# Patient Record
Sex: MALE | Race: WHITE | NOT HISPANIC OR LATINO | URBAN - METROPOLITAN AREA
[De-identification: names, ages, dates, MRNs, and addresses within clinical notes are randomized per-mention and may not be internally consistent; named-entity substitution may affect disease eponyms.]

---

## 2017-08-03 ENCOUNTER — EMERGENCY (EMERGENCY)
Facility: HOSPITAL | Age: 47
LOS: 1 days | Discharge: HOME | End: 2017-08-03
Attending: EMERGENCY MEDICINE | Admitting: EMERGENCY MEDICINE
Payer: SELF-PAY

## 2017-08-03 VITALS
DIASTOLIC BLOOD PRESSURE: 82 MMHG | RESPIRATION RATE: 16 BRPM | WEIGHT: 175.93 LBS | SYSTOLIC BLOOD PRESSURE: 121 MMHG | OXYGEN SATURATION: 100 % | HEART RATE: 93 BPM | TEMPERATURE: 98 F

## 2017-08-03 VITALS
SYSTOLIC BLOOD PRESSURE: 117 MMHG | TEMPERATURE: 98 F | RESPIRATION RATE: 17 BRPM | HEART RATE: 82 BPM | DIASTOLIC BLOOD PRESSURE: 92 MMHG | OXYGEN SATURATION: 98 %

## 2017-08-03 DIAGNOSIS — Z87.820 PERSONAL HISTORY OF TRAUMATIC BRAIN INJURY: ICD-10-CM
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
## 2017-08-03 DIAGNOSIS — Z88.5 ALLERGY STATUS TO NARCOTIC AGENT: ICD-10-CM

## 2017-08-03 DIAGNOSIS — E11.9 TYPE 2 DIABETES MELLITUS WITHOUT COMPLICATIONS: ICD-10-CM

## 2017-08-03 DIAGNOSIS — M79.602 PAIN IN LEFT ARM: ICD-10-CM
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
## 2017-08-03 DIAGNOSIS — Z79.84 LONG TERM (CURRENT) USE OF ORAL HYPOGLYCEMIC DRUGS: ICD-10-CM

## 2017-08-03 LAB
ALBUMIN SERPL ELPH-MCNC: 3.7 G/DL — SIGNIFICANT CHANGE UP (ref 3.3–5)
ALP SERPL-CCNC: 84 U/L — SIGNIFICANT CHANGE UP (ref 40–120)
ALT FLD-CCNC: 18 U/L — SIGNIFICANT CHANGE UP (ref 12–78)
ANION GAP SERPL CALC-SCNC: 10 MMOL/L — SIGNIFICANT CHANGE UP (ref 5–17)
AST SERPL-CCNC: 13 U/L — LOW (ref 15–37)
BASOPHILS # BLD AUTO: 0.1 K/UL — SIGNIFICANT CHANGE UP (ref 0–0.2)
BASOPHILS NFR BLD AUTO: 1.5 % — SIGNIFICANT CHANGE UP (ref 0–2)
BILIRUB SERPL-MCNC: 0.2 MG/DL — SIGNIFICANT CHANGE UP (ref 0.2–1.2)
BUN SERPL-MCNC: 17 MG/DL — SIGNIFICANT CHANGE UP (ref 7–23)
CALCIUM SERPL-MCNC: 9.1 MG/DL — SIGNIFICANT CHANGE UP (ref 8.5–10.1)
CHLORIDE SERPL-SCNC: 104 MMOL/L — SIGNIFICANT CHANGE UP (ref 96–108)
CK SERPL-CCNC: 50 U/L — SIGNIFICANT CHANGE UP (ref 26–308)
CO2 SERPL-SCNC: 21 MMOL/L — LOW (ref 22–31)
CREAT SERPL-MCNC: 0.73 MG/DL — SIGNIFICANT CHANGE UP (ref 0.5–1.3)
EOSINOPHIL # BLD AUTO: 0.3 K/UL — SIGNIFICANT CHANGE UP (ref 0–0.5)
EOSINOPHIL NFR BLD AUTO: 3.6 % — SIGNIFICANT CHANGE UP (ref 0–6)
GLUCOSE SERPL-MCNC: 299 MG/DL — HIGH (ref 70–99)
HCT VFR BLD CALC: 48.5 % — SIGNIFICANT CHANGE UP (ref 39–50)
HGB BLD-MCNC: 17 G/DL — SIGNIFICANT CHANGE UP (ref 13–17)
LYMPHOCYTES # BLD AUTO: 4.2 K/UL — HIGH (ref 1–3.3)
LYMPHOCYTES # BLD AUTO: 46 % — HIGH (ref 13–44)
MCHC RBC-ENTMCNC: 30.7 PG — SIGNIFICANT CHANGE UP (ref 27–34)
MCHC RBC-ENTMCNC: 35.1 GM/DL — SIGNIFICANT CHANGE UP (ref 32–36)
MCV RBC AUTO: 87.6 FL — SIGNIFICANT CHANGE UP (ref 80–100)
MONOCYTES # BLD AUTO: 0.6 K/UL — SIGNIFICANT CHANGE UP (ref 0–0.9)
MONOCYTES NFR BLD AUTO: 6.1 % — SIGNIFICANT CHANGE UP (ref 1–9)
NEUTROPHILS # BLD AUTO: 3.9 K/UL — SIGNIFICANT CHANGE UP (ref 1.8–7.4)
NEUTROPHILS NFR BLD AUTO: 42.9 % — LOW (ref 43–77)
PLATELET # BLD AUTO: 251 K/UL — SIGNIFICANT CHANGE UP (ref 150–400)
POTASSIUM SERPL-MCNC: 4.3 MMOL/L — SIGNIFICANT CHANGE UP (ref 3.5–5.3)
POTASSIUM SERPL-SCNC: 4.3 MMOL/L — SIGNIFICANT CHANGE UP (ref 3.5–5.3)
PROT SERPL-MCNC: 7.3 G/DL — SIGNIFICANT CHANGE UP (ref 6–8.3)
RBC # BLD: 5.54 M/UL — SIGNIFICANT CHANGE UP (ref 4.2–5.8)
RBC # FLD: 11.9 % — SIGNIFICANT CHANGE UP (ref 10.3–14.5)
SODIUM SERPL-SCNC: 135 MMOL/L — SIGNIFICANT CHANGE UP (ref 135–145)
WBC # BLD: 9.1 K/UL — SIGNIFICANT CHANGE UP (ref 3.8–10.5)
WBC # FLD AUTO: 9.1 K/UL — SIGNIFICANT CHANGE UP (ref 3.8–10.5)

## 2017-08-03 PROCEDURE — 73080 X-RAY EXAM OF ELBOW: CPT

## 2017-08-03 PROCEDURE — 36415 COLL VENOUS BLD VENIPUNCTURE: CPT

## 2017-08-03 PROCEDURE — 99284 EMERGENCY DEPT VISIT MOD MDM: CPT | Mod: 25

## 2017-08-03 PROCEDURE — 99283 EMERGENCY DEPT VISIT LOW MDM: CPT

## 2017-08-03 PROCEDURE — 73110 X-RAY EXAM OF WRIST: CPT | Mod: 26,LT

## 2017-08-03 PROCEDURE — 73090 X-RAY EXAM OF FOREARM: CPT

## 2017-08-03 PROCEDURE — 73130 X-RAY EXAM OF HAND: CPT

## 2017-08-03 PROCEDURE — 73080 X-RAY EXAM OF ELBOW: CPT | Mod: 26,LT

## 2017-08-03 PROCEDURE — 73130 X-RAY EXAM OF HAND: CPT | Mod: 26,LT

## 2017-08-03 PROCEDURE — 82550 ASSAY OF CK (CPK): CPT

## 2017-08-03 PROCEDURE — 73110 X-RAY EXAM OF WRIST: CPT

## 2017-08-03 PROCEDURE — 73090 X-RAY EXAM OF FOREARM: CPT | Mod: 26,LT

## 2017-08-03 PROCEDURE — 29125 APPL SHORT ARM SPLINT STATIC: CPT | Mod: LT

## 2017-08-03 PROCEDURE — 85027 COMPLETE CBC AUTOMATED: CPT

## 2017-08-03 PROCEDURE — 80053 COMPREHEN METABOLIC PANEL: CPT

## 2017-08-03 PROCEDURE — 29125 APPL SHORT ARM SPLINT STATIC: CPT

## 2017-08-03 RX ORDER — CYCLOBENZAPRINE HYDROCHLORIDE 10 MG/1
1 TABLET, FILM COATED ORAL
Qty: 15 | Refills: 0
Start: 2017-08-03 | End: 2017-08-08

## 2017-08-03 RX ORDER — CYCLOBENZAPRINE HYDROCHLORIDE 10 MG/1
5 TABLET, FILM COATED ORAL ONCE
Qty: 0 | Refills: 0 | Status: COMPLETED | OUTPATIENT
Start: 2017-08-03 | End: 2017-08-03

## 2017-08-03 RX ORDER — IBUPROFEN 200 MG
600 TABLET ORAL ONCE
Qty: 0 | Refills: 0 | Status: COMPLETED | OUTPATIENT
Start: 2017-08-03 | End: 2017-08-03

## 2017-08-03 RX ADMIN — Medication 600 MILLIGRAM(S): at 11:50

## 2017-08-03 RX ADMIN — CYCLOBENZAPRINE HYDROCHLORIDE 5 MILLIGRAM(S): 10 TABLET, FILM COATED ORAL at 13:17

## 2017-08-03 RX ADMIN — Medication 600 MILLIGRAM(S): at 13:17

## 2017-08-03 NOTE — ED PROVIDER NOTE - ATTENDING CONTRIBUTION TO CARE
I have personally performed a face to face diagnostic evaluation on this patient.  I have reviewed the PA note and agree with the history, exam, and plan of care, except as noted.  History and Exam by me shows 46 male with left arm injury was lifting his dog at the vet today and was struggling, pulled his left arm, having pain from left elbow down to left thumb, patient alert and oriented, heart and lung sounds clear, slight swelling over lateral elbow, able to move fingers, cap reill < 2 sec, opan and close hand, has tenderness over left forarm, f/u xrays, pain control.

## 2017-08-03 NOTE — ED PROVIDER NOTE - OBJECTIVE STATEMENT
45 y/o male presents to ED c/o left arm pain s/p injury x 2 days ago. States he brought his dog to the vet and he was holding his dog tightly so his dog wouldn't move at the vet. States he then developed the left arm pain later that day. Rates pain 5/10, no radiation of pain, gradual onset, worse with movement. Denies any other complaints. States he otherwise feels good. Denies n/v, f/c, chest pain, sob, numbness, tingling, headache, lightheadedness, dizziness, visual changes.

## 2017-08-03 NOTE — ED PROCEDURE NOTE - CPROC ED POST PROC CARE GUIDE1
Verbal/written post procedure instructions were given to patient/caregiver./Keep the cast/splint/dressing clean and dry./Instructed patient/caregiver to follow-up with primary care physician./Elevate the injured extremity as instructed./Instructed patient/caregiver regarding signs and symptoms of infection.

## 2017-08-03 NOTE — ED PROVIDER NOTE - PROGRESS NOTE DETAILS
ekg refused by pt. risks discussed including death and AMI. pt understands. pain improved after medication, pt in nad, resting comfortably in bed

## 2017-08-03 NOTE — ED PROVIDER NOTE - CHPI ED SYMPTOMS NEG
no deformity/no fever/no abrasion/no tingling/no bruising/no back pain/no weakness/no difficulty bearing weight/no numbness/no stiffness

## 2017-08-03 NOTE — ED PROVIDER NOTE - MEDICAL DECISION MAKING DETAILS
labs, xray, pain management, thumb spica splint labs, xray, pain management, thumb spica splint  Please follow up with your Primary MD in 24 hr. Please follow up with orthopedics Dr Cee within 3 days. Keep splint clean, dry and intact. Rest, ice, elevate extremity. OTC ibuprofen every 6 hours as needed for pain, take with food. Cyclobenzaprine every 8 hours as needed for muscle spasm, do not drive or drink alcohol while taking this medication. Return to ED immediately if condition worsens or any concerns.  Seek immediate medical care for any new/worsening signs or symptoms.

## 2017-08-03 NOTE — ED ADULT NURSE NOTE - OBJECTIVE STATEMENT
Pt. received alert and oriented x4 with chief complaint of left arm pain after holding dog earlier today.

## 2017-08-03 NOTE — ED PROCEDURE NOTE - NS ED PERI VASCULAR NEG
no cyanosis of extremity/capillary refill time < 2 seconds/no paresthesia/fingers/toes warm to touch/no swelling

## 2017-08-03 NOTE — ED PROVIDER NOTE - PHYSICAL EXAMINATION
Head- NC/AT. No capellan signs, no raccoon eyes, no hemotympanum, no contusions, no hematoma, no dental injuries.  Spine- no midline or paraspinal tenderness of cspine, thoracic spine or lumbar spine. No signs of back trauma, no masses, no abrasions, no lacerations, no redness, no bruising.  Neck- supple, no midline tenderness to palpation, + FROM, no abrasions, no ecchymosis.  Neuro- EOM intact, no nystagmus. Pelvis stable. Pt able to straight leg raise BL. NVI, good distal pulses x 4 extremities, capillary refill <2 sec x 4 extremities, sensation intact throughout, 5/5 motor x 4 extremities. Gait normal without limp.  DTRs normal x 4 extremities.  right upper extremity- FROM right shoulder, right elbow, right wrist, all fingers BL. +mild wrist and snuff box tenderness right. +mild tenderness to elbow. No other bony tenderness except where noted. No redness, no warmth, no swelling, no discharge, no streaking, no bruising, no deformity. NVI, good distal pulses BL, capillary refill <2 sec BL, sensation intact throughout, 5/5 motor x 4 extremities.

## 2018-10-19 ENCOUNTER — EMERGENCY (EMERGENCY)
Facility: HOSPITAL | Age: 48
LOS: 1 days | Discharge: HOME | End: 2018-10-19
Attending: INTERNAL MEDICINE
Payer: COMMERCIAL

## 2018-10-19 VITALS
TEMPERATURE: 98 F | RESPIRATION RATE: 16 BRPM | HEART RATE: 74 BPM | OXYGEN SATURATION: 95 % | WEIGHT: 184.97 LBS | HEIGHT: 66 IN | DIASTOLIC BLOOD PRESSURE: 75 MMHG | SYSTOLIC BLOOD PRESSURE: 111 MMHG

## 2018-10-19 PROCEDURE — 99283 EMERGENCY DEPT VISIT LOW MDM: CPT

## 2018-10-19 NOTE — ED ADULT NURSE NOTE - OBJECTIVE STATEMENT
Patient presents alert, oriented x4, calm, cooperative, able to follow commands. Multiple lacerations noted to right hand, no active bleeding noted. Fingers mobile, positive sensation

## 2018-10-20 PROCEDURE — 99283 EMERGENCY DEPT VISIT LOW MDM: CPT

## 2018-10-20 RX ADMIN — Medication 1 TABLET(S): at 01:27

## 2018-10-20 NOTE — ED PROVIDER NOTE - OBJECTIVE STATEMENT
49 y/o wm with cat bite, this was his rescue cat from adoption, uptodate on vaccines. The patient also received TT < 10 years

## 2019-02-14 ENCOUNTER — RX RENEWAL (OUTPATIENT)
Age: 49
End: 2019-02-14

## 2019-02-18 ENCOUNTER — RX RENEWAL (OUTPATIENT)
Age: 49
End: 2019-02-18

## 2019-03-05 ENCOUNTER — RECORD ABSTRACTING (OUTPATIENT)
Age: 49
End: 2019-03-05

## 2019-03-05 DIAGNOSIS — Z82.5 FAMILY HISTORY OF ASTHMA AND OTHER CHRONIC LOWER RESPIRATORY DISEASES: ICD-10-CM

## 2019-03-05 DIAGNOSIS — Z80.9 FAMILY HISTORY OF MALIGNANT NEOPLASM, UNSPECIFIED: ICD-10-CM

## 2019-03-05 DIAGNOSIS — Z87.828 PERSONAL HISTORY OF OTHER (HEALED) PHYSICAL INJURY AND TRAUMA: ICD-10-CM

## 2019-03-05 DIAGNOSIS — G56.03 CARPAL TUNNEL SYNDROM,BILATERAL UPPER LIMBS: ICD-10-CM

## 2019-03-05 DIAGNOSIS — Z87.820 PERSONAL HISTORY OF TRAUMATIC BRAIN INJURY: ICD-10-CM

## 2019-04-10 ENCOUNTER — RX RENEWAL (OUTPATIENT)
Age: 49
End: 2019-04-10

## 2019-07-17 ENCOUNTER — RX RENEWAL (OUTPATIENT)
Age: 49
End: 2019-07-17

## 2019-07-19 ENCOUNTER — RX RENEWAL (OUTPATIENT)
Age: 49
End: 2019-07-19

## 2019-07-22 ENCOUNTER — EMERGENCY (EMERGENCY)
Facility: HOSPITAL | Age: 49
LOS: 1 days | Discharge: HOME | End: 2019-07-22
Attending: EMERGENCY MEDICINE | Admitting: EMERGENCY MEDICINE
Payer: COMMERCIAL

## 2019-07-22 VITALS
DIASTOLIC BLOOD PRESSURE: 887 MMHG | RESPIRATION RATE: 17 BRPM | TEMPERATURE: 98 F | HEART RATE: 77 BPM | SYSTOLIC BLOOD PRESSURE: 128 MMHG | WEIGHT: 182.98 LBS | OXYGEN SATURATION: 98 % | HEIGHT: 67 IN

## 2019-07-22 VITALS
RESPIRATION RATE: 18 BRPM | HEART RATE: 74 BPM | TEMPERATURE: 98 F | DIASTOLIC BLOOD PRESSURE: 73 MMHG | OXYGEN SATURATION: 97 % | SYSTOLIC BLOOD PRESSURE: 111 MMHG

## 2019-07-22 LAB
ALBUMIN SERPL ELPH-MCNC: 3.8 G/DL — SIGNIFICANT CHANGE UP (ref 3.3–5)
ALP SERPL-CCNC: 75 U/L — SIGNIFICANT CHANGE UP (ref 40–120)
ALT FLD-CCNC: 31 U/L — SIGNIFICANT CHANGE UP (ref 12–78)
ANION GAP SERPL CALC-SCNC: 8 MMOL/L — SIGNIFICANT CHANGE UP (ref 5–17)
APTT BLD: 31.2 SEC — SIGNIFICANT CHANGE UP (ref 28.5–37)
AST SERPL-CCNC: 13 U/L — LOW (ref 15–37)
BASOPHILS # BLD AUTO: 0.05 K/UL — SIGNIFICANT CHANGE UP (ref 0–0.2)
BASOPHILS NFR BLD AUTO: 0.5 % — SIGNIFICANT CHANGE UP (ref 0–2)
BILIRUB SERPL-MCNC: 0.4 MG/DL — SIGNIFICANT CHANGE UP (ref 0.2–1.2)
BUN SERPL-MCNC: 16 MG/DL — SIGNIFICANT CHANGE UP (ref 7–23)
CALCIUM SERPL-MCNC: 9 MG/DL — SIGNIFICANT CHANGE UP (ref 8.5–10.1)
CHLORIDE SERPL-SCNC: 105 MMOL/L — SIGNIFICANT CHANGE UP (ref 96–108)
CK SERPL-CCNC: 76 U/L — SIGNIFICANT CHANGE UP (ref 26–308)
CO2 SERPL-SCNC: 22 MMOL/L — SIGNIFICANT CHANGE UP (ref 22–31)
CREAT SERPL-MCNC: 0.84 MG/DL — SIGNIFICANT CHANGE UP (ref 0.5–1.3)
EOSINOPHIL # BLD AUTO: 0.26 K/UL — SIGNIFICANT CHANGE UP (ref 0–0.5)
EOSINOPHIL NFR BLD AUTO: 2.7 % — SIGNIFICANT CHANGE UP (ref 0–6)
ERYTHROCYTE [SEDIMENTATION RATE] IN BLOOD: 5 MM/HR — SIGNIFICANT CHANGE UP (ref 0–15)
GLUCOSE SERPL-MCNC: 278 MG/DL — HIGH (ref 70–99)
HCT VFR BLD CALC: 47.4 % — SIGNIFICANT CHANGE UP (ref 39–50)
HGB BLD-MCNC: 17 G/DL — SIGNIFICANT CHANGE UP (ref 13–17)
IMM GRANULOCYTES NFR BLD AUTO: 0.3 % — SIGNIFICANT CHANGE UP (ref 0–1.5)
INR BLD: 0.89 RATIO — SIGNIFICANT CHANGE UP (ref 0.88–1.16)
LACTATE SERPL-SCNC: 1.5 MMOL/L — SIGNIFICANT CHANGE UP (ref 0.7–2)
LYMPHOCYTES # BLD AUTO: 3.63 K/UL — HIGH (ref 1–3.3)
LYMPHOCYTES # BLD AUTO: 37.2 % — SIGNIFICANT CHANGE UP (ref 13–44)
MCHC RBC-ENTMCNC: 30.4 PG — SIGNIFICANT CHANGE UP (ref 27–34)
MCHC RBC-ENTMCNC: 35.9 GM/DL — SIGNIFICANT CHANGE UP (ref 32–36)
MCV RBC AUTO: 84.8 FL — SIGNIFICANT CHANGE UP (ref 80–100)
MONOCYTES # BLD AUTO: 0.67 K/UL — SIGNIFICANT CHANGE UP (ref 0–0.9)
MONOCYTES NFR BLD AUTO: 6.9 % — SIGNIFICANT CHANGE UP (ref 2–14)
NEUTROPHILS # BLD AUTO: 5.11 K/UL — SIGNIFICANT CHANGE UP (ref 1.8–7.4)
NEUTROPHILS NFR BLD AUTO: 52.4 % — SIGNIFICANT CHANGE UP (ref 43–77)
NRBC # BLD: 0 /100 WBCS — SIGNIFICANT CHANGE UP (ref 0–0)
PLATELET # BLD AUTO: 233 K/UL — SIGNIFICANT CHANGE UP (ref 150–400)
POTASSIUM SERPL-MCNC: 4.3 MMOL/L — SIGNIFICANT CHANGE UP (ref 3.5–5.3)
POTASSIUM SERPL-SCNC: 4.3 MMOL/L — SIGNIFICANT CHANGE UP (ref 3.5–5.3)
PROT SERPL-MCNC: 7.5 G/DL — SIGNIFICANT CHANGE UP (ref 6–8.3)
PROTHROM AB SERPL-ACNC: 10.1 SEC — SIGNIFICANT CHANGE UP (ref 10–12.9)
RBC # BLD: 5.59 M/UL — SIGNIFICANT CHANGE UP (ref 4.2–5.8)
RBC # FLD: 12.8 % — SIGNIFICANT CHANGE UP (ref 10.3–14.5)
SODIUM SERPL-SCNC: 135 MMOL/L — SIGNIFICANT CHANGE UP (ref 135–145)
WBC # BLD: 9.75 K/UL — SIGNIFICANT CHANGE UP (ref 3.8–10.5)
WBC # FLD AUTO: 9.75 K/UL — SIGNIFICANT CHANGE UP (ref 3.8–10.5)

## 2019-07-22 PROCEDURE — 93010 ELECTROCARDIOGRAM REPORT: CPT

## 2019-07-22 PROCEDURE — 82962 GLUCOSE BLOOD TEST: CPT

## 2019-07-22 PROCEDURE — 36415 COLL VENOUS BLD VENIPUNCTURE: CPT

## 2019-07-22 PROCEDURE — 93971 EXTREMITY STUDY: CPT

## 2019-07-22 PROCEDURE — 73630 X-RAY EXAM OF FOOT: CPT

## 2019-07-22 PROCEDURE — 85027 COMPLETE CBC AUTOMATED: CPT

## 2019-07-22 PROCEDURE — 82550 ASSAY OF CK (CPK): CPT

## 2019-07-22 PROCEDURE — 73630 X-RAY EXAM OF FOOT: CPT | Mod: 26,RT

## 2019-07-22 PROCEDURE — 99284 EMERGENCY DEPT VISIT MOD MDM: CPT

## 2019-07-22 PROCEDURE — 83605 ASSAY OF LACTIC ACID: CPT

## 2019-07-22 PROCEDURE — 93926 LOWER EXTREMITY STUDY: CPT

## 2019-07-22 PROCEDURE — 85652 RBC SED RATE AUTOMATED: CPT

## 2019-07-22 PROCEDURE — 93005 ELECTROCARDIOGRAM TRACING: CPT

## 2019-07-22 PROCEDURE — 85610 PROTHROMBIN TIME: CPT

## 2019-07-22 PROCEDURE — 85730 THROMBOPLASTIN TIME PARTIAL: CPT

## 2019-07-22 PROCEDURE — 99284 EMERGENCY DEPT VISIT MOD MDM: CPT | Mod: 25

## 2019-07-22 PROCEDURE — 96376 TX/PRO/DX INJ SAME DRUG ADON: CPT

## 2019-07-22 PROCEDURE — 80053 COMPREHEN METABOLIC PANEL: CPT

## 2019-07-22 PROCEDURE — 93926 LOWER EXTREMITY STUDY: CPT | Mod: 26,RT

## 2019-07-22 PROCEDURE — 93922 UPR/L XTREMITY ART 2 LEVELS: CPT | Mod: 26

## 2019-07-22 PROCEDURE — 96374 THER/PROPH/DIAG INJ IV PUSH: CPT

## 2019-07-22 PROCEDURE — 93971 EXTREMITY STUDY: CPT | Mod: 26,RT

## 2019-07-22 PROCEDURE — 87040 BLOOD CULTURE FOR BACTERIA: CPT

## 2019-07-22 RX ORDER — HYDROMORPHONE HYDROCHLORIDE 2 MG/ML
0.5 INJECTION INTRAMUSCULAR; INTRAVENOUS; SUBCUTANEOUS ONCE
Refills: 0 | Status: DISCONTINUED | OUTPATIENT
Start: 2019-07-22 | End: 2019-07-22

## 2019-07-22 RX ORDER — SODIUM CHLORIDE 9 MG/ML
1000 INJECTION INTRAMUSCULAR; INTRAVENOUS; SUBCUTANEOUS ONCE
Refills: 0 | Status: COMPLETED | OUTPATIENT
Start: 2019-07-22 | End: 2019-07-22

## 2019-07-22 RX ADMIN — HYDROMORPHONE HYDROCHLORIDE 0.5 MILLIGRAM(S): 2 INJECTION INTRAMUSCULAR; INTRAVENOUS; SUBCUTANEOUS at 13:11

## 2019-07-22 RX ADMIN — HYDROMORPHONE HYDROCHLORIDE 0.5 MILLIGRAM(S): 2 INJECTION INTRAMUSCULAR; INTRAVENOUS; SUBCUTANEOUS at 17:48

## 2019-07-22 RX ADMIN — SODIUM CHLORIDE 1000 MILLILITER(S): 9 INJECTION INTRAMUSCULAR; INTRAVENOUS; SUBCUTANEOUS at 11:01

## 2019-07-22 RX ADMIN — HYDROMORPHONE HYDROCHLORIDE 0.5 MILLIGRAM(S): 2 INJECTION INTRAMUSCULAR; INTRAVENOUS; SUBCUTANEOUS at 14:00

## 2019-07-22 RX ADMIN — HYDROMORPHONE HYDROCHLORIDE 0.5 MILLIGRAM(S): 2 INJECTION INTRAMUSCULAR; INTRAVENOUS; SUBCUTANEOUS at 17:50

## 2019-07-22 RX ADMIN — HYDROMORPHONE HYDROCHLORIDE 0.5 MILLIGRAM(S): 2 INJECTION INTRAMUSCULAR; INTRAVENOUS; SUBCUTANEOUS at 12:27

## 2019-07-22 RX ADMIN — HYDROMORPHONE HYDROCHLORIDE 0.5 MILLIGRAM(S): 2 INJECTION INTRAMUSCULAR; INTRAVENOUS; SUBCUTANEOUS at 11:01

## 2019-07-22 NOTE — ED PROVIDER NOTE - PROGRESS NOTE DETAILS
spoke with vascular on call, Dr. Brown,case dicussed, aware of discolored right 5th toe, (+)DP pulse, awaiting US arterial, wants EULALIA to be done as well and call back with results Case reviewed again with Dr. Brown who feels that patient can go home to follow-up with him as outpatient.

## 2019-07-22 NOTE — ED PROVIDER NOTE - OBJECTIVE STATEMENT
Patient is a 49 y/o male with PMHx of NIDDM on Metformin complicated by diabetic neuropathy, TBI with chronic migraines, and nicotine dependence with 32 pack year history who presents with pain and purple discoloration of the R 5th toe, R 4th toe, and side of R foot. Patient describes beginning to notice the pain yesterday afternoon with redness of his R 5th toe. Pain is constant, 8/10, and feels like "someone is gripping my toes with pliers". Pain worsens with ambulation and icing helps the pain briefly. Denies taking any medication for the pain. Noticed the purple discoloration of the R 5th toe, R 4th toe tip, and R side of the foot this morning. Says this pain is different than his normal LE diabetic neuropathy pain. Denies any trauma to the area. Denies any fevers, chills, chest pain, or SOB. Denies any recent travel or increased time of immobility. Denies any history of gout. As per patient, last HbA1c was 7.2-7.4 within the past 2-3 months.     PCP: Dr. Sheldon Irwin

## 2019-07-22 NOTE — ED PROVIDER NOTE - ATTENDING CONTRIBUTION TO CARE
I have personally performed a face to face bedside history and physical examination of this patient. I have discussed the history, examination, review of systems, assessment and plan of management with the resident. I have reviewed the electronic medical record and amended it to reflect my history, review of systems, physical exam, assessment and plan. Patient c/o right foot pain, right 5th toe discoloration, tender to palpation, concern for PAD, (+)smoker, DM, f/u labs, US doppler, xrays, pain control.

## 2019-07-22 NOTE — ED PROVIDER NOTE - NS ED ROS FT
Constitutional: denies fever or chills  HEENT: denies headache, dizziness, or lightheadedness  Respiratory: denies SOB  Cardiovascular: denies CP, palpitations  Skin: + discoloration of R toes, R foot  Musculoskeletal: + R foot pain, + R calf pain  Neurologic: + neuropathic pain RLE LLE  ROS negative except as noted above

## 2019-07-22 NOTE — ED ADULT NURSE NOTE - OBJECTIVE STATEMENT
pt to er has area to bottom of 5 th toe on right foot with darkened skin no drainage c/o pain to area pos pedal pulse resp even unlabored iv started 20 angio left ac labs drawn

## 2019-07-22 NOTE — ED PROVIDER NOTE - PHYSICAL EXAMINATION
Physical Exam:  General: Overweight male, in mild acute distress due to pain  CV: RRR, +S1/S2, no murmurs, rubs or gallops  Respiratory: CTA B/L, No W/R/R  Extremities:   LLE: no cyanosis/ecchymosis, tenderness to light touch normal 2/2 diabetic neuropathy as per patient; pulses 2+ dorsalis, posterior tib, femoral on palpation; DP and PT pulses audible with doppler; 5/5 strength all ROM foot and ankle; sensation intact to light touch throughout  RLE: purple discoloration of 5th toe, tip of 4th toe, lateral side of foot; erythema of all toes; tenderness to light touch of all toes, plantar and dorsal aspects of foot, up to mid-calf region; DP pulse and femoral pulse 2+/4 by palpation; no PT found on palpation; DP and PT pulses audible with doppler; 4/5 strength all ROM foot and ankle 2/2 pain; sensation intact to light touch throughout

## 2019-07-25 ENCOUNTER — APPOINTMENT (OUTPATIENT)
Dept: INTERNAL MEDICINE | Facility: CLINIC | Age: 49
End: 2019-07-25
Payer: COMMERCIAL

## 2019-07-25 VITALS
RESPIRATION RATE: 16 BRPM | HEART RATE: 90 BPM | BODY MASS INDEX: 29.7 KG/M2 | HEIGHT: 67 IN | SYSTOLIC BLOOD PRESSURE: 124 MMHG | TEMPERATURE: 98.3 F | DIASTOLIC BLOOD PRESSURE: 84 MMHG | OXYGEN SATURATION: 95 % | WEIGHT: 189.19 LBS

## 2019-07-25 DIAGNOSIS — Z87.438 PERSONAL HISTORY OF OTHER DISEASES OF MALE GENITAL ORGANS: ICD-10-CM

## 2019-07-25 PROCEDURE — G0442 ANNUAL ALCOHOL SCREEN 15 MIN: CPT

## 2019-07-25 PROCEDURE — 99406 BEHAV CHNG SMOKING 3-10 MIN: CPT

## 2019-07-25 PROCEDURE — 99214 OFFICE O/P EST MOD 30 MIN: CPT | Mod: 25

## 2019-07-25 RX ORDER — ASPIRIN 325 MG/1
325 TABLET, FILM COATED ORAL
Refills: 0 | Status: ACTIVE | COMMUNITY

## 2019-07-25 NOTE — PLAN
[FreeTextEntry1] : Pulmonology \par Tobacco abuse-  advised the importance to stop smoking. Education was provided to the patient for over 5 minutes. Discussed comorbidities of tobacco abuse including stroke and clots.  Patient states he plans to quit in 2 days\par \par Endocrinology\par Diabetes-  advised patient the need to check hemoglobin A1c every 3-4 months.  Reviewed last hemoglobin A1c of October 2018 is elevated at 7.8.  Patient was given a prescription for and hemoglobin A1c. Did refill metformin for only 30 days pending blood test.  Again education was provided.\par \par Vitamin D deficiency -  advised to take vitamin D 3. Check lab\par Vascular  \par Fifth toe vascular clot.  Followup with Dr. Brown. Continue with aspirin 325 mg daily. Advised to elevate leg.

## 2019-07-25 NOTE — COUNSELING
[Healthy eating counseling provided] : healthy eating [Activity counseling provided] : activity [Discussed Risks and Advised to Quit Smoking] : Discussed risks and advised to quit smoking [Discussed Cessation Medication] : cessation medication was discussed [Discussed Cessation Strategies] : cessation strategies were discussed [Needs reinforcement, provided] : Patient needs reinforcement on understanding of disease, goals and obesity follow-up plan; reinforcement was provided [Low Fat Diet] : Low fat diet [Low Salt Diet] : Low salt diet [Decrease Portions] : Decrease food portions [Walking] : Walking [Patient motivation] : Patient motivation [Tobacco Use Cessation Intermediate Greater Than 3 Minutes Up to 10 Minutes] : Tobacco Use Cessation Intermediate Greater Than 3 Minutes Up to 10 Minutes [ - Annual Alcohol Misuse Screening] : Annual Alcohol Misuse Screening [de-identified] : ADA 1800 miley diet

## 2019-07-25 NOTE — ASSESSMENT
[FreeTextEntry1] : A 48-year-old male who presents to the office for a followup status post emergency room visit

## 2019-07-25 NOTE — PHYSICAL EXAM
[No Acute Distress] : no acute distress [Well Nourished] : well nourished [Well Developed] : well developed [Well-Appearing] : well-appearing [Normal Sclera/Conjunctiva] : normal sclera/conjunctiva [PERRL] : pupils equal round and reactive to light [EOMI] : extraocular movements intact [Normal Outer Ear/Nose] : the outer ears and nose were normal in appearance [Normal Oropharynx] : the oropharynx was normal [No JVD] : no jugular venous distention [No Lymphadenopathy] : no lymphadenopathy [Supple] : supple [Thyroid Normal, No Nodules] : the thyroid was normal and there were no nodules present [No Respiratory Distress] : no respiratory distress  [No Accessory Muscle Use] : no accessory muscle use [Clear to Auscultation] : lungs were clear to auscultation bilaterally [Normal Rate] : normal rate  [Regular Rhythm] : with a regular rhythm [Normal S1, S2] : normal S1 and S2 [No Carotid Bruits] : no carotid bruits [No Abdominal Bruit] : a ~M bruit was not heard ~T in the abdomen [No Varicosities] : no varicosities [Pedal Pulses Present] : the pedal pulses are present [No Edema] : there was no peripheral edema [No Palpable Aorta] : no palpable aorta [No Extremity Clubbing/Cyanosis] : no extremity clubbing/cyanosis [Soft] : abdomen soft [Non Tender] : non-tender [Non-distended] : non-distended [No Masses] : no abdominal mass palpated [No HSM] : no HSM [Normal Bowel Sounds] : normal bowel sounds [Normal Posterior Cervical Nodes] : no posterior cervical lymphadenopathy [Normal Anterior Cervical Nodes] : no anterior cervical lymphadenopathy [No CVA Tenderness] : no CVA  tenderness [No Spinal Tenderness] : no spinal tenderness [No Joint Swelling] : no joint swelling [Grossly Normal Strength/Tone] : grossly normal strength/tone [No Rash] : no rash [Coordination Grossly Intact] : coordination grossly intact [No Focal Deficits] : no focal deficits [Normal Gait] : normal gait [Deep Tendon Reflexes (DTR)] : deep tendon reflexes were 2+ and symmetric [Normal Affect] : the affect was normal [Normal Insight/Judgement] : insight and judgment were intact [Tattoo - Single] : no tattoos observed [Multiple Tattoos] : multiple tattoos observed [Speech Grossly Normal] : speech grossly normal [Alert and Oriented x3] : oriented to person, place, and time [Normal Mood] : the mood was normal [] : both feet [de-identified] : Left toe 5th was purple and tender to touch

## 2019-07-25 NOTE — HEALTH RISK ASSESSMENT
[] : Yes [30 or more] : 30 or more [Never (0 pts)] : Never (0 points) [No] : In the past 12 months have you used drugs other than those required for medical reasons? No [No falls in past year] : Patient reported no falls in the past year [0] : 2) Feeling down, depressed, or hopeless: Not at all (0) [Audit-CScore] : 0 [de-identified] : Denies  [de-identified] : Low carbohydrate  [[id number]] : 0

## 2019-07-27 LAB
CULTURE RESULTS: SIGNIFICANT CHANGE UP
CULTURE RESULTS: SIGNIFICANT CHANGE UP
SPECIMEN SOURCE: SIGNIFICANT CHANGE UP
SPECIMEN SOURCE: SIGNIFICANT CHANGE UP

## 2019-08-09 ENCOUNTER — APPOINTMENT (OUTPATIENT)
Dept: VASCULAR SURGERY | Facility: CLINIC | Age: 49
End: 2019-08-09
Payer: COMMERCIAL

## 2019-08-09 VITALS
HEART RATE: 90 BPM | DIASTOLIC BLOOD PRESSURE: 86 MMHG | TEMPERATURE: 98.1 F | WEIGHT: 188 LBS | HEIGHT: 67 IN | BODY MASS INDEX: 29.51 KG/M2 | SYSTOLIC BLOOD PRESSURE: 122 MMHG

## 2019-08-09 DIAGNOSIS — I75.021 ATHEROEMBOLISM OF RIGHT LOWER EXTREMITY: ICD-10-CM

## 2019-08-09 DIAGNOSIS — Z86.79 PERSONAL HISTORY OF OTHER DISEASES OF THE CIRCULATORY SYSTEM: ICD-10-CM

## 2019-08-09 DIAGNOSIS — Z86.39 PERSONAL HISTORY OF OTHER ENDOCRINE, NUTRITIONAL AND METABOLIC DISEASE: ICD-10-CM

## 2019-08-09 PROCEDURE — 99202 OFFICE O/P NEW SF 15 MIN: CPT

## 2019-08-09 NOTE — ASSESSMENT
[Arterial/Venous Disease] : arterial/venous disease [FreeTextEntry1] : Patient has  Atheroemboli to right foot  5th toe   No clinical evidence of  aneurysms  Patient has excellent  pedal pulses   Suggest cessation of  smoking  Start Plavix  Plan to  do  CTA  Abdominal  aorta with  runoff  Pain  control   [Smoking Cessation] : smoking cessation

## 2019-08-09 NOTE — HISTORY OF PRESENT ILLNESS
[FreeTextEntry1] : Patient has these symptoms  for  2 weeks  Started  suddenly  Right  5th toe  is extremely painful  Side of the foot has  purple  discoloration  Had cramps  right  calf   Sits  at a desk   Could  walk  well before this  episode  No claudication  history or  rest pain

## 2019-08-09 NOTE — PHYSICAL EXAM
[2+] : left 2+ [Ankle Swelling (On Exam)] : not present [Varicose Veins Of Lower Extremities] : not present [] : not present [FreeTextEntry1] : Right  5th toe cyanosis  Excellent pulses  Classic  Athero embolic  disease

## 2019-08-26 ENCOUNTER — RX RENEWAL (OUTPATIENT)
Age: 49
End: 2019-08-26

## 2019-12-04 ENCOUNTER — RX RENEWAL (OUTPATIENT)
Age: 49
End: 2019-12-04

## 2020-03-11 ENCOUNTER — LABORATORY RESULT (OUTPATIENT)
Age: 50
End: 2020-03-11

## 2020-03-12 ENCOUNTER — APPOINTMENT (OUTPATIENT)
Dept: INTERNAL MEDICINE | Facility: CLINIC | Age: 50
End: 2020-03-12
Payer: COMMERCIAL

## 2020-03-12 ENCOUNTER — LABORATORY RESULT (OUTPATIENT)
Age: 50
End: 2020-03-12

## 2020-03-12 VITALS
TEMPERATURE: 98.7 F | SYSTOLIC BLOOD PRESSURE: 132 MMHG | OXYGEN SATURATION: 98 % | BODY MASS INDEX: 27.78 KG/M2 | HEART RATE: 80 BPM | HEIGHT: 67 IN | RESPIRATION RATE: 16 BRPM | DIASTOLIC BLOOD PRESSURE: 70 MMHG | WEIGHT: 177 LBS

## 2020-03-12 DIAGNOSIS — Z12.5 ENCOUNTER FOR SCREENING FOR MALIGNANT NEOPLASM OF PROSTATE: ICD-10-CM

## 2020-03-12 LAB
BILIRUB UR QL STRIP: NORMAL
CLARITY UR: CLEAR
COLLECTION METHOD: NORMAL
GLUCOSE UR-MCNC: 500
HCG UR QL: 0.2 EU/DL
HGB UR QL STRIP.AUTO: NORMAL
KETONES UR-MCNC: 15
LEUKOCYTE ESTERASE UR QL STRIP: NORMAL
NITRITE UR QL STRIP: NORMAL
PH UR STRIP: 5.5
PROT UR STRIP-MCNC: 100
SP GR UR STRIP: 1.02

## 2020-03-12 PROCEDURE — 99214 OFFICE O/P EST MOD 30 MIN: CPT | Mod: 25

## 2020-03-12 PROCEDURE — 81003 URINALYSIS AUTO W/O SCOPE: CPT | Mod: QW

## 2020-03-12 PROCEDURE — 36415 COLL VENOUS BLD VENIPUNCTURE: CPT

## 2020-03-12 RX ORDER — CLOPIDOGREL BISULFATE 75 MG/1
75 TABLET, FILM COATED ORAL DAILY
Qty: 30 | Refills: 0 | Status: COMPLETED | COMMUNITY
Start: 2019-08-09 | End: 2019-09-12

## 2020-03-16 PROBLEM — Z12.5 SCREENING PSA (PROSTATE SPECIFIC ANTIGEN): Status: ACTIVE | Noted: 2020-03-12

## 2020-03-16 NOTE — COUNSELING
[Healthy eating counseling provided] : healthy eating [Activity counseling provided] : activity [Discussed Risks and Advised to Quit Smoking] : Discussed risks and advised to quit smoking [Discussed Cessation Medication] : cessation medication was discussed [Discussed Cessation Strategies] : cessation strategies were discussed [Needs reinforcement, provided] : Patient needs reinforcement on understanding of disease, goals and obesity follow-up plan; reinforcement was provided [Low Fat Diet] : Low fat diet [Low Salt Diet] : Low salt diet [Walking] : Walking [Patient motivation] : Patient motivation [Risk of tobacco use and health benefits of smoking cessation discussed] : Risk of tobacco use and health benefits of smoking cessation discussed [AUDIT-C Screening administered and reviewed] : AUDIT-C Screening administered and reviewed [Benefits of weight loss discussed] : Benefits of weight loss discussed [Encouraged to increase physical activity] : Encouraged to increase physical activity [Decrease Portions] : decrease portions [____ min/wk Activity] : [unfilled] min/wk activity [Good understanding] : Patient has a good understanding of disease, goals and obesity follow-up plan [FreeTextEntry2] : ADA diet  [de-identified] : ADA 1800 miley diet

## 2020-03-16 NOTE — HEALTH RISK ASSESSMENT
[] : Yes [30 or more] : 30 or more [Never (0 pts)] : Never (0 points) [No] : In the past 12 months have you used drugs other than those required for medical reasons? No [No falls in past year] : Patient reported no falls in the past year [0] : 2) Feeling down, depressed, or hopeless: Not at all (0) [Audit-CScore] : 0 [de-identified] : Denies  [de-identified] : Low carbohydrate  [[id number]] : 0

## 2020-03-16 NOTE — PHYSICAL EXAM
[No Acute Distress] : no acute distress [Well Nourished] : well nourished [Well Developed] : well developed [Well-Appearing] : well-appearing [Normal Sclera/Conjunctiva] : normal sclera/conjunctiva [PERRL] : pupils equal round and reactive to light [EOMI] : extraocular movements intact [Normal Outer Ear/Nose] : the outer ears and nose were normal in appearance [Normal Oropharynx] : the oropharynx was normal [No JVD] : no jugular venous distention [No Lymphadenopathy] : no lymphadenopathy [Supple] : supple [Thyroid Normal, No Nodules] : the thyroid was normal and there were no nodules present [No Respiratory Distress] : no respiratory distress  [No Accessory Muscle Use] : no accessory muscle use [Clear to Auscultation] : lungs were clear to auscultation bilaterally [Normal Rate] : normal rate  [Regular Rhythm] : with a regular rhythm [Normal S1, S2] : normal S1 and S2 [No Carotid Bruits] : no carotid bruits [No Abdominal Bruit] : a ~M bruit was not heard ~T in the abdomen [No Varicosities] : no varicosities [Pedal Pulses Present] : the pedal pulses are present [No Edema] : there was no peripheral edema [No Palpable Aorta] : no palpable aorta [No Extremity Clubbing/Cyanosis] : no extremity clubbing/cyanosis [Soft] : abdomen soft [Non Tender] : non-tender [Non-distended] : non-distended [No Masses] : no abdominal mass palpated [No HSM] : no HSM [Normal Bowel Sounds] : normal bowel sounds [Normal Posterior Cervical Nodes] : no posterior cervical lymphadenopathy [Normal Anterior Cervical Nodes] : no anterior cervical lymphadenopathy [No CVA Tenderness] : no CVA  tenderness [No Spinal Tenderness] : no spinal tenderness [No Joint Swelling] : no joint swelling [Grossly Normal Strength/Tone] : grossly normal strength/tone [No Rash] : no rash [Multiple Tattoos] : multiple tattoos observed [Coordination Grossly Intact] : coordination grossly intact [No Focal Deficits] : no focal deficits [Normal Gait] : normal gait [Deep Tendon Reflexes (DTR)] : deep tendon reflexes were 2+ and symmetric [Speech Grossly Normal] : speech grossly normal [Normal Affect] : the affect was normal [Alert and Oriented x3] : oriented to person, place, and time [Normal Mood] : the mood was normal [Normal Insight/Judgement] : insight and judgment were intact [] : both feet [Right Foot Was Examined] : Right foot ~C was examined [Left Foot Was Examined] : left foot ~C was examined [Normal TMs] : both tympanic membranes were normal [Tattoo - Single] : no tattoos observed [de-identified] : w murmur  [de-identified] : Left toe 5th was purple and tender to touch  [TWNoteComboBox3] : +1 [TWNoteComboBox4] : +2

## 2020-03-16 NOTE — ASSESSMENT
[FreeTextEntry1] : A 49-year-old male who presents to the office for a check up, renewal of medication and fasting labs

## 2020-03-16 NOTE — CURRENT MEDS
[Takes medication as prescribed] : does not take [FreeTextEntry1] : stopped metformin over the last month\par stopped Plavix and jardiace a while ago on his own

## 2020-03-16 NOTE — HISTORY OF PRESENT ILLNESS
[FreeTextEntry1] : Check up  [de-identified] : Mr. CARRINGTON is a 49 year  male, who present to the office for check up and fasting labs.\par States he stopped taking Plavix and jardiance for a period of time.  Also states for the last week  he not taking the metformin secondary to running out of medication.  \par States his foot pain is much better.  Still at time has a  slight numbness

## 2020-03-16 NOTE — PLAN
[FreeTextEntry1] : Pulmonology \par Tobacco abuse-  advised the importance to stop smoking.  Discussed comorbidities of tobacco abuse.\par  \par Endocrinology\par Diabetes-  advised patient the need to check hemoglobin A1c every 3-4 months.  Check labs.  Did refill metformin for only 30 days pending blood test.  Again education was provided.\par Advised to see ophthalmologist for a diabetic eye exam  \par \par Vitamin D deficiency -  advised to take vitamin D 3. Check lab\par \par Vascular  \par Fifth toe vascular clot.  Followup with Dr. Brown. Continue with aspirin 325 mg daily. Pt Stopped his plavix on his own.\par \par  - screening for PSA.  Education advised \par \par GI - check FIT / DNA advised the importance of screening colonoscopies \par \par We discussed the importance of healthy lifestyles which include exercise, weight control and good diet.\par Patient's questions were answered in full detail. Advise patient if any other concerns arise to please call office to have a discussion.

## 2020-03-17 ENCOUNTER — APPOINTMENT (OUTPATIENT)
Dept: INTERNAL MEDICINE | Facility: CLINIC | Age: 50
End: 2020-03-17
Payer: COMMERCIAL

## 2020-03-17 VITALS
TEMPERATURE: 97.8 F | HEART RATE: 81 BPM | SYSTOLIC BLOOD PRESSURE: 110 MMHG | BODY MASS INDEX: 27.94 KG/M2 | HEIGHT: 67 IN | WEIGHT: 178 LBS | OXYGEN SATURATION: 97 % | RESPIRATION RATE: 16 BRPM | DIASTOLIC BLOOD PRESSURE: 72 MMHG

## 2020-03-17 DIAGNOSIS — E29.1 TESTICULAR HYPOFUNCTION: ICD-10-CM

## 2020-03-17 DIAGNOSIS — E11.65 TYPE 2 DIABETES MELLITUS WITH HYPERGLYCEMIA: ICD-10-CM

## 2020-03-17 DIAGNOSIS — E55.9 VITAMIN D DEFICIENCY, UNSPECIFIED: ICD-10-CM

## 2020-03-17 DIAGNOSIS — D72.829 ELEVATED WHITE BLOOD CELL COUNT, UNSPECIFIED: ICD-10-CM

## 2020-03-17 LAB
25(OH)D3 SERPL-MCNC: 10.4 NG/ML
ALBUMIN SERPL ELPH-MCNC: 4.6 G/DL
ALP BLD-CCNC: 81 U/L
ALT SERPL-CCNC: 13 U/L
ANION GAP SERPL CALC-SCNC: 16 MMOL/L
AST SERPL-CCNC: 12 U/L
BASOPHILS # BLD AUTO: 0.05 K/UL
BASOPHILS NFR BLD AUTO: 0.4 %
BILIRUB SERPL-MCNC: 0.2 MG/DL
BUN SERPL-MCNC: 15 MG/DL
CALCIUM SERPL-MCNC: 9.9 MG/DL
CHLORIDE SERPL-SCNC: 95 MMOL/L
CHOLEST SERPL-MCNC: 204 MG/DL
CHOLEST/HDLC SERPL: 5.1 RATIO
CO2 SERPL-SCNC: 22 MMOL/L
CREAT SERPL-MCNC: 0.79 MG/DL
EOSINOPHIL # BLD AUTO: 0.31 K/UL
EOSINOPHIL NFR BLD AUTO: 2.6 %
ESTIMATED AVERAGE GLUCOSE: 349 MG/DL
GLUCOSE SERPL-MCNC: 330 MG/DL
HBA1C MFR BLD HPLC: 13.8 %
HCT VFR BLD CALC: 50 %
HDLC SERPL-MCNC: 40 MG/DL
HGB BLD-MCNC: 16.6 G/DL
IMM GRANULOCYTES NFR BLD AUTO: 0.2 %
LDLC SERPL CALC-MCNC: 95 MG/DL
LYMPHOCYTES # BLD AUTO: 4.17 K/UL
LYMPHOCYTES NFR BLD AUTO: 34.7 %
MAN DIFF?: NORMAL
MCHC RBC-ENTMCNC: 29.6 PG
MCHC RBC-ENTMCNC: 33.2 GM/DL
MCV RBC AUTO: 89.1 FL
MONOCYTES # BLD AUTO: 0.86 K/UL
MONOCYTES NFR BLD AUTO: 7.2 %
NEUTROPHILS # BLD AUTO: 6.61 K/UL
NEUTROPHILS NFR BLD AUTO: 54.9 %
PLATELET # BLD AUTO: 261 K/UL
POTASSIUM SERPL-SCNC: 4.4 MMOL/L
PROLACTIN SERPL-MCNC: 8.1 NG/ML
PROT SERPL-MCNC: 7.1 G/DL
PSA SERPL-MCNC: 0.4 NG/ML
RBC # BLD: 5.61 M/UL
RBC # FLD: 13 %
SODIUM SERPL-SCNC: 134 MMOL/L
TESTOST BND SERPL-MCNC: 5.7 PG/ML
TESTOST SERPL-MCNC: 248.4 NG/DL
TRIGL SERPL-MCNC: 349 MG/DL
TSH SERPL-ACNC: 1.92 UIU/ML
WBC # FLD AUTO: 12.02 K/UL

## 2020-03-17 PROCEDURE — 99213 OFFICE O/P EST LOW 20 MIN: CPT | Mod: 25

## 2020-03-17 PROCEDURE — 36415 COLL VENOUS BLD VENIPUNCTURE: CPT

## 2020-03-17 RX ORDER — BLOOD SUGAR DIAGNOSTIC
STRIP MISCELLANEOUS TWICE DAILY
Qty: 200 | Refills: 0 | Status: ACTIVE | COMMUNITY
Start: 2020-03-17 | End: 1900-01-01

## 2020-03-17 RX ORDER — LANCETS
EACH MISCELLANEOUS
Qty: 100 | Refills: 4 | Status: ACTIVE | COMMUNITY
Start: 2020-03-17 | End: 1900-01-01

## 2020-03-17 RX ORDER — BLOOD-GLUCOSE METER
EACH MISCELLANEOUS
Qty: 1 | Refills: 0 | Status: ACTIVE | COMMUNITY
Start: 2020-03-17 | End: 1900-01-01

## 2020-03-17 RX ORDER — MULTIVITAMIN
TABLET ORAL
Refills: 0 | Status: ACTIVE | COMMUNITY

## 2020-03-17 RX ORDER — VITAMIN K2 90 MCG
125 MCG CAPSULE ORAL
Qty: 90 | Refills: 0 | Status: ACTIVE | COMMUNITY
Start: 2020-03-16

## 2020-03-17 NOTE — PHYSICAL EXAM
[No Acute Distress] : no acute distress [Well Nourished] : well nourished [Well Developed] : well developed [Well-Appearing] : well-appearing [Normal Sclera/Conjunctiva] : normal sclera/conjunctiva [PERRL] : pupils equal round and reactive to light [EOMI] : extraocular movements intact [Normal Outer Ear/Nose] : the outer ears and nose were normal in appearance [Normal Oropharynx] : the oropharynx was normal [Normal TMs] : both tympanic membranes were normal [No JVD] : no jugular venous distention [No Lymphadenopathy] : no lymphadenopathy [Supple] : supple [Thyroid Normal, No Nodules] : the thyroid was normal and there were no nodules present [No Respiratory Distress] : no respiratory distress  [No Accessory Muscle Use] : no accessory muscle use [Clear to Auscultation] : lungs were clear to auscultation bilaterally [Normal Rate] : normal rate  [Regular Rhythm] : with a regular rhythm [Normal S1, S2] : normal S1 and S2 [No Carotid Bruits] : no carotid bruits [No Abdominal Bruit] : a ~M bruit was not heard ~T in the abdomen [No Varicosities] : no varicosities [Pedal Pulses Present] : the pedal pulses are present [No Edema] : there was no peripheral edema [No Palpable Aorta] : no palpable aorta [No Extremity Clubbing/Cyanosis] : no extremity clubbing/cyanosis [Soft] : abdomen soft [Non Tender] : non-tender [Non-distended] : non-distended [No Masses] : no abdominal mass palpated [No HSM] : no HSM [Normal Bowel Sounds] : normal bowel sounds [Normal Posterior Cervical Nodes] : no posterior cervical lymphadenopathy [Normal Anterior Cervical Nodes] : no anterior cervical lymphadenopathy [No CVA Tenderness] : no CVA  tenderness [No Spinal Tenderness] : no spinal tenderness [No Joint Swelling] : no joint swelling [Grossly Normal Strength/Tone] : grossly normal strength/tone [No Rash] : no rash [Multiple Tattoos] : multiple tattoos observed [Coordination Grossly Intact] : coordination grossly intact [No Focal Deficits] : no focal deficits [Normal Gait] : normal gait [Deep Tendon Reflexes (DTR)] : deep tendon reflexes were 2+ and symmetric [Speech Grossly Normal] : speech grossly normal [Normal Affect] : the affect was normal [Alert and Oriented x3] : oriented to person, place, and time [Normal Mood] : the mood was normal [Normal Insight/Judgement] : insight and judgment were intact [Right Foot Was Examined] : Right foot ~C was examined [Left Foot Was Examined] : left foot ~C was examined [] : both feet [Tattoo - Single] : no tattoos observed [de-identified] : w murmur  [de-identified] : Left toe 5th was purple and tender to touch  [TWNoteComboBox3] : +1 [TWNoteComboBox4] : +2

## 2020-03-17 NOTE — HISTORY OF PRESENT ILLNESS
[FreeTextEntry1] : Follow up on abnormal labs  [de-identified] : Mr. CARRINGTON is a 49 year  male, who present to the office for follow up on abnormal labs.  Patient states he restarted metformin bid.  Denies taking Jardiance as directed to but has medication at home.  Denies chest pain, SOB, VU, nausea and vomiting.  Does states he knows how to check his blood glucose at home.  \par Was being treated for a dental abscess prior to coming to the office last week

## 2020-03-17 NOTE — ASSESSMENT
[FreeTextEntry1] : A  49-year-old male who presents to the office for a check up, and a  follow up on abnormal labs

## 2020-03-17 NOTE — PLAN
[FreeTextEntry1] : Pulmonology \par Tobacco abuse-  advised the importance to stop smoking.  Discussed comorbidities of tobacco abuse.\par  \par Endocrinology\par Diabetes-  advised patient the need to check hemoglobin A1c every 3 months.  Check labs.  Did refill metformin for only 30 days pending blood test.  Again education was provided.  Advised t check blood glucose am and pm before eating and call office weekly to go over home readings.  \par Restart Jardiance \par Advised to see ophthalmologist for a diabetic eye exam  \par Rx for a test meter was given \par Advised ASA 81 mg daily\par \par hyper triglycerides - Advised low sugar and starch diet start pravastatin  - Check labs 8 weeks\par Advised risk of pancreatitis \par Start Pravachol 20 mg daily  repeat labs 8 week \par Advised diet \par \par Vitamin D deficiency -  advised to take vitamin D 3 5000 iu daily \par \par Heme leukocytosis -- hx dental abscess check cbc.\par \par Hyponatremia check BMP  \par \par Vascular  \par Fifth toe vascular clot.  Followup with Dr. Brown. Continue with aspirin 325 mg daily. Pt Stopped his Plavix on his own.\par \par  - Discussed testosterone  level and pros and cons of HRT.  Advised to monitor start weight bearing exercise and decrease weight  \par \par GI - check FIT / DNA advised the importance of screening colonoscopies \par \par We discussed the importance of healthy lifestyles which include exercise, weight control and good diet.\par Patient's questions were answered in full detail. Advise patient if any other concerns arise to please call office to have a discussion.

## 2020-03-17 NOTE — HEALTH RISK ASSESSMENT
[] : Yes [1 or 2 (0 pts)] : 1 or 2 (0 points) [Never (0 pts)] : Never (0 points) [No] : In the past 12 months have you used drugs other than those required for medical reasons? No [No falls in past year] : Patient reported no falls in the past year [0] : 2) Feeling down, depressed, or hopeless: Not at all (0) [Audit-CScore] : 0 [[id number]] : 0

## 2020-03-17 NOTE — COUNSELING
[Risk of tobacco use and health benefits of smoking cessation discussed] : Risk of tobacco use and health benefits of smoking cessation discussed [AUDIT-C Screening administered and reviewed] : AUDIT-C Screening administered and reviewed [Benefits of weight loss discussed] : Benefits of weight loss discussed [Encouraged to increase physical activity] : Encouraged to increase physical activity [____ min/wk Activity] : [unfilled] min/wk activity [Healthy eating counseling provided] : healthy eating [Activity counseling provided] : activity [Discussed Risks and Advised to Quit Smoking] : Discussed risks and advised to quit smoking [Discussed Cessation Medication] : cessation medication was discussed [Discussed Cessation Strategies] : cessation strategies were discussed [Needs reinforcement, provided] : Patient needs reinforcement on understanding of disease, goals and obesity follow-up plan; reinforcement was provided [Low Fat Diet] : Low fat diet [Low Salt Diet] : Low salt diet [Decrease Portions] : Decrease food portions [Walking] : Walking [Patient motivation] : Patient motivation [Good understanding] : Patient has a good understanding of lifestyle changes and steps needed to achieve self management goal [FreeTextEntry2] : ADA diet  [de-identified] : ADA 1800 miley diet

## 2020-03-18 LAB
ANION GAP SERPL CALC-SCNC: 15 MMOL/L
BASOPHILS # BLD AUTO: 0.07 K/UL
BASOPHILS NFR BLD AUTO: 0.8 %
BUN SERPL-MCNC: 19 MG/DL
CALCIUM SERPL-MCNC: 9.4 MG/DL
CHLORIDE SERPL-SCNC: 98 MMOL/L
CO2 SERPL-SCNC: 22 MMOL/L
CREAT SERPL-MCNC: 0.72 MG/DL
EOSINOPHIL # BLD AUTO: 0.21 K/UL
EOSINOPHIL NFR BLD AUTO: 2.4 %
GLUCOSE SERPL-MCNC: 480 MG/DL
HCT VFR BLD CALC: 46.3 %
HGB BLD-MCNC: 16 G/DL
IMM GRANULOCYTES NFR BLD AUTO: 0.2 %
LYMPHOCYTES # BLD AUTO: 3.9 K/UL
LYMPHOCYTES NFR BLD AUTO: 45.1 %
MAN DIFF?: NORMAL
MCHC RBC-ENTMCNC: 30.2 PG
MCHC RBC-ENTMCNC: 34.6 GM/DL
MCV RBC AUTO: 87.4 FL
MONOCYTES # BLD AUTO: 0.58 K/UL
MONOCYTES NFR BLD AUTO: 6.7 %
NEUTROPHILS # BLD AUTO: 3.87 K/UL
NEUTROPHILS NFR BLD AUTO: 44.8 %
PLATELET # BLD AUTO: 244 K/UL
POTASSIUM SERPL-SCNC: 4.6 MMOL/L
RBC # BLD: 5.3 M/UL
RBC # FLD: 12.8 %
SODIUM SERPL-SCNC: 135 MMOL/L
WBC # FLD AUTO: 8.65 K/UL

## 2020-03-18 RX ORDER — PRAVASTATIN SODIUM 20 MG/1
20 TABLET ORAL
Qty: 90 | Refills: 1 | Status: COMPLETED | COMMUNITY
Start: 2020-03-17 | End: 2020-03-18

## 2020-04-09 ENCOUNTER — RX RENEWAL (OUTPATIENT)
Age: 50
End: 2020-04-09

## 2020-06-02 ENCOUNTER — RX RENEWAL (OUTPATIENT)
Age: 50
End: 2020-06-02

## 2020-06-09 ENCOUNTER — RX RENEWAL (OUTPATIENT)
Age: 50
End: 2020-06-09

## 2020-06-09 RX ORDER — CHOLECALCIFEROL (VITAMIN D3) 125 MCG
125 MCG CAPSULE ORAL
Qty: 90 | Refills: 2 | Status: ACTIVE | COMMUNITY
Start: 2020-06-09 | End: 1900-01-01

## 2020-07-03 ENCOUNTER — RX RENEWAL (OUTPATIENT)
Age: 50
End: 2020-07-03

## 2020-08-31 ENCOUNTER — RX RENEWAL (OUTPATIENT)
Age: 50
End: 2020-08-31

## 2020-10-12 ENCOUNTER — RX RENEWAL (OUTPATIENT)
Age: 50
End: 2020-10-12

## 2020-10-16 ENCOUNTER — APPOINTMENT (OUTPATIENT)
Dept: INTERNAL MEDICINE | Facility: CLINIC | Age: 50
End: 2020-10-16

## 2020-12-30 ENCOUNTER — RX RENEWAL (OUTPATIENT)
Age: 50
End: 2020-12-30

## 2021-01-04 ENCOUNTER — RX RENEWAL (OUTPATIENT)
Age: 51
End: 2021-01-04

## 2021-01-31 ENCOUNTER — RX RENEWAL (OUTPATIENT)
Age: 51
End: 2021-01-31

## 2021-03-04 ENCOUNTER — APPOINTMENT (OUTPATIENT)
Dept: INTERNAL MEDICINE | Facility: CLINIC | Age: 51
End: 2021-03-04
Payer: COMMERCIAL

## 2021-03-04 VITALS
DIASTOLIC BLOOD PRESSURE: 70 MMHG | RESPIRATION RATE: 17 BRPM | BODY MASS INDEX: 27.31 KG/M2 | WEIGHT: 174 LBS | HEART RATE: 88 BPM | SYSTOLIC BLOOD PRESSURE: 110 MMHG | OXYGEN SATURATION: 97 % | TEMPERATURE: 97.7 F | HEIGHT: 67 IN

## 2021-03-04 DIAGNOSIS — E11.49 TYPE 2 DIABETES MELLITUS WITH OTHER DIABETIC NEUROLOGICAL COMPLICATION: ICD-10-CM

## 2021-03-04 DIAGNOSIS — Z12.11 ENCOUNTER FOR SCREENING FOR MALIGNANT NEOPLASM OF COLON: ICD-10-CM

## 2021-03-04 DIAGNOSIS — E78.5 HYPERLIPIDEMIA, UNSPECIFIED: ICD-10-CM

## 2021-03-04 DIAGNOSIS — F17.200 NICOTINE DEPENDENCE, UNSPECIFIED, UNCOMPLICATED: ICD-10-CM

## 2021-03-04 DIAGNOSIS — G62.9 POLYNEUROPATHY, UNSPECIFIED: ICD-10-CM

## 2021-03-04 LAB
BILIRUB UR QL STRIP: NORMAL
CLARITY UR: CLEAR
COLLECTION METHOD: NORMAL
GLUCOSE UR-MCNC: 500
HCG UR QL: 0.2 EU/DL
HGB UR QL STRIP.AUTO: NORMAL
KETONES UR-MCNC: ABNORMAL
LEUKOCYTE ESTERASE UR QL STRIP: NORMAL
NITRITE UR QL STRIP: NORMAL
PH UR STRIP: 5.5
PROT UR STRIP-MCNC: 100
SP GR UR STRIP: 1.02

## 2021-03-04 PROCEDURE — 99072 ADDL SUPL MATRL&STAF TM PHE: CPT

## 2021-03-04 PROCEDURE — 81003 URINALYSIS AUTO W/O SCOPE: CPT | Mod: QW

## 2021-03-04 PROCEDURE — 36415 COLL VENOUS BLD VENIPUNCTURE: CPT

## 2021-03-04 PROCEDURE — 99214 OFFICE O/P EST MOD 30 MIN: CPT | Mod: 25

## 2021-03-04 RX ORDER — EMPAGLIFLOZIN 25 MG/1
25 TABLET, FILM COATED ORAL DAILY
Qty: 30 | Refills: 0 | Status: DISCONTINUED | COMMUNITY
End: 2021-03-04

## 2021-03-05 PROBLEM — F17.200 CURRENT EVERY DAY SMOKER: Status: ACTIVE | Noted: 2019-03-05

## 2021-03-05 PROBLEM — E78.5 HYPERLIPIDEMIA: Status: ACTIVE | Noted: 2020-03-17

## 2021-03-05 RX ORDER — BLOOD-GLUCOSE TRANSMITTER
EACH MISCELLANEOUS
Qty: 1 | Refills: 3 | Status: ACTIVE | COMMUNITY
Start: 2021-03-05 | End: 1900-01-01

## 2021-03-05 RX ORDER — BLOOD-GLUCOSE SENSOR
EACH MISCELLANEOUS
Qty: 1 | Refills: 11 | Status: ACTIVE | COMMUNITY
Start: 2021-03-05 | End: 1900-01-01

## 2021-03-05 RX ORDER — FLASH GLUCOSE SCANNING READER
EACH MISCELLANEOUS
Qty: 1 | Refills: 0 | Status: ACTIVE | COMMUNITY
Start: 2021-03-05 | End: 1900-01-01

## 2021-03-05 RX ORDER — BLOOD-GLUCOSE,RECEIVER,CONT
EACH MISCELLANEOUS
Qty: 1 | Refills: 0 | Status: ACTIVE | COMMUNITY
Start: 2021-03-05 | End: 1900-01-01

## 2021-03-05 NOTE — PLAN
[FreeTextEntry1] : Pulmonology \par Tobacco abuse-  advised the importance to stop smoking.  Discussed comorbidities of tobacco abuse.\par  \par Endocrinology\par Diabetes-  advised patient the need to check hemoglobin A1c every 3 months.  Check labs.  Did refill metformin for only 30 days pending blood test.  Again education was provided.  Advised t check blood glucose am and pm before eating and call office weekly to go over home readings.  \par Advised to see ophthalmologist for a diabetic eye exam  \par Rx for a test meter was given  wants free styl warren \par Advised ASA 81 mg daily\par \par hyper triglycerides - Advised low sugar and starch diet start pravastatin  - Check labs \par Advised diet. Continue  meds \par \par Vitamin D deficiency -  advised to take vitamin D 3 5000 iu daily \par \par HX of Hyponatremia check BMP  \par \par GI - check FIT / DNA advised the importance of screening colonoscopies \par will have RN to call pt in two weeks if not done \par \par We discussed the importance of healthy lifestyles which include exercise, weight control and good diet.\par Patient's questions were answered in full detail. Advise patient if any other concerns arise to please call office to have a discussion.

## 2021-03-05 NOTE — CURRENT MEDS
[Takes medication as prescribed] : does not take [None] : Patient does not have any barriers to medication adherence [Lack of understanding] : lack of understanding [FreeTextEntry1] : stopped metformin over the last month\par stopped Plavix and jardiace a while ago on his own

## 2021-03-05 NOTE — PHYSICAL EXAM
[Well Nourished] : well nourished [Well Developed] : well developed [Well-Appearing] : well-appearing [Normal Sclera/Conjunctiva] : normal sclera/conjunctiva [PERRL] : pupils equal round and reactive to light [EOMI] : extraocular movements intact [Normal Outer Ear/Nose] : the outer ears and nose were normal in appearance [Normal Oropharynx] : the oropharynx was normal [Normal TMs] : both tympanic membranes were normal [No JVD] : no jugular venous distention [No Lymphadenopathy] : no lymphadenopathy [Supple] : supple [Thyroid Normal, No Nodules] : the thyroid was normal and there were no nodules present [No Respiratory Distress] : no respiratory distress  [No Accessory Muscle Use] : no accessory muscle use [Clear to Auscultation] : lungs were clear to auscultation bilaterally [Normal Rate] : normal rate  [Regular Rhythm] : with a regular rhythm [Normal S1, S2] : normal S1 and S2 [No Carotid Bruits] : no carotid bruits [No Abdominal Bruit] : a ~M bruit was not heard ~T in the abdomen [No Varicosities] : no varicosities [Pedal Pulses Present] : the pedal pulses are present [No Edema] : there was no peripheral edema [No Palpable Aorta] : no palpable aorta [No Extremity Clubbing/Cyanosis] : no extremity clubbing/cyanosis [Soft] : abdomen soft [Non Tender] : non-tender [Non-distended] : non-distended [No Masses] : no abdominal mass palpated [No HSM] : no HSM [Normal Bowel Sounds] : normal bowel sounds [Normal Posterior Cervical Nodes] : no posterior cervical lymphadenopathy [Normal Anterior Cervical Nodes] : no anterior cervical lymphadenopathy [No CVA Tenderness] : no CVA  tenderness [No Spinal Tenderness] : no spinal tenderness [No Joint Swelling] : no joint swelling [Grossly Normal Strength/Tone] : grossly normal strength/tone [No Rash] : no rash [Multiple Tattoos] : multiple tattoos observed [Coordination Grossly Intact] : coordination grossly intact [No Focal Deficits] : no focal deficits [Normal Gait] : normal gait [Deep Tendon Reflexes (DTR)] : deep tendon reflexes were 2+ and symmetric [Speech Grossly Normal] : speech grossly normal [Normal Affect] : the affect was normal [Alert and Oriented x3] : oriented to person, place, and time [Normal Mood] : the mood was normal [Normal Insight/Judgement] : insight and judgment were intact [Right Foot Was Examined] : Right foot ~C was examined [Left Foot Was Examined] : left foot ~C was examined [] : both feet [Tattoo - Single] : no tattoos observed [de-identified] : w murmur  [de-identified] : Left toe 5th was purple and tender to touch  [TWNoteComboBox3] : +1 [TWNoteComboBox4] : +2

## 2021-03-05 NOTE — ASSESSMENT
[FreeTextEntry1] : A  50-year-old male who presents to the office for a check up, and a  follow up on abnormal labs

## 2021-03-05 NOTE — COUNSELING
[Risk of tobacco use and health benefits of smoking cessation discussed] : Risk of tobacco use and health benefits of smoking cessation discussed [AUDIT-C Screening administered and reviewed] : AUDIT-C Screening administered and reviewed [Benefits of weight loss discussed] : Benefits of weight loss discussed [Encouraged to increase physical activity] : Encouraged to increase physical activity [____ min/wk Activity] : [unfilled] min/wk activity [Good understanding] : Patient has a good understanding of lifestyle changes and steps needed to achieve self management goal [Healthy eating counseling provided] : healthy eating [Activity counseling provided] : activity [Discussed Risks and Advised to Quit Smoking] : Discussed risks and advised to quit smoking [Discussed Cessation Medication] : cessation medication was discussed [Discussed Cessation Strategies] : cessation strategies were discussed [Needs reinforcement, provided] : Patient needs reinforcement on understanding of disease, goals and obesity follow-up plan; reinforcement was provided [Low Fat Diet] : Low fat diet [Low Salt Diet] : Low salt diet [Decrease Portions] : Decrease food portions [Walking] : Walking [Patient motivation] : Patient motivation [FreeTextEntry2] : ADA diet  [de-identified] : ADA 1800 miley diet

## 2021-03-05 NOTE — HEALTH RISK ASSESSMENT
[] : Yes [1 or 2 (0 pts)] : 1 or 2 (0 points) [Never (0 pts)] : Never (0 points) [No] : In the past 12 months have you used drugs other than those required for medical reasons? No [No falls in past year] : Patient reported no falls in the past year [0] : 2) Feeling down, depressed, or hopeless: Not at all (0) [Audit-CScore] : 0 [JRY4Szstx] : 0

## 2021-03-05 NOTE — HISTORY OF PRESENT ILLNESS
[FreeTextEntry1] : Medication renewal and fasting labs right leg cramps  [de-identified] : Mr. CARRINGTON is a 49 year  male, who present to the office for fasting labs, renewal of medication.  Denies going to see ophthalmologist as directed. \par States still having numbness to lower ext. Also been having leg cramps in the left calf area \par still smoking \par Stopped jardiance on his own

## 2021-03-11 DIAGNOSIS — E78.1 PURE HYPERGLYCERIDEMIA: ICD-10-CM

## 2021-03-11 LAB
25(OH)D3 SERPL-MCNC: 28.4 NG/ML
ALBUMIN SERPL ELPH-MCNC: 4.3 G/DL
ALP BLD-CCNC: 83 U/L
ALT SERPL-CCNC: 16 U/L
ANION GAP SERPL CALC-SCNC: 13 MMOL/L
AST SERPL-CCNC: 8 U/L
BASOPHILS # BLD AUTO: 0.05 K/UL
BASOPHILS NFR BLD AUTO: 0.5 %
BILIRUB SERPL-MCNC: <0.2 MG/DL
BUN SERPL-MCNC: 19 MG/DL
CALCIUM SERPL-MCNC: 9.3 MG/DL
CHLORIDE SERPL-SCNC: 102 MMOL/L
CHOLEST SERPL-MCNC: 186 MG/DL
CO2 SERPL-SCNC: 22 MMOL/L
CREAT SERPL-MCNC: 0.97 MG/DL
CREAT SPEC-SCNC: 78 MG/DL
EOSINOPHIL # BLD AUTO: 0.45 K/UL
EOSINOPHIL NFR BLD AUTO: 4.6 %
ESTIMATED AVERAGE GLUCOSE: 341 MG/DL
GLUCOSE SERPL-MCNC: 347 MG/DL
HBA1C MFR BLD HPLC: 13.5 %
HCT VFR BLD CALC: 46.9 %
HDLC SERPL-MCNC: 36 MG/DL
HGB BLD-MCNC: 16.1 G/DL
IMM GRANULOCYTES NFR BLD AUTO: 0.2 %
LDLC SERPL CALC-MCNC: NORMAL MG/DL
LYMPHOCYTES # BLD AUTO: 4.12 K/UL
LYMPHOCYTES NFR BLD AUTO: 42.4 %
MAN DIFF?: NORMAL
MCHC RBC-ENTMCNC: 30.5 PG
MCHC RBC-ENTMCNC: 34.3 GM/DL
MCV RBC AUTO: 88.8 FL
MICROALBUMIN 24H UR DL<=1MG/L-MCNC: 34.7 MG/DL
MICROALBUMIN/CREAT 24H UR-RTO: 445 MG/G
MONOCYTES # BLD AUTO: 0.7 K/UL
MONOCYTES NFR BLD AUTO: 7.2 %
NEUTROPHILS # BLD AUTO: 4.38 K/UL
NEUTROPHILS NFR BLD AUTO: 45.1 %
NONHDLC SERPL-MCNC: 150 MG/DL
PLATELET # BLD AUTO: 269 K/UL
POTASSIUM SERPL-SCNC: 4.6 MMOL/L
PROT SERPL-MCNC: 6.6 G/DL
RBC # BLD: 5.28 M/UL
RBC # FLD: 13.4 %
SODIUM SERPL-SCNC: 137 MMOL/L
TRIGL SERPL-MCNC: 505 MG/DL
TSH SERPL-ACNC: 0.98 UIU/ML
WBC # FLD AUTO: 9.72 K/UL

## 2021-03-12 RX ORDER — COLD-HOT PACK
125 MCG EACH MISCELLANEOUS
Qty: 90 | Refills: 0 | Status: ACTIVE | COMMUNITY
Start: 2020-03-17 | End: 1900-01-01

## 2021-03-12 RX ORDER — ICOSAPENT ETHYL 1 G/1
1 CAPSULE ORAL
Qty: 270 | Refills: 0 | Status: ACTIVE | COMMUNITY
Start: 2021-03-11 | End: 1900-01-01

## 2021-05-23 ENCOUNTER — RX RENEWAL (OUTPATIENT)
Age: 51
End: 2021-05-23

## 2021-06-21 ENCOUNTER — RX RENEWAL (OUTPATIENT)
Age: 51
End: 2021-06-21

## 2021-06-21 RX ORDER — METFORMIN ER 500 MG 500 MG/1
500 TABLET ORAL
Qty: 30 | Refills: 2 | Status: ACTIVE | COMMUNITY
Start: 2019-07-19 | End: 1900-01-01

## 2021-06-21 RX ORDER — ATORVASTATIN CALCIUM 20 MG/1
20 TABLET, FILM COATED ORAL
Qty: 90 | Refills: 2 | Status: ACTIVE | COMMUNITY
Start: 2020-03-18 | End: 1900-01-01

## 2021-06-21 RX ORDER — EMPAGLIFLOZIN 25 MG/1
25 TABLET, FILM COATED ORAL
Qty: 30 | Refills: 2 | Status: ACTIVE | COMMUNITY
Start: 2021-03-11 | End: 1900-01-01

## 2021-07-19 ENCOUNTER — RX RENEWAL (OUTPATIENT)
Age: 51
End: 2021-07-19

## 2021-07-20 RX ORDER — OMEGA-3-ACID ETHYL ESTERS CAPSULES 1 G/1
1 CAPSULE, LIQUID FILLED ORAL
Qty: 120 | Refills: 1 | Status: ACTIVE | COMMUNITY
Start: 2021-03-23 | End: 1900-01-01

## 2021-08-02 RX ORDER — FLASH GLUCOSE SENSOR
KIT MISCELLANEOUS
Qty: 2 | Refills: 0 | Status: ACTIVE | COMMUNITY
Start: 2021-03-05 | End: 1900-01-01

## 2021-08-15 ENCOUNTER — TRANSCRIPTION ENCOUNTER (OUTPATIENT)
Age: 51
End: 2021-08-15

## 2021-08-16 ENCOUNTER — EMERGENCY (EMERGENCY)
Facility: HOSPITAL | Age: 51
LOS: 1 days | Discharge: HOME | End: 2021-08-16
Attending: EMERGENCY MEDICINE | Admitting: EMERGENCY MEDICINE
Payer: SELF-PAY

## 2021-08-16 VITALS
HEIGHT: 67 IN | OXYGEN SATURATION: 97 % | TEMPERATURE: 98 F | RESPIRATION RATE: 18 BRPM | HEART RATE: 70 BPM | WEIGHT: 160.06 LBS | SYSTOLIC BLOOD PRESSURE: 155 MMHG | DIASTOLIC BLOOD PRESSURE: 106 MMHG

## 2021-08-16 VITALS
RESPIRATION RATE: 16 BRPM | SYSTOLIC BLOOD PRESSURE: 134 MMHG | HEART RATE: 68 BPM | DIASTOLIC BLOOD PRESSURE: 88 MMHG | OXYGEN SATURATION: 98 %

## 2021-08-16 PROCEDURE — 99284 EMERGENCY DEPT VISIT MOD MDM: CPT | Mod: 25

## 2021-08-16 PROCEDURE — 73140 X-RAY EXAM OF FINGER(S): CPT | Mod: 26,RT

## 2021-08-16 PROCEDURE — 99284 EMERGENCY DEPT VISIT MOD MDM: CPT

## 2021-08-16 PROCEDURE — 96375 TX/PRO/DX INJ NEW DRUG ADDON: CPT | Mod: XU

## 2021-08-16 PROCEDURE — 73140 X-RAY EXAM OF FINGER(S): CPT

## 2021-08-16 PROCEDURE — 12041 INTMD RPR N-HF/GENIT 2.5CM/<: CPT

## 2021-08-16 PROCEDURE — 96374 THER/PROPH/DIAG INJ IV PUSH: CPT | Mod: XU

## 2021-08-16 PROCEDURE — 96376 TX/PRO/DX INJ SAME DRUG ADON: CPT | Mod: XU

## 2021-08-16 RX ORDER — CEFAZOLIN SODIUM 1 G
1000 VIAL (EA) INJECTION ONCE
Refills: 0 | Status: COMPLETED | OUTPATIENT
Start: 2021-08-16 | End: 2021-08-16

## 2021-08-16 RX ORDER — ONDANSETRON 8 MG/1
4 TABLET, FILM COATED ORAL ONCE
Refills: 0 | Status: COMPLETED | OUTPATIENT
Start: 2021-08-16 | End: 2021-08-16

## 2021-08-16 RX ORDER — OXYCODONE AND ACETAMINOPHEN 5; 325 MG/1; MG/1
1 TABLET ORAL
Qty: 20 | Refills: 0
Start: 2021-08-16 | End: 2021-08-18

## 2021-08-16 RX ORDER — BUPIVACAINE HCL/EPINEPHRINE/PF 0.5-1:200K
15 VIAL (ML) INJECTION ONCE
Refills: 0 | Status: DISCONTINUED | OUTPATIENT
Start: 2021-08-16 | End: 2021-08-16

## 2021-08-16 RX ORDER — BUPIVACAINE HCL/PF 7.5 MG/ML
15 VIAL (ML) INJECTION ONCE
Refills: 0 | Status: DISCONTINUED | OUTPATIENT
Start: 2021-08-16 | End: 2021-08-16

## 2021-08-16 RX ORDER — MORPHINE SULFATE 50 MG/1
4 CAPSULE, EXTENDED RELEASE ORAL ONCE
Refills: 0 | Status: DISCONTINUED | OUTPATIENT
Start: 2021-08-16 | End: 2021-08-16

## 2021-08-16 RX ORDER — BUPIVACAINE HCL/PF 7.5 MG/ML
15 VIAL (ML) INJECTION ONCE
Refills: 0 | Status: COMPLETED | OUTPATIENT
Start: 2021-08-16 | End: 2021-08-16

## 2021-08-16 RX ADMIN — ONDANSETRON 4 MILLIGRAM(S): 8 TABLET, FILM COATED ORAL at 15:39

## 2021-08-16 RX ADMIN — Medication 15 MILLILITER(S): at 15:39

## 2021-08-16 RX ADMIN — MORPHINE SULFATE 4 MILLIGRAM(S): 50 CAPSULE, EXTENDED RELEASE ORAL at 15:39

## 2021-08-16 RX ADMIN — Medication 100 MILLIGRAM(S): at 16:49

## 2021-08-16 RX ADMIN — Medication 100 MILLIGRAM(S): at 15:39

## 2021-08-16 NOTE — ED PROVIDER NOTE - CONSTITUTIONAL, MLM
Well appearing, awake, alert, oriented to person, place, time/situation. appears uncomfortable normal...

## 2021-08-16 NOTE — ED PROVIDER NOTE - CLINICAL SUMMARY MEDICAL DECISION MAKING FREE TEXT BOX
pain and laceration to right index finger. suspect avulsion/partial amputation. will x-ray, IV abx, pain control consult hand specialist for repair

## 2021-08-16 NOTE — ED PROVIDER NOTE - PATIENT PORTAL LINK FT
You can access the FollowMyHealth Patient Portal offered by [facility] by registering at the following website: [email or web address]. By joining [facility]’s FollowMyHealth portal, you will also be able to view your health information using other applications (apps) compatible with our system.

## 2021-08-16 NOTE — ED PROVIDER NOTE - PROGRESS NOTE DETAILS
was seen and evaluated by Dr. Friend (hand specialist). ancef infused. wound care discussed. will follow up with Dr. Friend 3 days in office.  An opportunity to ask questions was given.  Discussed the importance of prompt, close medical follow-up.  Patient will return with any changes, concerns or persistent / worsening symptoms.  Understanding of all instructions verbalized.

## 2021-08-16 NOTE — ED PROVIDER NOTE - NSFOLLOWUPINSTRUCTIONS_ED_ALL_ED_FT
keep clean and dry 24 hours  apply thin layer of bacitracin to area 1-2 times a day  percocet for severe pain  augmentin twice a day for 10 days  follow up with Dr. Hernandez 4 days if office for wound check       Laceration Care, Adult      A laceration is a cut that may go through all layers of the skin and into the tissue that is right under the skin. Some lacerations heal on their own. Others need to be closed with stitches (sutures), staples, skin adhesive strips, or skin glue. Proper care of a laceration reduces the risk for infection, helps the laceration heal better, and may prevent scarring.      How to care for your laceration    Wash your hands with soap and water before touching your wound or changing your bandage (dressing). If soap and water are not available, use hand .    Keep the wound clean and dry.    If you were given a dressing, you should change it at least once a day, or as told by your health care provider. You should also change it if it becomes wet or dirty.    If sutures or staples were used:     •Keep the wound completely dry for the first 24 hours, or as told by your health care provider. After that time, you may shower or bathe. However, make sure that the wound is not soaked in water until after the sutures or staples have been removed.    •Clean the wound once each day, or as told by your health care provider:  •Wash the wound with soap and water.      •Rinse the wound with water to remove all soap.      •Pat the wound dry with a clean towel. Do not rub the wound.        •After cleaning the wound, apply a thin layer of antibiotic ointment as told by your health care provider. This will help prevent infection and keep the dressing from sticking to the wound.      •Have the sutures or staples removed as told by your health care provider.      If skin adhesive strips were used:     • Do not get the skin adhesive strips wet. You may shower or bathe, but be careful to keep the wound dry.      •If the wound gets wet, pat it dry with a clean towel. Do not rub the wound.      •Skin adhesive strips fall off on their own. You may trim the strips as the wound heals. Do not remove skin adhesive strips that are still stuck to the wound. They will fall off in time.      If skin glue was used:     •Try to keep the wound dry, but you may briefly wet it in the shower or bath. Do not soak the wound in water, such as by swimming.      •After you have showered or bathed, gently pat the wound dry with a clean towel. Do not rub the wound.      • Do not do any activities that will make you sweat heavily until the skin glue has fallen off on its own.      • Do not apply liquid, cream, or ointment medicine to the wound while the skin glue is in place. Using those may loosen the film before the wound has healed.      •If a dressing is placed over the wound, be careful not to apply tape directly over the skin glue. Doing that may cause the glue to be pulled off before the wound has healed.      • Do not pick at the glue. Skin glue usually remains in place for 5–10 days and then falls off the skin.        General instructions      •Take over-the-counter and prescription medicines only as told by your health care provider.      •If you were prescribed an antibiotic medicine or ointment, take or apply it as told by your health care provider. Do not stop using it even if your condition improves.      • Do not scratch or pick at the wound.    •Check your wound every day for signs of infection. Watch for:  •Redness, swelling, or pain.      •Fluid, blood, or pus.        •Raise (elevate) the injured area above the level of your heart while you are sitting or lying down for the first 24–48 hours after the laceration is repaired.    •If directed, put ice on the affected area:  •Put ice in a plastic bag.      •Place a towel between your skin and the bag.      •Leave the ice on for 20 minutes, 2–3 times a day.        •Keep all follow-up visits as told by your health care provider. This is important.        Contact a health care provider if:    •You received a tetanus shot and you have swelling, severe pain, redness, or bleeding at the injection site.      •You have a fever.      •A wound that was closed breaks open.      •You notice a bad smell coming from your wound or your dressing.      •You notice something coming out of the wound, such as wood or glass.      •Your pain is not controlled with medicine.      •You have increased redness, swelling, or pain at the site of your wound.      •You have fluid, blood, or pus coming from your wound.      •You need to change the dressing often due to fluid, blood, or pus that is draining from the wound.      •You develop a new rash.      •You develop numbness around the wound.        Get help right away if:    •You develop severe swelling around the wound.      •Your pain suddenly increases and is severe.      •You develop painful lumps near the wound or on skin anywhere else on your body.      •You have a red streak going away from your wound.      •The wound is on your hand or foot and you cannot properly move a finger or toe.      •The wound is on your hand or foot, and you notice that your fingers or toes look pale or bluish.        Summary    •A laceration is a cut that may go through all layers of the skin and into the tissue that is right under the skin.      •Some lacerations heal on their own. Others need to be closed with stitches (sutures), staples, skin adhesive strips, or skin glue.      •Proper care of a laceration reduces the risk of infection, helps the laceration heal better, and prevents scarring.      This information is not intended to replace advice given to you by your health care provider. Make sure you discuss any questions you have with your health care provider.

## 2021-08-16 NOTE — ED PROVIDER NOTE - CARE PROVIDER_API CALL
Dean Calvert (MD)  Plastic Surgery  [address] [place] [address], [address]  [place], [place] [id number]  Phone: [phone]  Fax: [phone]  Follow Up Time: 1-3 Days

## 2021-08-16 NOTE — ED PROVIDER NOTE - ATTENDING CONTRIBUTION TO CARE
I have personally performed a face to face diagnostic evaluation on this patient.  I have reviewed the PA note and agree with the history, exam, and plan of care, except as noted.  History and Exam by me shows  right index finger cut accidentally today.   pt is in nad.   Right index finger- tip complete avulsion.  xr- tuft fx.  wound repaired by [facility].   dc home.

## 2021-08-16 NOTE — ED PROVIDER NOTE - SKIN, MLM
partial avulsion laceration of distal right index finger with nail involvement partial avulsion laceration of distal right index finger with nail involvement with suspected bone amputation

## 2021-08-16 NOTE — ED ADULT NURSE NOTE - OBJECTIVE STATEMENT
R hand pointer finger injury.  Approx 1.5cm of tip severed.  Noted sanguinous drainage.  PRessure applied with 4x4.  MD made aware

## 2021-08-16 NOTE — CONSULT NOTE ADULT - SUBJECTIVE AND OBJECTIVE BOX
See dictated note for repairing right index finger.  Augmentin for 10 days.  F/u next wk.  Prognosis: guarded.

## 2021-09-07 NOTE — ED ADULT TRIAGE NOTE - WEIGHT IN LBS
Spoke with da in regards of medication form, stated Migdalia will sign off on form and will call parent and update once faxed.   [phone]

## 2021-10-19 ENCOUNTER — APPOINTMENT (OUTPATIENT)
Dept: INTERNAL MEDICINE | Facility: CLINIC | Age: 51
End: 2021-10-19

## 2021-10-19 VITALS
DIASTOLIC BLOOD PRESSURE: 72 MMHG | OXYGEN SATURATION: 97 % | TEMPERATURE: 98.7 F | RESPIRATION RATE: 17 BRPM | SYSTOLIC BLOOD PRESSURE: 112 MMHG | HEART RATE: 88 BPM | BODY MASS INDEX: 25.69 KG/M2 | WEIGHT: 164 LBS

## 2022-05-03 NOTE — ED ADULT NURSE NOTE - TEMPLATE
----- Message from Andres Anderson RN sent at 4/29/2022 11:10 AM CDT -----  Regarding: Home Monitor INR  Amee will obtain her INR today 5/03/22 on her home monitor. This is a 4 day recheck.      Wounds

## 2022-05-16 NOTE — ED PROVIDER NOTE - OBJECTIVE STATEMENT
51 year old male with history of migraines, TBI, DM, and neuropathy presents with injury to right index finger that occurred at work [employer]. caught between motorcycle and fence. pain 10/10. right handed. tetanus up to date. denies other injuries or complaints   PCP Sheldon Benson
16-May-2022 22:16

## 2022-09-01 NOTE — ED ADULT NURSE NOTE - PMH
Diabetes    Migraine    Traumatic brain injury Itraconazole Pregnancy And Lactation Text: This medication is Pregnancy Category C and it isn't know if it is safe during pregnancy. It is also excreted in breast milk.

## 2022-10-11 NOTE — ED ADULT NURSE NOTE - NS ED NURSE LEVEL OF CONSCIOUSNESS ORIENTATION
This could be a side effect of her recent nexplanon placement which will likely resolve, however I am glad to see her if she would like to be seen. -Zenia  Oriented - self; Oriented - place; Oriented - time

## 2022-10-14 ENCOUNTER — EMERGENCY (EMERGENCY)
Facility: HOSPITAL | Age: 52
LOS: 1 days | Discharge: HOME | End: 2022-10-14
Attending: EMERGENCY MEDICINE | Admitting: EMERGENCY MEDICINE
Payer: COMMERCIAL

## 2022-10-14 VITALS — TEMPERATURE: 98 F | HEART RATE: 75 BPM | RESPIRATION RATE: 25 BRPM | OXYGEN SATURATION: 100 % | HEIGHT: 67 IN

## 2022-10-14 LAB
ALBUMIN SERPL ELPH-MCNC: 4.2 G/DL — SIGNIFICANT CHANGE UP (ref 3.3–5)
ALP SERPL-CCNC: 72 U/L — SIGNIFICANT CHANGE UP (ref 40–120)
ALT FLD-CCNC: 31 U/L — SIGNIFICANT CHANGE UP (ref 12–78)
ANION GAP SERPL CALC-SCNC: 13 MMOL/L — SIGNIFICANT CHANGE UP (ref 5–17)
APPEARANCE UR: CLEAR — SIGNIFICANT CHANGE UP
AST SERPL-CCNC: 16 U/L — SIGNIFICANT CHANGE UP (ref 15–37)
BASOPHILS # BLD AUTO: 0.04 K/UL — SIGNIFICANT CHANGE UP (ref 0–0.2)
BASOPHILS NFR BLD AUTO: 0.4 % — SIGNIFICANT CHANGE UP (ref 0–2)
BILIRUB SERPL-MCNC: 0.6 MG/DL — SIGNIFICANT CHANGE UP (ref 0.2–1.2)
BILIRUB UR-MCNC: NEGATIVE — SIGNIFICANT CHANGE UP
BUN SERPL-MCNC: 12 MG/DL — SIGNIFICANT CHANGE UP (ref 7–23)
CALCIUM SERPL-MCNC: 9.8 MG/DL — SIGNIFICANT CHANGE UP (ref 8.5–10.1)
CHLORIDE SERPL-SCNC: 102 MMOL/L — SIGNIFICANT CHANGE UP (ref 96–108)
CO2 SERPL-SCNC: 22 MMOL/L — SIGNIFICANT CHANGE UP (ref 22–31)
COLOR SPEC: YELLOW — SIGNIFICANT CHANGE UP
CREAT SERPL-MCNC: 0.91 MG/DL — SIGNIFICANT CHANGE UP (ref 0.5–1.3)
DIFF PNL FLD: ABNORMAL
EGFR: 101 ML/MIN/1.73M2 — SIGNIFICANT CHANGE UP
EOSINOPHIL # BLD AUTO: 0.25 K/UL — SIGNIFICANT CHANGE UP (ref 0–0.5)
EOSINOPHIL NFR BLD AUTO: 2.8 % — SIGNIFICANT CHANGE UP (ref 0–6)
GLUCOSE SERPL-MCNC: 190 MG/DL — HIGH (ref 70–99)
GLUCOSE UR QL: 1000 MG/DL
HCT VFR BLD CALC: 46.3 % — SIGNIFICANT CHANGE UP (ref 39–50)
HGB BLD-MCNC: 16.4 G/DL — SIGNIFICANT CHANGE UP (ref 13–17)
IMM GRANULOCYTES NFR BLD AUTO: 0.3 % — SIGNIFICANT CHANGE UP (ref 0–0.9)
KETONES UR-MCNC: ABNORMAL
LACTATE SERPL-SCNC: 2.6 MMOL/L — HIGH (ref 0.7–2)
LACTATE SERPL-SCNC: 4 MMOL/L — CRITICAL HIGH (ref 0.7–2)
LEUKOCYTE ESTERASE UR-ACNC: NEGATIVE — SIGNIFICANT CHANGE UP
LYMPHOCYTES # BLD AUTO: 2.5 K/UL — SIGNIFICANT CHANGE UP (ref 1–3.3)
LYMPHOCYTES # BLD AUTO: 27.5 % — SIGNIFICANT CHANGE UP (ref 13–44)
MCHC RBC-ENTMCNC: 30.3 PG — SIGNIFICANT CHANGE UP (ref 27–34)
MCHC RBC-ENTMCNC: 35.4 GM/DL — SIGNIFICANT CHANGE UP (ref 32–36)
MCV RBC AUTO: 85.4 FL — SIGNIFICANT CHANGE UP (ref 80–100)
MONOCYTES # BLD AUTO: 0.55 K/UL — SIGNIFICANT CHANGE UP (ref 0–0.9)
MONOCYTES NFR BLD AUTO: 6.1 % — SIGNIFICANT CHANGE UP (ref 2–14)
NEUTROPHILS # BLD AUTO: 5.72 K/UL — SIGNIFICANT CHANGE UP (ref 1.8–7.4)
NEUTROPHILS NFR BLD AUTO: 62.9 % — SIGNIFICANT CHANGE UP (ref 43–77)
NITRITE UR-MCNC: NEGATIVE — SIGNIFICANT CHANGE UP
NRBC # BLD: 0 /100 WBCS — SIGNIFICANT CHANGE UP (ref 0–0)
PH UR: 6.5 — SIGNIFICANT CHANGE UP (ref 5–8)
PLATELET # BLD AUTO: 271 K/UL — SIGNIFICANT CHANGE UP (ref 150–400)
POTASSIUM SERPL-MCNC: 3.4 MMOL/L — LOW (ref 3.5–5.3)
POTASSIUM SERPL-SCNC: 3.4 MMOL/L — LOW (ref 3.5–5.3)
PROT SERPL-MCNC: 8 G/DL — SIGNIFICANT CHANGE UP (ref 6–8.3)
PROT UR-MCNC: 30 MG/DL
RAPID RVP RESULT: DETECTED
RBC # BLD: 5.42 M/UL — SIGNIFICANT CHANGE UP (ref 4.2–5.8)
RBC # FLD: 13.3 % — SIGNIFICANT CHANGE UP (ref 10.3–14.5)
RV+EV RNA SPEC QL NAA+PROBE: DETECTED
SARS-COV-2 RNA SPEC QL NAA+PROBE: SIGNIFICANT CHANGE UP
SODIUM SERPL-SCNC: 137 MMOL/L — SIGNIFICANT CHANGE UP (ref 135–145)
SP GR SPEC: 1 — LOW (ref 1.01–1.02)
UROBILINOGEN FLD QL: NEGATIVE — SIGNIFICANT CHANGE UP
WBC # BLD: 9.09 K/UL — SIGNIFICANT CHANGE UP (ref 3.8–10.5)
WBC # FLD AUTO: 9.09 K/UL — SIGNIFICANT CHANGE UP (ref 3.8–10.5)

## 2022-10-14 PROCEDURE — 71045 X-RAY EXAM CHEST 1 VIEW: CPT | Mod: 26

## 2022-10-14 PROCEDURE — 74019 RADEX ABDOMEN 2 VIEWS: CPT | Mod: 26

## 2022-10-14 PROCEDURE — 93010 ELECTROCARDIOGRAM REPORT: CPT

## 2022-10-14 PROCEDURE — 74176 CT ABD & PELVIS W/O CONTRAST: CPT | Mod: 26,MA

## 2022-10-14 PROCEDURE — 99285 EMERGENCY DEPT VISIT HI MDM: CPT

## 2022-10-14 PROCEDURE — 76705 ECHO EXAM OF ABDOMEN: CPT | Mod: 26

## 2022-10-14 RX ORDER — MORPHINE SULFATE 50 MG/1
4 CAPSULE, EXTENDED RELEASE ORAL ONCE
Refills: 0 | Status: DISCONTINUED | OUTPATIENT
Start: 2022-10-14 | End: 2022-10-14

## 2022-10-14 RX ORDER — HYDROMORPHONE HYDROCHLORIDE 2 MG/ML
1 INJECTION INTRAMUSCULAR; INTRAVENOUS; SUBCUTANEOUS ONCE
Refills: 0 | Status: DISCONTINUED | OUTPATIENT
Start: 2022-10-14 | End: 2022-10-14

## 2022-10-14 RX ORDER — ONDANSETRON 8 MG/1
4 TABLET, FILM COATED ORAL ONCE
Refills: 0 | Status: COMPLETED | OUTPATIENT
Start: 2022-10-14 | End: 2022-10-14

## 2022-10-14 RX ORDER — SODIUM CHLORIDE 9 MG/ML
2000 INJECTION INTRAMUSCULAR; INTRAVENOUS; SUBCUTANEOUS ONCE
Refills: 0 | Status: COMPLETED | OUTPATIENT
Start: 2022-10-14 | End: 2022-10-14

## 2022-10-14 RX ORDER — KETOROLAC TROMETHAMINE 30 MG/ML
30 SYRINGE (ML) INJECTION ONCE
Refills: 0 | Status: DISCONTINUED | OUTPATIENT
Start: 2022-10-14 | End: 2022-10-14

## 2022-10-14 RX ADMIN — Medication 30 MILLIGRAM(S): at 22:57

## 2022-10-14 RX ADMIN — ONDANSETRON 4 MILLIGRAM(S): 8 TABLET, FILM COATED ORAL at 20:10

## 2022-10-14 RX ADMIN — MORPHINE SULFATE 4 MILLIGRAM(S): 50 CAPSULE, EXTENDED RELEASE ORAL at 23:53

## 2022-10-14 RX ADMIN — Medication 30 MILLIGRAM(S): at 23:53

## 2022-10-14 RX ADMIN — ONDANSETRON 4 MILLIGRAM(S): 8 TABLET, FILM COATED ORAL at 23:55

## 2022-10-14 RX ADMIN — HYDROMORPHONE HYDROCHLORIDE 1 MILLIGRAM(S): 2 INJECTION INTRAMUSCULAR; INTRAVENOUS; SUBCUTANEOUS at 20:12

## 2022-10-14 RX ADMIN — SODIUM CHLORIDE 2000 MILLILITER(S): 9 INJECTION INTRAMUSCULAR; INTRAVENOUS; SUBCUTANEOUS at 20:10

## 2022-10-14 NOTE — ED PROVIDER NOTE - CLINICAL SUMMARY MEDICAL DECISION MAKING FREE TEXT BOX
52-year-old male with diffuse abdominal pain and vomiting we will check labs and CT rule out colitis, infectious etiology, UTI, gastroenteritis.

## 2022-10-14 NOTE — ED ADULT NURSE NOTE - CHIEF COMPLAINT QUOTE
51 y/o male received to triage. Alert and oriented x4. C/o vomiting, abdominal pain, fever/chills x2 weeks. Respirations even and unlabored. Abdomen soft, non distended, tender to middle of abdomen.

## 2022-10-14 NOTE — ED ADULT NURSE NOTE - OBJECTIVE STATEMENT
pt complains of sudden abdominal pain since 5pm today. pt states the pain is worsening and he has been vomiting. pt denies fever and chills. pt denies shortness of breath. peripheral IV inserted to R AC #187. IV patent. pt tolerated

## 2022-10-14 NOTE — ED ADULT NURSE NOTE - NSIMPLEMENTINTERV_GEN_ALL_ED
Implemented All Universal Safety Interventions:  [place] to call system. Call bell, personal items and telephone within reach. Instruct patient to call for assistance. Room bathroom lighting operational. Non-slip footwear when patient is off stretcher. Physically safe environment: no spills, clutter or unnecessary equipment. Stretcher in lowest position, wheels locked, appropriate side rails in place.

## 2022-10-14 NOTE — ED PROVIDER NOTE - NSFOLLOWUPINSTRUCTIONS_ED_ALL_ED_FT
Abdominal Pain    WHAT YOU NEED TO KNOW:    Abdominal pain can be dull, achy, or sharp. You may have pain in one area of your abdomen, or in your entire abdomen. Your pain may be caused by a condition such as constipation, food sensitivity or poisoning, infection, or a blockage. Abdominal pain can also be from a hernia, appendicitis, or an ulcer. Liver, gallbladder, or kidney conditions can also cause abdominal pain. The cause of your abdominal pain may not be known.  Abdominal Organs         DISCHARGE INSTRUCTIONS:    Call your local emergency number (911 in the [place]) if:   •You have chest pain or shortness of breath.          Return to the emergency department if:   •You have pulsing pain in your upper abdomen or lower back that suddenly becomes constant.      •Your pain is in the right lower abdominal area and worsens with movement.      •You have a fever over 100.4°F (38°C) or shaking chills.      •You are vomiting and cannot keep food or liquids down.      •Your pain does not improve or gets worse over the next 8 to 12 hours.      •You see blood in your vomit or bowel movements, or they look black and tarry.      •Your skin or the whites of your eyes turn yellow.      •You are a woman and have a large amount of vaginal bleeding that is not your monthly period.      Call your doctor if:   •You have pain in your lower back.      •You are a man and have pain in your testicles.      •You have pain when you urinate.      •You have questions or concerns about your condition or care.      Medicines: You may need any of the following:  •Medicines may be given to calm your stomach or prevent vomiting.      •Prescription pain medicine may be given. Ask your healthcare provider how to take this medicine safely. Some prescription pain medicines contain acetaminophen. Do not take other medicines that contain acetaminophen without talking to your healthcare provider. Too much acetaminophen may cause liver damage. Prescription pain medicine may cause constipation. Ask your healthcare provider how to prevent or treat constipation.       •Take your medicine as directed. Contact your healthcare provider if you think your medicine is not helping or if you have side effects. Tell your provider if you are allergic to any medicine. Keep a list of the medicines, vitamins, and herbs you take. Include the amounts, and when and why you take them. Bring the list or the pill bottles to follow-up visits. Carry your medicine list with you in case of an emergency.      Manage or prevent abdominal pain:   •Apply heat on your abdomen for 20 to 30 minutes every 2 hours for as many days as directed. Heat helps decrease pain and muscle spasms.      •Make changes to the foods you eat, if needed. Do not eat foods that cause abdominal pain or other symptoms. Eat small meals more often. The following changes may also help:?Eat more high-fiber foods if you are constipated. High-fiber foods include fruits, vegetables, whole-grain foods, and legumes such as cruz beans.             ?Do not eat foods that cause gas if you have bloating. Examples include broccoli, cabbage, beans, and carbonated drinks.      ?Do not eat foods or drinks that contain sorbitol or fructose if you have diarrhea and bloating. Some examples are fruit juices, candy, jelly, and sugar-free gum.      ?Do not eat high-fat foods. Examples include fried foods, cheeseburgers, hot dogs, and desserts.      •Make changes to the liquids you drink, if needed. Do not drink liquids that cause pain or make it worse, such as orange juice. Drink liquids throughout the day to stay hydrated. The following changes may also help:?Drink more liquids to prevent dehydration from diarrhea or vomiting. Ask your healthcare provider how much liquid to drink each day and which liquids are best for you.      ?Limit or do not have caffeine. Caffeine may make symptoms such as heartburn or nausea worse.      ?Limit or do not drink alcohol. Alcohol can make your abdominal pain worse. Ask your healthcare provider if it is okay for you to drink alcohol. Also ask how much is okay for you to drink. A drink of alcohol is 12 ounces of beer, ½ ounce of liquor, or 5 ounces of wine.      •Keep a diary of your abdominal pain. A diary may help your healthcare provider learn what is causing your pain. Include when the pain happens, how long it lasts, and what the pain feels like. Write down any other symptoms you have with abdominal pain. Also write down what you eat, and any symptoms you have after you eat.      •Manage stress. Stress may cause abdominal pain. Your healthcare provider may recommend relaxation techniques and deep breathing exercises to help decrease your stress. Your healthcare provider may recommend you talk to someone about your stress or anxiety, such as a counselor or a friend. Get plenty of sleep. Exercise regularly.   FAMILY WALKING FOR EXERCISE           •Do not smoke. Nicotine and other chemicals in cigarettes can damage your esophagus and stomach. Ask your healthcare provider for information if you currently smoke and need help to quit. E-cigarettes or smokeless tobacco still contain nicotine. Talk to your healthcare provider before you use these products.      Follow up with your doctor as directed: Write down your questions so you remember to ask them during

## 2022-10-14 NOTE — ED ADULT TRIAGE NOTE - CHIEF COMPLAINT QUOTE
Hematology/Oncology Office Note      CC:  Anemia secondary to chronic kidney disease    Referred by:  No ref. provider found    Diagnosis:  Anemia secondary to CKD    Treatment:  Procrit    HPI:  84-year-old female followed by Dr. Sundar Clark in the hematology/oncology clinic for anemia secondary to chronic kidney disease.  She is currently on weekly Procrit and presents today for CBC and additional treatment if indicated.  I am seeing her for the first time today and she has no specific complaints and reports feeling well over the previous month.      I have reviewed and updated the HPI, ROS, PMHx, Social Hx, Family Hx and treatment history.    Today's visit:  Patient is without complaints today and specifically denies fever, chills, night sweats or weight loss.          Past Medical History:   Diagnosis Date    Anemia     sickle cell trait, alpha thalessemia    Anemia of chronic renal failure 5/12/2014    Asthma, chronic     Cataract     GERD (gastroesophageal reflux disease)     Gout, arthritis     Helicobacter pylori gastritis     Hypertension     Osteoarthritis     Pulmonary HTN     Renal insufficiency          Social History:  No tobacco, alcohol, or illicit drugs    Family History: family history includes Breast cancer in her sister; Cancer in her mother and sister; Glaucoma in her sister; Liver cancer in her mother and sister; Thyroid disease in her sister. There is no history of Melanoma, Eczema, Lupus, or Psoriasis.      HPI    Review of Systems   Constitutional: Negative for activity change, appetite change, fatigue, fever and unexpected weight change.   HENT: Negative for congestion, drooling, ear discharge, facial swelling, hearing loss, mouth sores, postnasal drip, rhinorrhea, sinus pressure and sore throat.    Eyes: Negative.  Negative for pain, discharge, redness, itching and visual disturbance.   Respiratory: Negative for apnea, cough, choking, chest tightness and wheezing.   "  Cardiovascular: Negative for chest pain, palpitations and leg swelling.   Gastrointestinal: Negative for abdominal distention, abdominal pain, anal bleeding, constipation, diarrhea, nausea and vomiting.   Endocrine: Negative.    Genitourinary: Negative for difficulty urinating, dyspareunia, dysuria, enuresis, flank pain, frequency, hematuria and vaginal bleeding.   Musculoskeletal: Negative for arthralgias, back pain, gait problem, neck pain and neck stiffness.   Skin: Negative for pallor, rash and wound.   Allergic/Immunologic: Negative.    Neurological: Negative for dizziness, tremors, seizures, syncope, facial asymmetry, speech difficulty, light-headedness and headaches.   Hematological: Negative for adenopathy. Does not bruise/bleed easily.   Psychiatric/Behavioral: Negative for agitation, behavioral problems, confusion, dysphoric mood and hallucinations. The patient is not nervous/anxious and is not hyperactive.        Objective:       Vitals:    05/18/17 0950   BP: (!) 146/74   Pulse: 84   Resp: 18   Temp: 97.7 °F (36.5 °C)   TempSrc: Oral   SpO2: 99%   Weight: 59.9 kg (132 lb 0.9 oz)   Height: 5' 2.8" (1.595 m)     Physical Exam   Constitutional: She is oriented to person, place, and time. She appears well-developed and well-nourished. No distress.   Well groomed   HENT:   Head: Normocephalic and atraumatic.   Right Ear: Tympanic membrane and ear canal normal.   Left Ear: Tympanic membrane and ear canal normal.   Nose: Nose normal. Right sinus exhibits no maxillary sinus tenderness and no frontal sinus tenderness. Left sinus exhibits no maxillary sinus tenderness and no frontal sinus tenderness.   Mouth/Throat: Oropharynx is clear and moist and mucous membranes are normal. No oral lesions. Normal dentition. No oropharyngeal exudate.   Eyes: Conjunctivae, EOM and lids are normal. Pupils are equal, round, and reactive to light. Lids are everted and swept, no foreign bodies found. No scleral icterus.   Neck: " Trachea normal and normal range of motion. Neck supple. No JVD present. No tracheal deviation present. No thyroid mass and no thyromegaly present.   No crepitus   Cardiovascular: Normal rate, regular rhythm, normal heart sounds, intact distal pulses and normal pulses.  Exam reveals no gallop and no friction rub.    No murmur heard.  Pulmonary/Chest: Effort normal and breath sounds normal. She has no wheezes. She has no rales. She exhibits no tenderness.   Abdominal: Soft. Normal appearance and bowel sounds are normal. She exhibits no distension and no mass. There is no hepatosplenomegaly. There is no tenderness. There is no rebound and no guarding.   Musculoskeletal: Normal range of motion. She exhibits no edema or tenderness.   Lymphadenopathy:     She has no cervical adenopathy.   Neurological: She is alert and oriented to person, place, and time. No cranial nerve deficit. She exhibits normal muscle tone. Coordination normal.   Skin: Skin is warm, dry and intact. No rash noted. She is not diaphoretic. No cyanosis or erythema. No pallor. Nails show no clubbing.   Psychiatric: She has a normal mood and affect. Her behavior is normal. Judgment and thought content normal.       Lab Results   Component Value Date    WBC 4.98 05/18/2017    HGB 10.7 (L) 05/18/2017    HCT 32.0 (L) 05/18/2017    MCV 80 (L) 05/18/2017     05/18/2017     Lab Results   Component Value Date    CREATININE 4.0 (H) 05/18/2017    BUN 65 (H) 12/20/2016    BUN 65 (H) 12/20/2016     12/20/2016     12/20/2016    K 5.0 12/20/2016    K 5.0 12/20/2016     12/20/2016     12/20/2016    CO2 19 (L) 12/20/2016    CO2 19 (L) 12/20/2016     Lab Results   Component Value Date    ALT 14 04/11/2016    AST 28 04/11/2016    ALKPHOS 93 04/11/2016    BILITOT 0.3 04/11/2016         Assessment:       84-year-old female followed by Dr. Marcus Clark in the hematology/oncology clinic for anemia of chronic kidney disease.  She presents today  for routine visit. Hemoglobin is noted to be 10.7, therefore, we will hold treatment and have the patient follow-up in 2 weeks with repeat CBC with plans to resume treatment if hemoglobin falls below 10.      Anemia secondary to chronic renal disease:  Hemoglobin 10.7 today--Hold treatment today as hemoglobin is greater than 10  --Follow-up in 2 weeks with repeat CBC and Procrit if hemoglobin is less than 10     51 y/o male received to triage. Alert and oriented x4. C/o vomiting, abdominal pain, fever/chills x2 weeks. Respirations even and unlabored. Abdomen soft, non distended, tender to middle of abdomen.

## 2022-10-14 NOTE — ED ADULT NURSE NOTE - NSFALLRSKPASTHIST_ED_ALL_ED
Colonoscopy procedure cancelled due to pt being in A. Fib with RVR upon arrival to [facility].  After talking with the GI MD, pt agreed to have her family member to transport her to the [facility] ER for further evaluation.     no

## 2022-10-14 NOTE — ED PROVIDER NOTE - OBJECTIVE STATEMENT
52-year-old male presents to the ER with complaints of diffuse abdominal pain and vomiting x1 day.  Patient states that he was recently being treated with antibiotics for a virus.  Patient denies fevers diarrhea or urinary symptoms recent travel or sick contacts.

## 2022-10-15 VITALS
OXYGEN SATURATION: 100 % | TEMPERATURE: 98 F | SYSTOLIC BLOOD PRESSURE: 144 MMHG | DIASTOLIC BLOOD PRESSURE: 85 MMHG | HEART RATE: 85 BPM | RESPIRATION RATE: 20 BRPM

## 2022-10-15 PROCEDURE — 36415 COLL VENOUS BLD VENIPUNCTURE: CPT

## 2022-10-15 PROCEDURE — 80053 COMPREHEN METABOLIC PANEL: CPT

## 2022-10-15 PROCEDURE — 71045 X-RAY EXAM CHEST 1 VIEW: CPT

## 2022-10-15 PROCEDURE — 96374 THER/PROPH/DIAG INJ IV PUSH: CPT

## 2022-10-15 PROCEDURE — 74176 CT ABD & PELVIS W/O CONTRAST: CPT | Mod: MA

## 2022-10-15 PROCEDURE — 96375 TX/PRO/DX INJ NEW DRUG ADDON: CPT

## 2022-10-15 PROCEDURE — 85025 COMPLETE CBC W/AUTO DIFF WBC: CPT

## 2022-10-15 PROCEDURE — 96376 TX/PRO/DX INJ SAME DRUG ADON: CPT

## 2022-10-15 PROCEDURE — 0225U NFCT DS DNA&RNA 21 SARSCOV2: CPT

## 2022-10-15 PROCEDURE — 76705 ECHO EXAM OF ABDOMEN: CPT

## 2022-10-15 PROCEDURE — 83605 ASSAY OF LACTIC ACID: CPT

## 2022-10-15 PROCEDURE — 99285 EMERGENCY DEPT VISIT HI MDM: CPT | Mod: 25

## 2022-10-15 PROCEDURE — 81001 URINALYSIS AUTO W/SCOPE: CPT

## 2022-10-15 PROCEDURE — 93005 ELECTROCARDIOGRAM TRACING: CPT

## 2022-10-15 PROCEDURE — 87040 BLOOD CULTURE FOR BACTERIA: CPT

## 2022-10-15 PROCEDURE — 87086 URINE CULTURE/COLONY COUNT: CPT

## 2022-10-15 PROCEDURE — 74019 RADEX ABDOMEN 2 VIEWS: CPT

## 2022-10-15 RX ORDER — METOCLOPRAMIDE HCL 10 MG
10 TABLET ORAL ONCE
Refills: 0 | Status: COMPLETED | OUTPATIENT
Start: 2022-10-15 | End: 2022-10-15

## 2022-10-15 RX ORDER — ONDANSETRON 8 MG/1
1 TABLET, FILM COATED ORAL
Qty: 30 | Refills: 0
Start: 2022-10-15

## 2022-10-15 RX ORDER — PANTOPRAZOLE SODIUM 20 MG/1
1 TABLET, DELAYED RELEASE ORAL
Qty: 30 | Refills: 0
Start: 2022-10-15 | End: 2022-11-13

## 2022-10-15 RX ADMIN — Medication 10 MILLIGRAM(S): at 00:30

## 2022-10-16 LAB
CULTURE RESULTS: SIGNIFICANT CHANGE UP
SPECIMEN SOURCE: SIGNIFICANT CHANGE UP

## 2022-11-19 ENCOUNTER — EMERGENCY (EMERGENCY)
Facility: HOSPITAL | Age: 52
LOS: 1 days | Discharge: LEFT BEFORE DISCHARGE | End: 2022-11-19
Attending: EMERGENCY MEDICINE | Admitting: EMERGENCY MEDICINE
Payer: COMMERCIAL

## 2022-11-19 VITALS
HEART RATE: 79 BPM | HEIGHT: 67 IN | DIASTOLIC BLOOD PRESSURE: 91 MMHG | SYSTOLIC BLOOD PRESSURE: 179 MMHG | OXYGEN SATURATION: 100 % | TEMPERATURE: 98 F | WEIGHT: 145.06 LBS | RESPIRATION RATE: 19 BRPM

## 2022-11-19 LAB
ALBUMIN SERPL ELPH-MCNC: 3.5 G/DL — SIGNIFICANT CHANGE UP (ref 3.3–5)
ALP SERPL-CCNC: 63 U/L — SIGNIFICANT CHANGE UP (ref 40–120)
ALT FLD-CCNC: 19 U/L — SIGNIFICANT CHANGE UP (ref 12–78)
ANION GAP SERPL CALC-SCNC: 11 MMOL/L — SIGNIFICANT CHANGE UP (ref 5–17)
AST SERPL-CCNC: 15 U/L — SIGNIFICANT CHANGE UP (ref 15–37)
BASOPHILS # BLD AUTO: 0.03 K/UL — SIGNIFICANT CHANGE UP (ref 0–0.2)
BASOPHILS NFR BLD AUTO: 0.4 % — SIGNIFICANT CHANGE UP (ref 0–2)
BILIRUB SERPL-MCNC: 0.5 MG/DL — SIGNIFICANT CHANGE UP (ref 0.2–1.2)
BUN SERPL-MCNC: 14 MG/DL — SIGNIFICANT CHANGE UP (ref 7–23)
CALCIUM SERPL-MCNC: 9 MG/DL — SIGNIFICANT CHANGE UP (ref 8.5–10.1)
CHLORIDE SERPL-SCNC: 102 MMOL/L — SIGNIFICANT CHANGE UP (ref 96–108)
CK MB CFR SERPL CALC: 1 NG/ML — SIGNIFICANT CHANGE UP (ref 0–3.6)
CO2 SERPL-SCNC: 23 MMOL/L — SIGNIFICANT CHANGE UP (ref 22–31)
CREAT SERPL-MCNC: 0.9 MG/DL — SIGNIFICANT CHANGE UP (ref 0.5–1.3)
D DIMER BLD IA.RAPID-MCNC: <150 NG/ML DDU — SIGNIFICANT CHANGE UP
EGFR: 102 ML/MIN/1.73M2 — SIGNIFICANT CHANGE UP
EOSINOPHIL # BLD AUTO: 0 K/UL — SIGNIFICANT CHANGE UP (ref 0–0.5)
EOSINOPHIL NFR BLD AUTO: 0 % — SIGNIFICANT CHANGE UP (ref 0–6)
GLUCOSE SERPL-MCNC: 226 MG/DL — HIGH (ref 70–99)
HCT VFR BLD CALC: 43.6 % — SIGNIFICANT CHANGE UP (ref 39–50)
HGB BLD-MCNC: 15.9 G/DL — SIGNIFICANT CHANGE UP (ref 13–17)
IMM GRANULOCYTES NFR BLD AUTO: 0.2 % — SIGNIFICANT CHANGE UP (ref 0–0.9)
LIDOCAIN IGE QN: 62 U/L — LOW (ref 73–393)
LYMPHOCYTES # BLD AUTO: 1.98 K/UL — SIGNIFICANT CHANGE UP (ref 1–3.3)
LYMPHOCYTES # BLD AUTO: 23.1 % — SIGNIFICANT CHANGE UP (ref 13–44)
MCHC RBC-ENTMCNC: 30.3 PG — SIGNIFICANT CHANGE UP (ref 27–34)
MCHC RBC-ENTMCNC: 36.5 GM/DL — HIGH (ref 32–36)
MCV RBC AUTO: 83.2 FL — SIGNIFICANT CHANGE UP (ref 80–100)
MONOCYTES # BLD AUTO: 0.46 K/UL — SIGNIFICANT CHANGE UP (ref 0–0.9)
MONOCYTES NFR BLD AUTO: 5.4 % — SIGNIFICANT CHANGE UP (ref 2–14)
NEUTROPHILS # BLD AUTO: 6.08 K/UL — SIGNIFICANT CHANGE UP (ref 1.8–7.4)
NEUTROPHILS NFR BLD AUTO: 70.9 % — SIGNIFICANT CHANGE UP (ref 43–77)
NRBC # BLD: 0 /100 WBCS — SIGNIFICANT CHANGE UP (ref 0–0)
PLATELET # BLD AUTO: 353 K/UL — SIGNIFICANT CHANGE UP (ref 150–400)
POTASSIUM SERPL-MCNC: 3.5 MMOL/L — SIGNIFICANT CHANGE UP (ref 3.5–5.3)
POTASSIUM SERPL-SCNC: 3.5 MMOL/L — SIGNIFICANT CHANGE UP (ref 3.5–5.3)
PROT SERPL-MCNC: 7.5 G/DL — SIGNIFICANT CHANGE UP (ref 6–8.3)
RAPID RVP RESULT: SIGNIFICANT CHANGE UP
RBC # BLD: 5.24 M/UL — SIGNIFICANT CHANGE UP (ref 4.2–5.8)
RBC # FLD: 12.6 % — SIGNIFICANT CHANGE UP (ref 10.3–14.5)
SARS-COV-2 RNA SPEC QL NAA+PROBE: SIGNIFICANT CHANGE UP
SODIUM SERPL-SCNC: 136 MMOL/L — SIGNIFICANT CHANGE UP (ref 135–145)
TROPONIN I, HIGH SENSITIVITY RESULT: 7.1 NG/L — SIGNIFICANT CHANGE UP
WBC # BLD: 8.57 K/UL — SIGNIFICANT CHANGE UP (ref 3.8–10.5)
WBC # FLD AUTO: 8.57 K/UL — SIGNIFICANT CHANGE UP (ref 3.8–10.5)

## 2022-11-19 PROCEDURE — 93010 ELECTROCARDIOGRAM REPORT: CPT

## 2022-11-19 PROCEDURE — 82553 CREATINE MB FRACTION: CPT

## 2022-11-19 PROCEDURE — 84484 ASSAY OF TROPONIN QUANT: CPT

## 2022-11-19 PROCEDURE — 85025 COMPLETE CBC W/AUTO DIFF WBC: CPT

## 2022-11-19 PROCEDURE — 80053 COMPREHEN METABOLIC PANEL: CPT

## 2022-11-19 PROCEDURE — 99283 EMERGENCY DEPT VISIT LOW MDM: CPT | Mod: 25

## 2022-11-19 PROCEDURE — 85379 FIBRIN DEGRADATION QUANT: CPT

## 2022-11-19 PROCEDURE — 0225U NFCT DS DNA&RNA 21 SARSCOV2: CPT

## 2022-11-19 PROCEDURE — 99285 EMERGENCY DEPT VISIT HI MDM: CPT

## 2022-11-19 PROCEDURE — 36415 COLL VENOUS BLD VENIPUNCTURE: CPT

## 2022-11-19 PROCEDURE — 93005 ELECTROCARDIOGRAM TRACING: CPT

## 2022-11-19 PROCEDURE — 83690 ASSAY OF LIPASE: CPT

## 2022-11-19 RX ORDER — ONDANSETRON 8 MG/1
4 TABLET, FILM COATED ORAL ONCE
Refills: 0 | Status: DISCONTINUED | OUTPATIENT
Start: 2022-11-19 | End: 2022-11-23

## 2022-11-19 RX ORDER — FAMOTIDINE 10 MG/ML
20 INJECTION INTRAVENOUS ONCE
Refills: 0 | Status: DISCONTINUED | OUTPATIENT
Start: 2022-11-19 | End: 2022-11-23

## 2022-11-19 RX ORDER — ACETAMINOPHEN 500 MG
1000 TABLET ORAL ONCE
Refills: 0 | Status: DISCONTINUED | OUTPATIENT
Start: 2022-11-19 | End: 2022-11-23

## 2022-11-19 RX ORDER — SODIUM CHLORIDE 9 MG/ML
1000 INJECTION INTRAMUSCULAR; INTRAVENOUS; SUBCUTANEOUS ONCE
Refills: 0 | Status: DISCONTINUED | OUTPATIENT
Start: 2022-11-19 | End: 2022-11-23

## 2022-11-19 NOTE — ED PROVIDER NOTE - CLINICAL SUMMARY MEDICAL DECISION MAKING FREE TEXT BOX
Patient is a 52-year-old male who presents emergency room due to complaint of chest pain.  Past medical history of diabetes, history of migraines, history of TBI.  Patient was seen by the physician assistant.  Per documentation was complaining of abdominal pain nausea vomiting.  Reported the abdominal pain radiated to the chest with increased discomfort on deep breathing.  Of note patient reported that he was seen in the emergency room for similar complaints several weeks ago was diagnosed with rhinovirus symptoms improved initially but then seemed to return.  Patient was evaluated by the physician assistant EKG labs and medications were ordered.  Prior to my exam or history patient decided to leave AGAINST MEDICAL ADVICE because he was upset with the wait time.  IV was removed and patient left without being examined by myself.

## 2022-11-19 NOTE — ED ADULT NURSE REASSESSMENT NOTE - NS ED NURSE REASSESS COMMENT FT1
patient yelling to have his IV removed wants to leave to go to [facility]  IV removed patient refused to sign AMA paperwork

## 2022-11-19 NOTE — ED ADULT NURSE NOTE - OBJECTIVE STATEMENT
patient comes to ED for evaluation of nausea vomiting abdominal and chest pain reports he had same symptoms a month ago was prescribed protonix taking without relief patient reports the pain as unbearable for two days took one of his wifes percocet without relief patient hyperventilating shouting that he can't breathe pulse ox 98%

## 2022-11-19 NOTE — ED PROVIDER NOTE - OBJECTIVE STATEMENT
Patient is a 52-year-old male with past medical history of diabetes diabetic brain injury coming in complaining of abdominal pain vomiting.  Patient states pain diffuse and radiating to the chest and having pain with deep breath.  Patient denies any fever chills recent travels leg swelling.  Patient states he was here for similar complaints few weeks ago diagnosed with rhinovirus got better but symptoms come back again.  Patient denies any abdominal surgical history.

## 2023-02-08 ENCOUNTER — INPATIENT (INPATIENT)
Facility: HOSPITAL | Age: 53
LOS: 3 days | Discharge: HOME | DRG: 253 | End: 2023-02-12
Attending: INTERNAL MEDICINE | Admitting: INTERNAL MEDICINE
Payer: COMMERCIAL

## 2023-02-08 VITALS
DIASTOLIC BLOOD PRESSURE: 90 MMHG | RESPIRATION RATE: 16 BRPM | SYSTOLIC BLOOD PRESSURE: 168 MMHG | WEIGHT: 145.06 LBS | OXYGEN SATURATION: 98 % | TEMPERATURE: 98 F | HEART RATE: 85 BPM

## 2023-02-08 DIAGNOSIS — I99.8 OTHER DISORDER OF CIRCULATORY SYSTEM: ICD-10-CM

## 2023-02-08 LAB
ALBUMIN SERPL ELPH-MCNC: 3.7 G/DL — SIGNIFICANT CHANGE UP (ref 3.3–5)
ALP SERPL-CCNC: 64 U/L — SIGNIFICANT CHANGE UP (ref 40–120)
ALT FLD-CCNC: 17 U/L — SIGNIFICANT CHANGE UP (ref 12–78)
ANION GAP SERPL CALC-SCNC: 5 MMOL/L — SIGNIFICANT CHANGE UP (ref 5–17)
APTT BLD: 30.6 SEC — SIGNIFICANT CHANGE UP (ref 27.5–35.5)
AST SERPL-CCNC: 13 U/L — LOW (ref 15–37)
BASOPHILS # BLD AUTO: 0.08 K/UL — SIGNIFICANT CHANGE UP (ref 0–0.2)
BASOPHILS NFR BLD AUTO: 0.9 % — SIGNIFICANT CHANGE UP (ref 0–2)
BILIRUB SERPL-MCNC: 0.3 MG/DL — SIGNIFICANT CHANGE UP (ref 0.2–1.2)
BUN SERPL-MCNC: 18 MG/DL — SIGNIFICANT CHANGE UP (ref 7–23)
CALCIUM SERPL-MCNC: 9.1 MG/DL — SIGNIFICANT CHANGE UP (ref 8.5–10.1)
CHLORIDE SERPL-SCNC: 106 MMOL/L — SIGNIFICANT CHANGE UP (ref 96–108)
CO2 SERPL-SCNC: 30 MMOL/L — SIGNIFICANT CHANGE UP (ref 22–31)
CREAT SERPL-MCNC: 1 MG/DL — SIGNIFICANT CHANGE UP (ref 0.5–1.3)
EGFR: 90 ML/MIN/1.73M2 — SIGNIFICANT CHANGE UP
EOSINOPHIL # BLD AUTO: 0.56 K/UL — HIGH (ref 0–0.5)
EOSINOPHIL NFR BLD AUTO: 6.4 % — HIGH (ref 0–6)
ERYTHROCYTE [SEDIMENTATION RATE] IN BLOOD: 4 MM/HR — SIGNIFICANT CHANGE UP (ref 0–20)
GLUCOSE SERPL-MCNC: 97 MG/DL — SIGNIFICANT CHANGE UP (ref 70–99)
HCT VFR BLD CALC: 42.8 % — SIGNIFICANT CHANGE UP (ref 39–50)
HGB BLD-MCNC: 14.4 G/DL — SIGNIFICANT CHANGE UP (ref 13–17)
IMM GRANULOCYTES NFR BLD AUTO: 0.2 % — SIGNIFICANT CHANGE UP (ref 0–0.9)
INR BLD: 1.1 RATIO — SIGNIFICANT CHANGE UP (ref 0.88–1.16)
LYMPHOCYTES # BLD AUTO: 3.72 K/UL — HIGH (ref 1–3.3)
LYMPHOCYTES # BLD AUTO: 42.3 % — SIGNIFICANT CHANGE UP (ref 13–44)
MCHC RBC-ENTMCNC: 29.8 PG — SIGNIFICANT CHANGE UP (ref 27–34)
MCHC RBC-ENTMCNC: 33.6 GM/DL — SIGNIFICANT CHANGE UP (ref 32–36)
MCV RBC AUTO: 88.6 FL — SIGNIFICANT CHANGE UP (ref 80–100)
MONOCYTES # BLD AUTO: 0.59 K/UL — SIGNIFICANT CHANGE UP (ref 0–0.9)
MONOCYTES NFR BLD AUTO: 6.7 % — SIGNIFICANT CHANGE UP (ref 2–14)
NEUTROPHILS # BLD AUTO: 3.82 K/UL — SIGNIFICANT CHANGE UP (ref 1.8–7.4)
NEUTROPHILS NFR BLD AUTO: 43.5 % — SIGNIFICANT CHANGE UP (ref 43–77)
NRBC # BLD: 0 /100 WBCS — SIGNIFICANT CHANGE UP (ref 0–0)
PLATELET # BLD AUTO: 266 K/UL — SIGNIFICANT CHANGE UP (ref 150–400)
POTASSIUM SERPL-MCNC: 3.9 MMOL/L — SIGNIFICANT CHANGE UP (ref 3.5–5.3)
POTASSIUM SERPL-SCNC: 3.9 MMOL/L — SIGNIFICANT CHANGE UP (ref 3.5–5.3)
PROT SERPL-MCNC: 7.4 G/DL — SIGNIFICANT CHANGE UP (ref 6–8.3)
PROTHROM AB SERPL-ACNC: 12.9 SEC — SIGNIFICANT CHANGE UP (ref 10.5–13.4)
RBC # BLD: 4.83 M/UL — SIGNIFICANT CHANGE UP (ref 4.2–5.8)
RBC # FLD: 14.1 % — SIGNIFICANT CHANGE UP (ref 10.3–14.5)
SARS-COV-2 RNA SPEC QL NAA+PROBE: SIGNIFICANT CHANGE UP
SODIUM SERPL-SCNC: 141 MMOL/L — SIGNIFICANT CHANGE UP (ref 135–145)
WBC # BLD: 8.79 K/UL — SIGNIFICANT CHANGE UP (ref 3.8–10.5)
WBC # FLD AUTO: 8.79 K/UL — SIGNIFICANT CHANGE UP (ref 3.8–10.5)

## 2023-02-08 PROCEDURE — 71045 X-RAY EXAM CHEST 1 VIEW: CPT | Mod: 26

## 2023-02-08 PROCEDURE — 99222 1ST HOSP IP/OBS MODERATE 55: CPT

## 2023-02-08 PROCEDURE — 93926 LOWER EXTREMITY STUDY: CPT | Mod: 26,RT

## 2023-02-08 PROCEDURE — 93971 EXTREMITY STUDY: CPT | Mod: 26,RT

## 2023-02-08 PROCEDURE — 99285 EMERGENCY DEPT VISIT HI MDM: CPT

## 2023-02-08 RX ORDER — NICOTINE POLACRILEX 2 MG
1 GUM BUCCAL ONCE
Refills: 0 | Status: COMPLETED | OUTPATIENT
Start: 2023-02-08 | End: 2023-02-08

## 2023-02-08 RX ORDER — HEPARIN SODIUM 5000 [USP'U]/ML
2500 INJECTION INTRAVENOUS; SUBCUTANEOUS EVERY 6 HOURS
Refills: 0 | Status: DISCONTINUED | OUTPATIENT
Start: 2023-02-08 | End: 2023-02-09

## 2023-02-08 RX ORDER — HEPARIN SODIUM 5000 [USP'U]/ML
INJECTION INTRAVENOUS; SUBCUTANEOUS
Qty: 25000 | Refills: 0 | Status: DISCONTINUED | OUTPATIENT
Start: 2023-02-08 | End: 2023-02-09

## 2023-02-08 RX ORDER — HEPARIN SODIUM 5000 [USP'U]/ML
5500 INJECTION INTRAVENOUS; SUBCUTANEOUS ONCE
Refills: 0 | Status: COMPLETED | OUTPATIENT
Start: 2023-02-08 | End: 2023-02-09

## 2023-02-08 RX ORDER — HEPARIN SODIUM 5000 [USP'U]/ML
5500 INJECTION INTRAVENOUS; SUBCUTANEOUS EVERY 6 HOURS
Refills: 0 | Status: DISCONTINUED | OUTPATIENT
Start: 2023-02-08 | End: 2023-02-09

## 2023-02-08 RX ORDER — MORPHINE SULFATE 50 MG/1
4 CAPSULE, EXTENDED RELEASE ORAL ONCE
Refills: 0 | Status: DISCONTINUED | OUTPATIENT
Start: 2023-02-08 | End: 2023-02-08

## 2023-02-08 RX ADMIN — Medication 1 PATCH: at 23:30

## 2023-02-08 RX ADMIN — MORPHINE SULFATE 4 MILLIGRAM(S): 50 CAPSULE, EXTENDED RELEASE ORAL at 23:45

## 2023-02-08 RX ADMIN — MORPHINE SULFATE 4 MILLIGRAM(S): 50 CAPSULE, EXTENDED RELEASE ORAL at 23:30

## 2023-02-08 NOTE — H&P ADULT - NSHPSOCIALHISTORY_GEN_ALL_CORE
lives home with wife and son  adls indpeendent  ambulates independently  smoker: 1/2 pack a day x 30 years   alcohol none  drugs marjuana occasionally for sleep lives home with wife and son  adls independent  ambulates independently  smoker: 1/2- 1 pack a day x 30 years   alcohol none  drugs marijuana occasionally for sleep

## 2023-02-08 NOTE — H&P ADULT - ASSESSMENT
53 yo M PMHx Type 2 Diabetes (not on insulin) with diabetic neuropathy presenting with pain swelling RLE admitted for acute limb ischemia.  53 yo M PMHx Type 2 Diabetes (not on insulin) with diabetic neuropathy presenting with severe pain,  swelling RLE admitted for acute limb ischemia, started on IV heparin drip.

## 2023-02-08 NOTE — ED PROVIDER NOTE - CLINICAL SUMMARY MEDICAL DECISION MAKING FREE TEXT BOX
Patient is a 52-year-old male who presents to the emergency room with a chief complaint of right foot pain and skin changes for the last 2 to 3 days.  Past medical history of diabetes, diabetic neuropathy.  Patient reports that he has been experiencing worsening pain swelling and discoloration to his right foot for the last 2 to 3 days.  He followed up with his podiatrist and was sent to the emergency room for concern for possible right limb ischemia.  Patient endorses that he has chronic numbness and pain secondary to neuropathy to his lower extremities although this significantly worsened in the right foot and lower leg over the last several days and he has noted a red-purple discoloration to his foot since yesterday.  He reports the pain radiates from his foot to the mid calf.  He was seen by his primary care doctor 2 days ago was prescribed tramadol for the pain referred to podiatry.  He followed up with the podiatrist today and was sent to the emergency room for possible limb ischemia.  Patient does report that he had outpatient venous Dopplers of the leg which he reports was "normal".  Reports no relief of pain from tramadol.  Denies any acute trauma.  Denies any fevers chills nausea vomiting chest pain shortness of breath or abdominal pain.  Patient is a smoker.  On exam patient is sitting up in bed appears to be hypersensitive to the right foot heart is regular rate lungs are clear to auscultation abdomen soft nontender nondistended.  Right lower extremity: Erythema is noted to the right foot with diffuse tenderness to palpation extending up into the calf.  Skin is blanchable patient able to move toes but significant pain to the toes foot is cold to touch but is not significantly cooler than the left foot no calf swelling is noted pulses are dopplerable on the right foot unable to palpate.  Cap refill is greater than 2 seconds.  Patient with hypersensitivity to skin.  Patient is presenting to the emergency room with concern for possible arterial occlusion pulses are dopplerable at the bedside at this time.  Will obtain screening labs venous and arterial Dopplers of the right lower extremity and will consult with vascular.  Will medicate for pain. Patient seen by vascular bedside requesting that we begin heparin there is a high suspicion for arterial occlusion.  Results of labs reviewed no elevated white counts electrolytes within normal no evidence of DVT arterial Doppler with arthrosclerotic disease and significant stenosis of the mid superficial femoral artery there may be occlusion at the distal popliteal artery with reconstitution of the distal branches.  Vascular was made aware of findings we will continue with current plan.  Patient to be admitted for further management.

## 2023-02-08 NOTE — H&P ADULT - RESPIRATORY
normal/clear to auscultation bilaterally/no wheezes/no rales/no rhonchi normal/clear to auscultation bilaterally/no wheezes/no rales/no rhonchi/no respiratory distress/breath sounds equal/good air movement/respirations non-labored

## 2023-02-08 NOTE — H&P ADULT - NSICDXPASTMEDICALHX_GEN_ALL_CORE_FT
PAST MEDICAL HISTORY:  Diabetes     Migraine     Traumatic brain injury      PAST MEDICAL HISTORY:  Diabetes     Migraine     Neuropathy     PVD (peripheral vascular disease)     Traumatic brain injury

## 2023-02-08 NOTE — CONSULT NOTE ADULT - ASSESSMENT
51 yo M presenting with a one week history of right leg numbness with recent worsening of symptoms including increased pain, swelling and redness, sent in by podiatrist    Plan:  Obtain EULALIA and PVRs  Start on a hep gtt  Order uric acid   Pain control  Discussed with Dr. Lawton 53 yo M presenting with a one week history of right leg numbness with recent worsening of symptoms including increased pain, swelling and redness, sent in by podiatrist    Plan:  Obtain EULALIA and PVRs  Start on a hep gtt  Order uric acid   Pain control  Medicine/Cardiac clearance for potential angio this admission   Discussed with Dr. Lawton

## 2023-02-08 NOTE — H&P ADULT - PROBLEM SELECTOR PLAN 2
on metformin   - start LDISS with accuchecks and hypoglycemic protocol on metformin -HOLD po meds  -FS Q AC HS with HISS   -Hb A1C , Nutrition eval.  - start LDISS with Accu-Cheks and hypoglycemic protocol

## 2023-02-08 NOTE — H&P ADULT - PROBLEM SELECTOR PLAN 3
hep gtt - 15 pack year history   - nicotine patch - 15 pack year history   - nicotine patch daily   Smoking cessation d/w pt 2/2 PAD

## 2023-02-08 NOTE — H&P ADULT - RESPIRATORY AND THORAX
details… Initiate Treatment: Spironolactone 50 mg once daily \\nAklief cream at night (do not start until she completes the box of accutane) Continue Regimen: Winlevi morning & night Detail Level: Zone

## 2023-02-08 NOTE — ED PROVIDER NOTE - DIFFERENTIAL DIAGNOSIS
Differential Diagnosis cellulitis although dose not appear clinically to have this vs DVT vs arterial occlusion vs worsening neuropathy

## 2023-02-08 NOTE — H&P ADULT - ATTENDING COMMENTS
51 yo M PMHx Type 2 Diabetes (not on insulin) with diabetic neuropathy presenting with severe pain,  swelling RLE admitted for acute limb ischemia, started on IV heparin drip.  Pt seen, Examined, case & care plan d/w pt, residents at detail.  AM labs   PO diet  Vascular-Dr Theron-IV heparin , Plan for CT angio as per Vascular PA   Pain meds.

## 2023-02-08 NOTE — ED PROVIDER NOTE - PROGRESS NOTE DETAILS
Pt seen by surgery/vascular, advised can admit pt to medicine for further management. Spoke to surgery PA who will consult pt. Pt seen by surgery/vascular PA, advised can admit pt to medicine for further management. Pt seen by surgery/vascular PA, advised can start pt on heparin drip, admit pt to medicine for further management and potential angio this admission. Spoke to Dr. Ravinder Martinez who accepted admission.

## 2023-02-08 NOTE — ED ADULT NURSE NOTE - TOBACCO USE
Subjective   Era Santana Current every day smoker

## 2023-02-08 NOTE — H&P ADULT - NSICDXFAMILYHX_GEN_ALL_CORE_FT
FAMILY HISTORY:  Father  Still living? Unknown  FH: type 2 diabetes, Age at diagnosis: Age Unknown    Grandparent  Still living? Unknown  FH: type 2 diabetes, Age at diagnosis: Age Unknown

## 2023-02-08 NOTE — ED ADULT NURSE NOTE - CCCP TRG CHIEF CMPLNT
arm pain/injury Itraconazole Counseling:  I discussed with the patient the risks of itraconazole including but not limited to liver damage, nausea/vomiting, neuropathy, and severe allergy.  The patient understands that this medication is best absorbed when taken with acidic beverages such as non-diet cola or ginger ale.  The patient understands that monitoring is required including baseline LFTs and repeat LFTs at intervals.  The patient understands that they are to contact us or the primary physician if concerning signs are noted.

## 2023-02-08 NOTE — ED PROVIDER NOTE - OBJECTIVE STATEMENT
52-year-old male with history of diabetes and diabetic neuropathy presents with complaint of worsening pain with swelling and discoloration to right foot x2-3 days.  Pt was sent by podiatrist for acute right limb ischemia. Patient states that he has chronic numbness and pain secondary to neuropathy however noticed that he had significantly worse than normal pain to his right foot/lower leg with some swelling and red/purple discoloration to his right foot since yesterday.  States that pain radiates from his right foot all the way up to his right mid calf.  Patient was seen by his PCP, Dr. Berto Vargas 2 days ago and was prescribed tramadol and referred to podiatrist.  Patient was seen by podiatrist, Dr. Syeda Lilly today who sent patient to outpatient radiology for venous ultrasound of his leg which was "normal".  States that he has had persistent pain without any relief from tramadol.  Denies chest pain, shortness of breath, trauma, history of DVT/PE. Pt is a smoker.

## 2023-02-08 NOTE — H&P ADULT - HISTORY OF PRESENT ILLNESS
53 yo M PMHx Type 2 Diabetes (not on insulin) with diabetic neuropathy presenting with swelling, pain and discoloration of the right foot. He noticed worsening numbness from mid calf to the foot 2 weeks ago which then progressed to swelling and discoloration. he went to his PCP dr. pickens who ordered a doppler venous ultrasound (?) which was negative for clots. He was referred to podiatry who he  saw today and was referred to the ER for evaluation.     ER Course:   168/90 HR 85/min 16/min T 97.7 98% RA   Labs: wnl  EKG pending   given morphine 4 mg x 1   imaging: US Duplex Extremity Right: Atherosclerotic disease with spectral broadening seen from the superficial femoral artery, distally, compatible with upstream stenosis.

## 2023-02-08 NOTE — H&P ADULT - PROBLEM SELECTOR PLAN 1
presenting with numbness, discoloration and pain. LE Arterial dopplers revealing stenosis of the femoral artery  - hep gtt  - pain control dilaudid prn patient has tolerated in past   - monitor fo signs of bleeding   - RCI score 0, EKG pending. BP at goal; denies hx of CAD, arrythmias, renal disease. denies history of reaction to anesthesia, denies anginal symptoms.  - patient refused EKG in the ER agreed for AM   - cardio consulted [facility] presenting with numbness, discoloration and pain. LE Arterial dopplers revealing stenosis of the femoral artery  - hep gtt  - pain control dilaudid prn patient has tolerated in past   - monitor fo signs of bleeding   - RCI score 0, EKG pending. BP elevated 2/2 pain; denies hx of CAD, arrythmias, renal disease. denies history of reaction to anesthesia, denies anginal symptoms.  - patient refused EKG in the ER agreed for AM   - cardio consulted [facility] presenting with numbness, discoloration and pain. LE Arterial dopplers revealing stenosis of the femoral artery  - hep gtt  - pain control dilaudid prn patient has tolerated in past   - monitor fo signs of bleeding   - RCI score 0, EKG pending. BP elevated 2/2 pain; denies hx of CAD, arrythmias, renal disease. denies history of reaction to anesthesia, denies anginal symptoms.  - patient refused EKG in the ER agreed for AM   - cardio consulted [facility] for cardiac cleatamce   - vascular surgery following presenting with severe pain,  numbness, discoloration and pain rt lower ext, risks factor-Smoking, DM   -. LE Arterial dopplers revealing stenosis of the femoral artery rt    - hep gtt continue, follow nomogram to adjust as per PTT  - pain control Dilaudid prn patient has tolerated in past    - RCI score 0, EKG pending. BP elevated 2/2 pain; denies hx of CAD, arrythmia, renal disease. denies history of reaction to anesthesia, denies anginal symptoms.  - patient refused EKG in the ER agreed for AM   - cardio consulted [facility] for cardiac clearance   - vascular surgery --Vascular Dr Crump

## 2023-02-08 NOTE — ED PROVIDER NOTE - MUSCULOSKELETAL, MLM
R leg: +erythema noted to right foot with ttp extending up to just below knee, blanchable, toes mobile, cool to touch but not cooler than left foot, no edema or calf swelling noted, DP and PT pulses are palpable on the left, faint DP palpable on right and pulses dopplerable on right

## 2023-02-08 NOTE — ED ADULT TRIAGE NOTE - CHIEF COMPLAINT QUOTE
c/o right foot pain and numbness and purple discoloration for about a weeks, denies injury, with Hx of DM and diebetic neuropathy

## 2023-02-08 NOTE — H&P ADULT - NEUROLOGICAL
normal/cranial nerves II-XII intact/sensation intact/responds to verbal commands/cranial nerves intact/no spontaneous movement details…

## 2023-02-08 NOTE — CONSULT NOTE ADULT - SUBJECTIVE AND OBJECTIVE BOX
HPI:  Mr. Mcnally is a 51 yo M with a pmh of DM and diabetic neuropathy presenting with a one week history of leg numbness with symptoms that worsened on Monday. He reports that he always has baseline numbness and pain but reports that the pain was significantly worse than normal and he noticed some swelling and that his foot was turning red/purple. He was seen by his podiatrist that sent him to [facility] to get an ultrasound that he reports was "normal" but the pain has persisted without any relief from tramadol. He endorses pain in his right foot, up into his ankle all the way to his knee. He denies any recent injury, any extended period of immobility and reports that he has never had a DVT in the past. He is a daily smoker, reports that he has been smoking 2 packs a day since he was 15 but recently (in the last 3 months) reduced to one pack a day.     On exam his right foot is more erythematous than the left but blanchable, painful to the touch. DP and PT pulses are palpable on the left, dopplerable on the right. There is no noticable edema or swelling, both feet are cool to touch.    Labs are grossly normal.     PAST MEDICAL & SURGICAL HISTORY:  Diabetes    Traumatic brain injury    Migraine    No significant past surgical history      REVIEW OF SYSTEMS  Head: denies headaches, dizziness & lightheadedness  Eyes: denies changes in vision, eye pain, double vision & eye discharge  Ears: denies changes in hearing & ear discharge  Nose: denies rhinorrhea  Mouth: denies bleeding gums & sore tongue & sore throat  Neck: denies swollen lymph nodes   Respiratory: denies SOB, cough, sputum production, wheezing  Cardiac: denies CP & irregular heart beat  Abdominal: denies abdominal pain, + in bowel movements  : denies dysuria, frequent urination, hematuria  Musculoskeletal: as noted in HPI  Neuro: denies involuntary muscle movements  Psych: no depression, no anxiety      Allergies    Demerol HCl (Anaphylaxis)    Intolerances      Vital Signs Last 24 Hrs  T(C): 36.7 (08 Feb 2023 19:54), Max: 36.7 (08 Feb 2023 19:54)  T(F): 98 (08 Feb 2023 19:54), Max: 98 (08 Feb 2023 19:54)  HR: 72 (08 Feb 2023 19:54) (72 - 85)  BP: 123/82 (08 Feb 2023 19:54) (123/82 - 168/90)  BP(mean): --  RR: 16 (08 Feb 2023 19:54) (16 - 16)  SpO2: 98% (08 Feb 2023 19:54) (98% - 98%)    Parameters below as of 08 Feb 2023 19:54  Patient On (Oxygen Delivery Method): room air        PHYSICAL EXAM:  Constitutional: AAOx3, no acute distress  HEENT: NCAT, airway patent  Cardiovascular: RRR, pulses present bilaterally  Respiratory: nonlabored breathing  Gastrointestinal: abdomen soft, nontender, non distended, no rebound or guarding  Neuro: no focal deficits  Extremities: R foot erythematous, blanchable, ttp up to just below the knee, cool to touch but not cooler than left foot, apprehensive to weight bear, no edema noted or calf swelling.    LABS:                        14.4   8.79  )-----------( 266      ( 08 Feb 2023 19:00 )             42.8     02-08    141  |  106  |  18  ----------------------------<  97  3.9   |  30  |  1.00    Ca    9.1      08 Feb 2023 19:00    TPro  7.4  /  Alb  3.7  /  TBili  0.3  /  DBili  x   /  AST  13<L>  /  ALT  17  /  AlkPhos  64  02-08    PT/INR - ( 08 Feb 2023 19:00 )   PT: 12.9 sec;   INR: 1.10 ratio         PTT - ( 08 Feb 2023 19:00 )  PTT:30.6 sec      RADIOLOGY & ADDITIONAL STUDIES:  [pending arterial and venous duplex]

## 2023-02-09 ENCOUNTER — TRANSCRIPTION ENCOUNTER (OUTPATIENT)
Age: 53
End: 2023-02-09

## 2023-02-09 DIAGNOSIS — I99.8 OTHER DISORDER OF CIRCULATORY SYSTEM: ICD-10-CM

## 2023-02-09 DIAGNOSIS — Z29.8 ENCOUNTER FOR OTHER SPECIFIED PROPHYLACTIC MEASURES: ICD-10-CM

## 2023-02-09 DIAGNOSIS — F17.200 NICOTINE DEPENDENCE, UNSPECIFIED, UNCOMPLICATED: ICD-10-CM

## 2023-02-09 DIAGNOSIS — E11.9 TYPE 2 DIABETES MELLITUS WITHOUT COMPLICATIONS: ICD-10-CM

## 2023-02-09 LAB
A1C WITH ESTIMATED AVERAGE GLUCOSE RESULT: 7.3 % — HIGH (ref 4–5.6)
ANION GAP SERPL CALC-SCNC: 3 MMOL/L — LOW (ref 5–17)
APTT BLD: 132.4 SEC — CRITICAL HIGH (ref 27.5–35.5)
APTT BLD: 57.1 SEC — HIGH (ref 27.5–35.5)
BASOPHILS # BLD AUTO: 0.08 K/UL — SIGNIFICANT CHANGE UP (ref 0–0.2)
BASOPHILS NFR BLD AUTO: 1 % — SIGNIFICANT CHANGE UP (ref 0–2)
BUN SERPL-MCNC: 21 MG/DL — SIGNIFICANT CHANGE UP (ref 7–23)
CALCIUM SERPL-MCNC: 9.2 MG/DL — SIGNIFICANT CHANGE UP (ref 8.5–10.1)
CHLORIDE SERPL-SCNC: 106 MMOL/L — SIGNIFICANT CHANGE UP (ref 96–108)
CO2 SERPL-SCNC: 31 MMOL/L — SIGNIFICANT CHANGE UP (ref 22–31)
CREAT SERPL-MCNC: 0.78 MG/DL — SIGNIFICANT CHANGE UP (ref 0.5–1.3)
CRP SERPL-MCNC: <3 MG/L — SIGNIFICANT CHANGE UP
EGFR: 107 ML/MIN/1.73M2 — SIGNIFICANT CHANGE UP
EOSINOPHIL # BLD AUTO: 0.61 K/UL — HIGH (ref 0–0.5)
EOSINOPHIL NFR BLD AUTO: 7.9 % — HIGH (ref 0–6)
ESTIMATED AVERAGE GLUCOSE: 163 MG/DL — HIGH (ref 68–114)
GLUCOSE SERPL-MCNC: 157 MG/DL — HIGH (ref 70–99)
HCT VFR BLD CALC: 42.7 % — SIGNIFICANT CHANGE UP (ref 39–50)
HCT VFR BLD CALC: 44.3 % — SIGNIFICANT CHANGE UP (ref 39–50)
HGB BLD-MCNC: 14.5 G/DL — SIGNIFICANT CHANGE UP (ref 13–17)
HGB BLD-MCNC: 14.5 G/DL — SIGNIFICANT CHANGE UP (ref 13–17)
IMM GRANULOCYTES NFR BLD AUTO: 0.1 % — SIGNIFICANT CHANGE UP (ref 0–0.9)
INR BLD: 1.13 RATIO — SIGNIFICANT CHANGE UP (ref 0.88–1.16)
LYMPHOCYTES # BLD AUTO: 4.68 K/UL — HIGH (ref 1–3.3)
LYMPHOCYTES # BLD AUTO: 60.6 % — HIGH (ref 13–44)
MCHC RBC-ENTMCNC: 29.6 PG — SIGNIFICANT CHANGE UP (ref 27–34)
MCHC RBC-ENTMCNC: 29.9 PG — SIGNIFICANT CHANGE UP (ref 27–34)
MCHC RBC-ENTMCNC: 32.7 GM/DL — SIGNIFICANT CHANGE UP (ref 32–36)
MCHC RBC-ENTMCNC: 34 GM/DL — SIGNIFICANT CHANGE UP (ref 32–36)
MCV RBC AUTO: 88 FL — SIGNIFICANT CHANGE UP (ref 80–100)
MCV RBC AUTO: 90.4 FL — SIGNIFICANT CHANGE UP (ref 80–100)
MONOCYTES # BLD AUTO: 0.56 K/UL — SIGNIFICANT CHANGE UP (ref 0–0.9)
MONOCYTES NFR BLD AUTO: 7.3 % — SIGNIFICANT CHANGE UP (ref 2–14)
NEUTROPHILS # BLD AUTO: 1.78 K/UL — LOW (ref 1.8–7.4)
NEUTROPHILS NFR BLD AUTO: 23.1 % — LOW (ref 43–77)
NRBC # BLD: 0 /100 WBCS — SIGNIFICANT CHANGE UP (ref 0–0)
NRBC # BLD: 0 /100 WBCS — SIGNIFICANT CHANGE UP (ref 0–0)
PLATELET # BLD AUTO: 229 K/UL — SIGNIFICANT CHANGE UP (ref 150–400)
PLATELET # BLD AUTO: 255 K/UL — SIGNIFICANT CHANGE UP (ref 150–400)
POTASSIUM SERPL-MCNC: 3.7 MMOL/L — SIGNIFICANT CHANGE UP (ref 3.5–5.3)
POTASSIUM SERPL-SCNC: 3.7 MMOL/L — SIGNIFICANT CHANGE UP (ref 3.5–5.3)
PROTHROM AB SERPL-ACNC: 13.2 SEC — SIGNIFICANT CHANGE UP (ref 10.5–13.4)
RBC # BLD: 4.85 M/UL — SIGNIFICANT CHANGE UP (ref 4.2–5.8)
RBC # BLD: 4.9 M/UL — SIGNIFICANT CHANGE UP (ref 4.2–5.8)
RBC # FLD: 13.9 % — SIGNIFICANT CHANGE UP (ref 10.3–14.5)
RBC # FLD: 14.1 % — SIGNIFICANT CHANGE UP (ref 10.3–14.5)
SODIUM SERPL-SCNC: 140 MMOL/L — SIGNIFICANT CHANGE UP (ref 135–145)
URATE SERPL-MCNC: 3.6 MG/DL — SIGNIFICANT CHANGE UP (ref 3.4–8.8)
WBC # BLD: 7.72 K/UL — SIGNIFICANT CHANGE UP (ref 3.8–10.5)
WBC # BLD: 8.37 K/UL — SIGNIFICANT CHANGE UP (ref 3.8–10.5)
WBC # FLD AUTO: 7.72 K/UL — SIGNIFICANT CHANGE UP (ref 3.8–10.5)
WBC # FLD AUTO: 8.37 K/UL — SIGNIFICANT CHANGE UP (ref 3.8–10.5)

## 2023-02-09 PROCEDURE — 99222 1ST HOSP IP/OBS MODERATE 55: CPT

## 2023-02-09 PROCEDURE — 93971 EXTREMITY STUDY: CPT | Mod: 26,RT

## 2023-02-09 PROCEDURE — 93971 EXTREMITY STUDY: CPT | Mod: 26,LT

## 2023-02-09 PROCEDURE — 93010 ELECTROCARDIOGRAM REPORT: CPT

## 2023-02-09 PROCEDURE — 75635 CT ANGIO ABDOMINAL ARTERIES: CPT | Mod: 26

## 2023-02-09 RX ORDER — HEPARIN SODIUM 5000 [USP'U]/ML
1000 INJECTION INTRAVENOUS; SUBCUTANEOUS
Qty: 25000 | Refills: 0 | Status: DISCONTINUED | OUTPATIENT
Start: 2023-02-09 | End: 2023-02-10

## 2023-02-09 RX ORDER — HEPARIN SODIUM 5000 [USP'U]/ML
5500 INJECTION INTRAVENOUS; SUBCUTANEOUS EVERY 6 HOURS
Refills: 0 | Status: DISCONTINUED | OUTPATIENT
Start: 2023-02-09 | End: 2023-02-10

## 2023-02-09 RX ORDER — CEFAZOLIN SODIUM 1 G
2000 VIAL (EA) INJECTION ONCE
Refills: 0 | Status: COMPLETED | OUTPATIENT
Start: 2023-02-09 | End: 2023-02-09

## 2023-02-09 RX ORDER — HYDROMORPHONE HYDROCHLORIDE 2 MG/ML
1 INJECTION INTRAMUSCULAR; INTRAVENOUS; SUBCUTANEOUS EVERY 4 HOURS
Refills: 0 | Status: DISCONTINUED | OUTPATIENT
Start: 2023-02-09 | End: 2023-02-10

## 2023-02-09 RX ORDER — GLUCAGON INJECTION, SOLUTION 0.5 MG/.1ML
1 INJECTION, SOLUTION SUBCUTANEOUS ONCE
Refills: 0 | Status: DISCONTINUED | OUTPATIENT
Start: 2023-02-09 | End: 2023-02-10

## 2023-02-09 RX ORDER — NICOTINE POLACRILEX 2 MG
1 GUM BUCCAL DAILY
Refills: 0 | Status: DISCONTINUED | OUTPATIENT
Start: 2023-02-09 | End: 2023-02-10

## 2023-02-09 RX ORDER — LANOLIN ALCOHOL/MO/W.PET/CERES
3 CREAM (GRAM) TOPICAL AT BEDTIME
Refills: 0 | Status: DISCONTINUED | OUTPATIENT
Start: 2023-02-09 | End: 2023-02-10

## 2023-02-09 RX ORDER — DEXTROSE 50 % IN WATER 50 %
15 SYRINGE (ML) INTRAVENOUS ONCE
Refills: 0 | Status: DISCONTINUED | OUTPATIENT
Start: 2023-02-09 | End: 2023-02-10

## 2023-02-09 RX ORDER — ONDANSETRON 8 MG/1
4 TABLET, FILM COATED ORAL EVERY 8 HOURS
Refills: 0 | Status: DISCONTINUED | OUTPATIENT
Start: 2023-02-09 | End: 2023-02-10

## 2023-02-09 RX ORDER — SODIUM CHLORIDE 9 MG/ML
1000 INJECTION, SOLUTION INTRAVENOUS
Refills: 0 | Status: DISCONTINUED | OUTPATIENT
Start: 2023-02-09 | End: 2023-02-10

## 2023-02-09 RX ORDER — ACETAMINOPHEN 500 MG
650 TABLET ORAL EVERY 6 HOURS
Refills: 0 | Status: DISCONTINUED | OUTPATIENT
Start: 2023-02-09 | End: 2023-02-10

## 2023-02-09 RX ORDER — SUCRALFATE 1 G
1 TABLET ORAL
Refills: 0 | Status: DISCONTINUED | OUTPATIENT
Start: 2023-02-09 | End: 2023-02-10

## 2023-02-09 RX ORDER — INSULIN LISPRO 100/ML
VIAL (ML) SUBCUTANEOUS
Refills: 0 | Status: DISCONTINUED | OUTPATIENT
Start: 2023-02-09 | End: 2023-02-10

## 2023-02-09 RX ORDER — HEPARIN SODIUM 5000 [USP'U]/ML
5500 INJECTION INTRAVENOUS; SUBCUTANEOUS ONCE
Refills: 0 | Status: DISCONTINUED | OUTPATIENT
Start: 2023-02-09 | End: 2023-02-09

## 2023-02-09 RX ORDER — DEXTROSE 50 % IN WATER 50 %
25 SYRINGE (ML) INTRAVENOUS ONCE
Refills: 0 | Status: DISCONTINUED | OUTPATIENT
Start: 2023-02-09 | End: 2023-02-10

## 2023-02-09 RX ORDER — POLYETHYLENE GLYCOL 3350 17 G/17G
17 POWDER, FOR SOLUTION ORAL DAILY
Refills: 0 | Status: DISCONTINUED | OUTPATIENT
Start: 2023-02-09 | End: 2023-02-10

## 2023-02-09 RX ORDER — HEPARIN SODIUM 5000 [USP'U]/ML
1200 INJECTION INTRAVENOUS; SUBCUTANEOUS
Qty: 25000 | Refills: 0 | Status: DISCONTINUED | OUTPATIENT
Start: 2023-02-09 | End: 2023-02-09

## 2023-02-09 RX ORDER — NALOXONE HYDROCHLORIDE 4 MG/.1ML
0.4 SPRAY NASAL ONCE
Refills: 0 | Status: DISCONTINUED | OUTPATIENT
Start: 2023-02-09 | End: 2023-02-10

## 2023-02-09 RX ORDER — HYDROMORPHONE HYDROCHLORIDE 2 MG/ML
0.5 INJECTION INTRAMUSCULAR; INTRAVENOUS; SUBCUTANEOUS EVERY 4 HOURS
Refills: 0 | Status: DISCONTINUED | OUTPATIENT
Start: 2023-02-09 | End: 2023-02-10

## 2023-02-09 RX ORDER — PANTOPRAZOLE SODIUM 20 MG/1
40 TABLET, DELAYED RELEASE ORAL DAILY
Refills: 0 | Status: DISCONTINUED | OUTPATIENT
Start: 2023-02-09 | End: 2023-02-10

## 2023-02-09 RX ORDER — HEPARIN SODIUM 5000 [USP'U]/ML
2500 INJECTION INTRAVENOUS; SUBCUTANEOUS EVERY 6 HOURS
Refills: 0 | Status: DISCONTINUED | OUTPATIENT
Start: 2023-02-09 | End: 2023-02-10

## 2023-02-09 RX ORDER — DEXTROSE 50 % IN WATER 50 %
12.5 SYRINGE (ML) INTRAVENOUS ONCE
Refills: 0 | Status: DISCONTINUED | OUTPATIENT
Start: 2023-02-09 | End: 2023-02-10

## 2023-02-09 RX ORDER — SODIUM CHLORIDE 9 MG/ML
1000 INJECTION INTRAMUSCULAR; INTRAVENOUS; SUBCUTANEOUS
Refills: 0 | Status: DISCONTINUED | OUTPATIENT
Start: 2023-02-09 | End: 2023-02-10

## 2023-02-09 RX ORDER — SENNA PLUS 8.6 MG/1
2 TABLET ORAL AT BEDTIME
Refills: 0 | Status: DISCONTINUED | OUTPATIENT
Start: 2023-02-09 | End: 2023-02-10

## 2023-02-09 RX ADMIN — Medication 1 GRAM(S): at 23:22

## 2023-02-09 RX ADMIN — HYDROMORPHONE HYDROCHLORIDE 1 MILLIGRAM(S): 2 INJECTION INTRAMUSCULAR; INTRAVENOUS; SUBCUTANEOUS at 05:16

## 2023-02-09 RX ADMIN — Medication 1 PATCH: at 06:14

## 2023-02-09 RX ADMIN — HYDROMORPHONE HYDROCHLORIDE 1 MILLIGRAM(S): 2 INJECTION INTRAMUSCULAR; INTRAVENOUS; SUBCUTANEOUS at 06:17

## 2023-02-09 RX ADMIN — HYDROMORPHONE HYDROCHLORIDE 1 MILLIGRAM(S): 2 INJECTION INTRAMUSCULAR; INTRAVENOUS; SUBCUTANEOUS at 01:06

## 2023-02-09 RX ADMIN — HYDROMORPHONE HYDROCHLORIDE 1 MILLIGRAM(S): 2 INJECTION INTRAMUSCULAR; INTRAVENOUS; SUBCUTANEOUS at 10:59

## 2023-02-09 RX ADMIN — HEPARIN SODIUM 1200 UNIT(S)/HR: 5000 INJECTION INTRAVENOUS; SUBCUTANEOUS at 00:54

## 2023-02-09 RX ADMIN — HEPARIN SODIUM 1000 UNIT(S)/HR: 5000 INJECTION INTRAVENOUS; SUBCUTANEOUS at 08:35

## 2023-02-09 RX ADMIN — HEPARIN SODIUM 1100 UNIT(S)/HR: 5000 INJECTION INTRAVENOUS; SUBCUTANEOUS at 18:39

## 2023-02-09 RX ADMIN — HYDROMORPHONE HYDROCHLORIDE 1 MILLIGRAM(S): 2 INJECTION INTRAMUSCULAR; INTRAVENOUS; SUBCUTANEOUS at 11:59

## 2023-02-09 RX ADMIN — HEPARIN SODIUM 1100 UNIT(S)/HR: 5000 INJECTION INTRAVENOUS; SUBCUTANEOUS at 19:22

## 2023-02-09 RX ADMIN — HYDROMORPHONE HYDROCHLORIDE 1 MILLIGRAM(S): 2 INJECTION INTRAMUSCULAR; INTRAVENOUS; SUBCUTANEOUS at 01:21

## 2023-02-09 RX ADMIN — HYDROMORPHONE HYDROCHLORIDE 1 MILLIGRAM(S): 2 INJECTION INTRAMUSCULAR; INTRAVENOUS; SUBCUTANEOUS at 19:58

## 2023-02-09 RX ADMIN — Medication 1 GRAM(S): at 18:17

## 2023-02-09 RX ADMIN — Medication 1 PATCH: at 19:43

## 2023-02-09 RX ADMIN — HEPARIN SODIUM 5500 UNIT(S): 5000 INJECTION INTRAVENOUS; SUBCUTANEOUS at 00:56

## 2023-02-09 RX ADMIN — HYDROMORPHONE HYDROCHLORIDE 1 MILLIGRAM(S): 2 INJECTION INTRAMUSCULAR; INTRAVENOUS; SUBCUTANEOUS at 20:13

## 2023-02-09 RX ADMIN — PANTOPRAZOLE SODIUM 40 MILLIGRAM(S): 20 TABLET, DELAYED RELEASE ORAL at 13:23

## 2023-02-09 RX ADMIN — Medication 1 PATCH: at 13:24

## 2023-02-09 RX ADMIN — HYDROMORPHONE HYDROCHLORIDE 1 MILLIGRAM(S): 2 INJECTION INTRAMUSCULAR; INTRAVENOUS; SUBCUTANEOUS at 15:42

## 2023-02-09 RX ADMIN — SENNA PLUS 2 TABLET(S): 8.6 TABLET ORAL at 23:22

## 2023-02-09 NOTE — CONSULT NOTE ADULT - SUBJECTIVE AND OBJECTIVE BOX
CHARTING IN PROGRESS     [facility]         Sasha Kate, Simon Cantor Pannella, Patel, Savella        [phone] (office)    Reason for Consult:    Interval HPI: Patient seen and examined at bedside. No acute events overnight.     HPI:  53 yo M PMHx Type 2 Diabetes (not on insulin) with diabetic neuropathy presenting with swelling, pain and discoloration of the right foot. He noticed worsening numbness from mid calf to the foot 2 weeks ago which then progressed to swelling and discoloration. he went to his PCP dr. pickens who ordered a doppler venous ultrasound (?) which was negative for clots. He was referred to podiatry who he  saw today and was referred to the ER for evaluation.     ER Course:   168/90 HR 85/min 16/min T 97.7 98% RA   Labs: wnl  EKG pending   given morphine 4 mg x 1   imaging: US Duplex Extremity Right: Atherosclerotic disease with spectral broadening seen from the superficial femoral artery, distally, compatible with upstream stenosis. (08 Feb 2023 23:33)      PAST MEDICAL & SURGICAL HISTORY:  Diabetes      Traumatic brain injury      Migraine      No significant past surgical history          SOCIAL HISTORY: No active tobacco, alcohol or illicit drug use    FAMILY HISTORY:  FH: type 2 diabetes (Father, Grandparent)        Home Medications:  metformin 1000 mg oral tablet: 1  orally 2 times a day (09 Feb 2023 00:35)      MEDICATIONS  (STANDING):  ceFAZolin   IVPB 2000 milliGRAM(s) IV Intermittent once  dextrose 5%. 1000 milliLiter(s) (50 mL/Hr) IV Continuous <Continuous>  dextrose 5%. 1000 milliLiter(s) (100 mL/Hr) IV Continuous <Continuous>  dextrose 50% Injectable 25 Gram(s) IV Push once  dextrose 50% Injectable 12.5 Gram(s) IV Push once  dextrose 50% Injectable 25 Gram(s) IV Push once  glucagon  Injectable 1 milliGRAM(s) IntraMuscular once  heparin  Infusion. 1000 Unit(s)/Hr (10 mL/Hr) IV Continuous <Continuous>  insulin lispro (ADMELOG) corrective regimen sliding scale   SubCutaneous three times a day before meals  naloxone Injectable 0.4 milliGRAM(s) IV Push once  nicotine -  14 mG/24Hr(s) Patch 1 Patch Transdermal daily  polyethylene glycol 3350 17 Gram(s) Oral daily  senna 2 Tablet(s) Oral at bedtime  sodium chloride 0.9%. 1000 milliLiter(s) (100 mL/Hr) IV Continuous <Continuous>    MEDICATIONS  (PRN):  acetaminophen     Tablet .. 650 milliGRAM(s) Oral every 6 hours PRN Temp greater or equal to 38C (100.4F), Mild Pain (1 - 3)  aluminum hydroxide/magnesium hydroxide/simethicone Suspension 30 milliLiter(s) Oral every 4 hours PRN Dyspepsia  bisacodyl 5 milliGRAM(s) Oral daily PRN Constipation  dextrose Oral Gel 15 Gram(s) Oral once PRN Blood Glucose LESS THAN 70 milliGRAM(s)/deciliter  heparin   Injectable 5500 Unit(s) IV Push every 6 hours PRN For aPTT less than 40  heparin   Injectable 2500 Unit(s) IV Push every 6 hours PRN For aPTT between 40 - 57  HYDROmorphone  Injectable 0.5 milliGRAM(s) IV Push every 4 hours PRN Moderate Pain (4 - 6)  HYDROmorphone  Injectable 1 milliGRAM(s) IV Push every 4 hours PRN Severe Pain (7 - 10)  melatonin 3 milliGRAM(s) Oral at bedtime PRN Insomnia  ondansetron Injectable 4 milliGRAM(s) IV Push every 8 hours PRN Nausea and/or Vomiting      Allergies    Demerol HCl (Anaphylaxis)    Intolerances        REVIEW OF SYSTEMS: Negative except as per HPI.    VITAL SIGNS:   Vital Signs Last 24 Hrs  T(C): 36.9 (09 Feb 2023 10:31), Max: 36.9 (09 Feb 2023 10:31)  T(F): 98.5 (09 Feb 2023 10:31), Max: 98.5 (09 Feb 2023 10:31)  HR: 60 (09 Feb 2023 10:31) (59 - 85)  BP: 150/79 (09 Feb 2023 10:31) (123/82 - 168/90)  BP(mean): --  RR: 20 (09 Feb 2023 10:31) (14 - 20)  SpO2: 98% (09 Feb 2023 10:31) (97% - 100%)    Parameters below as of 09 Feb 2023 10:31  Patient On (Oxygen Delivery Method): room air        I&O's Summary      PHYSICAL EXAM:  Constitutional: NAD, well-developed  HEENT NC/AT, moist mucous membranes  Pulmonary: Non-labored, breath sounds are clear bilaterally, no wheezing, rales or rhonchi  Cardiovascular: +S1, S2, RRR, no murmur  Gastrointestinal: Soft, nontender, nondistended, normoactive bowel sounds  Extremities: No peripheral edema   Neurological: Alert, strength and sensitivity are grossly intact  Skin: No obvious lesions/rashes  Psych: Mood & affect appropriate    LABS: All Labs Reviewed:                        14.5   7.72  )-----------( 255      ( 09 Feb 2023 05:32 )             44.3                         14.4   8.79  )-----------( 266      ( 08 Feb 2023 19:00 )             42.8     09 Feb 2023 05:32    140    |  106    |  21     ----------------------------<  157    3.7     |  31     |  0.78   08 Feb 2023 19:00    141    |  106    |  18     ----------------------------<  97     3.9     |  30     |  1.00     Ca    9.2        09 Feb 2023 05:32  Ca    9.1        08 Feb 2023 19:00    TPro  7.4    /  Alb  3.7    /  TBili  0.3    /  DBili  x      /  AST  13     /  ALT  17     /  AlkPhos  64     08 Feb 2023 19:00    PT/INR - ( 09 Feb 2023 05:32 )   PT: 13.2 sec;   INR: 1.13 ratio         PTT - ( 09 Feb 2023 05:32 )  PTT:132.4 sec      Blood Culture:         EKG:    RADIOLOGY:    CXR:   [facility]         Sasha Kate, Sakshi, Simon, Clay, Roel, Amanda        [phone] (office)    Reason for Consult: Pre-op clearance    HPI: 53 yo M with PMHx of T2DM (not on insulin) with diabetic neuropathy, current smoker admitted for RLE limb ischemia. Pt denies chest pain, shortness of breath, dizziness, palpitations. Further denies Hx of MI, stroke/TIA, CHF, valvular disorders, arrythmias. Pt does not follow with Cardio outpt.      HPI from admission:  53 yo M PMHx Type 2 Diabetes (not on insulin) with diabetic neuropathy presenting with swelling, pain and discoloration of the right foot. He noticed worsening numbness from mid calf to the foot 2 weeks ago which then progressed to swelling and discoloration. he went to his PCP dr. pickens who ordered a doppler venous ultrasound (?) which was negative for clots. He was referred to podiatry who he  saw today and was referred to the ER for evaluation.     ER Course:   168/90 HR 85/min 16/min T 97.7 98% RA   Labs: wnl  EKG pending   given morphine 4 mg x 1   imaging: US Duplex Extremity Right: Atherosclerotic disease with spectral broadening seen from the superficial femoral artery, distally, compatible with upstream stenosis. (08 Feb 2023 23:33)      PAST MEDICAL & SURGICAL HISTORY:  Diabetes      Traumatic brain injury      Migraine      No significant past surgical history          SOCIAL HISTORY: No active tobacco, alcohol or illicit drug use    FAMILY HISTORY:  FH: type 2 diabetes (Father, Grandparent)        Home Medications:  metformin 1000 mg oral tablet: 1  orally 2 times a day (09 Feb 2023 00:35)      MEDICATIONS  (STANDING):  ceFAZolin   IVPB 2000 milliGRAM(s) IV Intermittent once  dextrose 5%. 1000 milliLiter(s) (50 mL/Hr) IV Continuous <Continuous>  dextrose 5%. 1000 milliLiter(s) (100 mL/Hr) IV Continuous <Continuous>  dextrose 50% Injectable 25 Gram(s) IV Push once  dextrose 50% Injectable 12.5 Gram(s) IV Push once  dextrose 50% Injectable 25 Gram(s) IV Push once  glucagon  Injectable 1 milliGRAM(s) IntraMuscular once  heparin  Infusion. 1000 Unit(s)/Hr (10 mL/Hr) IV Continuous <Continuous>  insulin lispro (ADMELOG) corrective regimen sliding scale   SubCutaneous three times a day before meals  naloxone Injectable 0.4 milliGRAM(s) IV Push once  nicotine -  14 mG/24Hr(s) Patch 1 Patch Transdermal daily  polyethylene glycol 3350 17 Gram(s) Oral daily  senna 2 Tablet(s) Oral at bedtime  sodium chloride 0.9%. 1000 milliLiter(s) (100 mL/Hr) IV Continuous <Continuous>    MEDICATIONS  (PRN):  acetaminophen     Tablet .. 650 milliGRAM(s) Oral every 6 hours PRN Temp greater or equal to 38C (100.4F), Mild Pain (1 - 3)  aluminum hydroxide/magnesium hydroxide/simethicone Suspension 30 milliLiter(s) Oral every 4 hours PRN Dyspepsia  bisacodyl 5 milliGRAM(s) Oral daily PRN Constipation  dextrose Oral Gel 15 Gram(s) Oral once PRN Blood Glucose LESS THAN 70 milliGRAM(s)/deciliter  heparin   Injectable 5500 Unit(s) IV Push every 6 hours PRN For aPTT less than 40  heparin   Injectable 2500 Unit(s) IV Push every 6 hours PRN For aPTT between 40 - 57  HYDROmorphone  Injectable 0.5 milliGRAM(s) IV Push every 4 hours PRN Moderate Pain (4 - 6)  HYDROmorphone  Injectable 1 milliGRAM(s) IV Push every 4 hours PRN Severe Pain (7 - 10)  melatonin 3 milliGRAM(s) Oral at bedtime PRN Insomnia  ondansetron Injectable 4 milliGRAM(s) IV Push every 8 hours PRN Nausea and/or Vomiting      Allergies    Demerol HCl (Anaphylaxis)    Intolerances        REVIEW OF SYSTEMS: Negative except as per HPI.    VITAL SIGNS:   Vital Signs Last 24 Hrs  T(C): 36.9 (09 Feb 2023 10:31), Max: 36.9 (09 Feb 2023 10:31)  T(F): 98.5 (09 Feb 2023 10:31), Max: 98.5 (09 Feb 2023 10:31)  HR: 60 (09 Feb 2023 10:31) (59 - 85)  BP: 150/79 (09 Feb 2023 10:31) (123/82 - 168/90)  BP(mean): --  RR: 20 (09 Feb 2023 10:31) (14 - 20)  SpO2: 98% (09 Feb 2023 10:31) (97% - 100%)    Parameters below as of 09 Feb 2023 10:31  Patient On (Oxygen Delivery Method): room air        I&O's Summary      PHYSICAL EXAM:  Constitutional: NAD, well-developed  HEENT: NC/AT, EOMI  Pulmonary: Non-labored, breath sounds are clear bilaterally, no wheezing, rales or rhonchi  Cardiovascular: +S1, S2, RRR, no murmur  Gastrointestinal: Soft, nontender, nondistended  Extremities: RLE edema, erythema, loss of sensation  Neurological: AAOx3, appropriately responsive to questions and commands  Psych: Mood & affect appropriate    LABS: All Labs Reviewed:                        14.5   7.72  )-----------( 255      ( 09 Feb 2023 05:32 )             44.3                         14.4   8.79  )-----------( 266      ( 08 Feb 2023 19:00 )             42.8     09 Feb 2023 05:32    140    |  106    |  21     ----------------------------<  157    3.7     |  31     |  0.78   08 Feb 2023 19:00    141    |  106    |  18     ----------------------------<  97     3.9     |  30     |  1.00     Ca    9.2        09 Feb 2023 05:32  Ca    9.1        08 Feb 2023 19:00    TPro  7.4    /  Alb  3.7    /  TBili  0.3    /  DBili  x      /  AST  13     /  ALT  17     /  AlkPhos  64     08 Feb 2023 19:00    PT/INR - ( 09 Feb 2023 05:32 )   PT: 13.2 sec;   INR: 1.13 ratio         PTT - ( 09 Feb 2023 05:32 )  PTT:132.4 sec      Blood Culture:         EKG:    RADIOLOGY:    CXR:

## 2023-02-09 NOTE — PROGRESS NOTE ADULT - PROBLEM SELECTOR PLAN 1
Presenting with numbness, discoloration and pain. LE Arterial dopplers revealing stenosis of the femoral artery  - hep gtt  - pain control Dilaudid prn patient has tolerated in past   - monitor for signs of bleeding   - RCI score 0, EKG normal. BP elevated 2/2 pain; denies hx of CAD, arrythmias, renal disease. denies history of reaction to anesthesia, denies anginal symptoms.  - Cardio consulted [facility] for cardiac clearance  - Vascular surgery following    - CT angio, EULALIA & PVRs - F/u Presenting with numbness, discoloration and pain. LE Arterial dopplers revealing stenosis of the femoral artery  - hep gtt  - pain control Dilaudid prn patient has tolerated in past   - monitor for signs of bleeding   - RCI score 0, EKG normal. BP elevated 2/2 pain; denies hx of CAD, arrythmias, renal disease. denies history of reaction to anesthesia, denies anginal symptoms.  - Cardio consulted [facility] for cardiac clearance  - Vascular surgery following    - CT angio, EULALIA & PVRs - F/u    - Plan for OR tomorrow, RLE bypass, NPO after 12AM Presenting with numbness, discoloration and pain  -. LE Arterial dopplers revealing stenosis of the femoral artery  -IV  hep gtt Follow ApTT   - pain control Dilaudid prn patient has tolerated in past   - monitor for signs of bleeding   - RCI score 0, EKG normal. BP elevated 2/2 pain; denies hx of CAD, arrythmia, renal disease. denies history of reaction to anesthesia, denies anginal symptoms.  - Cardio consulted [facility] for cardiac clearance.  - Vascular surgery following    - CT angio, EULALIA & PVRs - F/u    - Plan for OR tomorrow, RLE bypass, NPO after midnight   Pt with popliteal occlusion and small distal targets  Will need R fem-distal bypass ASAP Rt Angiogram   Cont Heparin gtt

## 2023-02-09 NOTE — PROGRESS NOTE ADULT - PROBLEM SELECTOR PLAN 2
on metformin   - start LDISS with accuchecks and hypoglycemic protocol on metformin -HOLD  Follow HbA1c   - start LDISS with Accu-Cheks and hypoglycemic protocol

## 2023-02-09 NOTE — ED ADULT NURSE REASSESSMENT NOTE - NS ED NURSE REASSESS COMMENT FT1
as per patient, ekg to be done in am as agreed upon between patient and resident. will continue to monitor.

## 2023-02-09 NOTE — PROGRESS NOTE ADULT - SUBJECTIVE AND OBJECTIVE BOX
Patient is a 52y old  Male who presents with a chief complaint of acute limb ischemia (08 Feb 2023 23:33)    HPI:  53 yo M PMHx Type 2 Diabetes (not on insulin) with diabetic neuropathy presenting with swelling, pain and discoloration of the right foot. He noticed worsening numbness from mid calf to the foot 2 weeks ago which then progressed to swelling and discoloration. he went to his PCP dr. pickens who ordered a doppler venous ultrasound (?) which was negative for clots. He was referred to podiatry who he  saw today and was referred to the ER for evaluation.     ER Course:   168/90 HR 85/min 16/min T 97.7 98% RA   Labs: wnl  EKG pending   given morphine 4 mg x 1   imaging: US Duplex Extremity Right: Atherosclerotic disease with spectral broadening seen from the superficial femoral artery, distally, compatible with upstream stenosis. (08 Feb 2023 23:33)    INTERVAL HPI:    2/9/23 - Pt was seen and examined at bedside. Endorses severe persistent pain though improved and tolerable. Pt denies headache, dizziness, lightheadedness, fever, chills, body aches, CP, SOB, palpitations, abdominal pain, n/v.    OVERNIGHT EVENTS: No acute overnight events.     Home Medications:  metformin 1000 mg oral tablet: 1  orally 2 times a day (09 Feb 2023 00:35)      MEDICATIONS  (STANDING):  dextrose 5%. 1000 milliLiter(s) (50 mL/Hr) IV Continuous <Continuous>  dextrose 5%. 1000 milliLiter(s) (100 mL/Hr) IV Continuous <Continuous>  dextrose 50% Injectable 25 Gram(s) IV Push once  dextrose 50% Injectable 12.5 Gram(s) IV Push once  dextrose 50% Injectable 25 Gram(s) IV Push once  glucagon  Injectable 1 milliGRAM(s) IntraMuscular once  heparin  Infusion. 1000 Unit(s)/Hr (10 mL/Hr) IV Continuous <Continuous>  insulin lispro (ADMELOG) corrective regimen sliding scale   SubCutaneous three times a day before meals  naloxone Injectable 0.4 milliGRAM(s) IV Push once  nicotine -  14 mG/24Hr(s) Patch 1 Patch Transdermal daily  polyethylene glycol 3350 17 Gram(s) Oral daily  senna 2 Tablet(s) Oral at bedtime    MEDICATIONS  (PRN):  acetaminophen     Tablet .. 650 milliGRAM(s) Oral every 6 hours PRN Temp greater or equal to 38C (100.4F), Mild Pain (1 - 3)  aluminum hydroxide/magnesium hydroxide/simethicone Suspension 30 milliLiter(s) Oral every 4 hours PRN Dyspepsia  bisacodyl 5 milliGRAM(s) Oral daily PRN Constipation  dextrose Oral Gel 15 Gram(s) Oral once PRN Blood Glucose LESS THAN 70 milliGRAM(s)/deciliter  heparin   Injectable 5500 Unit(s) IV Push every 6 hours PRN For aPTT less than 40  heparin   Injectable 2500 Unit(s) IV Push every 6 hours PRN For aPTT between 40 - 57  HYDROmorphone  Injectable 0.5 milliGRAM(s) IV Push every 4 hours PRN Moderate Pain (4 - 6)  HYDROmorphone  Injectable 1 milliGRAM(s) IV Push every 4 hours PRN Severe Pain (7 - 10)  melatonin 3 milliGRAM(s) Oral at bedtime PRN Insomnia  ondansetron Injectable 4 milliGRAM(s) IV Push every 8 hours PRN Nausea and/or Vomiting      Demerol HCl (Anaphylaxis)      Social History:  lives home with wife and son  adls indpeendent  ambulates independently  smoker: 1/2 pack a day x 30 years   alcohol none  drugs [facility] occasionally for sleep (08 Feb 2023 23:33)      REVIEW OF SYSTEMS:  CONSTITUTIONAL: No fever, No chills, No fatigue, No myalgia, No Body ache, No Weakness  EYES: No eye pain,  No visual disturbances, No discharge, No Redness  ENMT: No ear pain, No nose bleed, No vertigo; No sinus pain, No throat pain, No Congestion  NECK: No pain, No stiffness  RESPIRATORY: No cough, No wheezing, No hemoptysis, No shortness of breath  CARDIOVASCULAR: No chest pain, No palpitations  GASTROINTESTINAL: No abdominal pain, No epigastric pain. No nausea, No vomiting, No diarrhea, No constipation; [ x ] BM-  GENITOURINARY: No dysuria, No frequency, No urgency, No hematuria, No incontinence  NEUROLOGICAL: No headaches, No dizziness, No numbness, No tingling, No tremors, No weakness  EXTREMITIES: + Distal RLE Pain/Swelling/discoloration  SKIN: [  ] No itching, burning, rashes, or lesions    MUSCULOSKELETAL: No joint pain, No joint swelling; No muscle pain, No back pain, No extremity pain  PSYCHIATRIC: No depression, No anxiety, No mood swings, No difficulty sleeping at night  PAIN SCALE: [  ] None  [ x ] Other- 10/10 w/ movement and palpation  ROS Unable to obtain due to: [  ] Dementia  [  ] Lethargy  [  ] Sedated  [  ] Non verbal  REST OF REVIEW OF SYSTEMS: [ x ] Normal     Vital Signs Last 24 Hrs  T(C): 36.8 (09 Feb 2023 08:25), Max: 36.8 (09 Feb 2023 08:25)  T(F): 98.3 (09 Feb 2023 08:25), Max: 98.3 (09 Feb 2023 08:25)  HR: 62 (09 Feb 2023 08:25) (59 - 85)  BP: 146/73 (09 Feb 2023 08:25) (123/82 - 168/90)  BP(mean): --  RR: 18 (09 Feb 2023 08:25) (14 - 18)  SpO2: 100% (09 Feb 2023 08:25) (97% - 100%)    Parameters below as of 09 Feb 2023 08:25  Patient On (Oxygen Delivery Method): room air        CAPILLARY BLOOD GLUCOSE      POCT Blood Glucose.: 143 mg/dL (09 Feb 2023 08:24)  POCT Blood Glucose.: 151 mg/dL (09 Feb 2023 01:28)      I&O's Summary    PHYSICAL EXAM:  GENERAL:  [ x ] NAD, [ x ] Well appearing, [  ] Agitated, [  ] Drowsy, [  ] Lethargy, [  ] Confused   HEAD:  [ x ] Normal, [  ] Other  EYES:  [ x ] EOMI, [ x ] PERRLA, [ x ] Conjunctiva and sclera clear normal, [  ] Other, [  ] Pallor, [  ] Discharge  ENMT:  [ x ] Normal, [ x ] Moist mucous membranes, [  ] Good dentition, [  ] No thrush  NECK:  [ x ] Supple, [  ] No JVD, [ x ] Normal thyroid, [  ] Lymphadenopathy, [  ] Other  CHEST/LUNG:  [ x ] Clear to auscultation bilaterally, [ x ] Breath Sounds equal B/L / decreased, [  ] Poor effort, [ x ] No rales, [ x ] No rhonchi, [ x ] No wheezing  HEART:  [ x ] Regular rate and rhythm, [  ] Tachycardia, [  ] Bradycardia, [  ] Irregular, [ x ] No murmurs, No rubs, No gallops, [  ] PPM in place (Mfr:  )  ABDOMEN:  [ x ] Soft, [ x ] Nontender, [ x ] Nondistended, [ x ] No mass, [ x ] Bowel sounds present, [  ] Obese  NERVOUS SYSTEM:  [ x ] Alert & Oriented x3__, [ x ] Nonfocal, [  ] Confusion, [  ] Encephalopathic, [  ] Sedated, [  ] Unable to assess, [  ] Dementia, [  ] Other-  EXTREMITIES:  [ x ] 2+ Peripheral Pulses, No clubbing, No cyanosis,  [  ] Edema B/L lower EXT, [  ] PVD stasis skin changes B/L lower EXT, [  ] Wound, [ x ] - RLE tenderness to palpation  LYMPH:  No lymphadenopathy noted  SKIN:  [  ] No rashes or lesions, [  ] Pressure ulcers, [  ] Ecchymosis, [  ] Skin tears, [ x ] Other - RLE ankle swelling, skin discoloration     DIET: Diet, Regular:   Consistent Carbohydrate Evening Snack (02-09-23 @ 00:02)      LABS:                        14.5   7.72  )-----------( 255      ( 09 Feb 2023 05:32 )             44.3     09 Feb 2023 05:32    140    |  106    |  21     ----------------------------<  157    3.7     |  31     |  0.78     Ca    9.2        09 Feb 2023 05:32    TPro  7.4    /  Alb  3.7    /  TBili  0.3    /  DBili  x      /  AST  13     /  ALT  17     /  AlkPhos  64     08 Feb 2023 19:00    PT/INR - ( 09 Feb 2023 05:32 )   PT: 13.2 sec;   INR: 1.13 ratio         PTT - ( 09 Feb 2023 05:32 )  PTT:132.4 sec               Anemia Panel:      Thyroid Panel:                RADIOLOGY & ADDITIONAL TESTS: _______      HEALTH ISSUES - PROBLEM Dx:  Acute lower limb ischemia    Type 2 diabetes mellitus    Need for other prophylactic measure    Tobacco dependence          Consultant(s) Notes Reviewed:  [ x ] YES     Care Discussed with [ x ] Consultants, [ x ] Patient, [ x ] Family, [  ] HCP, [ x ] , [  ] Social Service, [ x ] RN, [  ] Physical Therapy, [  ] Palliative Care Team  DVT PPX: [  ] Lovenox, [  ] SC Heparin, [  ] Coumadin, [  ] Xarelto, [  ] Eliquis, [  ] Pradaxa, [ x ] IV Heparin drip, [  ] SCD, [  ] Ambulation, [  ] Contraindicated 2/2 GI Bleed, [  ] Contraindicated 2/2  Bleed, [  ] Contraindicated 2/2 Brain Bleed  Advanced Directive: [  ] None, [  ] DNR/DNI Patient is a 52y old  Male who presents with a chief complaint of acute limb ischemia (08 Feb 2023 23:33)    HPI:  53 yo M PMHx Type 2 Diabetes (not on insulin) with diabetic neuropathy presenting with swelling, pain and discoloration of the right foot. He noticed worsening numbness from mid calf to the foot 2 weeks ago which then progressed to swelling and discoloration. he went to his PCP dr. pickens who ordered a doppler venous ultrasound (?) which was negative for clots. He was referred to podiatry who he  saw today and was referred to the ER for evaluation.     ER Course:   168/90 HR 85/min 16/min T 97.7 98% RA   Labs: wnl  EKG pending   given morphine 4 mg x 1   imaging: US Duplex Extremity Right: Atherosclerotic disease with spectral broadening seen from the superficial femoral artery, distally, compatible with upstream stenosis. (08 Feb 2023 23:33)    INTERVAL HPI:    2/9/23 - Pt was seen and examined at bedside. Endorses severe persistent pain though improved and tolerable. Pt denies headache, dizziness, lightheadedness, fever, chills, body aches, CP, SOB, palpitations, abdominal pain, n/v. On IV heparin drip    OVERNIGHT EVENTS: No acute overnight events.     Home Medications:  metformin 1000 mg oral tablet: 1  orally 2 times a day (09 Feb 2023 00:35)      MEDICATIONS  (STANDING):  dextrose 5%. 1000 milliLiter(s) (50 mL/Hr) IV Continuous <Continuous>  dextrose 5%. 1000 milliLiter(s) (100 mL/Hr) IV Continuous <Continuous>  dextrose 50% Injectable 25 Gram(s) IV Push once  dextrose 50% Injectable 12.5 Gram(s) IV Push once  dextrose 50% Injectable 25 Gram(s) IV Push once  glucagon  Injectable 1 milliGRAM(s) IntraMuscular once  heparin  Infusion. 1000 Unit(s)/Hr (10 mL/Hr) IV Continuous <Continuous>  insulin lispro (ADMELOG) corrective regimen sliding scale   SubCutaneous three times a day before meals  naloxone Injectable 0.4 milliGRAM(s) IV Push once  nicotine -  14 mG/24Hr(s) Patch 1 Patch Transdermal daily  polyethylene glycol 3350 17 Gram(s) Oral daily  senna 2 Tablet(s) Oral at bedtime    MEDICATIONS  (PRN):  acetaminophen     Tablet .. 650 milliGRAM(s) Oral every 6 hours PRN Temp greater or equal to 38C (100.4F), Mild Pain (1 - 3)  aluminum hydroxide/magnesium hydroxide/simethicone Suspension 30 milliLiter(s) Oral every 4 hours PRN Dyspepsia  bisacodyl 5 milliGRAM(s) Oral daily PRN Constipation  dextrose Oral Gel 15 Gram(s) Oral once PRN Blood Glucose LESS THAN 70 milliGRAM(s)/deciliter  heparin   Injectable 5500 Unit(s) IV Push every 6 hours PRN For aPTT less than 40  heparin   Injectable 2500 Unit(s) IV Push every 6 hours PRN For aPTT between 40 - 57  HYDROmorphone  Injectable 0.5 milliGRAM(s) IV Push every 4 hours PRN Moderate Pain (4 - 6)  HYDROmorphone  Injectable 1 milliGRAM(s) IV Push every 4 hours PRN Severe Pain (7 - 10)  melatonin 3 milliGRAM(s) Oral at bedtime PRN Insomnia  ondansetron Injectable 4 milliGRAM(s) IV Push every 8 hours PRN Nausea and/or Vomiting      Demerol HCl (Anaphylaxis)      Social History:  lives home with wife and son  adls indpeendent  ambulates independently  smoker: 1/2 pack a day x 30 years   alcohol none  [facility] (08 Feb 2023 23:33)      REVIEW OF SYSTEMS:  CONSTITUTIONAL: No fever, No chills, No fatigue, No myalgia, No Body ache, No Weakness  EYES: No eye pain,  No visual disturbances, No discharge, No Redness  ENMT: No ear pain, No nose bleed, No vertigo; No sinus pain, No throat pain, No Congestion  NECK: No pain, No stiffness  RESPIRATORY: No cough, No wheezing, No hemoptysis, No shortness of breath  CARDIOVASCULAR: No chest pain, No palpitations  GASTROINTESTINAL: No abdominal pain, No epigastric pain. No nausea, No vomiting, No diarrhea, No constipation; [ x ] BM-  GENITOURINARY: No dysuria, No frequency, No urgency, No hematuria, No incontinence  NEUROLOGICAL: No headaches, No dizziness, No numbness, No tingling, No tremors, No weakness  EXTREMITIES: + Distal RLE Pain/Swelling/discoloration  SKIN: [ x ] No itching, burning, rashes, or lesions  + pain,  MUSCULOSKELETAL: No joint pain, No joint swelling; No muscle pain, No back pain, No extremity pain  PSYCHIATRIC: No depression, No anxiety, No mood swings, No difficulty sleeping at night  PAIN SCALE: [  ] None  [ x ] Other- 10/10 w/ movement and palpation rt lower ext  ROS Unable to obtain due to: [  ] Dementia  [  ] Lethargy  [  ] Sedated  [  ] Non verbal  REST OF REVIEW OF SYSTEMS: [ x ] Normal     Vital Signs Last 24 Hrs  T(C): 36.8 (09 Feb 2023 08:25), Max: 36.8 (09 Feb 2023 08:25)  T(F): 98.3 (09 Feb 2023 08:25), Max: 98.3 (09 Feb 2023 08:25)  HR: 62 (09 Feb 2023 08:25) (59 - 85)  BP: 146/73 (09 Feb 2023 08:25) (123/82 - 168/90)  BP(mean): --  RR: 18 (09 Feb 2023 08:25) (14 - 18)  SpO2: 100% (09 Feb 2023 08:25) (97% - 100%)    Parameters below as of 09 Feb 2023 08:25  Patient On (Oxygen Delivery Method): room air        CAPILLARY BLOOD GLUCOSE      POCT Blood Glucose.: 143 mg/dL (09 Feb 2023 08:24)  POCT Blood Glucose.: 151 mg/dL (09 Feb 2023 01:28)      I&O's Summary    PHYSICAL EXAM:  GENERAL:  [ x ] NAD, [ x ] Well appearing, [  ] Agitated, [  ] Drowsy, [  ] Lethargy, [  ] Confused   HEAD:  [ x ] Normal, [  ] Other  EYES:  [ x ] EOMI, [ x ] PERRLA, [ x ] Conjunctiva and sclera clear normal, [  ] Other, [  ] Pallor, [  ] Discharge  ENMT:  [ x ] Normal, [ x ] Moist mucous membranes, [ x ] Good dentition, [ x ] No thrush  NECK:  [ x ] Supple, [  x] No JVD, [ x ] Normal thyroid, [  ] Lymphadenopathy, [  ] Other  CHEST/LUNG:  [ x ] Clear to auscultation bilaterally, [ x ] Breath Sounds equal B/L / decreased, [  ] Poor effort, [ x ] No rales, [ x ] No rhonchi, [ x ] No wheezing  HEART:  [ x ] Regular rate and rhythm, [  ] Tachycardia, [  ] Bradycardia, [  ] Irregular, [ x ] No murmurs, No rubs, No gallops, [  ] PPM in place (Mfr:  )  ABDOMEN:  [ x ] Soft, [ x ] Nontender, [ x ] Nondistended, [ x ] No mass, [ x ] Bowel sounds present, [  ] Obese  NERVOUS SYSTEM:  [ x ] Alert & Oriented x3__, [ x ] Nonfocal, [  ] Confusion, [  ] Encephalopathic, [  ] Sedated, [  ] Unable to assess, [  ] Dementia, [  ] Other-  EXTREMITIES:  [ x ] 2+ Peripheral Pulses, No clubbing, No cyanosis, left leg mo PP Rt foot  [  ] Edema B/L lower EXT, [x  ] Severe  PAD stasis skin changes Rt lower EXT, [  ] Wound, [ x ] - RLE tenderness to palpation, Erythema, Pain,warm  LYMPH:  No lymphadenopathy noted  SKIN:  [  ] No rashes or lesions, [  ] Pressure ulcers, [  ] Ecchymosis, [  ] Skin tears, [ x ] Other - RLE ankle swelling, skin discoloration     DIET: Diet, Regular:   Consistent Carbohydrate Evening Snack (02-09-23 @ 00:02)      LABS:                        14.5   7.72  )-----------( 255      ( 09 Feb 2023 05:32 )             44.3     09 Feb 2023 05:32    140    |  106    |  21     ----------------------------<  157    3.7     |  31     |  0.78     Ca    9.2        09 Feb 2023 05:32    TPro  7.4    /  Alb  3.7    /  TBili  0.3    /  DBili  x      /  AST  13     /  ALT  17     /  AlkPhos  64     08 Feb 2023 19:00    PT/INR - ( 09 Feb 2023 05:32 )   PT: 13.2 sec;   INR: 1.13 ratio         PTT - ( 09 Feb 2023 05:32 )  PTT:132.4 sec    RADIOLOGY & ADDITIONAL TESTS: _______  < from: VA Duplex Lower Ext Vein Scan, Right (02.09.23 @ 12:38) >    TECHNIQUE: Limited evaluation of the right great saphenous vein was   performed. Prior venous duplex examination on 2/8/2023 demonstrated no   evidence of DVT.    FINDINGS:    The visualized segments of the right great saphenous vein are patent.    The diameters of the right great saphenous vein are as follows: Sapheno   femoral junction not visualized, proximal thigh 3.3, midthigh 1.2 ,   distal thigh 1.3 , knee level 0.8, proximal calf 2.2 , mid calf 1.7  mm.      IMPRESSION:  Limited examination of the right great saphenous vein demonstrates patent   segments from the proximal thigh to the mid calf level.      < end of copied text >  < from: CT Angio Abd Aorta w/run-off w/ IV Cont (02.09.23 @ 08:20) >    IMPRESSION:  Right leg:  Occlusion popliteal and tibioperoneal trunk arteries.  Reconstitution 3 calf arteries with runoff to the foot and question short   segment proximal calcific occlusion anterior tibial artery.    Left leg:  Three-vessel runoff with short segment mid calf occlusion peroneal artery      < end of copied text >      HEALTH ISSUES - PROBLEM Dx:  Acute lower limb ischemia    Type 2 diabetes mellitus    Need for other prophylactic measure    Tobacco dependence          Consultant(s) Notes Reviewed:  [ x ] YES     Care Discussed with [ x ] Consultants, [ x ] Patient, [ x ] Family, [  ] HCP, [ x ] , [  ] Social Service, [ x ] RN, [  ] Physical Therapy, [  ] Palliative Care Team  DVT PPX: [  ] Lovenox, [  ] SC Heparin, [  ] Coumadin, [  ] Xarelto, [  ] Eliquis, [  ] Pradaxa, [ x ] IV Heparin drip, [  ] SCD, [  ] Ambulation, [  ] Contraindicated 2/2 GI Bleed, [  ] Contraindicated 2/2  Bleed, [  ] Contraindicated 2/2 Brain Bleed  Advanced Directive: [  ] None, [  ] DNR/DNI

## 2023-02-09 NOTE — CONSULT NOTE ADULT - ASSESSMENT
51 yo M with PMHx of T2DM (not on insulin) with diabetic neuropathy, current smoker admitted for RLE limb ischemia.    RLE ischemia  - No acute changes on EKG compared to previous  - Pt has no active ischemia, decompensated heart failure, unstable arrythmia, or severe stenotic valvular disease. RCRI 0, METS >4. Pt is optimized from cardiovascular standpoint to proceed with planned procedure with routine hemodynamic monitoring  - Other cardiovascular workup will depend on clinical course

## 2023-02-09 NOTE — PROGRESS NOTE ADULT - PROBLEM SELECTOR PLAN 3
- 15 pack year history   - nicotine patch - 15 pack year history   - nicotine patch daily  Smoking cessation d/w pt

## 2023-02-09 NOTE — ED ADULT NURSE REASSESSMENT NOTE - NS ED NURSE REASSESS COMMENT FT1
0800: Received pt from outgoing RN resting in stretcher.   Pt sitting at side of stretcher with foot dangling stating "it feels better when my leg is lower".   Right foot cool to touch R popliteal pulses present.  Vascular surgery resident at bedside to assess pt at this time.   Pt to be sent down to CT at this time per stat order for eval.   Pt denies cp/sob/palpitations.   Pt had previously ambulated to BR with steady gait, insist on ambulating and refusing to use bedside commode/urinal.   Pt safety maintained.   Freddie continue frequent monitoring.  BN

## 2023-02-09 NOTE — PROGRESS NOTE ADULT - ASSESSMENT
51 yo M PMHx Type 2 Diabetes (not on insulin) with diabetic neuropathy presenting with pain swelling RLE admitted for acute limb ischemia.

## 2023-02-09 NOTE — PROVIDER CONTACT NOTE (CRITICAL VALUE NOTIFICATION) - SITUATION
MD to discontinue current heparin orders and re-order with current dosing wgt per error message on zebra

## 2023-02-09 NOTE — PROGRESS NOTE ADULT - SUBJECTIVE AND OBJECTIVE BOX
CTA distal aorta with run off performed and reviewed with Dr Velasco  Pt with popliteal occlusion and small distal targets  Will need R fem-distal bypass ASAP    Cont Heparin gtt  Vein map RLE for conduit  Cardiology risk assessment  Recent GI w/u + for gastritis. Will resume carafate 1 gm tid and pantoprazole 40 mg qd  NPO p MN  For OR tomorrow for R fem-distal bypass and RLE angio

## 2023-02-10 ENCOUNTER — TRANSCRIPTION ENCOUNTER (OUTPATIENT)
Age: 53
End: 2023-02-10

## 2023-02-10 LAB
ALBUMIN SERPL ELPH-MCNC: 3.2 G/DL — LOW (ref 3.3–5)
ALP SERPL-CCNC: 58 U/L — SIGNIFICANT CHANGE UP (ref 40–120)
ALT FLD-CCNC: 14 U/L — SIGNIFICANT CHANGE UP (ref 12–78)
ANION GAP SERPL CALC-SCNC: 5 MMOL/L — SIGNIFICANT CHANGE UP (ref 5–17)
APTT BLD: 103.8 SEC — HIGH (ref 27.5–35.5)
APTT BLD: 112.4 SEC — HIGH (ref 27.5–35.5)
AST SERPL-CCNC: 11 U/L — LOW (ref 15–37)
BASOPHILS # BLD AUTO: 0.08 K/UL — SIGNIFICANT CHANGE UP (ref 0–0.2)
BASOPHILS NFR BLD AUTO: 1.1 % — SIGNIFICANT CHANGE UP (ref 0–2)
BILIRUB SERPL-MCNC: 0.3 MG/DL — SIGNIFICANT CHANGE UP (ref 0.2–1.2)
BUN SERPL-MCNC: 15 MG/DL — SIGNIFICANT CHANGE UP (ref 7–23)
CALCIUM SERPL-MCNC: 8.8 MG/DL — SIGNIFICANT CHANGE UP (ref 8.5–10.1)
CHLORIDE SERPL-SCNC: 109 MMOL/L — HIGH (ref 96–108)
CHOLEST SERPL-MCNC: 165 MG/DL — SIGNIFICANT CHANGE UP
CO2 SERPL-SCNC: 26 MMOL/L — SIGNIFICANT CHANGE UP (ref 22–31)
CREAT SERPL-MCNC: 0.55 MG/DL — SIGNIFICANT CHANGE UP (ref 0.5–1.3)
EGFR: 118 ML/MIN/1.73M2 — SIGNIFICANT CHANGE UP
EOSINOPHIL # BLD AUTO: 0.57 K/UL — HIGH (ref 0–0.5)
EOSINOPHIL NFR BLD AUTO: 8.1 % — HIGH (ref 0–6)
GLUCOSE SERPL-MCNC: 101 MG/DL — HIGH (ref 70–99)
HCT VFR BLD CALC: 42.9 % — SIGNIFICANT CHANGE UP (ref 39–50)
HDLC SERPL-MCNC: 39 MG/DL — LOW
HGB BLD-MCNC: 14.5 G/DL — SIGNIFICANT CHANGE UP (ref 13–17)
IMM GRANULOCYTES NFR BLD AUTO: 0.3 % — SIGNIFICANT CHANGE UP (ref 0–0.9)
LIPID PNL WITH DIRECT LDL SERPL: 104 MG/DL — HIGH
LYMPHOCYTES # BLD AUTO: 3.12 K/UL — SIGNIFICANT CHANGE UP (ref 1–3.3)
LYMPHOCYTES # BLD AUTO: 44.1 % — HIGH (ref 13–44)
MCHC RBC-ENTMCNC: 30 PG — SIGNIFICANT CHANGE UP (ref 27–34)
MCHC RBC-ENTMCNC: 33.8 GM/DL — SIGNIFICANT CHANGE UP (ref 32–36)
MCV RBC AUTO: 88.6 FL — SIGNIFICANT CHANGE UP (ref 80–100)
MONOCYTES # BLD AUTO: 0.61 K/UL — SIGNIFICANT CHANGE UP (ref 0–0.9)
MONOCYTES NFR BLD AUTO: 8.6 % — SIGNIFICANT CHANGE UP (ref 2–14)
NEUTROPHILS # BLD AUTO: 2.67 K/UL — SIGNIFICANT CHANGE UP (ref 1.8–7.4)
NEUTROPHILS NFR BLD AUTO: 37.8 % — LOW (ref 43–77)
NON HDL CHOLESTEROL: 126 MG/DL — SIGNIFICANT CHANGE UP
NRBC # BLD: 0 /100 WBCS — SIGNIFICANT CHANGE UP (ref 0–0)
PLATELET # BLD AUTO: 219 K/UL — SIGNIFICANT CHANGE UP (ref 150–400)
POTASSIUM SERPL-MCNC: 3.9 MMOL/L — SIGNIFICANT CHANGE UP (ref 3.5–5.3)
POTASSIUM SERPL-SCNC: 3.9 MMOL/L — SIGNIFICANT CHANGE UP (ref 3.5–5.3)
PROT SERPL-MCNC: 6.4 G/DL — SIGNIFICANT CHANGE UP (ref 6–8.3)
RBC # BLD: 4.84 M/UL — SIGNIFICANT CHANGE UP (ref 4.2–5.8)
RBC # FLD: 13.6 % — SIGNIFICANT CHANGE UP (ref 10.3–14.5)
SODIUM SERPL-SCNC: 140 MMOL/L — SIGNIFICANT CHANGE UP (ref 135–145)
TRIGL SERPL-MCNC: 110 MG/DL — SIGNIFICANT CHANGE UP
WBC # BLD: 7.07 K/UL — SIGNIFICANT CHANGE UP (ref 3.8–10.5)
WBC # FLD AUTO: 7.07 K/UL — SIGNIFICANT CHANGE UP (ref 3.8–10.5)

## 2023-02-10 PROCEDURE — 36140 INTRO NDL ICATH UPR/LXTR ART: CPT | Mod: 59

## 2023-02-10 PROCEDURE — 99232 SBSQ HOSP IP/OBS MODERATE 35: CPT

## 2023-02-10 PROCEDURE — 99232 SBSQ HOSP IP/OBS MODERATE 35: CPT | Mod: 25

## 2023-02-10 PROCEDURE — 73630 X-RAY EXAM OF FOOT: CPT | Mod: 26,RT

## 2023-02-10 PROCEDURE — 37224: CPT

## 2023-02-10 PROCEDURE — 75630 X-RAY AORTA LEG ARTERIES: CPT | Mod: 26,59

## 2023-02-10 PROCEDURE — 76937 US GUIDE VASCULAR ACCESS: CPT | Mod: 26

## 2023-02-10 DEVICE — SHEATH INTRODUCER TERUMO PINNACLE GUIDING 5FR X 45CM CROSS-CUT STRAIGHT: Type: IMPLANTABLE DEVICE | Status: FUNCTIONAL

## 2023-02-10 DEVICE — IMPLANTABLE DEVICE: Type: IMPLANTABLE DEVICE | Status: FUNCTIONAL

## 2023-02-10 DEVICE — GUIDEWIRE RADIFOCUS GLIDEWIRE STANDARD ANGLED TIP 0.035" X 260CM: Type: IMPLANTABLE DEVICE | Status: FUNCTIONAL

## 2023-02-10 DEVICE — GLDWIRE ADVANTAGE .035X260 CM: Type: IMPLANTABLE DEVICE | Status: FUNCTIONAL

## 2023-02-10 DEVICE — CATH SUPP TRAILBLAZER .035X90CM: Type: IMPLANTABLE DEVICE | Status: FUNCTIONAL

## 2023-02-10 DEVICE — SHEATH INTRODUCER TERUMO PINNACLE RADIOPAQUE 5FR X 10CM: Type: IMPLANTABLE DEVICE | Status: FUNCTIONAL

## 2023-02-10 DEVICE — CATH SUPP TRAILBLAZER .035X135CM: Type: IMPLANTABLE DEVICE | Status: FUNCTIONAL

## 2023-02-10 DEVICE — DEVICE CLOSURE 5F MYNX GRIP MUST ORDER MIN OF 10 EA: Type: IMPLANTABLE DEVICE | Status: FUNCTIONAL

## 2023-02-10 DEVICE — SHEATH INTRODUCER TERUMO PINNACLE RADIOPAQUE 6FR X 10CM: Type: IMPLANTABLE DEVICE | Status: FUNCTIONAL

## 2023-02-10 DEVICE — KIT MICROPUNCTURE 5FR 10CM: Type: IMPLANTABLE DEVICE | Status: FUNCTIONAL

## 2023-02-10 DEVICE — OMNI FLUSH  5FR X 70CM: Type: IMPLANTABLE DEVICE | Status: FUNCTIONAL

## 2023-02-10 DEVICE — GWIRE BENT STRT 0.035INX150CM: Type: IMPLANTABLE DEVICE | Status: FUNCTIONAL

## 2023-02-10 RX ORDER — ONDANSETRON 8 MG/1
4 TABLET, FILM COATED ORAL EVERY 8 HOURS
Refills: 0 | Status: DISCONTINUED | OUTPATIENT
Start: 2023-02-10 | End: 2023-02-12

## 2023-02-10 RX ORDER — DEXTROSE 50 % IN WATER 50 %
25 SYRINGE (ML) INTRAVENOUS ONCE
Refills: 0 | Status: DISCONTINUED | OUTPATIENT
Start: 2023-02-10 | End: 2023-02-12

## 2023-02-10 RX ORDER — POLYETHYLENE GLYCOL 3350 17 G/17G
17 POWDER, FOR SOLUTION ORAL DAILY
Refills: 0 | Status: DISCONTINUED | OUTPATIENT
Start: 2023-02-10 | End: 2023-02-11

## 2023-02-10 RX ORDER — ASPIRIN/CALCIUM CARB/MAGNESIUM 324 MG
325 TABLET ORAL DAILY
Refills: 0 | Status: DISCONTINUED | OUTPATIENT
Start: 2023-02-10 | End: 2023-02-12

## 2023-02-10 RX ORDER — ATORVASTATIN CALCIUM 80 MG/1
20 TABLET, FILM COATED ORAL AT BEDTIME
Refills: 0 | Status: DISCONTINUED | OUTPATIENT
Start: 2023-02-10 | End: 2023-02-10

## 2023-02-10 RX ORDER — HYDROMORPHONE HYDROCHLORIDE 2 MG/ML
0.5 INJECTION INTRAMUSCULAR; INTRAVENOUS; SUBCUTANEOUS
Refills: 0 | Status: DISCONTINUED | OUTPATIENT
Start: 2023-02-10 | End: 2023-02-11

## 2023-02-10 RX ORDER — ACETAMINOPHEN 500 MG
1000 TABLET ORAL ONCE
Refills: 0 | Status: COMPLETED | OUTPATIENT
Start: 2023-02-10 | End: 2023-02-10

## 2023-02-10 RX ORDER — HYDROMORPHONE HYDROCHLORIDE 2 MG/ML
1 INJECTION INTRAMUSCULAR; INTRAVENOUS; SUBCUTANEOUS
Refills: 0 | Status: DISCONTINUED | OUTPATIENT
Start: 2023-02-10 | End: 2023-02-11

## 2023-02-10 RX ORDER — NICOTINE POLACRILEX 2 MG
1 GUM BUCCAL DAILY
Refills: 0 | Status: DISCONTINUED | OUTPATIENT
Start: 2023-02-10 | End: 2023-02-12

## 2023-02-10 RX ORDER — INSULIN LISPRO 100/ML
VIAL (ML) SUBCUTANEOUS EVERY 6 HOURS
Refills: 0 | Status: DISCONTINUED | OUTPATIENT
Start: 2023-02-10 | End: 2023-02-10

## 2023-02-10 RX ORDER — OXYCODONE HYDROCHLORIDE 5 MG/1
10 TABLET ORAL
Refills: 0 | Status: DISCONTINUED | OUTPATIENT
Start: 2023-02-10 | End: 2023-02-10

## 2023-02-10 RX ORDER — HYDROMORPHONE HYDROCHLORIDE 2 MG/ML
1 INJECTION INTRAMUSCULAR; INTRAVENOUS; SUBCUTANEOUS
Refills: 0 | Status: DISCONTINUED | OUTPATIENT
Start: 2023-02-10 | End: 2023-02-10

## 2023-02-10 RX ORDER — NALOXONE HYDROCHLORIDE 4 MG/.1ML
0.4 SPRAY NASAL ONCE
Refills: 0 | Status: DISCONTINUED | OUTPATIENT
Start: 2023-02-10 | End: 2023-02-12

## 2023-02-10 RX ORDER — DEXTROSE 50 % IN WATER 50 %
15 SYRINGE (ML) INTRAVENOUS ONCE
Refills: 0 | Status: DISCONTINUED | OUTPATIENT
Start: 2023-02-10 | End: 2023-02-12

## 2023-02-10 RX ORDER — CLOPIDOGREL BISULFATE 75 MG/1
75 TABLET, FILM COATED ORAL DAILY
Refills: 0 | Status: DISCONTINUED | OUTPATIENT
Start: 2023-02-10 | End: 2023-02-12

## 2023-02-10 RX ORDER — PANTOPRAZOLE SODIUM 20 MG/1
40 TABLET, DELAYED RELEASE ORAL DAILY
Refills: 0 | Status: DISCONTINUED | OUTPATIENT
Start: 2023-02-10 | End: 2023-02-12

## 2023-02-10 RX ORDER — INSULIN LISPRO 100/ML
VIAL (ML) SUBCUTANEOUS
Refills: 0 | Status: DISCONTINUED | OUTPATIENT
Start: 2023-02-10 | End: 2023-02-12

## 2023-02-10 RX ORDER — GABAPENTIN 400 MG/1
100 CAPSULE ORAL AT BEDTIME
Refills: 0 | Status: DISCONTINUED | OUTPATIENT
Start: 2023-02-10 | End: 2023-02-12

## 2023-02-10 RX ORDER — HYDROMORPHONE HYDROCHLORIDE 2 MG/ML
0.5 INJECTION INTRAMUSCULAR; INTRAVENOUS; SUBCUTANEOUS
Refills: 0 | Status: DISCONTINUED | OUTPATIENT
Start: 2023-02-10 | End: 2023-02-10

## 2023-02-10 RX ORDER — ONDANSETRON 8 MG/1
4 TABLET, FILM COATED ORAL ONCE
Refills: 0 | Status: DISCONTINUED | OUTPATIENT
Start: 2023-02-10 | End: 2023-02-10

## 2023-02-10 RX ORDER — ATORVASTATIN CALCIUM 80 MG/1
20 TABLET, FILM COATED ORAL AT BEDTIME
Refills: 0 | Status: DISCONTINUED | OUTPATIENT
Start: 2023-02-10 | End: 2023-02-12

## 2023-02-10 RX ORDER — SODIUM CHLORIDE 9 MG/ML
1000 INJECTION, SOLUTION INTRAVENOUS
Refills: 0 | Status: DISCONTINUED | OUTPATIENT
Start: 2023-02-10 | End: 2023-02-12

## 2023-02-10 RX ORDER — SENNA PLUS 8.6 MG/1
2 TABLET ORAL AT BEDTIME
Refills: 0 | Status: DISCONTINUED | OUTPATIENT
Start: 2023-02-10 | End: 2023-02-12

## 2023-02-10 RX ORDER — DEXTROSE 50 % IN WATER 50 %
12.5 SYRINGE (ML) INTRAVENOUS ONCE
Refills: 0 | Status: DISCONTINUED | OUTPATIENT
Start: 2023-02-10 | End: 2023-02-12

## 2023-02-10 RX ORDER — OXYCODONE HYDROCHLORIDE 5 MG/1
5 TABLET ORAL
Refills: 0 | Status: DISCONTINUED | OUTPATIENT
Start: 2023-02-10 | End: 2023-02-10

## 2023-02-10 RX ORDER — ACETAMINOPHEN 500 MG
650 TABLET ORAL EVERY 6 HOURS
Refills: 0 | Status: DISCONTINUED | OUTPATIENT
Start: 2023-02-10 | End: 2023-02-12

## 2023-02-10 RX ORDER — SUCRALFATE 1 G
1 TABLET ORAL
Refills: 0 | Status: DISCONTINUED | OUTPATIENT
Start: 2023-02-10 | End: 2023-02-12

## 2023-02-10 RX ORDER — GLUCAGON INJECTION, SOLUTION 0.5 MG/.1ML
1 INJECTION, SOLUTION SUBCUTANEOUS ONCE
Refills: 0 | Status: DISCONTINUED | OUTPATIENT
Start: 2023-02-10 | End: 2023-02-12

## 2023-02-10 RX ORDER — SODIUM CHLORIDE 9 MG/ML
1000 INJECTION, SOLUTION INTRAVENOUS
Refills: 0 | Status: DISCONTINUED | OUTPATIENT
Start: 2023-02-10 | End: 2023-02-10

## 2023-02-10 RX ORDER — LANOLIN ALCOHOL/MO/W.PET/CERES
3 CREAM (GRAM) TOPICAL AT BEDTIME
Refills: 0 | Status: DISCONTINUED | OUTPATIENT
Start: 2023-02-10 | End: 2023-02-12

## 2023-02-10 RX ORDER — ASPIRIN/CALCIUM CARB/MAGNESIUM 324 MG
324 TABLET ORAL ONCE
Refills: 0 | Status: DISCONTINUED | OUTPATIENT
Start: 2023-02-10 | End: 2023-02-10

## 2023-02-10 RX ORDER — GABAPENTIN 400 MG/1
300 CAPSULE ORAL DAILY
Refills: 0 | Status: DISCONTINUED | OUTPATIENT
Start: 2023-02-10 | End: 2023-02-10

## 2023-02-10 RX ADMIN — Medication 1000 MILLIGRAM(S): at 19:43

## 2023-02-10 RX ADMIN — HYDROMORPHONE HYDROCHLORIDE 1 MILLIGRAM(S): 2 INJECTION INTRAMUSCULAR; INTRAVENOUS; SUBCUTANEOUS at 00:21

## 2023-02-10 RX ADMIN — HEPARIN SODIUM 1000 UNIT(S)/HR: 5000 INJECTION INTRAVENOUS; SUBCUTANEOUS at 07:22

## 2023-02-10 RX ADMIN — HYDROMORPHONE HYDROCHLORIDE 0.5 MILLIGRAM(S): 2 INJECTION INTRAMUSCULAR; INTRAVENOUS; SUBCUTANEOUS at 19:43

## 2023-02-10 RX ADMIN — SODIUM CHLORIDE 75 MILLILITER(S): 9 INJECTION, SOLUTION INTRAVENOUS at 19:22

## 2023-02-10 RX ADMIN — CLOPIDOGREL BISULFATE 75 MILLIGRAM(S): 75 TABLET, FILM COATED ORAL at 20:15

## 2023-02-10 RX ADMIN — HYDROMORPHONE HYDROCHLORIDE 1 MILLIGRAM(S): 2 INJECTION INTRAMUSCULAR; INTRAVENOUS; SUBCUTANEOUS at 00:06

## 2023-02-10 RX ADMIN — Medication 1 GRAM(S): at 12:34

## 2023-02-10 RX ADMIN — HYDROMORPHONE HYDROCHLORIDE 1 MILLIGRAM(S): 2 INJECTION INTRAMUSCULAR; INTRAVENOUS; SUBCUTANEOUS at 14:00

## 2023-02-10 RX ADMIN — HYDROMORPHONE HYDROCHLORIDE 1 MILLIGRAM(S): 2 INJECTION INTRAMUSCULAR; INTRAVENOUS; SUBCUTANEOUS at 09:34

## 2023-02-10 RX ADMIN — HYDROMORPHONE HYDROCHLORIDE 0.5 MILLIGRAM(S): 2 INJECTION INTRAMUSCULAR; INTRAVENOUS; SUBCUTANEOUS at 21:20

## 2023-02-10 RX ADMIN — Medication 325 MILLIGRAM(S): at 20:15

## 2023-02-10 RX ADMIN — Medication 1 GRAM(S): at 05:38

## 2023-02-10 RX ADMIN — Medication 1 PATCH: at 12:33

## 2023-02-10 RX ADMIN — HYDROMORPHONE HYDROCHLORIDE 1 MILLIGRAM(S): 2 INJECTION INTRAMUSCULAR; INTRAVENOUS; SUBCUTANEOUS at 05:38

## 2023-02-10 RX ADMIN — PANTOPRAZOLE SODIUM 40 MILLIGRAM(S): 20 TABLET, DELAYED RELEASE ORAL at 12:34

## 2023-02-10 RX ADMIN — HEPARIN SODIUM 1000 UNIT(S)/HR: 5000 INJECTION INTRAVENOUS; SUBCUTANEOUS at 01:18

## 2023-02-10 RX ADMIN — SODIUM CHLORIDE 100 MILLILITER(S): 9 INJECTION INTRAMUSCULAR; INTRAVENOUS; SUBCUTANEOUS at 00:06

## 2023-02-10 RX ADMIN — HYDROMORPHONE HYDROCHLORIDE 1 MILLIGRAM(S): 2 INJECTION INTRAMUSCULAR; INTRAVENOUS; SUBCUTANEOUS at 09:58

## 2023-02-10 RX ADMIN — HYDROMORPHONE HYDROCHLORIDE 1 MILLIGRAM(S): 2 INJECTION INTRAMUSCULAR; INTRAVENOUS; SUBCUTANEOUS at 05:53

## 2023-02-10 RX ADMIN — Medication 400 MILLIGRAM(S): at 19:22

## 2023-02-10 RX ADMIN — HEPARIN SODIUM 900 UNIT(S)/HR: 5000 INJECTION INTRAVENOUS; SUBCUTANEOUS at 09:06

## 2023-02-10 RX ADMIN — HYDROMORPHONE HYDROCHLORIDE 0.5 MILLIGRAM(S): 2 INJECTION INTRAMUSCULAR; INTRAVENOUS; SUBCUTANEOUS at 19:58

## 2023-02-10 NOTE — DIETITIAN INITIAL EVALUATION ADULT - PERTINENT MEDS FT
MEDICATIONS  (STANDING):  ceFAZolin   IVPB 2000 milliGRAM(s) IV Intermittent once  dextrose 5%. 1000 milliLiter(s) (100 mL/Hr) IV Continuous <Continuous>  dextrose 5%. 1000 milliLiter(s) (50 mL/Hr) IV Continuous <Continuous>  dextrose 50% Injectable 25 Gram(s) IV Push once  dextrose 50% Injectable 12.5 Gram(s) IV Push once  dextrose 50% Injectable 25 Gram(s) IV Push once  glucagon  Injectable 1 milliGRAM(s) IntraMuscular once  heparin  Infusion. 1000 Unit(s)/Hr (10 mL/Hr) IV Continuous <Continuous>  insulin lispro (ADMELOG) corrective regimen sliding scale   SubCutaneous every 6 hours  naloxone Injectable 0.4 milliGRAM(s) IV Push once  nicotine -  14 mG/24Hr(s) Patch 1 Patch Transdermal daily  pantoprazole    Tablet 40 milliGRAM(s) Oral daily  polyethylene glycol 3350 17 Gram(s) Oral daily  senna 2 Tablet(s) Oral at bedtime  sodium chloride 0.9%. 1000 milliLiter(s) (100 mL/Hr) IV Continuous <Continuous>  sucralfate 1 Gram(s) Oral four times a day    MEDICATIONS  (PRN):  acetaminophen     Tablet .. 650 milliGRAM(s) Oral every 6 hours PRN Temp greater or equal to 38C (100.4F), Mild Pain (1 - 3)  aluminum hydroxide/magnesium hydroxide/simethicone Suspension 30 milliLiter(s) Oral every 4 hours PRN Dyspepsia  bisacodyl 5 milliGRAM(s) Oral daily PRN Constipation  dextrose Oral Gel 15 Gram(s) Oral once PRN Blood Glucose LESS THAN 70 milliGRAM(s)/deciliter  heparin   Injectable 5500 Unit(s) IV Push every 6 hours PRN For aPTT less than 40  heparin   Injectable 2500 Unit(s) IV Push every 6 hours PRN For aPTT between 40 - 57  HYDROmorphone  Injectable 1 milliGRAM(s) IV Push every 4 hours PRN Severe Pain (7 - 10)  HYDROmorphone  Injectable 0.5 milliGRAM(s) IV Push every 4 hours PRN Moderate Pain (4 - 6)  melatonin 3 milliGRAM(s) Oral at bedtime PRN Insomnia  ondansetron Injectable 4 milliGRAM(s) IV Push every 8 hours PRN Nausea and/or Vomiting

## 2023-02-10 NOTE — PROGRESS NOTE ADULT - SUBJECTIVE AND OBJECTIVE BOX
Post Procedure Note  Patient: PADILLA CARRINGTON 52y (1970) Male   MRN: [id number]  Location: [facility] 339 D1  Visit: 02-08-23 Inpatient  Date: 02-10-23 @ 22:05    PROCEDURE: S/p R SFA angioplasty    SUBJECTIVE   Patient seen and examined at bedside, reports RLE pain, particularly at the posterior knee, improved after dose of dilaudid 0.5mg. Also states he is hungry, and eager to go back to the floor.    OBJECTIVE    VITALS  Vitals: T(F): 97.7 (02-10-23 @ 21:45), Max: 98.1 (02-10-23 @ 15:40)  HR: 84 (02-10-23 @ 22:45)  BP: 131/60 (02-10-23 @ 22:45) (131/60 - 166/75)  RR: 16 (02-10-23 @ 22:45)  SpO2: 97% (02-10-23 @ 22:45)    INTAKE & OUTPUT  IN:   02-09-23 @ 07:01  -  02-10-23 @ 07:00  --------------------------------------------------------  IN: 716 mL    IV Fluids: dextrose 5%. 1000 milliLiter(s) (100 mL/Hr) IV Continuous <Continuous>  dextrose 5%. 1000 milliLiter(s) (50 mL/Hr) IV Continuous <Continuous>    OUT:   02-09-23 @ 07:01  -  02-10-23 @ 07:00  --------------------------------------------------------  OUT: 0 mL    EBL: 10 mL    Voided Urine:   02-09-23 @ 07:01  -  02-10-23 @ 07:00  --------------------------------------------------------  OUT: 0 mL    Ty Catheter: no      PHYSICAL EXAM  GENERAL:  Well-nourished, well-developed male lying comfortably in bed in NAD.  HEENT:  NC/AT. Sclera white. Mucous membranes moist.  CARDIO:  Regular rate and rhythm.  RESPIRATORY:  Nonlabored breathing, no accessory muscle use.  EXTREMITIES: Bilateral lower extremities warm, with DP/PT pulse identifiable via doppler. RLE sensitive to light touch. Dressing over Left groin clean/dry, surrounding skin soft, no induration, ecchymosis or obvious hematoma noted. Sandbag replaced to Left groin.  SKIN:  No jaundice, pallor, or cyanosis  NEURO:  A&O x 3    MEDICATIONS  [Standing]  acetaminophen     Tablet .. 650 milliGRAM(s) Oral every 6 hours PRN  aluminum hydroxide/magnesium hydroxide/simethicone Suspension 30 milliLiter(s) Oral every 4 hours PRN  aspirin 325 milliGRAM(s) Oral daily  atorvastatin 20 milliGRAM(s) Oral at bedtime  bisacodyl 5 milliGRAM(s) Oral daily PRN  clopidogrel Tablet 75 milliGRAM(s) Oral daily  dextrose 5%. 1000 milliLiter(s) IV Continuous <Continuous>  dextrose 5%. 1000 milliLiter(s) IV Continuous <Continuous>  dextrose 50% Injectable 25 Gram(s) IV Push once  dextrose 50% Injectable 12.5 Gram(s) IV Push once  dextrose 50% Injectable 25 Gram(s) IV Push once  dextrose Oral Gel 15 Gram(s) Oral once PRN  glucagon  Injectable 1 milliGRAM(s) IntraMuscular once  HYDROmorphone  Injectable 0.5 milliGRAM(s) IV Push every 10 minutes PRN  HYDROmorphone  Injectable 0.5 milliGRAM(s) IV Push every 3 hours PRN  HYDROmorphone  Injectable 1 milliGRAM(s) IV Push every 3 hours PRN  insulin lispro (ADMELOG) corrective regimen sliding scale   SubCutaneous three times a day before meals  melatonin 3 milliGRAM(s) Oral at bedtime PRN  naloxone Injectable 0.4 milliGRAM(s) IV Push once  nicotine -  14 mG/24Hr(s) Patch 1 Patch Transdermal daily  ondansetron Injectable 4 milliGRAM(s) IV Push every 8 hours PRN  pantoprazole    Tablet 40 milliGRAM(s) Oral daily  polyethylene glycol 3350 17 Gram(s) Oral daily  senna 2 Tablet(s) Oral at bedtime  sucralfate 1 Gram(s) Oral four times a day    [PRN]  acetaminophen     Tablet .. 650 milliGRAM(s) Oral every 6 hours PRN  aluminum hydroxide/magnesium hydroxide/simethicone Suspension 30 milliLiter(s) Oral every 4 hours PRN  aspirin 325 milliGRAM(s) Oral daily  atorvastatin 20 milliGRAM(s) Oral at bedtime  bisacodyl 5 milliGRAM(s) Oral daily PRN  clopidogrel Tablet 75 milliGRAM(s) Oral daily  dextrose 5%. 1000 milliLiter(s) IV Continuous <Continuous>  dextrose 5%. 1000 milliLiter(s) IV Continuous <Continuous>  dextrose 50% Injectable 25 Gram(s) IV Push once  dextrose 50% Injectable 12.5 Gram(s) IV Push once  dextrose 50% Injectable 25 Gram(s) IV Push once  dextrose Oral Gel 15 Gram(s) Oral once PRN  glucagon  Injectable 1 milliGRAM(s) IntraMuscular once  HYDROmorphone  Injectable 0.5 milliGRAM(s) IV Push every 10 minutes PRN  HYDROmorphone  Injectable 0.5 milliGRAM(s) IV Push every 3 hours PRN  HYDROmorphone  Injectable 1 milliGRAM(s) IV Push every 3 hours PRN  insulin lispro (ADMELOG) corrective regimen sliding scale   SubCutaneous three times a day before meals  melatonin 3 milliGRAM(s) Oral at bedtime PRN  naloxone Injectable 0.4 milliGRAM(s) IV Push once  nicotine -  14 mG/24Hr(s) Patch 1 Patch Transdermal daily  ondansetron Injectable 4 milliGRAM(s) IV Push every 8 hours PRN  pantoprazole    Tablet 40 milliGRAM(s) Oral daily  polyethylene glycol 3350 17 Gram(s) Oral daily  senna 2 Tablet(s) Oral at bedtime  sucralfate 1 Gram(s) Oral four times a day    LABS:                        14.5   7.07  )-----------( 219      ( 10 Feb 2023 07:47 )             42.9     02-10    140  |  109<H>  |  15  ----------------------------<  101<H>  3.9   |  26  |  0.55    Ca    8.8      10 Feb 2023 07:47    TPro  6.4  /  Alb  3.2<L>  /  TBili  0.3  /  DBili  x   /  AST  11<L>  /  ALT  14  /  AlkPhos  58  02-10    PT/INR - ( 09 Feb 2023 05:32 )   PT: 13.2 sec;   INR: 1.13 ratio  PTT - ( 10 Feb 2023 07:47 )  PTT:103.8 sec    IMAGING:  No post-op imaging studies    ASSESSMENT:  52 year old male s/p R SFA angioplasty.    PLAN:  - Neurovascular checks as ordered; may return to the floor after 4 hours in PACU  - Bedrest x 5 hours postop, patient to remain flat in bed with LLE straight  - Continue sandbag to Left groin  - Pain control PRN, gabapentin added to regimen  - No need for further heparin gtt  - [id number] and plavix  - Incentive spirometry  - Diabetic diet, FS, ISS  - Follow up AM labs  - Will continue to monitor  - Discussed with Dr. Velasco

## 2023-02-10 NOTE — CONSULT NOTE ADULT - SUBJECTIVE AND OBJECTIVE BOX
S : 52y year old Male seen at bedside for Right foot pain redness and swelling for 2 weeks.  Patient was seen at our office on 02/08/2023. Pt was diagnosed with acute limb ischemia and was sent to ED. Pt is diabetic and is under treatment of Dr Pickens. Pt stated that his pain started about 2 weeks ago and he was told by his PCP to see a podiatrist. Pt could not find podiatrist due to insurance issue. Pt reports increasing pain and swelling over 2 weeks period and since morning of 02/08/03, he noticed redness along with pain and swelling. Pt was seen by foot doctor for similar complaint about 2 years ago.   Chief Complaint : Patient is a 52y old  Male who presents with a chief complaint of acute limb ischemia (10 Feb 2023 07:35)    HPI Medicine:  53 yo M PMHx Type 2 Diabetes (not on insulin) with diabetic neuropathy presenting with swelling, pain and discoloration of the right foot. He noticed worsening numbness from mid calf to the foot 2 weeks ago which then progressed to swelling and discoloration. he went to his PCP dr. pcikens who ordered a doppler venous ultrasound (?) which was negative for clots. He was referred to podiatry who he  saw today and was referred to the ER for evaluation.     ER Course:   168/90 HR 85/min 16/min T 97.7 98% RA   Labs: wnl  EKG pending   given morphine 4 mg x 1   imaging: US Duplex Extremity Right: Atherosclerotic disease with spectral broadening seen from the superficial femoral artery, distally, compatible with upstream stenosis. (08 Feb 2023 23:33)      Patient admits to  (-) Fevers, (-) Chills, (-) Nausea, (-) Vomiting, (-) Shortness of Breath      PMH: Diabetes    Traumatic brain injury    Migraine    Neuropathy    PVD (peripheral vascular disease)      PSH:No significant past surgical history        Allergies:Demerol HCl (Anaphylaxis)      Labs:                          14.5   7.07  )-----------( 219      ( 10 Feb 2023 07:47 )             42.9     WBC Trend  7.07 Date (02-10 @ 07:47)  8.37 Date (02-09 @ 17:20)  7.72 Date (02-09 @ 05:32)  8.79 Date (02-08 @ 19:00)      Chem  02-10    140  |  109<H>  |  15  ----------------------------<  101<H>  3.9   |  26  |  0.55    Ca    8.8      10 Feb 2023 07:47    TPro  6.4  /  Alb  3.2<L>  /  TBili  0.3  /  DBili  x   /  AST  11<L>  /  ALT  14  /  AlkPhos  58  02-10          T(F): 97.7 (02-10-23 @ 07:25), Max: 98.5 (02-09-23 @ 10:31)  HR: 65 (02-10-23 @ 07:25) (60 - 65)  BP: 145/76 (02-10-23 @ 07:25) (136/75 - 150/79)  RR: 18 (02-10-23 @ 07:25) (18 - 20)  SpO2: 97% (02-10-23 @ 07:25) (97% - 98%)  Wt(kg): --    O:   General: Pleasant  male NAD & AOX3.    Integument:  Skin warm, dry and supple bilateral.    Vascular: Dorsalis Pedis  and Posterior Tibial pulsesR foot non palpable.  Capillary re-fill time less >5 seconds digits 1-5 R foot    Neuro: Protective sensation diminished to the level of the digits bilateral.  MSK: not able to check due to pain       A: Acute limb ischemia     Cellulitis R foot     R foot charcot foot      P:   Chart reviewed and Patient evaluated  Discussed diagnosis and treatment with patient  Ordered R foot xray  Continue with IV antibiotics   Reviewed vascular notes and studies  Pt scheduled for vascular surgery today afternoon  Weight bearing as tolerated  Discussed importance of daily foot examinations and proper shoe gear and to importance of lower Fasting Blood Glucose levels.   Podiatry will follow while in house.  Pt to be seen at podiatry office with in a week upon discharge.

## 2023-02-10 NOTE — SBIRT NOTE ADULT - NSSBIRTDRGUSEDOTHTHAN_GEN_A_CORE
pt states he smokes marijuana prior to going to bed as it helps him sleep. he does not smoke it any other time./No

## 2023-02-10 NOTE — DIETITIAN INITIAL EVALUATION ADULT - NUTRITION DIAGNOSIS
----- Message from Demarcus Cassidy MD sent at 6/15/2022  7:40 AM CDT -----  Overall labs good - hdl low - rec inc exercise work on weight loss  Blood counts normal  Liver, electorlytes, kidneys look good  Sugar good  Vit d noraml  Hep c negative   yes...

## 2023-02-10 NOTE — DIETITIAN INITIAL EVALUATION ADULT - OTHER INFO
Reason for Admission: acute limb ischemia  History of Present Illness:   53 yo M PMHx Type 2 Diabetes (not on insulin) with diabetic neuropathy presenting with swelling, pain and discoloration of the right foot. He noticed worsening numbness from mid calf to the foot 2 weeks ago which then progressed to swelling and discoloration. he went to his PCP dr. pickens who ordered a doppler venous ultrasound (?) which was negative for clots. He was referred to podiatry who he  saw today and was referred to the ER for evaluation.   patient for right femoral distal bypass today.   weight this admit 145#  NS 100ml hr

## 2023-02-10 NOTE — CARE COORDINATION ASSESSMENT. - REFERRAL INFORMATION CONCERNS
Chief complaint: left great toe cellulitis     Impression:  Obinna Garza is a 57 year old male admitted initially on 2019 with     1) left great toe cellulitis  -xray shows fracture of the the first proximal phalanx  -ESR 19 and CRP 1.0  - scheduled for wbc labeled bone scan to r/o osteomyelitis     2) h/o right great amputation  -secondary to infected toe with MRSA  -MRSA screen positive    3) Drug allergies/adverse reactions  -ceftin/cefprozil ( pruritis )  - levaquin ( sore joints )  -no rashes or anaphylaxis noted     4) h/o adrenal insuffiencey     Recommendations and Medical Decision Makin) continue with IV cefepime and IV vancomycin  2) bone scan results are still pending.                          no concerns

## 2023-02-10 NOTE — PROGRESS NOTE ADULT - SUBJECTIVE AND OBJECTIVE BOX
Patient is a 52y old  Male who presents with a chief complaint of Other specified disorders of circulatory system     (10 Feb 2023 09:50)    ceFAZolin   IVPB 2000  ceFAZolin   IVPB 2000  heparin   Injectable 5500 PRN  heparin   Injectable 2500 PRN  heparin  Infusion. 1000    Allergies    Demerol HCl (Anaphylaxis)  Lobster (Unknown)    Intolerances        Vital Signs Last 24 Hrs  T(C): 36.5 (10 Feb 2023 07:25), Max: 36.8 (09 Feb 2023 20:13)  T(F): 97.7 (10 Feb 2023 07:25), Max: 98.3 (09 Feb 2023 20:13)  HR: 65 (10 Feb 2023 07:25) (61 - 65)  BP: 145/76 (10 Feb 2023 07:25) (136/75 - 145/76)  BP(mean): --  RR: 18 (10 Feb 2023 07:25) (18 - 18)  SpO2: 97% (10 Feb 2023 07:25) (97% - 97%)    Parameters below as of 10 Feb 2023 05:17  Patient On (Oxygen Delivery Method): room air      I&O's Detail    09 Feb 2023 07:01  -  10 Feb 2023 07:00  --------------------------------------------------------  IN:    Heparin Infusion: 116 mL    sodium chloride 0.9%: 600 mL  Total IN: 716 mL    OUT:  Total OUT: 0 mL    Total NET: 716 mL          Physical Exam:  General: NAD, resting comfortably in bed  Pulmonary: Nonlabored breathing, no respiratory distress  Cardiovascular: NSR  Abdominal: soft, NT/ND  Extremities: WWP  Pulses:   Right:                                                                          Left:  FEM [ ]2+ [ ]1+ [ ]doppler                                             FEM [ ]2+ [ ]1+ [ ]doppler    POP [ ]2+ [ ]1+ [ ]doppler                                             POP [ ]2+ [ ]1+ [ ]doppler    DP [ ]2+ [ ]1+ [ ]doppler                                                DP [ ]2+ [ ]1+ [ ]doppler  PT[ ]2+ [ ]1+ [ ]doppler                                                  PT [ ]2+ [ ]1+ [ ]doppler      LABS:                        14.5   7.07  )-----------( 219      ( 10 Feb 2023 07:47 )             42.9     02-10    140  |  109<H>  |  15  ----------------------------<  101<H>  3.9   |  26  |  0.55    Ca    8.8      10 Feb 2023 07:47    TPro  6.4  /  Alb  3.2<L>  /  TBili  0.3  /  DBili  x   /  AST  11<L>  /  ALT  14  /  AlkPhos  58  02-10    PT/INR - ( 09 Feb 2023 05:32 )   PT: 13.2 sec;   INR: 1.13 ratio    PTT - ( 10 Feb 2023 07:47 )  PTT:103.8 sec    CAPILLARY BLOOD GLUCOSE  POCT Blood Glucose.: 112 mg/dL (10 Feb 2023 06:19)  POCT Blood Glucose.: 141 mg/dL (09 Feb 2023 21:25)  POCT Blood Glucose.: 87 mg/dL (09 Feb 2023 17:33)  POCT Blood Glucose.: 101 mg/dL (09 Feb 2023 13:22)    Radiology and Additional Studies:    < from: CT Angio Abd Aorta w/run-off w/ IV Cont (02.09.23 @ 08:20) >  ACC: [id number] EXAM:  CT ANGIO ABD AOR W RUN(W)AW IC   ORDERED BY: JOSE ANNE     PROCEDURE DATE:  02/09/2023          INTERPRETATION:  CLINICAL INFORMATION: 52-year-old man with diabetes and   nonpalpable lower extremity pulses. Right lower extremity ischemia.    COMPARISON: Ultrasound right lower extremity arteries 02/08/2023. CT   10/14/2022    CONTRAST/COMPLICATIONS:  IV Contrast: Omnipaque 350  90 cc administered   10 cc discarded  Oral Contrast: NONE  Complications: None reported at time of study completion    CT ANGIOGRAM ABDOMEN, PELVIS, AND LOWER EXTREMITIES:    PROCEDURE:  Initially, nonenhanced CT was obtained through the calves. Then,   following the rapid administration of intravenous contrast, CT   angiography was performed through the abdomen, pelvis, and lower   extremities down to the toes.  Delayed images through the calves were   also obtained. Sagittal and coronal reformats as well as 3D   reconstructions were performed.    125 mls of Omnipaque 350 was administered intravenously without   complication and 25 mls were discarded.    FINDINGS:    CENTRAL ARTERIAL SYSTEM:  No aortic aneurysm or dissection.  Patent celiac axis and branches, SMA, BATSHEVA, and bilateral renal arteries.      RIGHT LOWER EXTREMITY ARTERIES:    Common iliac: Patent  External iliac: Patent  Internal iliac: Patent  Common femoral: Patent with calcific plaque  Deep femoral: Patent  Superficial femoral: Patent with calcific plaque  Popliteal: Occluded  Tibial peroneal trunk: Occluded  Posterior tibial: Proximal to mid reconstitution with runoff to the foot  Anterior tibial: Proximal reconstitution with short segment proximal   calcific occlusion and patent to the mid foot.  Peroneal: Proximal reconstitution with distal faintly visualized runoff      LEFT LOWER EXTREMITY ARTERIES:    Common iliac: Patent  External iliac: Patent  Internal iliac: Patent  Common femoral: Patent with calcific plaque  Deep femoral: Patent  Superficial femoral: Patent with calcific plaque  Popliteal: Patent  Tibial peroneal trunk: Patent  Posterior tibial: Patent to the foot  Anterior tibial: Patent to the mid foot  Peroneal: Patent proximally with short segment mid calf occlusion,   threadlike reconstitution and runoff to the ankle    LOWER CHEST: Within normal limits.    LIVER: Within normal limits.  BILE DUCTS: Normal caliber.  GALLBLADDER: Within normal limits.  SPLEEN: Within normal limits.  PANCREAS: Within normal limits.  ADRENALS: Within normal limits.  KIDNEYS/URETERS: Within normal limits.    BLADDER: Within normal limits.  REPRODUCTIVE ORGANS: Normal size prostate. Seminal vesicle calcification.    BOWEL: No bowel obstruction. Appendix normal.  PERITONEUM: No ascites.  VESSELS: Within normal limits.  RETROPERITONEUM/LYMPH NODES:No lymphadenopathy.  ABDOMINAL WALL: Within normal limits.  BONES: Within normal limits.    IMPRESSION:  Right leg:  Occlusion popliteal and tibioperoneal trunk arteries.  Reconstitution 3 calf arteries with runoff to the foot and question short   segment proximal calcific occlusion anterior tibial artery.    Left leg:  Three-vessel runoff with short segment mid calf occlusion peroneal artery      < end of copied text >

## 2023-02-10 NOTE — PROGRESS NOTE ADULT - SUBJECTIVE AND OBJECTIVE BOX
Patient is a 52y old  Male who presents with a chief complaint of acute limb ischemia (10 Feb 2023 11:14)    HPI:  51 yo M PMHx Type 2 Diabetes (not on insulin) with diabetic neuropathy presenting with swelling, pain and discoloration of the right foot. He noticed worsening numbness from mid calf to the foot 2 weeks ago which then progressed to swelling and discoloration. he went to his PCP dr. pickens who ordered a doppler venous ultrasound (?) which was negative for clots. He was referred to podiatry who he  saw today and was referred to the ER for evaluation.     ER Course:   168/90 HR 85/min 16/min T 97.7 98% RA   Labs: wnl  EKG pending   given morphine 4 mg x 1   imaging: US Duplex Extremity Right: Atherosclerotic disease with spectral broadening seen from the superficial femoral artery, distally, compatible with upstream stenosis. (08 Feb 2023 23:33)    INTERVAL HPI:  2/9/23 - Pt was seen and examined at bedside. Endorses severe persistent pain though improved and tolerable. Pt denies headache, dizziness, lightheadedness, fever, chills, body aches, CP, SOB, palpitations, abdominal pain, n/v. On IV heparin drip    2/10/23 - Pt seen and examined at bedside. Pain improving and controlled. Pt denies headache, dizziness, lightheadedness, fever, chills, body aches, CP, SOB, palpitations, abdominal pain, n/v. On IV heparin drip             Home Medications:  metformin 1000 mg oral tablet: 1  orally 2 times a day (09 Feb 2023 00:35)      MEDICATIONS  (STANDING):  ceFAZolin   IVPB 2000 milliGRAM(s) IV Intermittent once  dextrose 5%. 1000 milliLiter(s) (100 mL/Hr) IV Continuous <Continuous>  dextrose 5%. 1000 milliLiter(s) (50 mL/Hr) IV Continuous <Continuous>  dextrose 50% Injectable 25 Gram(s) IV Push once  dextrose 50% Injectable 12.5 Gram(s) IV Push once  dextrose 50% Injectable 25 Gram(s) IV Push once  glucagon  Injectable 1 milliGRAM(s) IntraMuscular once  heparin  Infusion. 1000 Unit(s)/Hr (10 mL/Hr) IV Continuous <Continuous>  insulin lispro (ADMELOG) corrective regimen sliding scale   SubCutaneous every 6 hours  naloxone Injectable 0.4 milliGRAM(s) IV Push once  nicotine -  14 mG/24Hr(s) Patch 1 Patch Transdermal daily  pantoprazole    Tablet 40 milliGRAM(s) Oral daily  polyethylene glycol 3350 17 Gram(s) Oral daily  senna 2 Tablet(s) Oral at bedtime  sodium chloride 0.9%. 1000 milliLiter(s) (100 mL/Hr) IV Continuous <Continuous>  sucralfate 1 Gram(s) Oral four times a day    MEDICATIONS  (PRN):  acetaminophen     Tablet .. 650 milliGRAM(s) Oral every 6 hours PRN Temp greater or equal to 38C (100.4F), Mild Pain (1 - 3)  aluminum hydroxide/magnesium hydroxide/simethicone Suspension 30 milliLiter(s) Oral every 4 hours PRN Dyspepsia  bisacodyl 5 milliGRAM(s) Oral daily PRN Constipation  dextrose Oral Gel 15 Gram(s) Oral once PRN Blood Glucose LESS THAN 70 milliGRAM(s)/deciliter  heparin   Injectable 5500 Unit(s) IV Push every 6 hours PRN For aPTT less than 40  heparin   Injectable 2500 Unit(s) IV Push every 6 hours PRN For aPTT between 40 - 57  HYDROmorphone  Injectable 1 milliGRAM(s) IV Push every 4 hours PRN Severe Pain (7 - 10)  HYDROmorphone  Injectable 0.5 milliGRAM(s) IV Push every 4 hours PRN Moderate Pain (4 - 6)  melatonin 3 milliGRAM(s) Oral at bedtime PRN Insomnia  ondansetron Injectable 4 milliGRAM(s) IV Push every 8 hours PRN Nausea and/or Vomiting      Demerol HCl (Anaphylaxis)  Lobster (Unknown)      Social History:  lives home with wife and son  adls independent  ambulates independently  smoker: 1/2- 1 pack a day x 30 years   alcohol none  drugs marijuana occasionally for sleep (08 Feb 2023 23:33)      REVIEW OF SYSTEMS:  CONSTITUTIONAL: No fever, No chills, No fatigue, No myalgia, No Body ache, No Weakness  EYES: No eye pain,  No visual disturbances, No discharge, No Redness  ENMT: No ear pain, No nose bleed, No vertigo; No sinus pain, No throat pain, No Congestion  NECK: No pain, No stiffness  RESPIRATORY: No cough, No wheezing, No hemoptysis, No shortness of breath  CARDIOVASCULAR: No chest pain, No palpitations  GASTROINTESTINAL: No abdominal pain, No epigastric pain. No nausea, No vomiting, No diarrhea, No constipation; [ x ] BM-  GENITOURINARY: No dysuria, No frequency, No urgency, No hematuria, No incontinence  NEUROLOGICAL: No headaches, No dizziness, No numbness, No tingling, No tremors, No weakness  EXTREMITIES: + Distal RLE Pain/Swelling/discoloration  SKIN: [ x ] No itching, burning, rashes, or lesions  + pain,  MUSCULOSKELETAL: No joint pain, No joint swelling; No muscle pain, No back pain, No extremity pain  PSYCHIATRIC: No depression, No anxiety, No mood swings, No difficulty sleeping at night  PAIN SCALE: [  ] None  [ x ] Other- 10/10 w/ movement and palpation rt lower ext  ROS Unable to obtain due to: [  ] Dementia  [  ] Lethargy  [  ] Sedated  [  ] Non verbal  REST OF REVIEW OF SYSTEMS: [ x ] Normal     Vital Signs Last 24 Hrs  T(C): 36.5 (10 Feb 2023 07:25), Max: 36.8 (09 Feb 2023 20:13)  T(F): 97.7 (10 Feb 2023 07:25), Max: 98.3 (09 Feb 2023 20:13)  HR: 65 (10 Feb 2023 07:25) (61 - 65)  BP: 145/76 (10 Feb 2023 07:25) (136/75 - 145/76)  BP(mean): --  RR: 18 (10 Feb 2023 07:25) (18 - 18)  SpO2: 97% (10 Feb 2023 07:25) (97% - 97%)    Parameters below as of 10 Feb 2023 05:17  Patient On (Oxygen Delivery Method): room air        CAPILLARY BLOOD GLUCOSE      POCT Blood Glucose.: 112 mg/dL (10 Feb 2023 06:19)  POCT Blood Glucose.: 141 mg/dL (09 Feb 2023 21:25)  POCT Blood Glucose.: 87 mg/dL (09 Feb 2023 17:33)  POCT Blood Glucose.: 101 mg/dL (09 Feb 2023 13:22)      I&O's Summary    09 Feb 2023 07:01  -  10 Feb 2023 07:00  --------------------------------------------------------  IN: 716 mL / OUT: 0 mL / NET: 716 mL      PHYSICAL EXAM:  GENERAL:  [ x ] NAD, [ x ] Well appearing, [  ] Agitated, [  ] Drowsy, [  ] Lethargy, [  ] Confused   HEAD:  [ x ] Normal, [  ] Other  EYES:  [ x ] EOMI, [ x ] PERRLA, [ x ] Conjunctiva and sclera clear normal, [  ] Other, [  ] Pallor, [  ] Discharge  ENMT:  [ x ] Normal, [ x ] Moist mucous membranes, [ x ] Good dentition, [ x ] No thrush  NECK:  [ x ] Supple, [  x] No JVD, [ x ] Normal thyroid, [  ] Lymphadenopathy, [  ] Other  CHEST/LUNG:  [ x ] Clear to auscultation bilaterally, [ x ] Breath Sounds equal B/L / decreased, [  ] Poor effort, [ x ] No rales, [ x ] No rhonchi, [ x ] No wheezing  HEART:  [ x ] Regular rate and rhythm, [  ] Tachycardia, [  ] Bradycardia, [  ] Irregular, [ x ] No murmurs, No rubs, No gallops, [  ] PPM in place (Mfr:  )  ABDOMEN:  [ x ] Soft, [ x ] Nontender, [ x ] Nondistended, [ x ] No mass, [ x ] Bowel sounds present, [  ] Obese  NERVOUS SYSTEM:  [ x ] Alert & Oriented x3__, [ x ] Nonfocal, [  ] Confusion, [  ] Encephalopathic, [  ] Sedated, [  ] Unable to assess, [  ] Dementia, [  ] Other-  EXTREMITIES:  [ x ] 2+ Peripheral Pulses, No clubbing, No cyanosis, left leg mo PP Rt foot  [  ] Edema B/L lower EXT, [x  ] Severe  PAD stasis skin changes Rt lower EXT, [  ] Wound, [ x ] - RLE tenderness to palpation, Erythema, Pain,warm  LYMPH:  No lymphadenopathy noted  SKIN:  [  ] No rashes or lesions, [  ] Pressure ulcers, [  ] Ecchymosis, [  ] Skin tears, [ x ] Other - RLE ankle swelling, skin discoloration       DIET: Diet, NPO:   Except Medications (02-10-23 @ 01:15)  Diet, NPO after Midnight:      NPO Start Date: 09-Feb-2023,   NPO Start Time: 23:59  Except Medications (02-09-23 @ 10:39)      LABS:                        14.5   7.07  )-----------( 219      ( 10 Feb 2023 07:47 )             42.9     10 Feb 2023 07:47    140    |  109    |  15     ----------------------------<  101    3.9     |  26     |  0.55     Ca    8.8        10 Feb 2023 07:47    TPro  6.4    /  Alb  3.2    /  TBili  0.3    /  DBili  x      /  AST  11     /  ALT  14     /  AlkPhos  58     10 Feb 2023 07:47    PT/INR - ( 09 Feb 2023 05:32 )   PT: 13.2 sec;   INR: 1.13 ratio         PTT - ( 10 Feb 2023 07:47 )  PTT:103.8 sec               Anemia Panel:      Thyroid Panel:                RADIOLOGY & ADDITIONAL TESTS: _______      HEALTH ISSUES - PROBLEM Dx:  Acute lower limb ischemia    Type 2 diabetes mellitus    Need for other prophylactic measure    Tobacco dependence          Consultant(s) Notes Reviewed:  [ x ] YES     Care Discussed with [ x ] Consultants, [ x ] Patient, [ x ] Family, [  ] HCP, [ x ] , [  ] Social Service, [ x ] RN, [  ] Physical Therapy, [  ] Palliative Care Team  DVT PPX: heparin gtt Patient is a 52y old  Male who presents with a chief complaint of acute limb ischemia (10 Feb 2023 11:14)    HPI:  51 yo M PMHx Type 2 Diabetes (not on insulin) with diabetic neuropathy presenting with swelling, pain and discoloration of the right foot. He noticed worsening numbness from mid calf to the foot 2 weeks ago which then progressed to swelling and discoloration. he went to his PCP dr. pickens who ordered a doppler venous ultrasound (?) which was negative for clots. He was referred to podiatry who he  saw today and was referred to the ER for evaluation.     ER Course:   168/90 HR 85/min 16/min T 97.7 98% RA   Labs: wnl  EKG pending   given morphine 4 mg x 1   imaging: US Duplex Extremity Right: Atherosclerotic disease with spectral broadening seen from the superficial femoral artery, distally, compatible with upstream stenosis. (08 Feb 2023 23:33)    INTERVAL HPI:  2/9/23 - Pt was seen and examined at bedside. Endorses severe persistent pain though improved and tolerable. Pt denies headache, dizziness, lightheadedness, fever, chills, body aches, CP, SOB, palpitations, abdominal pain, n/v. On IV heparin drip    2/10/23 - Pt seen and examined at bedside. Pain improving and controlled. Pt denies headache, dizziness, lightheadedness, fever, chills, body aches, CP, SOB, palpitations, abdominal pain, n/v. On IV heparin drip,IV Pain meds, NPO         Home Medications:  metformin 1000 mg oral tablet: 1  orally 2 times a day (09 Feb 2023 00:35)      MEDICATIONS  (STANDING):  ceFAZolin   IVPB 2000 milliGRAM(s) IV Intermittent once  dextrose 5%. 1000 milliLiter(s) (100 mL/Hr) IV Continuous <Continuous>  dextrose 5%. 1000 milliLiter(s) (50 mL/Hr) IV Continuous <Continuous>  dextrose 50% Injectable 25 Gram(s) IV Push once  dextrose 50% Injectable 12.5 Gram(s) IV Push once  dextrose 50% Injectable 25 Gram(s) IV Push once  glucagon  Injectable 1 milliGRAM(s) IntraMuscular once  heparin  Infusion. 1000 Unit(s)/Hr (10 mL/Hr) IV Continuous <Continuous>  insulin lispro (ADMELOG) corrective regimen sliding scale   SubCutaneous every 6 hours  naloxone Injectable 0.4 milliGRAM(s) IV Push once  nicotine -  14 mG/24Hr(s) Patch 1 Patch Transdermal daily  pantoprazole    Tablet 40 milliGRAM(s) Oral daily  polyethylene glycol 3350 17 Gram(s) Oral daily  senna 2 Tablet(s) Oral at bedtime  sodium chloride 0.9%. 1000 milliLiter(s) (100 mL/Hr) IV Continuous <Continuous>  sucralfate 1 Gram(s) Oral four times a day    MEDICATIONS  (PRN):  acetaminophen     Tablet .. 650 milliGRAM(s) Oral every 6 hours PRN Temp greater or equal to 38C (100.4F), Mild Pain (1 - 3)  aluminum hydroxide/magnesium hydroxide/simethicone Suspension 30 milliLiter(s) Oral every 4 hours PRN Dyspepsia  bisacodyl 5 milliGRAM(s) Oral daily PRN Constipation  dextrose Oral Gel 15 Gram(s) Oral once PRN Blood Glucose LESS THAN 70 milliGRAM(s)/deciliter  heparin   Injectable 5500 Unit(s) IV Push every 6 hours PRN For aPTT less than 40  heparin   Injectable 2500 Unit(s) IV Push every 6 hours PRN For aPTT between 40 - 57  HYDROmorphone  Injectable 1 milliGRAM(s) IV Push every 4 hours PRN Severe Pain (7 - 10)  HYDROmorphone  Injectable 0.5 milliGRAM(s) IV Push every 4 hours PRN Moderate Pain (4 - 6)  melatonin 3 milliGRAM(s) Oral at bedtime PRN Insomnia  ondansetron Injectable 4 milliGRAM(s) IV Push every 8 hours PRN Nausea and/or Vomiting      Demerol HCl (Anaphylaxis)  Lobster (Unknown)      Social History:  lives home with wife and son  adls independent  ambulates independently  smoker: 1/2- 1 pack a day x 30 years   alcohol none  drugs marijuana occasionally for sleep (08 Feb 2023 23:33)      REVIEW OF SYSTEMS: c/o Pain RT Lower EXT  CONSTITUTIONAL: No fever, No chills, No fatigue, No myalgia, No Body ache, No Weakness  EYES: No eye pain,  No visual disturbances, No discharge, No Redness  ENMT: No ear pain, No nose bleed, No vertigo; No sinus pain, No throat pain, No Congestion  NECK: No pain, No stiffness  RESPIRATORY: No cough, No wheezing, No hemoptysis, No shortness of breath  CARDIOVASCULAR: No chest pain, No palpitations  GASTROINTESTINAL: No abdominal pain, No epigastric pain. No nausea, No vomiting, No diarrhea, No constipation; [ x ] BM-  GENITOURINARY: No dysuria, No frequency, No urgency, No hematuria, No incontinence  NEUROLOGICAL: No headaches, No dizziness, No numbness, No tingling, No tremors, No weakness  EXTREMITIES: + Distal RLE Pain/Swelling/discoloration  SKIN: [ x ] No itching, burning, rashes, or lesions  + pain,  MUSCULOSKELETAL: No joint pain, No joint swelling; No muscle pain, No back pain, No extremity pain  PSYCHIATRIC: No depression, No anxiety, No mood swings, No difficulty sleeping at night  PAIN SCALE: [  ] None  [ x ] Other- 10/10 w/ movement and palpation rt lower ext  ROS Unable to obtain due to: [  ] Dementia  [  ] Lethargy  [  ] Sedated  [  ] Non verbal  REST OF REVIEW OF SYSTEMS: [ x ] Normal     Vital Signs Last 24 Hrs  T(C): 36.5 (10 Feb 2023 07:25), Max: 36.8 (09 Feb 2023 20:13)  T(F): 97.7 (10 Feb 2023 07:25), Max: 98.3 (09 Feb 2023 20:13)  HR: 65 (10 Feb 2023 07:25) (61 - 65)  BP: 145/76 (10 Feb 2023 07:25) (136/75 - 145/76)  BP(mean): --  RR: 18 (10 Feb 2023 07:25) (18 - 18)  SpO2: 97% (10 Feb 2023 07:25) (97% - 97%)    Parameters below as of 10 Feb 2023 05:17  Patient On (Oxygen Delivery Method): room air        CAPILLARY BLOOD GLUCOSE      POCT Blood Glucose.: 112 mg/dL (10 Feb 2023 06:19)  POCT Blood Glucose.: 141 mg/dL (09 Feb 2023 21:25)  POCT Blood Glucose.: 87 mg/dL (09 Feb 2023 17:33)  POCT Blood Glucose.: 101 mg/dL (09 Feb 2023 13:22)      I&O's Summary    09 Feb 2023 07:01  -  10 Feb 2023 07:00  --------------------------------------------------------  IN: 716 mL / OUT: 0 mL / NET: 716 mL      PHYSICAL EXAM:  GENERAL:  [ x ] NAD, [ x ] Well appearing, [  ] Agitated, [  ] Drowsy, [  ] Lethargy, [  ] Confused   HEAD:  [ x ] Normal, [  ] Other  EYES:  [ x ] EOMI, [ x ] PERRLA, [ x ] Conjunctiva and sclera clear normal, [  ] Other, [  ] Pallor, [  ] Discharge  ENMT:  [ x ] Normal, [ x ] Moist mucous membranes, [ x ] Good dentition, [ x ] No thrush  NECK:  [ x ] Supple, [  x] No JVD, [ x ] Normal thyroid, [  ] Lymphadenopathy, [  ] Other  CHEST/LUNG:  [ x ] Clear to auscultation bilaterally, [ x ] Breath Sounds equal B/L / decreased, [  ] Poor effort, [ x ] No rales, [ x ] No rhonchi, [ x ] No wheezing  HEART:  [ x ] Regular rate and rhythm, [  ] Tachycardia, [  ] Bradycardia, [  ] Irregular, [ x ] No murmurs, No rubs, No gallops, [  ] PPM in place (Mfr:  )  ABDOMEN:  [ x ] Soft, [ x ] Nontender, [ x ] Nondistended, [ x ] No mass, [ x ] Bowel sounds present, [  ] Obese  NERVOUS SYSTEM:  [ x ] Alert & Oriented x3__, [ x ] Nonfocal, [  ] Confusion, [  ] Encephalopathic, [  ] Sedated, [  ] Unable to assess, [  ] Dementia, [  ] Other-  EXTREMITIES:  [ x ] 2+ Peripheral Pulses, No clubbing, No cyanosis, left leg mo PP Rt foot  [  ] Edema B/L lower EXT, [x  ] Severe  PAD stasis skin changes Rt lower EXT, [  ] Wound, [ x ] - RLE tenderness to palpation, Erythema, Pain, warm  LYMPH:  No lymphadenopathy noted  SKIN:  [  ] No rashes or lesions, [  ] Pressure ulcers, [  ] Ecchymosis, [  ] Skin tears, [ x ] Other - RLE ankle swelling, skin discoloration       DIET: Diet, NPO:   Except Medications (02-10-23 @ 01:15)  Diet, NPO after Midnight:      NPO Start Date: 09-Feb-2023,   NPO Start Time: 23:59  Except Medications (02-09-23 @ 10:39)      LABS:                        14.5   7.07  )-----------( 219      ( 10 Feb 2023 07:47 )             42.9     10 Feb 2023 07:47    140    |  109    |  15     ----------------------------<  101    3.9     |  26     |  0.55     Ca    8.8        10 Feb 2023 07:47    TPro  6.4    /  Alb  3.2    /  TBili  0.3    /  DBili  x      /  AST  11     /  ALT  14     /  AlkPhos  58     10 Feb 2023 07:47    PT/INR - ( 09 Feb 2023 05:32 )   PT: 13.2 sec;   INR: 1.13 ratio         PTT - ( 10 Feb 2023 07:47 )  PTT:103.8 sec    RADIOLOGY & ADDITIONAL TESTS: _______      HEALTH ISSUES - PROBLEM Dx:  Acute lower limb ischemia    Type 2 diabetes mellitus    Need for other prophylactic measure    Tobacco dependence          Consultant(s) Notes Reviewed:  [ x ] YES   Care Discussed with [ x ] Consultants, [ x ] Patient, [ x ] Family, [  ] HCP, [ x ] , [  ] Social Service, [ x ] RN, [  ] Physical Therapy, [  ] Palliative Care Team  DVT PPX: heparin gtt  NO Advance Directive

## 2023-02-10 NOTE — DIETITIAN INITIAL EVALUATION ADULT - NUTRITION CONSULT
Para citas favor de llamar al [phone].  Usted puede contactar mi enfermera,avila Currie [phone].  Usted puede contactar a mi programador de cirugias  avila Puri [phone].  Para cualquier otra información puede llamar al [phone].    Daniella por sherley a los medicos de [facility] el placer de proveerle cuidado.    Nos gustaria aprovechar la oportunidad para comunicarle algunas cosas,  que consideramos útiles para usted con respecto a maciel cuidado continuo.  Si usted ha tenido alguna prueba por maciel proveedor en el momento de maciel visita y no cedillo recibido rebeka Daniella.    Ricardo Arthur MD FACS  [facility]  Oficina [phone]    
yes

## 2023-02-10 NOTE — PROGRESS NOTE ADULT - SUBJECTIVE AND OBJECTIVE BOX
[facility] -- Simon Baez Pannella, Patel, [facility] [employer]  Office # [phone]      Follow Up:    cardiac optimization   Subjective/Observations:   No events overnight resting comfortably in bed.  No complaints of chest pain, dyspnea, or palpitations reported. No signs of orthopnea or PND.      REVIEW OF SYSTEMS: All other review of systems is negative unless indicated above    PAST MEDICAL & SURGICAL HISTORY:  Diabetes      Traumatic brain injury      Migraine      Neuropathy      PVD (peripheral vascular disease)      No significant past surgical history          MEDICATIONS  (STANDING):  ceFAZolin   IVPB 2000 milliGRAM(s) IV Intermittent once  dextrose 5%. 1000 milliLiter(s) (100 mL/Hr) IV Continuous <Continuous>  dextrose 5%. 1000 milliLiter(s) (50 mL/Hr) IV Continuous <Continuous>  dextrose 50% Injectable 25 Gram(s) IV Push once  dextrose 50% Injectable 12.5 Gram(s) IV Push once  dextrose 50% Injectable 25 Gram(s) IV Push once  glucagon  Injectable 1 milliGRAM(s) IntraMuscular once  heparin  Infusion. 1000 Unit(s)/Hr (10 mL/Hr) IV Continuous <Continuous>  insulin lispro (ADMELOG) corrective regimen sliding scale   SubCutaneous every 6 hours  naloxone Injectable 0.4 milliGRAM(s) IV Push once  nicotine -  14 mG/24Hr(s) Patch 1 Patch Transdermal daily  pantoprazole    Tablet 40 milliGRAM(s) Oral daily  polyethylene glycol 3350 17 Gram(s) Oral daily  senna 2 Tablet(s) Oral at bedtime  sodium chloride 0.9%. 1000 milliLiter(s) (100 mL/Hr) IV Continuous <Continuous>  sucralfate 1 Gram(s) Oral four times a day    MEDICATIONS  (PRN):  acetaminophen     Tablet .. 650 milliGRAM(s) Oral every 6 hours PRN Temp greater or equal to 38C (100.4F), Mild Pain (1 - 3)  aluminum hydroxide/magnesium hydroxide/simethicone Suspension 30 milliLiter(s) Oral every 4 hours PRN Dyspepsia  bisacodyl 5 milliGRAM(s) Oral daily PRN Constipation  dextrose Oral Gel 15 Gram(s) Oral once PRN Blood Glucose LESS THAN 70 milliGRAM(s)/deciliter  heparin   Injectable 5500 Unit(s) IV Push every 6 hours PRN For aPTT less than 40  heparin   Injectable 2500 Unit(s) IV Push every 6 hours PRN For aPTT between 40 - 57  HYDROmorphone  Injectable 1 milliGRAM(s) IV Push every 4 hours PRN Severe Pain (7 - 10)  HYDROmorphone  Injectable 0.5 milliGRAM(s) IV Push every 4 hours PRN Moderate Pain (4 - 6)  melatonin 3 milliGRAM(s) Oral at bedtime PRN Insomnia  ondansetron Injectable 4 milliGRAM(s) IV Push every 8 hours PRN Nausea and/or Vomiting      Allergies    Demerol HCl (Anaphylaxis)    Intolerances        Vital Signs Last 24 Hrs  T(C): 36.5 (10 Feb 2023 07:25), Max: 36.9 (2023 10:31)  T(F): 97.7 (10 Feb 2023 07:25), Max: 98.5 (2023 10:31)  HR: 65 (10 Feb 2023 07:25) (60 - 65)  BP: 145/76 (10 Feb 2023 07:25) (136/75 - 150/79)  BP(mean): --  RR: 18 (10 Feb 2023 07:25) (18 - 20)  SpO2: 97% (10 Feb 2023 07:25) (97% - 100%)    Parameters below as of 10 Feb 2023 05:17  Patient On (Oxygen Delivery Method): room air        I&O's Summary    2023 07:01  -  10 Feb 2023 07:00  --------------------------------------------------------  IN: 716 mL / OUT: 0 mL / NET: 716 mL          PHYSICAL EXAM:  TELE:   Constitutional: NAD, awake and alert, well-developed  HEENT: Moist Mucous Membranes, Anicteric  Pulmonary: Non-labored, breath sounds are clear bilaterally, No wheezing, crackles or rhonchi  Cardiovascular: Regular, S1 and S2 nl, No murmurs, rubs, gallops or clicks  Gastrointestinal: Bowel Sounds present, soft, nontender.   Lymph: +peripheral edema.  Skin: No visible rashes or ulcers.  Psych:  Mood & affect appropriate    LABS: All Labs Reviewed:                        14.5   8.37  )-----------( 229      ( 2023 17:20 )             42.7                         14.5   7.72  )-----------( 255      ( 2023 05:32 )             44.3                         14.4   8.79  )-----------( 266      ( 2023 19:00 )             42.8     2023 05:32    140    |  106    |  21     ----------------------------<  157    3.7     |  31     |  0.78   2023 19:00    141    |  106    |  18     ----------------------------<  97     3.9     |  30     |  1.00     Ca    9.2        2023 05:32  Ca    9.1        2023 19:00    TPro  7.4    /  Alb  3.7    /  TBili  0.3    /  DBili  x      /  AST  13     /  ALT  17     /  AlkPhos  64     2023 19:00    PT/INR - ( 2023 05:32 )   PT: 13.2 sec;   INR: 1.13 ratio         PTT - ( 10 Feb 2023 00:28 )  PTT:112.4 sec         EC Lead ECG:   Ventricular Rate 64 BPM    Atrial Rate 64 BPM    P-R Interval 158 ms    QRS Duration 98 ms    Q-T Interval 404 ms    QTC Calculation(Bazett) 416 ms    P Axis 39 degrees    R Axis 1 degrees    T Axis 62 degrees    Diagnosis Line Normal sinus rhythm  Normal ECG  When compared with ECG of 2022 14:44,  No significant change was found  Confirmed by JASMYNE DIAZ ([id number]) on 2023 11:02:54 AM (23 @ 08:30)        Radiology:         [facility] -- Simon Baez Pannella, Patel, [facility]  Office # [phone]      Follow Up:    cardiac optimization   Subjective/Observations:     No complaints of chest pain, dyspnea, or palpitations reported. No signs of orthopnea or PND.  Complaints of leg pain ,   remains on room air     REVIEW OF SYSTEMS: All other review of systems is negative unless indicated above    PAST MEDICAL & SURGICAL HISTORY:  Diabetes      Traumatic brain injury      Migraine      Neuropathy      PVD (peripheral vascular disease)      No significant past surgical history          MEDICATIONS  (STANDING):  ceFAZolin   IVPB 2000 milliGRAM(s) IV Intermittent once  dextrose 5%. 1000 milliLiter(s) (100 mL/Hr) IV Continuous <Continuous>  dextrose 5%. 1000 milliLiter(s) (50 mL/Hr) IV Continuous <Continuous>  dextrose 50% Injectable 25 Gram(s) IV Push once  dextrose 50% Injectable 12.5 Gram(s) IV Push once  dextrose 50% Injectable 25 Gram(s) IV Push once  glucagon  Injectable 1 milliGRAM(s) IntraMuscular once  heparin  Infusion. 1000 Unit(s)/Hr (10 mL/Hr) IV Continuous <Continuous>  insulin lispro (ADMELOG) corrective regimen sliding scale   SubCutaneous every 6 hours  naloxone Injectable 0.4 milliGRAM(s) IV Push once  nicotine -  14 mG/24Hr(s) Patch 1 Patch Transdermal daily  pantoprazole    Tablet 40 milliGRAM(s) Oral daily  polyethylene glycol 3350 17 Gram(s) Oral daily  senna 2 Tablet(s) Oral at bedtime  sodium chloride 0.9%. 1000 milliLiter(s) (100 mL/Hr) IV Continuous <Continuous>  sucralfate 1 Gram(s) Oral four times a day    MEDICATIONS  (PRN):  acetaminophen     Tablet .. 650 milliGRAM(s) Oral every 6 hours PRN Temp greater or equal to 38C (100.4F), Mild Pain (1 - 3)  aluminum hydroxide/magnesium hydroxide/simethicone Suspension 30 milliLiter(s) Oral every 4 hours PRN Dyspepsia  bisacodyl 5 milliGRAM(s) Oral daily PRN Constipation  dextrose Oral Gel 15 Gram(s) Oral once PRN Blood Glucose LESS THAN 70 milliGRAM(s)/deciliter  heparin   Injectable 5500 Unit(s) IV Push every 6 hours PRN For aPTT less than 40  heparin   Injectable 2500 Unit(s) IV Push every 6 hours PRN For aPTT between 40 - 57  HYDROmorphone  Injectable 1 milliGRAM(s) IV Push every 4 hours PRN Severe Pain (7 - 10)  HYDROmorphone  Injectable 0.5 milliGRAM(s) IV Push every 4 hours PRN Moderate Pain (4 - 6)  melatonin 3 milliGRAM(s) Oral at bedtime PRN Insomnia  ondansetron Injectable 4 milliGRAM(s) IV Push every 8 hours PRN Nausea and/or Vomiting      Allergies    Demerol HCl (Anaphylaxis)    Intolerances        Vital Signs Last 24 Hrs  T(C): 36.5 (10 Feb 2023 07:25), Max: 36.9 (2023 10:31)  T(F): 97.7 (10 Feb 2023 07:25), Max: 98.5 (2023 10:31)  HR: 65 (10 Feb 2023 07:25) (60 - 65)  BP: 145/76 (10 Feb 2023 07:25) (136/75 - 150/79)  BP(mean): --  RR: 18 (10 Feb 2023 07:25) (18 - 20)  SpO2: 97% (10 Feb 2023 07:25) (97% - 100%)    Parameters below as of 10 Feb 2023 05:17  Patient On (Oxygen Delivery Method): room air        I&O's Summary    2023 07:01  -  10 Feb 2023 07:00  --------------------------------------------------------  IN: 716 mL / OUT: 0 mL / NET: 716 mL          PHYSICAL EXAM:  TELE: not on tele   Constitutional: NAD, awake and alert, well-developed  HEENT: Moist Mucous Membranes, Anicteric  Pulmonary: Non-labored, breath sounds are clear bilaterally, No wheezing, crackles or rhonchi  Cardiovascular: Regular, S1 and S2 nl, No murmurs, rubs, gallops or clicks  Gastrointestinal: Bowel Sounds present, soft, nontender.   Lymph: +peripheral edema.  Skin: No visible rashes or ulcers.  Psych:  Mood & affect appropriate    LABS: All Labs Reviewed:                        14.5   8.37  )-----------( 229      ( 2023 17:20 )             42.7                         14.5   7.72  )-----------( 255      ( 2023 05:32 )             44.3                         14.4   8.79  )-----------( 266      ( 2023 19:00 )             42.8     2023 05:32    140    |  106    |  21     ----------------------------<  157    3.7     |  31     |  0.78   2023 19:00    141    |  106    |  18     ----------------------------<  97     3.9     |  30     |  1.00     Ca    9.2        2023 05:32  Ca    9.1        2023 19:00    TPro  7.4    /  Alb  3.7    /  TBili  0.3    /  DBili  x      /  AST  13     /  ALT  17     /  AlkPhos  64     2023 19:00    PT/INR - ( 2023 05:32 )   PT: 13.2 sec;   INR: 1.13 ratio         PTT - ( 10 Feb 2023 00:28 )  PTT:112.4 sec         EC Lead ECG:   Ventricular Rate 64 BPM    Atrial Rate 64 BPM    P-R Interval 158 ms    QRS Duration 98 ms    Q-T Interval 404 ms    QTC Calculation(Bazett) 416 ms    P Axis 39 degrees    R Axis 1 degrees    T Axis 62 degrees    Diagnosis Line Normal sinus rhythm  Normal ECG  When compared with ECG of 2022 14:44,  No significant change was found  Confirmed by JASMYNE DIAZ ([id number]) on 2023 11:02:54 AM (23 @ 08:30)        Radiology:

## 2023-02-10 NOTE — DIETITIAN INITIAL EVALUATION ADULT - PHYSCIAL ASSESSMENT
patient with weight loss ~ 40# over few months from October now with ~ 8# weight gain eating better  BMI 22.7 based on given ht and wt/other (specify)

## 2023-02-10 NOTE — PROGRESS NOTE ADULT - PROBLEM SELECTOR PLAN 2
on metformin -HOLD  Follow HbA1c   - start LDISS with Accu-Cheks and hypoglycemic protocol Hold home metformin  - a1c 7.3  - start LDISS with Accu-Cheks and hypoglycemic protocol

## 2023-02-10 NOTE — BRIEF OPERATIVE NOTE - NSICDXBRIEFPROCEDURE_GEN_ALL_CORE_FT
PROCEDURES:  Angiogram, lower extremity, unilateral 10-Feb-2023 19:16:59  Martha Dumont  Femoropopliteal arterial angioplasty of right lower extremity 10-Feb-2023 19:17:33  Martha Dumont

## 2023-02-10 NOTE — PROGRESS NOTE ADULT - PROBLEM SELECTOR PLAN 1
Presenting with numbness, discoloration and pain  - LE Arterial dopplers revealing stenosis of the femoral artery  - CT angio with runoff reviewed; Pt with popliteal occlusion and small distal targets  - As per vascular will need RLE bypass and angio for today 2/10  - pain control Dilaudid prn   - RCI score 0, EKG normal. BP elevated 2/2 pain; denies hx of CAD, arrythmia, renal disease. denies history of reaction to anesthesia, denies anginal symptoms.  - Cardio consulted [facility] appreciated for cardiac clearance.  - Vascular surgery following

## 2023-02-10 NOTE — DIETITIAN INITIAL EVALUATION ADULT - NS FNS DIET ORDER
Diet, NPO:   Except Medications (02-10-23 @ 01:15)  Diet, NPO after Midnight:      NPO Start Date: 09-Feb-2023,   NPO Start Time: 23:59  Except Medications (02-09-23 @ 10:39)

## 2023-02-10 NOTE — CARE COORDINATION ASSESSMENT. - OTHER PERTINENT DISCHARGE PLANNING INFORMATION:
sw able to speak w pt at bedside. 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 Pta pt lives wife and son. Pt states that wife just went back to work, now working as a . Pt states that his son was in an accident approx 3 years ago and is at home w him and wife. States he is still slowly recovering. Per EMR, it is noted that pt does smoke marijuana at home. SBIRT flagged. Pt states he only smoke marijuana prior to going to sleep and does not drink alcohol or take pills other than prescribed. Plan remains for pt to ret home w support of spouse and son. sw to follow for needs.

## 2023-02-10 NOTE — DIETITIAN INITIAL EVALUATION ADULT - PERTINENT LABORATORY DATA
02-10    140  |  109<H>  |  15  ----------------------------<  101<H>  3.9   |  26  |  0.55    Ca    8.8      10 Feb 2023 07:47    TPro  6.4  /  Alb  3.2<L>  /  TBili  0.3  /  DBili  x   /  AST  11<L>  /  ALT  14  /  AlkPhos  58  02-10  POCT Blood Glucose.: 112 mg/dL (02-10-23 @ 06:19)  A1C with Estimated Average Glucose Result: 7.3 % (02-09-23 @ 05:32)

## 2023-02-10 NOTE — CARE COORDINATION ASSESSMENT. - NSPASTMEDSURGHISTORY_GEN_ALL_CORE_FT
PAST MEDICAL & SURGICAL HISTORY:  Diabetes      Migraine      Traumatic brain injury      No significant past surgical history      PVD (peripheral vascular disease)      Neuropathy

## 2023-02-10 NOTE — PROGRESS NOTE ADULT - ASSESSMENT
53 y/o M presenting with acute limb ischemia  Admitted to medicine  Heparin gtt started  CTA distal aorta with run off performed and reviewed with Dr Velasco  Pt with below knee popliteal occlusion and small distal targets  Will need RLE angio and fem-distal bypass   Vein map BLE without acceptable conduit  Cont Heparin gtt  Appreciate cardiology risk assessment  NPO p MN confirmed  For OR today for RLE angio

## 2023-02-10 NOTE — CARE COORDINATION ASSESSMENT. - NSCAREPROVIDERS_GEN_ALL_CORE_FT
CARE PROVIDERS:  Accepting Physician: Ravinder Martinez  Administration: Greg Matias  Admitting: Ravinder Martinez  Attending: Ravinder Martinez  Case Management: Saroj Pate  Case Management: Danae Navarrete  Consultant: Berkley Quiñones  Consultant: Dimple Velázquez  Consultant: Annalee Hicks  Consultant: Syeda Veneags  Consultant: Anel Sanon  Consultant: Uriel Garrett  Consultant: Riki Williamson  Consultant: Weil, Patricia  Consultant: Manvar-Singh, Pallavi  Consultant: Rebeca Edge  Consultant: Reddy Dubon  Covering Team: Ana Jurado  Covering Team: Joe Nixon ED ACP: Tessy Camacho ED Attending: Liz Cano ED Nurse: Shante Vance  Nurse: Jose Elias Oseguera  Nurse: Blossom Worley  Nurse: Ladi León  Nurse: Rebeca Degroot  Ordered: ADM, User  Ordered: Doctor, Unknown  Ordered: Kim Massey  PCA/Nursing Assistant: Perla Becker  Primary Team: Irineo Billings  Primary Team: Felicia Baires  Primary Team: Jovanny Peters  Registered Dietitian: Chanel Erickson  : Andreina Fisher  : Marbella Harris

## 2023-02-10 NOTE — PROGRESS NOTE ADULT - PROBLEM SELECTOR PLAN 3
- 15 pack year history   - nicotine patch daily  Smoking cessation d/w pt - 15 pack year history   - nicotine patch daily  - Smoking cessation d/w pt

## 2023-02-10 NOTE — DIETITIAN INITIAL EVALUATION ADULT - ORAL INTAKE PTA/DIET HISTORY
patient reports with multiple food preferences . states allergy to lobster. spoke to patient and wife who both report patient in October with GI concerns EGD at [facility] sent home on GI meds. has restricted diet to chicken and vegetables no dairy no carrots no beef no pork no coffee. eats mashed potatoes . refusing diet education at this time A1c 7.3% takes metformin BID. during sickness patient was tolerating only cherrios. weight loss from usual 175-180# down to 137# now up to 145# . taking DM supplement at home. patient is NPO today for OR

## 2023-02-10 NOTE — PROGRESS NOTE ADULT - ASSESSMENT
51 yo M with PMHx of T2DM (not on insulin) with diabetic neuropathy, current smoker admitted for RLE limb ischemia.    IN progress    RLE ischemia  -presenting with acute limb ischemia   -CTA distal aorta with run off with popliteal occlusion and small distal targets  -Will need R fem-distal bypass followed by vascular on IV heparin   -bp 145/76   - No acute changes on EKG compared to previous  -Monitor and replete electrolytes. Keep K>4.0 and Mg>2.0.    - Pt has no active ischemia, decompensated heart failure, unstable arrythmia, or severe stenotic valvular disease. RCRI 0, METS >4. Pt is optimized from cardiovascular standpoint to proceed with planned procedure with routine hemodynamic monitoring  - Other cardiovascular workup will depend on clinical course  Anel RGP-C  Cardiology   [id number] [phone]   51 yo M with PMHx of T2DM (not on insulin) with diabetic neuropathy, current smoker admitted for RLE limb ischemia.      RLE ischemia  -CTA distal aorta with run off with popliteal occlusion and small distal targets  -Will need R fem-distal bypass followed by vascular on IV heparin   -bp 145/76   - No acute changes on EKG compared to previous  -Monitor and replete electrolytes. Keep K>4.0 and Mg>2.0.  -pain control as per primary team     - Pt has no active ischemia, decompensated heart failure, unstable arrythmia, or severe stenotic valvular disease. RCRI 0, METS >4. Pt is optimized from cardiovascular standpoint to proceed with planned procedure with routine hemodynamic monitoring  - Other cardiovascular workup will depend on clinical course  Anel Sanon FNP-C  Cardiology NP  [phone]

## 2023-02-10 NOTE — CARE COORDINATION ASSESSMENT. - CURRENT MENTAL STATUS/COGNITIVE FUNCTIONING
alert/oriented to person/oriented to place/recent memory is intact/remote memory is intact/behavior seems appropriate to situation

## 2023-02-11 DIAGNOSIS — M79.2 NEURALGIA AND NEURITIS, UNSPECIFIED: ICD-10-CM

## 2023-02-11 DIAGNOSIS — G62.9 POLYNEUROPATHY, UNSPECIFIED: ICD-10-CM

## 2023-02-11 DIAGNOSIS — I73.9 PERIPHERAL VASCULAR DISEASE, UNSPECIFIED: ICD-10-CM

## 2023-02-11 LAB
ALBUMIN SERPL ELPH-MCNC: 3.9 G/DL — SIGNIFICANT CHANGE UP (ref 3.3–5)
ALP SERPL-CCNC: 66 U/L — SIGNIFICANT CHANGE UP (ref 40–120)
ALT FLD-CCNC: 15 U/L — SIGNIFICANT CHANGE UP (ref 12–78)
ANION GAP SERPL CALC-SCNC: 5 MMOL/L — SIGNIFICANT CHANGE UP (ref 5–17)
AST SERPL-CCNC: 11 U/L — LOW (ref 15–37)
BILIRUB SERPL-MCNC: 0.4 MG/DL — SIGNIFICANT CHANGE UP (ref 0.2–1.2)
BUN SERPL-MCNC: 15 MG/DL — SIGNIFICANT CHANGE UP (ref 7–23)
CALCIUM SERPL-MCNC: 9.7 MG/DL — SIGNIFICANT CHANGE UP (ref 8.5–10.1)
CHLORIDE SERPL-SCNC: 106 MMOL/L — SIGNIFICANT CHANGE UP (ref 96–108)
CO2 SERPL-SCNC: 31 MMOL/L — SIGNIFICANT CHANGE UP (ref 22–31)
CREAT SERPL-MCNC: 0.81 MG/DL — SIGNIFICANT CHANGE UP (ref 0.5–1.3)
EGFR: 105 ML/MIN/1.73M2 — SIGNIFICANT CHANGE UP
GLUCOSE SERPL-MCNC: 137 MG/DL — HIGH (ref 70–99)
HCT VFR BLD CALC: 44.3 % — SIGNIFICANT CHANGE UP (ref 39–50)
HGB BLD-MCNC: 15.2 G/DL — SIGNIFICANT CHANGE UP (ref 13–17)
MCHC RBC-ENTMCNC: 30.1 PG — SIGNIFICANT CHANGE UP (ref 27–34)
MCHC RBC-ENTMCNC: 34.3 GM/DL — SIGNIFICANT CHANGE UP (ref 32–36)
MCV RBC AUTO: 87.7 FL — SIGNIFICANT CHANGE UP (ref 80–100)
NRBC # BLD: 0 /100 WBCS — SIGNIFICANT CHANGE UP (ref 0–0)
PLATELET # BLD AUTO: 250 K/UL — SIGNIFICANT CHANGE UP (ref 150–400)
POTASSIUM SERPL-MCNC: 4.3 MMOL/L — SIGNIFICANT CHANGE UP (ref 3.5–5.3)
POTASSIUM SERPL-SCNC: 4.3 MMOL/L — SIGNIFICANT CHANGE UP (ref 3.5–5.3)
PROT SERPL-MCNC: 7.6 G/DL — SIGNIFICANT CHANGE UP (ref 6–8.3)
RBC # BLD: 5.05 M/UL — SIGNIFICANT CHANGE UP (ref 4.2–5.8)
RBC # FLD: 13.5 % — SIGNIFICANT CHANGE UP (ref 10.3–14.5)
SODIUM SERPL-SCNC: 142 MMOL/L — SIGNIFICANT CHANGE UP (ref 135–145)
WBC # BLD: 8.43 K/UL — SIGNIFICANT CHANGE UP (ref 3.8–10.5)
WBC # FLD AUTO: 8.43 K/UL — SIGNIFICANT CHANGE UP (ref 3.8–10.5)

## 2023-02-11 PROCEDURE — 99232 SBSQ HOSP IP/OBS MODERATE 35: CPT

## 2023-02-11 RX ORDER — TRAMADOL HYDROCHLORIDE 50 MG/1
25 TABLET ORAL EVERY 4 HOURS
Refills: 0 | Status: DISCONTINUED | OUTPATIENT
Start: 2023-02-11 | End: 2023-02-12

## 2023-02-11 RX ORDER — AMITRIPTYLINE HCL 25 MG
25 TABLET ORAL AT BEDTIME
Refills: 0 | Status: DISCONTINUED | OUTPATIENT
Start: 2023-02-11 | End: 2023-02-12

## 2023-02-11 RX ORDER — OXYCODONE HYDROCHLORIDE 5 MG/1
5 TABLET ORAL EVERY 4 HOURS
Refills: 0 | Status: DISCONTINUED | OUTPATIENT
Start: 2023-02-11 | End: 2023-02-11

## 2023-02-11 RX ORDER — HYDROMORPHONE HYDROCHLORIDE 2 MG/ML
4 INJECTION INTRAMUSCULAR; INTRAVENOUS; SUBCUTANEOUS EVERY 4 HOURS
Refills: 0 | Status: DISCONTINUED | OUTPATIENT
Start: 2023-02-11 | End: 2023-02-12

## 2023-02-11 RX ORDER — SENNA PLUS 8.6 MG/1
2 TABLET ORAL AT BEDTIME
Refills: 0 | Status: DISCONTINUED | OUTPATIENT
Start: 2023-02-11 | End: 2023-02-12

## 2023-02-11 RX ORDER — CILOSTAZOL 100 MG/1
100 TABLET ORAL
Refills: 0 | Status: DISCONTINUED | OUTPATIENT
Start: 2023-02-11 | End: 2023-02-12

## 2023-02-11 RX ORDER — MAGNESIUM HYDROXIDE 400 MG/1
30 TABLET, CHEWABLE ORAL DAILY
Refills: 0 | Status: DISCONTINUED | OUTPATIENT
Start: 2023-02-11 | End: 2023-02-12

## 2023-02-11 RX ORDER — HYDROMORPHONE HYDROCHLORIDE 2 MG/ML
2 INJECTION INTRAMUSCULAR; INTRAVENOUS; SUBCUTANEOUS EVERY 6 HOURS
Refills: 0 | Status: DISCONTINUED | OUTPATIENT
Start: 2023-02-11 | End: 2023-02-11

## 2023-02-11 RX ORDER — TRAMADOL HYDROCHLORIDE 50 MG/1
50 TABLET ORAL EVERY 4 HOURS
Refills: 0 | Status: DISCONTINUED | OUTPATIENT
Start: 2023-02-11 | End: 2023-02-12

## 2023-02-11 RX ORDER — POLYETHYLENE GLYCOL 3350 17 G/17G
17 POWDER, FOR SOLUTION ORAL
Refills: 0 | Status: DISCONTINUED | OUTPATIENT
Start: 2023-02-11 | End: 2023-02-12

## 2023-02-11 RX ADMIN — HYDROMORPHONE HYDROCHLORIDE 1 MILLIGRAM(S): 2 INJECTION INTRAMUSCULAR; INTRAVENOUS; SUBCUTANEOUS at 09:40

## 2023-02-11 RX ADMIN — ATORVASTATIN CALCIUM 20 MILLIGRAM(S): 80 TABLET, FILM COATED ORAL at 00:17

## 2023-02-11 RX ADMIN — Medication 1 GRAM(S): at 17:27

## 2023-02-11 RX ADMIN — PANTOPRAZOLE SODIUM 40 MILLIGRAM(S): 20 TABLET, DELAYED RELEASE ORAL at 12:37

## 2023-02-11 RX ADMIN — Medication 1 GRAM(S): at 12:37

## 2023-02-11 RX ADMIN — HYDROMORPHONE HYDROCHLORIDE 1 MILLIGRAM(S): 2 INJECTION INTRAMUSCULAR; INTRAVENOUS; SUBCUTANEOUS at 12:51

## 2023-02-11 RX ADMIN — HYDROMORPHONE HYDROCHLORIDE 1 MILLIGRAM(S): 2 INJECTION INTRAMUSCULAR; INTRAVENOUS; SUBCUTANEOUS at 00:33

## 2023-02-11 RX ADMIN — CLOPIDOGREL BISULFATE 75 MILLIGRAM(S): 75 TABLET, FILM COATED ORAL at 12:37

## 2023-02-11 RX ADMIN — HYDROMORPHONE HYDROCHLORIDE 1 MILLIGRAM(S): 2 INJECTION INTRAMUSCULAR; INTRAVENOUS; SUBCUTANEOUS at 07:29

## 2023-02-11 RX ADMIN — SENNA PLUS 2 TABLET(S): 8.6 TABLET ORAL at 22:10

## 2023-02-11 RX ADMIN — Medication 25 MILLIGRAM(S): at 22:10

## 2023-02-11 RX ADMIN — Medication 1 GRAM(S): at 06:34

## 2023-02-11 RX ADMIN — HYDROMORPHONE HYDROCHLORIDE 4 MILLIGRAM(S): 2 INJECTION INTRAMUSCULAR; INTRAVENOUS; SUBCUTANEOUS at 23:09

## 2023-02-11 RX ADMIN — HYDROMORPHONE HYDROCHLORIDE 1 MILLIGRAM(S): 2 INJECTION INTRAMUSCULAR; INTRAVENOUS; SUBCUTANEOUS at 09:24

## 2023-02-11 RX ADMIN — OXYCODONE HYDROCHLORIDE 5 MILLIGRAM(S): 5 TABLET ORAL at 14:23

## 2023-02-11 RX ADMIN — Medication 1 PATCH: at 11:18

## 2023-02-11 RX ADMIN — Medication 1 PATCH: at 07:00

## 2023-02-11 RX ADMIN — HYDROMORPHONE HYDROCHLORIDE 4 MILLIGRAM(S): 2 INJECTION INTRAMUSCULAR; INTRAVENOUS; SUBCUTANEOUS at 17:56

## 2023-02-11 RX ADMIN — HYDROMORPHONE HYDROCHLORIDE 4 MILLIGRAM(S): 2 INJECTION INTRAMUSCULAR; INTRAVENOUS; SUBCUTANEOUS at 19:30

## 2023-02-11 RX ADMIN — HYDROMORPHONE HYDROCHLORIDE 1 MILLIGRAM(S): 2 INJECTION INTRAMUSCULAR; INTRAVENOUS; SUBCUTANEOUS at 13:05

## 2023-02-11 RX ADMIN — Medication 1: at 12:36

## 2023-02-11 RX ADMIN — SENNA PLUS 2 TABLET(S): 8.6 TABLET ORAL at 00:17

## 2023-02-11 RX ADMIN — ATORVASTATIN CALCIUM 20 MILLIGRAM(S): 80 TABLET, FILM COATED ORAL at 22:09

## 2023-02-11 RX ADMIN — CILOSTAZOL 100 MILLIGRAM(S): 100 TABLET ORAL at 17:27

## 2023-02-11 RX ADMIN — HYDROMORPHONE HYDROCHLORIDE 1 MILLIGRAM(S): 2 INJECTION INTRAMUSCULAR; INTRAVENOUS; SUBCUTANEOUS at 03:21

## 2023-02-11 RX ADMIN — Medication 1 PATCH: at 10:05

## 2023-02-11 RX ADMIN — Medication 1 GRAM(S): at 00:16

## 2023-02-11 RX ADMIN — HYDROMORPHONE HYDROCHLORIDE 1 MILLIGRAM(S): 2 INJECTION INTRAMUSCULAR; INTRAVENOUS; SUBCUTANEOUS at 06:29

## 2023-02-11 RX ADMIN — OXYCODONE HYDROCHLORIDE 5 MILLIGRAM(S): 5 TABLET ORAL at 15:23

## 2023-02-11 RX ADMIN — GABAPENTIN 100 MILLIGRAM(S): 400 CAPSULE ORAL at 22:10

## 2023-02-11 RX ADMIN — HYDROMORPHONE HYDROCHLORIDE 1 MILLIGRAM(S): 2 INJECTION INTRAMUSCULAR; INTRAVENOUS; SUBCUTANEOUS at 03:36

## 2023-02-11 RX ADMIN — HYDROMORPHONE HYDROCHLORIDE 4 MILLIGRAM(S): 2 INJECTION INTRAMUSCULAR; INTRAVENOUS; SUBCUTANEOUS at 22:09

## 2023-02-11 RX ADMIN — Medication 3 MILLIGRAM(S): at 00:16

## 2023-02-11 RX ADMIN — Medication 325 MILLIGRAM(S): at 12:37

## 2023-02-11 RX ADMIN — HYDROMORPHONE HYDROCHLORIDE 1 MILLIGRAM(S): 2 INJECTION INTRAMUSCULAR; INTRAVENOUS; SUBCUTANEOUS at 00:18

## 2023-02-11 RX ADMIN — Medication 1 PATCH: at 19:28

## 2023-02-11 NOTE — PROGRESS NOTE ADULT - SUBJECTIVE AND OBJECTIVE BOX
Patient is a 52y old  Male who presents with a chief complaint of acute limb ischemia (11 Feb 2023 12:58)    HPI:  53 yo M PMHx Type 2 Diabetes (not on insulin) with diabetic neuropathy presenting with swelling, pain and discoloration of the right foot. He noticed worsening numbness from mid calf to the foot 2 weeks ago which then progressed to swelling and discoloration. he went to his PCP dr. pickens who ordered a doppler venous ultrasound (?) which was negative for clots. He was referred to podiatry who he  saw today and was referred to the ER for evaluation.     ER Course:   168/90 HR 85/min 16/min T 97.7 98% RA   Labs: wnl  EKG pending   given morphine 4 mg x 1   imaging: US Duplex Extremity Right: Atherosclerotic disease with spectral broadening seen from the superficial femoral artery, distally, compatible with upstream stenosis. (08 Feb 2023 23:33)    INTERVAL HPI:  2/9/23 - Pt was seen and examined at bedside. Endorses severe persistent pain though improved and tolerable. Pt denies headache, dizziness, lightheadedness, fever, chills, body aches, CP, SOB, palpitations, abdominal pain, n/v. On IV heparin drip    2/10/23 - Pt seen and examined at bedside. Pain improving and controlled. Pt denies headache, dizziness, lightheadedness, fever, chills, body aches, CP, SOB, palpitations, abdominal pain, n/v. On IV heparin drip, IV Pain meds, NPO     2/11/23 - Pt was seen and examined at bedside. Patient endorsing 7/10 pain. S/p RLE angiogram with angioplasty. No further vascular intervention - started on cilostazol. Denies fever, chills, CP, SOB, n/v/d. No other complaints at this time.     OVERNIGHT EVENTS: No acute overnight events.     Home Medications:  metformin 1000 mg oral tablet: 1  orally 2 times a day (09 Feb 2023 00:35)      MEDICATIONS  (STANDING):  aspirin 325 milliGRAM(s) Oral daily  atorvastatin 20 milliGRAM(s) Oral at bedtime  cilostazol 100 milliGRAM(s) Oral two times a day  clopidogrel Tablet 75 milliGRAM(s) Oral daily  dextrose 5%. 1000 milliLiter(s) (50 mL/Hr) IV Continuous <Continuous>  dextrose 5%. 1000 milliLiter(s) (100 mL/Hr) IV Continuous <Continuous>  dextrose 50% Injectable 25 Gram(s) IV Push once  dextrose 50% Injectable 12.5 Gram(s) IV Push once  dextrose 50% Injectable 25 Gram(s) IV Push once  gabapentin 100 milliGRAM(s) Oral at bedtime  glucagon  Injectable 1 milliGRAM(s) IntraMuscular once  insulin lispro (ADMELOG) corrective regimen sliding scale   SubCutaneous three times a day before meals  naloxone Injectable 0.4 milliGRAM(s) IV Push once  nicotine -  14 mG/24Hr(s) Patch 1 Patch Transdermal daily  oxyCODONE    IR 5 milliGRAM(s) Oral every 4 hours  pantoprazole    Tablet 40 milliGRAM(s) Oral daily  polyethylene glycol 3350 17 Gram(s) Oral two times a day  senna 2 Tablet(s) Oral at bedtime  senna 2 Tablet(s) Oral at bedtime  sucralfate 1 Gram(s) Oral four times a day    MEDICATIONS  (PRN):  acetaminophen     Tablet .. 650 milliGRAM(s) Oral every 6 hours PRN Mild Pain (1 - 3)  aluminum hydroxide/magnesium hydroxide/simethicone Suspension 30 milliLiter(s) Oral every 4 hours PRN Dyspepsia  bisacodyl 5 milliGRAM(s) Oral daily PRN Constipation  dextrose Oral Gel 15 Gram(s) Oral once PRN Blood Glucose LESS THAN 70 milliGRAM(s)/deciliter  magnesium hydroxide Suspension 30 milliLiter(s) Oral daily PRN Constipation  melatonin 3 milliGRAM(s) Oral at bedtime PRN Insomnia  ondansetron Injectable 4 milliGRAM(s) IV Push every 8 hours PRN Nausea and/or Vomiting  traMADol 25 milliGRAM(s) Oral every 4 hours PRN Mild Pain (1 - 3)  traMADol 50 milliGRAM(s) Oral every 4 hours PRN Moderate Pain (4 - 6)      Demerol HCl (Anaphylaxis)  Lobster (Unknown)      Social History:  lives home with wife and son  adls independent  ambulates independently  smoker: 1/2- 1 pack a day x 30 years   alcohol none  drugs marijuana occasionally for sleep (08 Feb 2023 23:33)      REVIEW OF SYSTEMS: c/o Pain RT Lower EXT  CONSTITUTIONAL: No fever, No chills, No fatigue, No myalgia, No Body ache, No Weakness  EYES: No eye pain,  No visual disturbances, No discharge, No Redness  ENMT: No ear pain, No nose bleed, No vertigo; No sinus pain, No throat pain, No Congestion  NECK: No pain, No stiffness  RESPIRATORY: No cough, No wheezing, No hemoptysis, No shortness of breath  CARDIOVASCULAR: No chest pain, No palpitations  GASTROINTESTINAL: No abdominal pain, No epigastric pain. No nausea, No vomiting, No diarrhea, No constipation; [ x ] BM-  GENITOURINARY: No dysuria, No frequency, No urgency, No hematuria, No incontinence  NEUROLOGICAL: No headaches, No dizziness, No numbness, No tingling, No tremors, No weakness  EXTREMITIES: + Distal RLE Pain/Swelling/discoloration  SKIN: [ x ] No itching, burning, rashes, or lesions  + pain,  MUSCULOSKELETAL: No joint pain, No joint swelling; No muscle pain, No back pain, No extremity pain  PSYCHIATRIC: No depression, No anxiety, No mood swings, No difficulty sleeping at night  PAIN SCALE: [  ] None  [ x ] Other- 10/10 w/ movement and palpation rt lower ext; 7/10 with pain medications  ROS Unable to obtain due to: [  ] Dementia  [  ] Lethargy  [  ] Sedated  [  ] Non verbal  REST OF REVIEW OF SYSTEMS: [ x ] Normal     Vital Signs Last 24 Hrs  T(C): 36.2 (11 Feb 2023 11:32), Max: 36.9 (10 Feb 2023 23:32)  T(F): 97.2 (11 Feb 2023 11:32), Max: 98.4 (10 Feb 2023 23:32)  HR: 69 (11 Feb 2023 11:32) (59 - 87)  BP: 150/74 (11 Feb 2023 11:32) (115/70 - 166/75)  BP(mean): --  RR: 17 (11 Feb 2023 11:32) (11 - 18)  SpO2: 98% (11 Feb 2023 11:32) (96% - 100%)    Parameters below as of 11 Feb 2023 11:32  Patient On (Oxygen Delivery Method): room air        CAPILLARY BLOOD GLUCOSE      POCT Blood Glucose.: 164 mg/dL (11 Feb 2023 12:15)  POCT Blood Glucose.: 142 mg/dL (11 Feb 2023 08:21)  POCT Blood Glucose.: 144 mg/dL (10 Feb 2023 21:39)  POCT Blood Glucose.: 95 mg/dL (10 Feb 2023 18:45)  POCT Blood Glucose.: 80 mg/dL (10 Feb 2023 15:44)      I&O's Summary    10 Feb 2023 07:01  -  11 Feb 2023 07:00  --------------------------------------------------------  IN: 300 mL / OUT: 0 mL / NET: 300 mL      PHYSICAL EXAM:  GENERAL:  [ x ] NAD, [ x ] Well appearing, [  ] Agitated, [  ] Drowsy, [  ] Lethargy, [  ] Confused   HEAD:  [ x ] Normal, [  ] Other  EYES:  [ x ] EOMI, [ x ] PERRLA, [ x ] Conjunctiva and sclera clear normal, [  ] Other, [  ] Pallor, [  ] Discharge  ENMT:  [ x ] Normal, [ x ] Moist mucous membranes, [ x ] Good dentition, [ x ] No thrush  NECK:  [ x ] Supple, [  x] No JVD, [ x ] Normal thyroid, [  ] Lymphadenopathy, [  ] Other  CHEST/LUNG:  [ x ] Clear to auscultation bilaterally, [ x ] Breath Sounds equal B/L / decreased, [  ] Poor effort, [ x ] No rales, [ x ] No rhonchi, [ x ] No wheezing  HEART:  [ x ] Regular rate and rhythm, [  ] Tachycardia, [  ] Bradycardia, [  ] Irregular, [ x ] No murmurs, No rubs, No gallops, [  ] PPM in place (Mfr:  )  ABDOMEN:  [ x ] Soft, [ x ] Nontender, [ x ] Nondistended, [ x ] No mass, [ x ] Bowel sounds present, [  ] Obese  NERVOUS SYSTEM:  [ x ] Alert & Oriented x3__, [ x ] Nonfocal, [  ] Confusion, [  ] Encephalopathic, [  ] Sedated, [  ] Unable to assess, [  ] Dementia, [  ] Other-  EXTREMITIES:  [ x ] 2+ Peripheral Pulses, No clubbing, No cyanosis, left leg mo PP Rt foot  [  ] Edema B/L lower EXT, [x  ] Severe  PAD stasis skin changes Rt lower EXT, [  ] Wound, [ x ] - RLE tenderness to palpation, Erythema, Pain, warm  LYMPH:  No lymphadenopathy noted  SKIN:  [  ] No rashes or lesions, [  ] Pressure ulcers, [  ] Ecchymosis, [  ] Skin tears, [ x ] Other - RLE ankle swelling, skin discoloration     DIET: Diet, Regular:   Consistent Carbohydrate No Snacks (02-10-23 @ 23:22)      LABS:                        15.2   8.43  )-----------( 250      ( 11 Feb 2023 08:00 )             44.3     11 Feb 2023 08:00    142    |  106    |  15     ----------------------------<  137    4.3     |  31     |  0.81     Ca    9.7        11 Feb 2023 08:00    TPro  7.6    /  Alb  3.9    /  TBili  0.4    /  DBili  x      /  AST  11     /  ALT  15     /  AlkPhos  66     11 Feb 2023 08:00    PTT - ( 10 Feb 2023 07:47 )  PTT:103.8 sec               Anemia Panel:      Thyroid Panel:                RADIOLOGY & ADDITIONAL TESTS: _______      HEALTH ISSUES - PROBLEM Dx:  Acute lower limb ischemia    Type 2 diabetes mellitus    Need for other prophylactic measure    Tobacco dependence          Consultant(s) Notes Reviewed:  [ x ] YES     Care Discussed with [ x ] Consultants, [ x ] Patient, [ x ] Family, [  ] HCP, [ x ] , [  ] Social Service, [ x ] RN, [  ] Physical Therapy, [  ] Palliative Care Team  DVT PPX: [  ] Lovenox, [  ] SC Heparin, [  ] Coumadin, [  ] Xarelto, [  ] Eliquis, [  ] Pradaxa, [  ] IV Heparin drip, [  ] SCD, [  ] Ambulation, [  ] Contraindicated 2/2 GI Bleed, [  ] Contraindicated 2/2  Bleed, [  ] Contraindicated 2/2 Brain Bleed  Advanced Directive: [  ] None, [  ] DNR/DNI Patient is a 52y old  Male who presents with a chief complaint of acute limb ischemia (11 Feb 2023 12:58)    HPI:  53 yo M PMHx Type 2 Diabetes (not on insulin) with diabetic neuropathy presenting with swelling, pain and discoloration of the right foot. He noticed worsening numbness from mid calf to the foot 2 weeks ago which then progressed to swelling and discoloration. he went to his PCP dr. pickens who ordered a doppler venous ultrasound (?) which was negative for clots. He was referred to podiatry who he  saw today and was referred to the ER for evaluation.     ER Course:   168/90 HR 85/min 16/min T 97.7 98% RA   Labs: wnl  EKG pending   given morphine 4 mg x 1   imaging: US Duplex Extremity Right: Atherosclerotic disease with spectral broadening seen from the superficial femoral artery, distally, compatible with upstream stenosis. (08 Feb 2023 23:33)    INTERVAL HPI:  2/9/23 - Pt was seen and examined at bedside. Endorses severe persistent pain though improved and tolerable. Pt denies headache, dizziness, lightheadedness, fever, chills, body aches, CP, SOB, palpitations, abdominal pain, n/v. On IV heparin drip    2/10/23 - Pt seen and examined at bedside. Pain improving and controlled. Pt denies headache, dizziness, lightheadedness, fever, chills, body aches, CP, SOB, palpitations, abdominal pain, n/v. On IV heparin drip, IV Pain meds, NPO     2/11/23 - Pt was seen and examined at bedside. Patient endorsing 7/10 pain. S/p RLE angiogram with angioplasty. No further vascular intervention - started on cilostazol. Denies fever, chills, CP, SOB, n/v/d. No other complaints at this time.     OVERNIGHT EVENTS: No acute overnight events.     Home Medications:  metformin 1000 mg oral tablet: 1  orally 2 times a day (09 Feb 2023 00:35)      MEDICATIONS  (STANDING):  aspirin 325 milliGRAM(s) Oral daily  atorvastatin 20 milliGRAM(s) Oral at bedtime  cilostazol 100 milliGRAM(s) Oral two times a day  clopidogrel Tablet 75 milliGRAM(s) Oral daily  dextrose 5%. 1000 milliLiter(s) (50 mL/Hr) IV Continuous <Continuous>  dextrose 5%. 1000 milliLiter(s) (100 mL/Hr) IV Continuous <Continuous>  dextrose 50% Injectable 25 Gram(s) IV Push once  dextrose 50% Injectable 12.5 Gram(s) IV Push once  dextrose 50% Injectable 25 Gram(s) IV Push once  gabapentin 100 milliGRAM(s) Oral at bedtime  glucagon  Injectable 1 milliGRAM(s) IntraMuscular once  insulin lispro (ADMELOG) corrective regimen sliding scale   SubCutaneous three times a day before meals  naloxone Injectable 0.4 milliGRAM(s) IV Push once  nicotine -  14 mG/24Hr(s) Patch 1 Patch Transdermal daily  oxyCODONE    IR 5 milliGRAM(s) Oral every 4 hours  pantoprazole    Tablet 40 milliGRAM(s) Oral daily  polyethylene glycol 3350 17 Gram(s) Oral two times a day  senna 2 Tablet(s) Oral at bedtime  senna 2 Tablet(s) Oral at bedtime  sucralfate 1 Gram(s) Oral four times a day    MEDICATIONS  (PRN):  acetaminophen     Tablet .. 650 milliGRAM(s) Oral every 6 hours PRN Mild Pain (1 - 3)  aluminum hydroxide/magnesium hydroxide/simethicone Suspension 30 milliLiter(s) Oral every 4 hours PRN Dyspepsia  bisacodyl 5 milliGRAM(s) Oral daily PRN Constipation  dextrose Oral Gel 15 Gram(s) Oral once PRN Blood Glucose LESS THAN 70 milliGRAM(s)/deciliter  magnesium hydroxide Suspension 30 milliLiter(s) Oral daily PRN Constipation  melatonin 3 milliGRAM(s) Oral at bedtime PRN Insomnia  ondansetron Injectable 4 milliGRAM(s) IV Push every 8 hours PRN Nausea and/or Vomiting  traMADol 25 milliGRAM(s) Oral every 4 hours PRN Mild Pain (1 - 3)  traMADol 50 milliGRAM(s) Oral every 4 hours PRN Moderate Pain (4 - 6)      Demerol HCl (Anaphylaxis)  Lobster (Unknown)      Social History:  lives home with wife and son  adls independent  ambulates independently  smoker: 1/2- 1 pack a day x 30 years   alcohol none  drugs marijuana occasionally for sleep (08 Feb 2023 23:33)      REVIEW OF SYSTEMS: c/o Pain RT Lower EXT  CONSTITUTIONAL: No fever, No chills, No fatigue, No myalgia, No Body ache, No Weakness  EYES: No eye pain,  No visual disturbances, No discharge, No Redness  ENMT: No ear pain, No nose bleed, No vertigo; No sinus pain, No throat pain, No Congestion  NECK: No pain, No stiffness  RESPIRATORY: No cough, No wheezing, No hemoptysis, No shortness of breath  CARDIOVASCULAR: No chest pain, No palpitations  GASTROINTESTINAL: No abdominal pain, No epigastric pain. No nausea, No vomiting, No diarrhea, No constipation; [ x ] BM-  GENITOURINARY: No dysuria, No frequency, No urgency, No hematuria, No incontinence  NEUROLOGICAL: No headaches, No dizziness, No numbness, No tingling, No tremors, No weakness  EXTREMITIES: + Distal RLE Pain/Swelling/discoloration  SKIN: [ x ] No itching, burning, rashes, or lesions  + pain,  MUSCULOSKELETAL: No joint pain, No joint swelling; No muscle pain, No back pain, No extremity pain  PSYCHIATRIC: No depression, No anxiety, No mood swings, No difficulty sleeping at night  PAIN SCALE: [  ] None  [ x ] Other- 10/10 w/ movement and palpation rt lower ext; 7/10 with pain medications  ROS Unable to obtain due to: [  ] Dementia  [  ] Lethargy  [  ] Sedated  [  ] Non verbal  REST OF REVIEW OF SYSTEMS: [ x ] Normal     Vital Signs Last 24 Hrs  T(C): 36.2 (11 Feb 2023 11:32), Max: 36.9 (10 Feb 2023 23:32)  T(F): 97.2 (11 Feb 2023 11:32), Max: 98.4 (10 Feb 2023 23:32)  HR: 69 (11 Feb 2023 11:32) (59 - 87)  BP: 150/74 (11 Feb 2023 11:32) (115/70 - 166/75)  BP(mean): --  RR: 17 (11 Feb 2023 11:32) (11 - 18)  SpO2: 98% (11 Feb 2023 11:32) (96% - 100%)    Parameters below as of 11 Feb 2023 11:32  Patient On (Oxygen Delivery Method): room air        CAPILLARY BLOOD GLUCOSE      POCT Blood Glucose.: 164 mg/dL (11 Feb 2023 12:15)  POCT Blood Glucose.: 142 mg/dL (11 Feb 2023 08:21)  POCT Blood Glucose.: 144 mg/dL (10 Feb 2023 21:39)  POCT Blood Glucose.: 95 mg/dL (10 Feb 2023 18:45)  POCT Blood Glucose.: 80 mg/dL (10 Feb 2023 15:44)      I&O's Summary    10 Feb 2023 07:01  -  11 Feb 2023 07:00  --------------------------------------------------------  IN: 300 mL / OUT: 0 mL / NET: 300 mL      PHYSICAL EXAM:  GENERAL:  [ x ] NAD, [ x ] Well appearing, [  ] Agitated, [  ] Drowsy, [  ] Lethargy, [  ] Confused   HEAD:  [ x ] Normal, [  ] Other  EYES:  [ x ] EOMI, [ x ] PERRLA, [ x ] Conjunctiva and sclera clear normal, [  ] Other, [  ] Pallor, [  ] Discharge  ENMT:  [ x ] Normal, [ x ] Moist mucous membranes, [ x ] Good dentition, [ x ] No thrush  NECK:  [ x ] Supple, [  x] No JVD, [ x ] Normal thyroid, [  ] Lymphadenopathy, [  ] Other  CHEST/LUNG:  [ x ] Clear to auscultation bilaterally, [ x ] Breath Sounds equal B/L / decreased, [  ] Poor effort, [ x ] No rales, [ x ] No rhonchi, [ x ] No wheezing  HEART:  [ x ] Regular rate and rhythm, [  ] Tachycardia, [  ] Bradycardia, [  ] Irregular, [ x ] No murmurs, No rubs, No gallops, [  ] PPM in place (Mfr:  )  ABDOMEN:  [ x ] Soft, [ x ] Nontender, [ x ] Nondistended, [ x ] No mass, [ x ] Bowel sounds present, [  ] Obese  NERVOUS SYSTEM:  [ x ] Alert & Oriented x3__, [ x ] Nonfocal, [  ] Confusion, [  ] Encephalopathic, [  ] Sedated, [  ] Unable to assess, [  ] Dementia, [  ] Other-  EXTREMITIES:  [ x ] 2+ Peripheral Pulses, No clubbing, No cyanosis, left leg mo PP Rt foot  [  ] Edema B/L lower EXT, [x  ] Severe  PAD stasis skin changes Rt lower EXT, [  ] Wound, [ x ] - RLE tenderness to palpation, Erythema, Pain, warm  LYMPH:  No lymphadenopathy noted  SKIN:  [  ] No rashes or lesions, [  ] Pressure ulcers, [  ] Ecchymosis, [  ] Skin tears, [ x ] Other - RLE ankle swelling, skin discoloration     DIET: Diet, Regular:   Consistent Carbohydrate No Snacks (02-10-23 @ 23:22)      LABS:                        15.2   8.43  )-----------( 250      ( 11 Feb 2023 08:00 )             44.3     11 Feb 2023 08:00    142    |  106    |  15     ----------------------------<  137    4.3     |  31     |  0.81     Ca    9.7        11 Feb 2023 08:00    TPro  7.6    /  Alb  3.9    /  TBili  0.4    /  DBili  x      /  AST  11     /  ALT  15     /  AlkPhos  66     11 Feb 2023 08:00    PTT - ( 10 Feb 2023 07:47 )  PTT:103.8 sec        RADIOLOGY & ADDITIONAL TESTS: _______      HEALTH ISSUES - PROBLEM Dx:  Acute lower limb ischemia    Type 2 diabetes mellitus    Need for other prophylactic measure    Tobacco dependence          Consultant(s) Notes Reviewed:  [ x ] YES     Care Discussed with [ x ] Consultants, [ x ] Patient, [ x ] Family, [  ] HCP, [ x ] , [  ] Social Service, [ x ] RN, [  ] Physical Therapy, [  ] Palliative Care Team  DVT PPX: [  ] Lovenox, [  ] SC Heparin, [  ] Coumadin, [  ] Xarelto, [  ] Eliquis, [  ] Pradaxa, [  ] IV Heparin drip, [  ] SCD, [  ] Ambulation, [  ] Contraindicated 2/2 GI Bleed, [  ] Contraindicated 2/2  Bleed, [  ] Contraindicated 2/2 Brain Bleed  Advanced Directive: [x  ] None, [  ] DNR/DNI

## 2023-02-11 NOTE — PROGRESS NOTE ADULT - SUBJECTIVE AND OBJECTIVE BOX
[facility] -- Sasha Kate Wachsman, Pannella, Patel, Savella, Goodger  Office # [phone]    Follow Up:  cardiac optimization     Subjective/Observations: seen and examined, awake, alert, resting comfortably in bed, denies chest pain, dyspnea, palpitations or dizziness. Tolerating room air. c/o Right foot pain     REVIEW OF SYSTEMS: All other review of systems is negative unless indicated above  PAST MEDICAL & SURGICAL HISTORY:  Diabetes      Traumatic brain injury      Migraine      Neuropathy      PVD (peripheral vascular disease)      No significant past surgical history        MEDICATIONS  (STANDING):  aspirin 325 milliGRAM(s) Oral daily  atorvastatin 20 milliGRAM(s) Oral at bedtime  cilostazol 100 milliGRAM(s) Oral two times a day  clopidogrel Tablet 75 milliGRAM(s) Oral daily  dextrose 5%. 1000 milliLiter(s) (50 mL/Hr) IV Continuous <Continuous>  dextrose 5%. 1000 milliLiter(s) (100 mL/Hr) IV Continuous <Continuous>  dextrose 50% Injectable 25 Gram(s) IV Push once  dextrose 50% Injectable 12.5 Gram(s) IV Push once  dextrose 50% Injectable 25 Gram(s) IV Push once  gabapentin 100 milliGRAM(s) Oral at bedtime  glucagon  Injectable 1 milliGRAM(s) IntraMuscular once  insulin lispro (ADMELOG) corrective regimen sliding scale   SubCutaneous three times a day before meals  naloxone Injectable 0.4 milliGRAM(s) IV Push once  nicotine -  14 mG/24Hr(s) Patch 1 Patch Transdermal daily  pantoprazole    Tablet 40 milliGRAM(s) Oral daily  polyethylene glycol 3350 17 Gram(s) Oral two times a day  senna 2 Tablet(s) Oral at bedtime  senna 2 Tablet(s) Oral at bedtime  sucralfate 1 Gram(s) Oral four times a day    MEDICATIONS  (PRN):  acetaminophen     Tablet .. 650 milliGRAM(s) Oral every 6 hours PRN Mild Pain (1 - 3)  aluminum hydroxide/magnesium hydroxide/simethicone Suspension 30 milliLiter(s) Oral every 4 hours PRN Dyspepsia  bisacodyl 5 milliGRAM(s) Oral daily PRN Constipation  dextrose Oral Gel 15 Gram(s) Oral once PRN Blood Glucose LESS THAN 70 milliGRAM(s)/deciliter  HYDROmorphone  Injectable 0.5 milliGRAM(s) IV Push every 3 hours PRN Moderate Pain (4 - 6)  HYDROmorphone  Injectable 1 milliGRAM(s) IV Push every 3 hours PRN Severe Pain (7 - 10)  magnesium hydroxide Suspension 30 milliLiter(s) Oral daily PRN Constipation  melatonin 3 milliGRAM(s) Oral at bedtime PRN Insomnia  ondansetron Injectable 4 milliGRAM(s) IV Push every 8 hours PRN Nausea and/or Vomiting    Allergies    Demerol HCl (Anaphylaxis)  Lobster (Unknown)    Intolerances      Vital Signs Last 24 Hrs  T(C): 36.6 (11 Feb 2023 04:20), Max: 36.9 (10 Feb 2023 23:32)  T(F): 97.8 (11 Feb 2023 04:20), Max: 98.4 (10 Feb 2023 23:32)  HR: 66 (11 Feb 2023 04:20) (59 - 87)  BP: 115/70 (11 Feb 2023 04:20) (115/70 - 166/75)  BP(mean): --  RR: 18 (11 Feb 2023 04:20) (11 - 18)  SpO2: 97% (11 Feb 2023 04:20) (96% - 100%)    Parameters below as of 11 Feb 2023 04:20  Patient On (Oxygen Delivery Method): room air      I&O's Summary    10 Feb 2023 07:01  -  11 Feb 2023 07:00  --------------------------------------------------------  IN: 300 mL / OUT: 0 mL / NET: 300 mL      Weight (kg): 65.8 (02-10 @ 15:40)  TELE: Not on telemetry   PHYSICAL EXAM:  Constitutional: NAD, awake and alert, well-developed  HEENT: Moist Mucous Membranes, Anicteric  Pulmonary: Non-labored, breath sounds are clear bilaterally, No wheezing, rales or rhonchi  Cardiovascular: Regular, S1 and S2, No murmurs, rubs, gallops or clicks  Gastrointestinal: Bowel Sounds present, soft, nontender.   Lymph: trace peripheral edema. No lymphadenopathy.  Skin: No visible rashes or ulcers.  Psych:  Mood & affect appropriate  LABS: All Labs Reviewed:                        15.2   8.43  )-----------( 250      ( 11 Feb 2023 08:00 )             44.3                         14.5   7.07  )-----------( 219      ( 10 Feb 2023 07:47 )             42.9                         14.5   8.37  )-----------( 229      ( 09 Feb 2023 17:20 )             42.7     11 Feb 2023 08:00    142    |  106    |  15     ----------------------------<  137    4.3     |  31     |  0.81   10 Feb 2023 07:47    140    |  109    |  15     ----------------------------<  101    3.9     |  26     |  0.55   09 Feb 2023 05:32    140    |  106    |  21     ----------------------------<  157    3.7     |  31     |  0.78     Ca    9.7        11 Feb 2023 08:00  Ca    8.8        10 Feb 2023 07:47  Ca    9.2        09 Feb 2023 05:32    TPro  7.6    /  Alb  3.9    /  TBili  0.4    /  DBili  x      /  AST  11     /  ALT  15     /  AlkPhos  66     11 Feb 2023 08:00  TPro  6.4    /  Alb  3.2    /  TBili  0.3    /  DBili  x      /  AST  11     /  ALT  14     /  AlkPhos  58     10 Feb 2023 07:47  TPro  7.4    /  Alb  3.7    /  TBili  0.3    /  DBili  x      /  AST  13     /  ALT  17     /  AlkPhos  64     08 Feb 2023 19:00    PTT - ( 10 Feb 2023 07:47 )  PTT:103.8 sec      12 Lead ECG:   Ventricular Rate 64 BPM    Atrial Rate 64 BPM    P-R Interval 158 ms    QRS Duration 98 ms    Q-T Interval 404 ms    QTC Calculation(Bazett) 416 ms    P Axis 39 degrees    R Axis 1 degrees    T Axis 62 degrees    Diagnosis Line Normal sinus rhythm  Normal ECG  When compared with ECG of 19-NOV-2022 14:44,  No significant change was found  Confirmed by JASMYNE DIAZ ([id number]) on 2/9/2023 11:02:54 AM (02-09-23 @ 08:30)

## 2023-02-11 NOTE — CONSULT NOTE ADULT - SUBJECTIVE AND OBJECTIVE BOX
Physical Medicine and Rehabilitation Initial Evaluation    Patients acute care records reviewed and are summarized as follows:     Patient is a 52y Male with past medical history including DM2 not on insulin, with diabetic neuropathy who is admitted with severe pain, swelling, and discoloration of the right foot. Patient was seen by vascular surgery, he is now s/p angioplasty, but continues to have severe pain. Patient is referred for management of this pain.    Radiological studies reviewed, including angiography:    Right leg:  Occlusion popliteal and tibioperoneal trunk arteries.  Reconstitution 3 calf arteries with runoff to the foot and question short   segment proximal calcific occlusion anterior tibial artery.    Left leg:  Three-vessel runoff with short segment mid calf occlusion peroneal artery    Medical studies/laboratory studies reviewed, including:                          15.2   8.43  )-----------( 250      ( 11 Feb 2023 08:00 )             44.3       02-11    142  |  106  |  15  ----------------------------<  137<H>  4.3   |  31  |  0.81    Ca    9.7      11 Feb 2023 08:00    TPro  7.6  /  Alb  3.9  /  TBili  0.4  /  DBili  x   /  AST  11<L>  /  ALT  15  /  AlkPhos  66  02-11    The patient was seen and examined at bedside. Continues to complain of burning, numbness, tingling of the peripheral lower extremities, 10/10. Relieved previously by IV dilaudid but this needs to be transitioned to oral such that patient can be discharged.    ROS:  Constitutional: Denies fevers or chills  MSK: Denies any myalgia or arthralgia other than LE pain  Neuro: Complains of peripheral burning, tingling, numbness and pain R > L    PM, Social, Family Hx: includes significant smoking history, 1 ppd for 20-30 years at least, states he quite 5 days ago, is utilizing patch right now.    Medications:   acetaminophen     Tablet .. 650 milliGRAM(s) Oral every 6 hours PRN  aluminum hydroxide/magnesium hydroxide/simethicone Suspension 30 milliLiter(s) Oral every 4 hours PRN  amitriptyline 25 milliGRAM(s) Oral at bedtime  aspirin 325 milliGRAM(s) Oral daily  atorvastatin 20 milliGRAM(s) Oral at bedtime  bisacodyl 5 milliGRAM(s) Oral daily PRN  cilostazol 100 milliGRAM(s) Oral two times a day  clopidogrel Tablet 75 milliGRAM(s) Oral daily  dextrose 5%. 1000 milliLiter(s) IV Continuous <Continuous>  dextrose 5%. 1000 milliLiter(s) IV Continuous <Continuous>  dextrose 50% Injectable 25 Gram(s) IV Push once  dextrose 50% Injectable 12.5 Gram(s) IV Push once  dextrose 50% Injectable 25 Gram(s) IV Push once  dextrose Oral Gel 15 Gram(s) Oral once PRN  gabapentin 100 milliGRAM(s) Oral at bedtime  glucagon  Injectable 1 milliGRAM(s) IntraMuscular once  HYDROmorphone   Tablet 2 milliGRAM(s) Oral every 6 hours PRN  insulin lispro (ADMELOG) corrective regimen sliding scale   SubCutaneous three times a day before meals  magnesium hydroxide Suspension 30 milliLiter(s) Oral daily PRN  melatonin 3 milliGRAM(s) Oral at bedtime PRN  naloxone Injectable 0.4 milliGRAM(s) IV Push once  nicotine -  14 mG/24Hr(s) Patch 1 Patch Transdermal daily  ondansetron Injectable 4 milliGRAM(s) IV Push every 8 hours PRN  oxyCODONE    IR 5 milliGRAM(s) Oral every 4 hours  pantoprazole    Tablet 40 milliGRAM(s) Oral daily  polyethylene glycol 3350 17 Gram(s) Oral two times a day  senna 2 Tablet(s) Oral at bedtime  senna 2 Tablet(s) Oral at bedtime  sucralfate 1 Gram(s) Oral four times a day  traMADol 25 milliGRAM(s) Oral every 4 hours PRN  traMADol 50 milliGRAM(s) Oral every 4 hours PRN      Physical Exam:   Vitals: T(C): 36.2 (02-11-23 @ 11:32), Max: 36.9 (02-10-23 @ 23:32)  HR: 69 (02-11-23 @ 11:32) (63 - 87)  BP: 150/74 (02-11-23 @ 11:32) (115/70 - 156/84)  RR: 17 (02-11-23 @ 11:32) (11 - 18)  SpO2: 98% (02-11-23 @ 11:32) (96% - 100%)    Constitutional: Gen: In no acute distress, cooperative with exam and questioning   CV: Regular rate and rhythm, S1 S2, bilateral lower extremity pitting peripheral edema, pedal pulses are weak but palpable   Neuro: Cranial Nerves II-XII intact, sensation intact to light touch in peripheral upper and impaired in peripheral lower extremities distal to the knees bilaterally  MSK: No cyanosis or clubbing of nails, strength 4+/5 in bilateral upper and lower extremities, ROM full in all extremities passively  Skin: No rashes on limbs, normal temperature on palpation in UE but cooler to touch in LE's  Psychiatric: Awake alert fully oriented

## 2023-02-11 NOTE — PROGRESS NOTE ADULT - PROBLEM SELECTOR PLAN 1
Presenting with numbness, discoloration and pain  - LE Arterial dopplers revealing stenosis of the femoral artery  - CT angio with runoff reviewed; Pt with popliteal occlusion and small distal targets  - S/p (2/10) Angiogram, lower extremity, unilateral. Femoropopliteal arterial angioplasty of RLE  - Transition pain regimen to tramadol mild/moderate, oxy severe  - RCI score 0, EKG normal. BP elevated 2/2 pain; denies hx of CAD, arrythmia, renal disease. denies history of reaction to anesthesia, denies anginal symptoms.  - Cardio following  - Vascular surgery following    - No further intervention, asa, plavix, cilostazol Presenting with numbness, discoloration and pain-Likely Chronic   - LE Arterial dopplers revealing stenosis of the femoral artery  - CT angio with runoff reviewed; Pt with popliteal occlusion and small distal targets  - S/p (2/10) Angiogram, lower extremity, unilateral. Femoropopliteal arterial angioplasty of RLE  - Transition pain regimen to tramadol mild/moderate, oxy severe  - Cardio following  - Vascular surgery following s/p Angiogram & Angioplasty     - No further intervention, asa, plavix, cilostazol  -Ambulation  Pain meds Presenting with numbness, discoloration and pain-Likely Chronic   - LE Arterial dopplers revealing stenosis of the femoral artery  - CT angio with runoff reviewed; Pt with popliteal occlusion and small distal targets  - S/p (2/10) Angiogram, lower extremity, unilateral. Femoropopliteal arterial angioplasty of RLE  - Transition pain regimen to tramadol mild/moderate, Dilaudid PO severe  - Cardio following  - Vascular surgery following s/p Angiogram & Angioplasty     - No further intervention, asa, plavix, cilostazol  - Ambulation  - Pain management following, recs appreciated    - Transitioned to PO regimen pending dispo

## 2023-02-11 NOTE — CONSULT NOTE ADULT - ASSESSMENT
52y year old Male with PVD and Peripheral diabetic neuropathic pain    Equivalent oral dose of 1mg IV dilaudid would be 5mg oral dilaudid. Considering that patient is s/p angioplasty and any vascular component should improve s/p this procedure, reasonable to reduce the equivalent dosing incrementally. As such will recommend 4mg oral dilaudid Q4H to start with goal of taper as the patient continues with medical treatment for the PVD and ideally adheres to an exercise program to ambulate and exercise to an ischemic threshold and stop for rest break before resuming exercise again to ischemic threshold so as to stimulate collateral angiogenesis. I discussed this with the patient at length, discussed risks, benefits, alternatives of medications for analgesia.    Patient was not amenable to increasing dedicated neuropathic pain medications including gabapentin and amitriptyline, however with an increase of these medications, should be able to taper the need for opioids further. Will discuss tomorrow with patient once oral dilaudid regimen is established.    Primary team notes are reviewed, vascular consultation is reviewed  Some nursing notes reviewed    Laboratory studies reviewed including those mentioned earlier/above    Discussed management/coordinated care with primary team, neurological consult.    High risk of morbidity from treatment with schedule II controlled substance medication use.    Overall 80 minutes spent during patient encounter:    ¦ preparing to see the patient (eg, review of tests and procedures)  ¦ performing a medically appropriate examination and/or evaluation   ¦ counseling and educating the patient regarding exercise programs, avoidance of heat related modalities to the LE's, importance of smoking cessation and lifestyle changes for glycemic control and exercise to be incorporated for angiogenesis.  ¦ referring and communicating with other health care professionals-- primary team and neurology  ¦ documenting clinical information in the electronic or other health record   ¦ care coordination with primary team. 52y year old Male with PVD and Peripheral diabetic neuropathic pain    Equivalent oral dose of 1mg IV dilaudid would be 5mg oral dilaudid. Considering that patient is s/p angioplasty and any vascular component should improve s/p this procedure, reasonable to reduce the equivalent dosing incrementally. As such will recommend 4mg oral dilaudid Q4H to start with goal of taper as the patient continues with medical treatment for the PVD and ideally adheres to an exercise program to ambulate and exercise to an ischemic threshold and stop for rest break before resuming exercise again to ischemic threshold so as to stimulate collateral angiogenesis. I discussed this with the patient at length, discussed risks, benefits, alternatives of medications for analgesia.    Patient should follow outpatient with pain mgmt to continue with opioid taper.     referenced Reference #: [id number]     Patient was not amenable to increasing dedicated neuropathic pain medications including gabapentin and amitriptyline, however with an increase of these medications, should be able to taper the need for opioids further. Will discuss tomorrow with patient once oral dilaudid regimen is established.    Primary team notes are reviewed, vascular consultation is reviewed  Some nursing notes reviewed    Laboratory studies reviewed including those mentioned earlier/above    Discussed management/coordinated care with primary team, neurological consult.    High risk of morbidity from treatment with schedule II controlled substance medication use.    Overall 80 minutes spent during patient encounter:    ¦ preparing to see the patient (eg, review of tests and procedures)  ¦ performing a medically appropriate examination and/or evaluation   ¦ counseling and educating the patient regarding exercise programs, avoidance of heat related modalities to the LE's, importance of smoking cessation and lifestyle changes for glycemic control and exercise to be incorporated for angiogenesis.  ¦ referring and communicating with other health care professionals-- primary team and neurology  ¦ documenting clinical information in the electronic or other health record   ¦ care coordination with primary team.

## 2023-02-11 NOTE — PROGRESS NOTE ADULT - ASSESSMENT
53 yo M PMHx Type 2 Diabetes (not on insulin) with diabetic neuropathy presenting with pain swelling RLE admitted for acute limb ischemia.

## 2023-02-11 NOTE — PROGRESS NOTE ADULT - PROBLEM SELECTOR PLAN 3
- 15 pack year history   - nicotine patch daily  - Smoking cessation d/w pt  - To dc w/ nicotine patches

## 2023-02-11 NOTE — PROGRESS NOTE ADULT - SUBJECTIVE AND OBJECTIVE BOX
VASCULAR SURGERY PA NOTE ON BEHALF OF DR. GODFREY:    S: Patient seen and examined at bedside.   No acute events overnight following angioplasty.  Patient endorses pain to RLE, chronic for him.  Denies fevers, chills, chest pain, SOB, palpitations, calf pain.      MEDICATIONS:  acetaminophen     Tablet .. 650 milliGRAM(s) Oral every 6 hours PRN  aluminum hydroxide/magnesium hydroxide/simethicone Suspension 30 milliLiter(s) Oral every 4 hours PRN  aspirin 325 milliGRAM(s) Oral daily  atorvastatin 20 milliGRAM(s) Oral at bedtime  bisacodyl 5 milliGRAM(s) Oral daily PRN  cilostazol 100 milliGRAM(s) Oral two times a day  clopidogrel Tablet 75 milliGRAM(s) Oral daily  dextrose 5%. 1000 milliLiter(s) IV Continuous <Continuous>  dextrose 5%. 1000 milliLiter(s) IV Continuous <Continuous>  dextrose 50% Injectable 25 Gram(s) IV Push once  dextrose 50% Injectable 12.5 Gram(s) IV Push once  dextrose 50% Injectable 25 Gram(s) IV Push once  dextrose Oral Gel 15 Gram(s) Oral once PRN  gabapentin 100 milliGRAM(s) Oral at bedtime  glucagon  Injectable 1 milliGRAM(s) IntraMuscular once  HYDROmorphone  Injectable 0.5 milliGRAM(s) IV Push every 3 hours PRN  HYDROmorphone  Injectable 1 milliGRAM(s) IV Push every 3 hours PRN  insulin lispro (ADMELOG) corrective regimen sliding scale   SubCutaneous three times a day before meals  magnesium hydroxide Suspension 30 milliLiter(s) Oral daily PRN  melatonin 3 milliGRAM(s) Oral at bedtime PRN  naloxone Injectable 0.4 milliGRAM(s) IV Push once  nicotine -  14 mG/24Hr(s) Patch 1 Patch Transdermal daily  ondansetron Injectable 4 milliGRAM(s) IV Push every 8 hours PRN  pantoprazole    Tablet 40 milliGRAM(s) Oral daily  polyethylene glycol 3350 17 Gram(s) Oral two times a day  senna 2 Tablet(s) Oral at bedtime  senna 2 Tablet(s) Oral at bedtime  sucralfate 1 Gram(s) Oral four times a day      O:  Vital Signs Last 24 Hrs  T(C): 36.2 (11 Feb 2023 11:32), Max: 36.9 (10 Feb 2023 23:32)  T(F): 97.2 (11 Feb 2023 11:32), Max: 98.4 (10 Feb 2023 23:32)  HR: 69 (11 Feb 2023 11:32) (59 - 87)  BP: 150/74 (11 Feb 2023 11:32) (115/70 - 166/75)  BP(mean): --  RR: 17 (11 Feb 2023 11:32) (11 - 18)  SpO2: 98% (11 Feb 2023 11:32) (96% - 100%)    Parameters below as of 11 Feb 2023 11:32  Patient On (Oxygen Delivery Method): room air        I&O SUMMARY:    02-10-23 @ 07:01  -  02-11-23 @ 07:00  --------------------------------------------------------  IN: 300 mL / OUT: 0 mL / NET: 300 mL        PHYSICAL EXAM:  Lungs: CTA bilat without W/R/R  Card: S1S2  Abd: Soft, NT, ND.  +BS x 4.  No rebound/guarding.    Ext: Bilateral lower extremities warm, with DP/PT pulse identifiable via doppler. RLE sensitive to light touch. Dressing over Left groin clean/dry, surrounding skin soft, no induration, ecchymosis or obvious hematoma noted.    LABS:                        15.2   8.43  )-----------( 250      ( 11 Feb 2023 08:00 )             44.3     02-11    142  |  106  |  15  ----------------------------<  137<H>  4.3   |  31  |  0.81    Ca    9.7      11 Feb 2023 08:00    TPro  7.6  /  Alb  3.9  /  TBili  0.4  /  DBili  x   /  AST  11<L>  /  ALT  15  /  AlkPhos  66  02-11    PTT - ( 10 Feb 2023 07:47 )  PTT:103.8 sec        Assessment:  52 year old male s/p R SFA angioplasty POD #1    Plan:  - continue ASA/Plavix  - Start Cilostazol BID  - Continue care per primary team  - Discussed w/ Dr. Robles

## 2023-02-11 NOTE — PROGRESS NOTE ADULT - ASSESSMENT
53 yo M with PMHx of T2DM (not on insulin) with diabetic neuropathy, current smoker admitted for RLE limb ischemia.    Cardiac optimization   - p/w swelling, pain and discoloration of the right foot, found to have RLE, s/p R SFA angioplasty (2/10), vascular following, tolerated procedure well from CV standpoint   - CTA distal aorta with run off with popliteal occlusion and small distal targets  - on ASA, cilostazol   - pain management per primary     - No acute changes on EKG compared to previous  - no signs of volume overload   - BP, HR stable and controlled   - continue to monitor routine hemodynamics     - Monitor and replete Lytes. Keep K > 4 and Mg > 2  - Will continue to follow.    Tita Colon [facility]  Nurse Practitioner - Cardiology   Spectra #[id number]/ [phone]

## 2023-02-12 ENCOUNTER — TRANSCRIPTION ENCOUNTER (OUTPATIENT)
Age: 53
End: 2023-02-12

## 2023-02-12 VITALS
HEART RATE: 62 BPM | OXYGEN SATURATION: 98 % | DIASTOLIC BLOOD PRESSURE: 69 MMHG | RESPIRATION RATE: 19 BRPM | TEMPERATURE: 98 F | SYSTOLIC BLOOD PRESSURE: 131 MMHG

## 2023-02-12 LAB
ALBUMIN SERPL ELPH-MCNC: 3.6 G/DL — SIGNIFICANT CHANGE UP (ref 3.3–5)
ALP SERPL-CCNC: 55 U/L — SIGNIFICANT CHANGE UP (ref 40–120)
ALT FLD-CCNC: 20 U/L — SIGNIFICANT CHANGE UP (ref 12–78)
ANION GAP SERPL CALC-SCNC: 4 MMOL/L — LOW (ref 5–17)
AST SERPL-CCNC: 14 U/L — LOW (ref 15–37)
BILIRUB SERPL-MCNC: 0.3 MG/DL — SIGNIFICANT CHANGE UP (ref 0.2–1.2)
BUN SERPL-MCNC: 10 MG/DL — SIGNIFICANT CHANGE UP (ref 7–23)
CALCIUM SERPL-MCNC: 9 MG/DL — SIGNIFICANT CHANGE UP (ref 8.5–10.1)
CHLORIDE SERPL-SCNC: 106 MMOL/L — SIGNIFICANT CHANGE UP (ref 96–108)
CO2 SERPL-SCNC: 32 MMOL/L — HIGH (ref 22–31)
CREAT SERPL-MCNC: 0.73 MG/DL — SIGNIFICANT CHANGE UP (ref 0.5–1.3)
EGFR: 109 ML/MIN/1.73M2 — SIGNIFICANT CHANGE UP
GLUCOSE SERPL-MCNC: 150 MG/DL — HIGH (ref 70–99)
HCT VFR BLD CALC: 41.5 % — SIGNIFICANT CHANGE UP (ref 39–50)
HGB BLD-MCNC: 14.1 G/DL — SIGNIFICANT CHANGE UP (ref 13–17)
MCHC RBC-ENTMCNC: 30 PG — SIGNIFICANT CHANGE UP (ref 27–34)
MCHC RBC-ENTMCNC: 34 GM/DL — SIGNIFICANT CHANGE UP (ref 32–36)
MCV RBC AUTO: 88.3 FL — SIGNIFICANT CHANGE UP (ref 80–100)
NRBC # BLD: 0 /100 WBCS — SIGNIFICANT CHANGE UP (ref 0–0)
PLATELET # BLD AUTO: 222 K/UL — SIGNIFICANT CHANGE UP (ref 150–400)
POTASSIUM SERPL-MCNC: 3.6 MMOL/L — SIGNIFICANT CHANGE UP (ref 3.5–5.3)
POTASSIUM SERPL-SCNC: 3.6 MMOL/L — SIGNIFICANT CHANGE UP (ref 3.5–5.3)
PROT SERPL-MCNC: 6.8 G/DL — SIGNIFICANT CHANGE UP (ref 6–8.3)
RBC # BLD: 4.7 M/UL — SIGNIFICANT CHANGE UP (ref 4.2–5.8)
RBC # FLD: 13.4 % — SIGNIFICANT CHANGE UP (ref 10.3–14.5)
SODIUM SERPL-SCNC: 142 MMOL/L — SIGNIFICANT CHANGE UP (ref 135–145)
WBC # BLD: 6.68 K/UL — SIGNIFICANT CHANGE UP (ref 3.8–10.5)
WBC # FLD AUTO: 6.68 K/UL — SIGNIFICANT CHANGE UP (ref 3.8–10.5)

## 2023-02-12 PROCEDURE — 84550 ASSAY OF BLOOD/URIC ACID: CPT

## 2023-02-12 PROCEDURE — 85652 RBC SED RATE AUTOMATED: CPT

## 2023-02-12 PROCEDURE — 82962 GLUCOSE BLOOD TEST: CPT

## 2023-02-12 PROCEDURE — 99232 SBSQ HOSP IP/OBS MODERATE 35: CPT

## 2023-02-12 PROCEDURE — 86900 BLOOD TYPING SEROLOGIC ABO: CPT

## 2023-02-12 PROCEDURE — 86140 C-REACTIVE PROTEIN: CPT

## 2023-02-12 PROCEDURE — 83036 HEMOGLOBIN GLYCOSYLATED A1C: CPT

## 2023-02-12 PROCEDURE — 85610 PROTHROMBIN TIME: CPT

## 2023-02-12 PROCEDURE — 99285 EMERGENCY DEPT VISIT HI MDM: CPT

## 2023-02-12 PROCEDURE — 85730 THROMBOPLASTIN TIME PARTIAL: CPT

## 2023-02-12 PROCEDURE — 85027 COMPLETE CBC AUTOMATED: CPT

## 2023-02-12 PROCEDURE — 93926 LOWER EXTREMITY STUDY: CPT

## 2023-02-12 PROCEDURE — 96374 THER/PROPH/DIAG INJ IV PUSH: CPT

## 2023-02-12 PROCEDURE — C1769: CPT

## 2023-02-12 PROCEDURE — 75635 CT ANGIO ABDOMINAL ARTERIES: CPT

## 2023-02-12 PROCEDURE — 93005 ELECTROCARDIOGRAM TRACING: CPT

## 2023-02-12 PROCEDURE — C2623: CPT

## 2023-02-12 PROCEDURE — 86850 RBC ANTIBODY SCREEN: CPT

## 2023-02-12 PROCEDURE — U0005: CPT

## 2023-02-12 PROCEDURE — 80048 BASIC METABOLIC PNL TOTAL CA: CPT

## 2023-02-12 PROCEDURE — C1887: CPT

## 2023-02-12 PROCEDURE — 73630 X-RAY EXAM OF FOOT: CPT

## 2023-02-12 PROCEDURE — 71045 X-RAY EXAM CHEST 1 VIEW: CPT

## 2023-02-12 PROCEDURE — 86901 BLOOD TYPING SEROLOGIC RH(D): CPT

## 2023-02-12 PROCEDURE — 85025 COMPLETE CBC W/AUTO DIFF WBC: CPT

## 2023-02-12 PROCEDURE — U0003: CPT

## 2023-02-12 PROCEDURE — C1766: CPT

## 2023-02-12 PROCEDURE — 93971 EXTREMITY STUDY: CPT

## 2023-02-12 PROCEDURE — 80053 COMPREHEN METABOLIC PANEL: CPT

## 2023-02-12 PROCEDURE — C1760: CPT

## 2023-02-12 PROCEDURE — 93923 UPR/LXTR ART STDY 3+ LVLS: CPT

## 2023-02-12 PROCEDURE — 76000 FLUOROSCOPY <1 HR PHYS/QHP: CPT

## 2023-02-12 PROCEDURE — 36415 COLL VENOUS BLD VENIPUNCTURE: CPT

## 2023-02-12 PROCEDURE — 80061 LIPID PANEL: CPT

## 2023-02-12 PROCEDURE — C1894: CPT

## 2023-02-12 RX ORDER — ATORVASTATIN CALCIUM 80 MG/1
1 TABLET, FILM COATED ORAL
Qty: 30 | Refills: 0
Start: 2023-02-12 | End: 2023-03-13

## 2023-02-12 RX ORDER — ASPIRIN/CALCIUM CARB/MAGNESIUM 324 MG
1 TABLET ORAL
Qty: 30 | Refills: 0
Start: 2023-02-12 | End: 2023-03-13

## 2023-02-12 RX ORDER — NICOTINE POLACRILEX 2 MG
1 GUM BUCCAL
Qty: 30 | Refills: 0
Start: 2023-02-12 | End: 2023-03-13

## 2023-02-12 RX ORDER — SUCRALFATE 1 G
1 TABLET ORAL
Qty: 120 | Refills: 0
Start: 2023-02-12 | End: 2023-03-13

## 2023-02-12 RX ORDER — GABAPENTIN 400 MG/1
1 CAPSULE ORAL
Qty: 30 | Refills: 0
Start: 2023-02-12 | End: 2023-03-13

## 2023-02-12 RX ORDER — CLOPIDOGREL BISULFATE 75 MG/1
1 TABLET, FILM COATED ORAL
Qty: 30 | Refills: 0
Start: 2023-02-12 | End: 2023-03-13

## 2023-02-12 RX ORDER — AMITRIPTYLINE HCL 25 MG
1 TABLET ORAL
Qty: 30 | Refills: 0
Start: 2023-02-12 | End: 2023-03-13

## 2023-02-12 RX ORDER — NALOXONE HYDROCHLORIDE 4 MG/.1ML
4 SPRAY NASAL
Qty: 2 | Refills: 0
Start: 2023-02-12 | End: 2023-02-13

## 2023-02-12 RX ORDER — AMITRIPTYLINE HCL 25 MG
1 TABLET ORAL
Qty: 0 | Refills: 0 | DISCHARGE
Start: 2023-02-12

## 2023-02-12 RX ORDER — PANTOPRAZOLE SODIUM 20 MG/1
1 TABLET, DELAYED RELEASE ORAL
Qty: 30 | Refills: 0
Start: 2023-02-12 | End: 2023-03-13

## 2023-02-12 RX ORDER — ENOXAPARIN SODIUM 100 MG/ML
40 INJECTION SUBCUTANEOUS EVERY 24 HOURS
Refills: 0 | Status: DISCONTINUED | OUTPATIENT
Start: 2023-02-12 | End: 2023-02-12

## 2023-02-12 RX ORDER — HYDROMORPHONE HYDROCHLORIDE 2 MG/ML
1 INJECTION INTRAMUSCULAR; INTRAVENOUS; SUBCUTANEOUS
Qty: 36 | Refills: 0
Start: 2023-02-12 | End: 2023-02-17

## 2023-02-12 RX ORDER — CILOSTAZOL 100 MG/1
1 TABLET ORAL
Qty: 60 | Refills: 0
Start: 2023-02-12 | End: 2023-03-13

## 2023-02-12 RX ADMIN — POLYETHYLENE GLYCOL 3350 17 GRAM(S): 17 POWDER, FOR SOLUTION ORAL at 06:33

## 2023-02-12 RX ADMIN — Medication 1 PATCH: at 07:00

## 2023-02-12 RX ADMIN — ENOXAPARIN SODIUM 40 MILLIGRAM(S): 100 INJECTION SUBCUTANEOUS at 02:07

## 2023-02-12 RX ADMIN — PANTOPRAZOLE SODIUM 40 MILLIGRAM(S): 20 TABLET, DELAYED RELEASE ORAL at 11:25

## 2023-02-12 RX ADMIN — HYDROMORPHONE HYDROCHLORIDE 4 MILLIGRAM(S): 2 INJECTION INTRAMUSCULAR; INTRAVENOUS; SUBCUTANEOUS at 03:11

## 2023-02-12 RX ADMIN — HYDROMORPHONE HYDROCHLORIDE 4 MILLIGRAM(S): 2 INJECTION INTRAMUSCULAR; INTRAVENOUS; SUBCUTANEOUS at 07:32

## 2023-02-12 RX ADMIN — Medication 1 GRAM(S): at 00:15

## 2023-02-12 RX ADMIN — CILOSTAZOL 100 MILLIGRAM(S): 100 TABLET ORAL at 06:32

## 2023-02-12 RX ADMIN — CLOPIDOGREL BISULFATE 75 MILLIGRAM(S): 75 TABLET, FILM COATED ORAL at 11:25

## 2023-02-12 RX ADMIN — HYDROMORPHONE HYDROCHLORIDE 4 MILLIGRAM(S): 2 INJECTION INTRAMUSCULAR; INTRAVENOUS; SUBCUTANEOUS at 06:32

## 2023-02-12 RX ADMIN — HYDROMORPHONE HYDROCHLORIDE 4 MILLIGRAM(S): 2 INJECTION INTRAMUSCULAR; INTRAVENOUS; SUBCUTANEOUS at 02:11

## 2023-02-12 RX ADMIN — Medication 325 MILLIGRAM(S): at 11:24

## 2023-02-12 RX ADMIN — Medication 1 GRAM(S): at 11:24

## 2023-02-12 RX ADMIN — HYDROMORPHONE HYDROCHLORIDE 4 MILLIGRAM(S): 2 INJECTION INTRAMUSCULAR; INTRAVENOUS; SUBCUTANEOUS at 11:33

## 2023-02-12 RX ADMIN — HYDROMORPHONE HYDROCHLORIDE 4 MILLIGRAM(S): 2 INJECTION INTRAMUSCULAR; INTRAVENOUS; SUBCUTANEOUS at 14:38

## 2023-02-12 RX ADMIN — Medication 1 PATCH: at 10:00

## 2023-02-12 RX ADMIN — Medication 1 GRAM(S): at 06:32

## 2023-02-12 RX ADMIN — HYDROMORPHONE HYDROCHLORIDE 4 MILLIGRAM(S): 2 INJECTION INTRAMUSCULAR; INTRAVENOUS; SUBCUTANEOUS at 10:32

## 2023-02-12 RX ADMIN — Medication 1 PATCH: at 11:25

## 2023-02-12 NOTE — DISCHARGE NOTE PROVIDER - NSDCFUADDINST_GEN_ALL_CORE_FT
You MUST STOP Smoking  You MUST follow up with DR Robles in 1-2 weeks   Follow up with PMD in 1-2 weeks   Make an Appointment with Pain management in 2-3 days   Follow up with Neurologist in 2-3 weeks.

## 2023-02-12 NOTE — PROGRESS NOTE ADULT - SUBJECTIVE AND OBJECTIVE BOX
Physical Medicine and Rehabilitation Subsequent Evaluation    The patient was seen and examined at bedside. Patients pain adequately managed per his own report.                         14.1   6.68  )-----------( 222      ( 12 Feb 2023 08:25 )             41.5   02-12    142  |  106  |  10  ----------------------------<  150<H>  3.6   |  32<H>  |  0.73    Ca    9.0      12 Feb 2023 08:50    TPro  6.8  /  Alb  3.6  /  TBili  0.3  /  DBili  x   /  AST  14<L>  /  ALT  20  /  AlkPhos  55  02-12      ROS:  Constitutional: Denies fevers or chills  MSK: Denies any myalgia or arthralgia other than LE pain but improved  Neuro: Complains of peripheral burning, tingling, numbness and pain R > L but improved    PM, Social, Family Hx: includes significant smoking history, 1 ppd for 20-30 years at least, states he quite 5 days ago, is utilizing patch right now.    Medications:  reviewed    Physical Exam:   Vital Signs Last 24 Hrs  T(C): 36.5 (12 Feb 2023 12:25), Max: 36.7 (11 Feb 2023 21:04)  T(F): 97.7 (12 Feb 2023 12:25), Max: 98.1 (11 Feb 2023 21:04)  HR: 62 (12 Feb 2023 12:25) (62 - 67)  BP: 131/69 (12 Feb 2023 12:25) (130/61 - 134/57)  BP(mean): --  RR: 19 (12 Feb 2023 12:25) (18 - 19)  SpO2: 98% (12 Feb 2023 12:25) (97% - 98%)    Parameters below as of 12 Feb 2023 12:25  Patient On (Oxygen Delivery Method): room air    Constitutional: Gen: In no acute distress, cooperative with exam and questioning   CV: Regular rate and rhythm, S1 S2, bilateral lower extremity pitting peripheral edema, pedal pulses are weak but palpable   Neuro: Cranial Nerves II-XII intact, sensation intact to light touch in peripheral upper and impaired in peripheral lower extremities distal to the knees bilaterally  MSK: No cyanosis or clubbing of nails, strength 4+/5 in bilateral upper and lower extremities, ROM full in all extremities passively  Skin: No rashes on limbs, normal temperature on palpation in UE but cooler to touch in LE's  Psychiatric: Awake alert fully oriented

## 2023-02-12 NOTE — PROGRESS NOTE ADULT - REASON FOR ADMISSION
acute limb ischemia

## 2023-02-12 NOTE — PROGRESS NOTE ADULT - PROBLEM SELECTOR PLAN 1
Presenting with numbness, discoloration and pain-Likely Chronic   - LE Arterial dopplers revealing stenosis of the femoral artery  - CT angio with runoff reviewed; Pt with popliteal occlusion and small distal targets  - S/p (2/10) Angiogram, lower extremity, unilateral. Femoropopliteal arterial angioplasty of RLE  - Transition pain regimen to PO tramadol mild/moderate, Dilaudid severe  - Cardio following  - Vascular surgery following s/p Angiogram & Angioplasty   - No further intervention, asa, plavix, cilostazol  - Ambulation  - Pain management following, recs appreciated Presenting with numbness, discoloration and pain-Likely Chronic PAD with severe Neuropathy from DM & Smoking   - LE Arterial dopplers revealing stenosis of the femoral artery  - CT angio with runoff reviewed; Pt with popliteal occlusion and small distal targets  - S/p (2/10) Angiogram, lower extremity, unilateral. Femoropopliteal arterial angioplasty of RLE  - Transition pain regimen to PO tramadol mild/moderate, Dilaudid severe  - Cardio following  - Vascular surgery following s/p Angiogram & Angioplasty stable for d/c home with Meds DAPT  - No further intervention, asa, plavix, cilostazol  - Ambulation  - Pain management following, recs appreciated

## 2023-02-12 NOTE — DISCHARGE NOTE PROVIDER - NSDCMRMEDTOKEN_GEN_ALL_CORE_FT
dicyclomine 20 mg oral tablet: 1 tab(s) orally 4 times a day   metformin 1000 mg oral tablet: 1  orally 2 times a day  ondansetron 4 mg oral tablet: 1 tab(s) orally every 8 hours    amitriptyline 25 mg oral tablet: 1 tab(s) orally once a day (at bedtime)  aspirin 81 mg oral delayed release tablet: 1 tab(s) orally once a day  atorvastatin 20 mg oral tablet: 1 tab(s) orally once a day (at bedtime)  cilostazol 100 mg oral tablet: 1 tab(s) orally 2 times a day  clopidogrel 75 mg oral tablet: 1 tab(s) orally once a day  dicyclomine 20 mg oral tablet: 1 tab(s) orally 4 times a day   gabapentin 100 mg oral capsule: 1 cap(s) orally once a day (at bedtime)  metformin 1000 mg oral tablet: 1  orally 2 times a day  nicotine 14 mg/24 hr transdermal film, extended release: 1 patch transdermal once a day   ondansetron 4 mg oral tablet: 1 tab(s) orally every 8 hours   pantoprazole 40 mg oral delayed release tablet: 1 tab(s) orally once a day  sucralfate 1 g oral tablet: 1 tab(s) orally 4 times a day   amitriptyline 25 mg oral tablet: 1 tab(s) orally once a day (at bedtime)  aspirin 81 mg oral delayed release tablet: 1 tab(s) orally once a day  atorvastatin 20 mg oral tablet: 1 tab(s) orally once a day (at bedtime)  cilostazol 100 mg oral tablet: 1 tab(s) orally 2 times a day  clopidogrel 75 mg oral tablet: 1 tab(s) orally once a day  dicyclomine 20 mg oral tablet: 1 tab(s) orally 4 times a day   gabapentin 100 mg oral capsule: 1 cap(s) orally once a day (at bedtime)  HYDROmorphone 4 mg oral tablet: 1 tab(s) orally every 4 hours, As needed, Severe Pain (7 - 10) MDD:6 tabs  metformin 1000 mg oral tablet: 1  orally 2 times a day  Narcan 4 mg/0.1 mL nasal spray: 4 milligram(s) intranasally once   nicotine 14 mg/24 hr transdermal film, extended release: 1 patch transdermal once a day   ondansetron 4 mg oral tablet: 1 tab(s) orally every 8 hours   pantoprazole 40 mg oral delayed release tablet: 1 tab(s) orally once a day  sucralfate 1 g oral tablet: 1 tab(s) orally 4 times a day

## 2023-02-12 NOTE — DISCHARGE NOTE NURSING/CASE MANAGEMENT/SOCIAL WORK - PATIENT PORTAL LINK FT
You can access the FollowMyHealth Patient Portal offered by [facility] by registering at the following website: [email or web address]. By joining [employer]’s FollowMyHealth portal, you will also be able to view your health information using other applications (apps) compatible with our system.

## 2023-02-12 NOTE — PROGRESS NOTE ADULT - ATTENDING COMMENTS
51 yo M PMHx Type 2 Diabetes (not on insulin) with diabetic neuropathy presenting with severe pain,  swelling RLE admitted for acute limb ischemia, started on IV heparin drip.  Pt seen, Examined, case & care plan d/w pt, residents at detail.  -Vascular Sx as d/w DR Crump -S/P Angioplasty -on ASA,PLAVIX, Pletal  -D/W Dr Parmar- Neuro eval for Pain Neuropathy   D/W Dr Pulido-Pain management -Change to Dilaudid 4 mg q 4 hrs for pain control.  Cardiology-Dr Sevilla follow up  Total care time is 45 minutes.
53 yo M PMHx Type 2 Diabetes (not on insulin) with diabetic neuropathy presenting with severe pain,  swelling RLE admitted for acute limb ischemia, started on IV heparin drip.  Pt seen, Examined, case & care plan d/w pt, residents at detail.  -Vascular Sx as d/w DR Crump -S/P Angioplasty -on ASA,PLAVIX, Pletal  -D/W Dr Parmar- Neuro eval for Pain Neuropathy   D/W Dr Pulido-Pain management -Change to Dilaudid 4 mg q 4 hrs for pain control. stable for d/c with out pt follow up   Cardiology-Dr Sevilla follow up  D/W Dr Crump-stable for D/C home & OK to continue Nicotine Patches daily for smoking cessation  D/C Home today d/w Pt at detail about ALl Meds & Out pt follow up with pain management next week.  Total  d/c care time is 60  minutes.
53 yo M PMHx Type 2 Diabetes (not on insulin) with diabetic neuropathy presenting with severe pain,  swelling RLE admitted for acute limb ischemia, started on IV heparin drip.  Pt seen, Examined, case & care plan d/w pt, residents at detail.  Vascular-Dr Crump-IV heparin , Plan for angiogram & Possible  Vascular Bypass Pt with popliteal occlusion and small distal targets if pt   Will need R fem-distal bypass - pt will need to be transferred to [facility] for Vascular Sx as d/w DR Crump   Cont Heparin gtt until OR   - IV Pain meds. NPO after midnight . HOLD Heparin Drip as per Vascular Sx   Pt is medically optimized for schedule Rt Femoral distal Bypass -  Cardiology-Dr Roel roman pre OP  Total care time is 45 minutes.
53 yo M PMHx Type 2 Diabetes (not on insulin) with diabetic neuropathy presenting with severe pain,  swelling RLE admitted for acute limb ischemia, started on IV heparin drip.  Pt seen, Examined, case & care plan d/w pt, residents at detail.  AM labs   PO diet  Vascular-Dr Crump-IV heparin , Plan for Vascular Bypass -Pt with popliteal occlusion and small distal targets  Will need R fem-distal bypass ASAP  Cont Heparin gtt  Pain meds. NPO after midnight . HOLD Heparin Drip as per Vascular Sx   Pt is medically optimized for schedule Rt Femoral distal Bypass -  Cardiology-Dr Clay roman pre OP  Total care time is 45 minutes.

## 2023-02-12 NOTE — PROGRESS NOTE ADULT - NS ATTEND AMEND GEN_ALL_CORE FT
fu vascular. No signs of significant ischemia or volume overload. cont current care. Further cardiac workup will depend on clinical course.
No signs of significant ischemia or volume overload. optimized for vascualr surgery as best as possible from cv pov. Further cardiac workup will depend on clinical course.
No signs of significant ischemia or volume overload. Further cardiac workup will depend on clinical course.

## 2023-02-12 NOTE — PROGRESS NOTE ADULT - SUBJECTIVE AND OBJECTIVE BOX
Patient is a 52y old  Male who presents with a chief complaint of acute limb ischemia (11 Feb 2023 17:09)    HPI:  53 yo M PMHx Type 2 Diabetes (not on insulin) with diabetic neuropathy presenting with swelling, pain and discoloration of the right foot. He noticed worsening numbness from mid calf to the foot 2 weeks ago which then progressed to swelling and discoloration. he went to his PCP dr. pickens who ordered a doppler venous ultrasound (?) which was negative for clots. He was referred to podiatry who he  saw today and was referred to the ER for evaluation.     ER Course:   168/90 HR 85/min 16/min T 97.7 98% RA   Labs: wnl  EKG pending   given morphine 4 mg x 1   imaging: US Duplex Extremity Right: Atherosclerotic disease with spectral broadening seen from the superficial femoral artery, distally, compatible with upstream stenosis. (08 Feb 2023 23:33)    INTERVAL HPI:  2/9/23 - Pt was seen and examined at bedside. Endorses severe persistent pain though improved and tolerable. Pt denies headache, dizziness, lightheadedness, fever, chills, body aches, CP, SOB, palpitations, abdominal pain, n/v. On IV heparin drip    2/10/23 - Pt seen and examined at bedside. Pain improving and controlled. Pt denies headache, dizziness, lightheadedness, fever, chills, body aches, CP, SOB, palpitations, abdominal pain, n/v. On IV heparin drip, IV Pain meds, NPO     2/11/23 - Pt was seen and examined at bedside. Patient endorsing 7/10 pain. S/p RLE angiogram with angioplasty. No further vascular intervention - started on cilostazol. Denies fever, chills, CP, SOB, n/v/d. No other complaints at this time.     2/12/23- Pt was seen and examined at bedside ***    OVERNIGHT EVENTS:    Home Medications:  metformin 1000 mg oral tablet: 1  orally 2 times a day (09 Feb 2023 00:35)      MEDICATIONS  (STANDING):  amitriptyline 25 milliGRAM(s) Oral at bedtime  aspirin 325 milliGRAM(s) Oral daily  atorvastatin 20 milliGRAM(s) Oral at bedtime  cilostazol 100 milliGRAM(s) Oral two times a day  clopidogrel Tablet 75 milliGRAM(s) Oral daily  dextrose 5%. 1000 milliLiter(s) (50 mL/Hr) IV Continuous <Continuous>  dextrose 5%. 1000 milliLiter(s) (100 mL/Hr) IV Continuous <Continuous>  dextrose 50% Injectable 25 Gram(s) IV Push once  dextrose 50% Injectable 12.5 Gram(s) IV Push once  dextrose 50% Injectable 25 Gram(s) IV Push once  enoxaparin Injectable 40 milliGRAM(s) SubCutaneous every 24 hours  gabapentin 100 milliGRAM(s) Oral at bedtime  glucagon  Injectable 1 milliGRAM(s) IntraMuscular once  insulin lispro (ADMELOG) corrective regimen sliding scale   SubCutaneous three times a day before meals  naloxone Injectable 0.4 milliGRAM(s) IV Push once  nicotine -  14 mG/24Hr(s) Patch 1 Patch Transdermal daily  pantoprazole    Tablet 40 milliGRAM(s) Oral daily  polyethylene glycol 3350 17 Gram(s) Oral two times a day  senna 2 Tablet(s) Oral at bedtime  senna 2 Tablet(s) Oral at bedtime  sucralfate 1 Gram(s) Oral four times a day    MEDICATIONS  (PRN):  acetaminophen     Tablet .. 650 milliGRAM(s) Oral every 6 hours PRN Mild Pain (1 - 3)  aluminum hydroxide/magnesium hydroxide/simethicone Suspension 30 milliLiter(s) Oral every 4 hours PRN Dyspepsia  bisacodyl 5 milliGRAM(s) Oral daily PRN Constipation  dextrose Oral Gel 15 Gram(s) Oral once PRN Blood Glucose LESS THAN 70 milliGRAM(s)/deciliter  HYDROmorphone   Tablet 4 milliGRAM(s) Oral every 4 hours PRN Severe Pain (7 - 10)  magnesium hydroxide Suspension 30 milliLiter(s) Oral daily PRN Constipation  melatonin 3 milliGRAM(s) Oral at bedtime PRN Insomnia  ondansetron Injectable 4 milliGRAM(s) IV Push every 8 hours PRN Nausea and/or Vomiting  traMADol 25 milliGRAM(s) Oral every 4 hours PRN Mild Pain (1 - 3)  traMADol 50 milliGRAM(s) Oral every 4 hours PRN Moderate Pain (4 - 6)      Allergies    Demerol HCl (Anaphylaxis)  Lobster (Unknown)    Intolerances        Social History:  lives home with wife and son  adls independent  ambulates independently  smoker: 1/2- 1 pack a day x 30 years   alcohol none  drugs marijuana occasionally for sleep (08 Feb 2023 23:33)      REVIEW OF SYSTEMS: c/o Pain RT Lower EXT  CONSTITUTIONAL: No fever, No chills, No fatigue, No myalgia, No Body ache, No Weakness  EYES: No eye pain,  No visual disturbances, No discharge, No Redness  ENMT: No ear pain, No nose bleed, No vertigo; No sinus pain, No throat pain, No Congestion  NECK: No pain, No stiffness  RESPIRATORY: No cough, No wheezing, No hemoptysis, No shortness of breath  CARDIOVASCULAR: No chest pain, No palpitations  GASTROINTESTINAL: No abdominal pain, No epigastric pain. No nausea, No vomiting, No diarrhea, No constipation; [ x ] BM-  GENITOURINARY: No dysuria, No frequency, No urgency, No hematuria, No incontinence  NEUROLOGICAL: No headaches, No dizziness, No numbness, No tingling, No tremors, No weakness  EXTREMITIES: + Distal RLE Pain/Swelling/discoloration  SKIN: [ x ] No itching, burning, rashes, or lesions  + pain,  MUSCULOSKELETAL: No joint pain, No joint swelling; No muscle pain, No back pain, No extremity pain  PSYCHIATRIC: No depression, No anxiety, No mood swings, No difficulty sleeping at night  PAIN SCALE: [  ] None  [ x ] Other- 10/10 w/ movement and palpation rt lower ext; 7/10 with pain medications  ROS Unable to obtain due to: [  ] Dementia  [  ] Lethargy  [  ] Sedated  [  ] Non verbal  REST OF REVIEW OF SYSTEMS: [ x ] Normal     Vital Signs Last 24 Hrs  T(C): 36.5 (12 Feb 2023 04:35), Max: 36.7 (11 Feb 2023 21:04)  T(F): 97.7 (12 Feb 2023 04:35), Max: 98.1 (11 Feb 2023 21:04)  HR: 64 (12 Feb 2023 04:35) (64 - 69)  BP: 134/57 (12 Feb 2023 04:35) (130/61 - 150/74)  BP(mean): --  RR: 18 (12 Feb 2023 04:35) (17 - 18)  SpO2: 98% (12 Feb 2023 04:35) (97% - 98%)    Parameters below as of 12 Feb 2023 04:35  Patient On (Oxygen Delivery Method): room air      Finger Stick          PHYSICAL EXAM:  GENERAL:  [ x ] NAD, [ x ] Well appearing, [  ] Agitated, [  ] Drowsy, [  ] Lethargy, [  ] Confused   HEAD:  [ x ] Normal, [  ] Other  EYES:  [ x ] EOMI, [ x ] PERRLA, [ x ] Conjunctiva and sclera clear normal, [  ] Other, [  ] Pallor, [  ] Discharge  ENMT:  [ x ] Normal, [ x ] Moist mucous membranes, [ x ] Good dentition, [ x ] No thrush  NECK:  [ x ] Supple, [  x] No JVD, [ x ] Normal thyroid, [  ] Lymphadenopathy, [  ] Other  CHEST/LUNG:  [ x ] Clear to auscultation bilaterally, [ x ] Breath Sounds equal B/L / decreased, [  ] Poor effort, [ x ] No rales, [ x ] No rhonchi, [ x ] No wheezing  HEART:  [ x ] Regular rate and rhythm, [  ] Tachycardia, [  ] Bradycardia, [  ] Irregular, [ x ] No murmurs, No rubs, No gallops, [  ] PPM in place (Mfr:  )  ABDOMEN:  [ x ] Soft, [ x ] Nontender, [ x ] Nondistended, [ x ] No mass, [ x ] Bowel sounds present, [  ] Obese  NERVOUS SYSTEM:  [ x ] Alert & Oriented x3__, [ x ] Nonfocal, [  ] Confusion, [  ] Encephalopathic, [  ] Sedated, [  ] Unable to assess, [  ] Dementia, [  ] Other-  EXTREMITIES:  [ x ] 2+ Peripheral Pulses, No clubbing, No cyanosis, left leg mo PP Rt foot  [  ] Edema B/L lower EXT, [x  ] Severe  PAD stasis skin changes Rt lower EXT, [  ] Wound, [ x ] - RLE tenderness to palpation, Erythema, Pain, warm  LYMPH:  No lymphadenopathy noted  SKIN:  [  ] No rashes or lesions, [  ] Pressure ulcers, [  ] Ecchymosis, [  ] Skin tears, [ x ] Other - RLE ankle swelling, skin discoloration     DIET: Diet, Regular:   Consistent Carbohydrate No Snacks (02-10-23 @ 23:22)      LABS:                        15.2   8.43  )-----------( 250      ( 11 Feb 2023 08:00 )             44.3     11 Feb 2023 08:00    142    |  106    |  15     ----------------------------<  137    4.3     |  31     |  0.81     Ca    9.7        11 Feb 2023 08:00    TPro  7.6    /  Alb  3.9    /  TBili  0.4    /  DBili  x      /  AST  11     /  ALT  15     /  AlkPhos  66     11 Feb 2023 08:00    PTT - ( 10 Feb 2023 07:47 )  PTT:103.8 sec                         Anemia Panel:      Thyroid Panel:                RADIOLOGY & ADDITIONAL TESTS:      HEALTH ISSUES - PROBLEM Dx:  Acute lower limb ischemia    Type 2 diabetes mellitus    Need for other prophylactic measure    Tobacco dependence    Neuropathic pain, leg    Peripheral neuropathy    Peripheral vascular disease            Consultant(s) Notes Reviewed:  [ x ] YES     Care Discussed with [ x ] Consultants, [ x ] Patient, [ x ] Family, [  ] HCP, [ x ] , [  ] Social Service, [ x ] RN, [  ] Physical Therapy, [  ] Palliative Care Team  DVT PPX: [  ] Lovenox, [  ] SC Heparin, [  ] Coumadin, [  ] Xarelto, [  ] Eliquis, [  ] Pradaxa, [  ] IV Heparin drip, [  ] SCD, [  ] Ambulation, [  ] Contraindicated 2/2 GI Bleed, [  ] Contraindicated 2/2  Bleed, [  ] Contraindicated 2/2 Brain Bleed  Advanced Directive: [x  ] None, [  ] DNR/DNI Patient is a 52y old  Male who presents with a chief complaint of acute limb ischemia (11 Feb 2023 17:09)    HPI:  53 yo M PMHx Type 2 Diabetes (not on insulin) with diabetic neuropathy presenting with swelling, pain and discoloration of the right foot. He noticed worsening numbness from mid calf to the foot 2 weeks ago which then progressed to swelling and discoloration. he went to his PCP dr. pickens who ordered a doppler venous ultrasound (?) which was negative for clots. He was referred to podiatry who he  saw today and was referred to the ER for evaluation.     ER Course:   168/90 HR 85/min 16/min T 97.7 98% RA   Labs: wnl  EKG pending   given morphine 4 mg x 1   imaging: US Duplex Extremity Right: Atherosclerotic disease with spectral broadening seen from the superficial femoral artery, distally, compatible with upstream stenosis. (08 Feb 2023 23:33)    INTERVAL HPI:  2/9/23 - Pt was seen and examined at bedside. Endorses severe persistent pain though improved and tolerable. Pt denies headache, dizziness, lightheadedness, fever, chills, body aches, CP, SOB, palpitations, abdominal pain, n/v. On IV heparin drip    2/10/23 - Pt seen and examined at bedside. Pain improving and controlled. Pt denies headache, dizziness, lightheadedness, fever, chills, body aches, CP, SOB, palpitations, abdominal pain, n/v. On IV heparin drip, IV Pain meds, NPO     2/11/23 - Pt was seen and examined at bedside. Patient endorsing 7/10 pain. S/p RLE angiogram with angioplasty. No further vascular intervention - started on cilostazol. Denies fever, chills, CP, SOB, n/v/d. No other complaints at this time.     2/12/23- Pt was seen and examined at bedside. Patient asking to go home today and says that he is feeling better. Transitioned to PO pain regimen, patient states pain is tolerable with PO meds, and was able to get some sleep. Was able to ambulate up and down the [place] yesterday with pain, but is tolerable. States RLE is slightly less numb than prior. Swelling is about the same.     OVERNIGHT EVENTS: none    Home Medications:  metformin 1000 mg oral tablet: 1  orally 2 times a day (09 Feb 2023 00:35)      MEDICATIONS  (STANDING):  amitriptyline 25 milliGRAM(s) Oral at bedtime  aspirin 325 milliGRAM(s) Oral daily  atorvastatin 20 milliGRAM(s) Oral at bedtime  cilostazol 100 milliGRAM(s) Oral two times a day  clopidogrel Tablet 75 milliGRAM(s) Oral daily  dextrose 5%. 1000 milliLiter(s) (50 mL/Hr) IV Continuous <Continuous>  dextrose 5%. 1000 milliLiter(s) (100 mL/Hr) IV Continuous <Continuous>  dextrose 50% Injectable 25 Gram(s) IV Push once  dextrose 50% Injectable 12.5 Gram(s) IV Push once  dextrose 50% Injectable 25 Gram(s) IV Push once  enoxaparin Injectable 40 milliGRAM(s) SubCutaneous every 24 hours  gabapentin 100 milliGRAM(s) Oral at bedtime  glucagon  Injectable 1 milliGRAM(s) IntraMuscular once  insulin lispro (ADMELOG) corrective regimen sliding scale   SubCutaneous three times a day before meals  naloxone Injectable 0.4 milliGRAM(s) IV Push once  nicotine -  14 mG/24Hr(s) Patch 1 Patch Transdermal daily  pantoprazole    Tablet 40 milliGRAM(s) Oral daily  polyethylene glycol 3350 17 Gram(s) Oral two times a day  senna 2 Tablet(s) Oral at bedtime  senna 2 Tablet(s) Oral at bedtime  sucralfate 1 Gram(s) Oral four times a day    MEDICATIONS  (PRN):  acetaminophen     Tablet .. 650 milliGRAM(s) Oral every 6 hours PRN Mild Pain (1 - 3)  aluminum hydroxide/magnesium hydroxide/simethicone Suspension 30 milliLiter(s) Oral every 4 hours PRN Dyspepsia  bisacodyl 5 milliGRAM(s) Oral daily PRN Constipation  dextrose Oral Gel 15 Gram(s) Oral once PRN Blood Glucose LESS THAN 70 milliGRAM(s)/deciliter  HYDROmorphone   Tablet 4 milliGRAM(s) Oral every 4 hours PRN Severe Pain (7 - 10)  magnesium hydroxide Suspension 30 milliLiter(s) Oral daily PRN Constipation  melatonin 3 milliGRAM(s) Oral at bedtime PRN Insomnia  ondansetron Injectable 4 milliGRAM(s) IV Push every 8 hours PRN Nausea and/or Vomiting  traMADol 25 milliGRAM(s) Oral every 4 hours PRN Mild Pain (1 - 3)  traMADol 50 milliGRAM(s) Oral every 4 hours PRN Moderate Pain (4 - 6)      Allergies    Demerol HCl (Anaphylaxis)  Lobster (Unknown)    Intolerances        Social History:  lives home with wife and son  adls independent  ambulates independently  smoker: 1/2- 1 pack a day x 30 years   alcohol none  drugs marijuana occasionally for sleep (08 Feb 2023 23:33)      REVIEW OF SYSTEMS: c/o Pain RT Lower EXT  CONSTITUTIONAL: No fever, No chills, No fatigue, No myalgia, No Body ache, No Weakness  EYES: No eye pain,  No visual disturbances, No discharge, No Redness  ENMT: No ear pain, No nose bleed, No vertigo; No sinus pain, No throat pain, No Congestion  NECK: No pain, No stiffness  RESPIRATORY: No cough, No wheezing, No hemoptysis, No shortness of breath  CARDIOVASCULAR: No chest pain, No palpitations  GASTROINTESTINAL: No abdominal pain, No epigastric pain. No nausea, No vomiting, No diarrhea, No constipation; [ x ] BM-  GENITOURINARY: No dysuria, No frequency, No urgency, No hematuria, No incontinence  NEUROLOGICAL: No headaches, No dizziness, No numbness, No tingling, No tremors, No weakness  EXTREMITIES: + Distal RLE Pain/Swelling/discoloration  SKIN: [ x ] No itching, burning, rashes, or lesions  + pain,  MUSCULOSKELETAL: No joint pain, No joint swelling; No muscle pain, No back pain, No extremity pain  PSYCHIATRIC: No depression, No anxiety, No mood swings, No difficulty sleeping at night  PAIN SCALE: [  ] None  [ x ] Other- 10/10 w/ movement and palpation rt lower ext; 7/10 with pain medications  ROS Unable to obtain due to: [  ] Dementia  [  ] Lethargy  [  ] Sedated  [  ] Non verbal  REST OF REVIEW OF SYSTEMS: [ x ] Normal     Vital Signs Last 24 Hrs  T(C): 36.5 (12 Feb 2023 04:35), Max: 36.7 (11 Feb 2023 21:04)  T(F): 97.7 (12 Feb 2023 04:35), Max: 98.1 (11 Feb 2023 21:04)  HR: 64 (12 Feb 2023 04:35) (64 - 69)  BP: 134/57 (12 Feb 2023 04:35) (130/61 - 150/74)  BP(mean): --  RR: 18 (12 Feb 2023 04:35) (17 - 18)  SpO2: 98% (12 Feb 2023 04:35) (97% - 98%)    Parameters below as of 12 Feb 2023 04:35  Patient On (Oxygen Delivery Method): room air      Finger Stick          PHYSICAL EXAM:  GENERAL:  [ x ] NAD, [ x ] Well appearing, [  ] Agitated, [  ] Drowsy, [  ] Lethargy, [  ] Confused   HEAD:  [ x ] Normal, [  ] Other  EYES:  [ x ] EOMI, [ x ] PERRLA, [ x ] Conjunctiva and sclera clear normal, [  ] Other, [  ] Pallor, [  ] Discharge  ENMT:  [ x ] Normal, [ x ] Moist mucous membranes, [ x ] Good dentition, [ x ] No thrush  NECK:  [ x ] Supple, [  x] No JVD, [ x ] Normal thyroid, [  ] Lymphadenopathy, [  ] Other  CHEST/LUNG:  [ x ] Clear to auscultation bilaterally, [ x ] Breath Sounds equal B/L / decreased, [  ] Poor effort, [ x ] No rales, [ x ] No rhonchi, [ x ] No wheezing  HEART:  [ x ] Regular rate and rhythm, [  ] Tachycardia, [  ] Bradycardia, [  ] Irregular, [ x ] No murmurs, No rubs, No gallops, [  ] PPM in place (Mfr:  )  ABDOMEN:  [ x ] Soft, [ x ] Nontender, [ x ] Nondistended, [ x ] No mass, [ x ] Bowel sounds present, [  ] Obese  NERVOUS SYSTEM:  [ x ] Alert & Oriented x3__, [ x ] Nonfocal, [  ] Confusion, [  ] Encephalopathic, [  ] Sedated, [  ] Unable to assess, [  ] Dementia, [  ] Other-  EXTREMITIES:  [ x ] 2+ Peripheral Pulses, No clubbing, No cyanosis, left leg mo PP Rt foot  [  ] Edema B/L lower EXT, [x  ] Severe  PAD stasis skin changes Rt lower EXT, [  ] Wound, [ x ] - RLE tenderness to palpation, Erythema, Pain, warm  LYMPH:  No lymphadenopathy noted  SKIN:  [  ] No rashes or lesions, [  ] Pressure ulcers, [  ] Ecchymosis, [  ] Skin tears, [ x ] Other - RLE ankle swelling, skin discoloration     DIET: Diet, Regular:   Consistent Carbohydrate No Snacks (02-10-23 @ 23:22)      LABS:                        15.2   8.43  )-----------( 250      ( 11 Feb 2023 08:00 )             44.3     11 Feb 2023 08:00    142    |  106    |  15     ----------------------------<  137    4.3     |  31     |  0.81     Ca    9.7        11 Feb 2023 08:00    TPro  7.6    /  Alb  3.9    /  TBili  0.4    /  DBili  x      /  AST  11     /  ALT  15     /  AlkPhos  66     11 Feb 2023 08:00    PTT - ( 10 Feb 2023 07:47 )  PTT:103.8 sec                         Anemia Panel:      Thyroid Panel:                RADIOLOGY & ADDITIONAL TESTS:      HEALTH ISSUES - PROBLEM Dx:  Acute lower limb ischemia    Type 2 diabetes mellitus    Need for other prophylactic measure    Tobacco dependence    Neuropathic pain, leg    Peripheral neuropathy    Peripheral vascular disease            Consultant(s) Notes Reviewed:  [ x ] YES     Care Discussed with [ x ] Consultants, [ x ] Patient, [ x ] Family, [  ] HCP, [ x ] , [  ] Social Service, [ x ] RN, [  ] Physical Therapy, [  ] Palliative Care Team  DVT PPX: [  ] Lovenox, [  ] SC Heparin, [  ] Coumadin, [  ] Xarelto, [  ] Eliquis, [  ] Pradaxa, [  ] IV Heparin drip, [  ] SCD, [  ] Ambulation, [  ] Contraindicated 2/2 GI Bleed, [  ] Contraindicated 2/2  Bleed, [  ] Contraindicated 2/2 Brain Bleed  Advanced Directive: [x  ] None, [  ] DNR/DNI Patient is a 52y old  Male who presents with a chief complaint of acute limb ischemia (11 Feb 2023 17:09)    HPI:  53 yo M PMHx Type 2 Diabetes (not on insulin) with diabetic neuropathy presenting with swelling, pain and discoloration of the right foot. He noticed worsening numbness from mid calf to the foot 2 weeks ago which then progressed to swelling and discoloration. he went to his PCP dr. pickens who ordered a doppler venous ultrasound (?) which was negative for clots. He was referred to podiatry who he  saw today and was referred to the ER for evaluation.     ER Course:   168/90 HR 85/min 16/min T 97.7 98% RA   Labs: wnl  EKG pending   given morphine 4 mg x 1   imaging: US Duplex Extremity Right: Atherosclerotic disease with spectral broadening seen from the superficial femoral artery, distally, compatible with upstream stenosis. (08 Feb 2023 23:33)    INTERVAL HPI:  2/9/23 - Pt was seen and examined at bedside. Endorses severe persistent pain though improved and tolerable. Pt denies headache, dizziness, lightheadedness, fever, chills, body aches, CP, SOB, palpitations, abdominal pain, n/v. On IV heparin drip    2/10/23 - Pt seen and examined at bedside. Pain improving and controlled. Pt denies headache, dizziness, lightheadedness, fever, chills, body aches, CP, SOB, palpitations, abdominal pain, n/v. On IV heparin drip, IV Pain meds, NPO     2/11/23 - Pt was seen and examined at bedside. Patient endorsing 7/10 pain. S/p RLE angiogram with angioplasty. No further vascular intervention - started on cilostazol. Denies fever, chills, CP, SOB, n/v/d. No other complaints at this time.     2/12/23- Pt was seen and examined at bedside. Patient asking to go home today and says that he is feeling better. Transitioned to PO pain regimen, patient states pain is tolerable with PO meds, and was able to get some sleep. Was able to ambulate up and down the [place] yesterday with pain, but is tolerable. States RLE is slightly less numb than prior. Swelling is about the same. d/c home today.    OVERNIGHT EVENTS: none    Home Medications:  metformin 1000 mg oral tablet: 1  orally 2 times a day (09 Feb 2023 00:35)      MEDICATIONS  (STANDING):  amitriptyline 25 milliGRAM(s) Oral at bedtime  aspirin 325 milliGRAM(s) Oral daily  atorvastatin 20 milliGRAM(s) Oral at bedtime  cilostazol 100 milliGRAM(s) Oral two times a day  clopidogrel Tablet 75 milliGRAM(s) Oral daily  dextrose 5%. 1000 milliLiter(s) (50 mL/Hr) IV Continuous <Continuous>  dextrose 5%. 1000 milliLiter(s) (100 mL/Hr) IV Continuous <Continuous>  dextrose 50% Injectable 25 Gram(s) IV Push once  dextrose 50% Injectable 12.5 Gram(s) IV Push once  dextrose 50% Injectable 25 Gram(s) IV Push once  enoxaparin Injectable 40 milliGRAM(s) SubCutaneous every 24 hours  gabapentin 100 milliGRAM(s) Oral at bedtime  glucagon  Injectable 1 milliGRAM(s) IntraMuscular once  insulin lispro (ADMELOG) corrective regimen sliding scale   SubCutaneous three times a day before meals  naloxone Injectable 0.4 milliGRAM(s) IV Push once  nicotine -  14 mG/24Hr(s) Patch 1 Patch Transdermal daily  pantoprazole    Tablet 40 milliGRAM(s) Oral daily  polyethylene glycol 3350 17 Gram(s) Oral two times a day  senna 2 Tablet(s) Oral at bedtime  senna 2 Tablet(s) Oral at bedtime  sucralfate 1 Gram(s) Oral four times a day    MEDICATIONS  (PRN):  acetaminophen     Tablet .. 650 milliGRAM(s) Oral every 6 hours PRN Mild Pain (1 - 3)  aluminum hydroxide/magnesium hydroxide/simethicone Suspension 30 milliLiter(s) Oral every 4 hours PRN Dyspepsia  bisacodyl 5 milliGRAM(s) Oral daily PRN Constipation  dextrose Oral Gel 15 Gram(s) Oral once PRN Blood Glucose LESS THAN 70 milliGRAM(s)/deciliter  HYDROmorphone   Tablet 4 milliGRAM(s) Oral every 4 hours PRN Severe Pain (7 - 10)  magnesium hydroxide Suspension 30 milliLiter(s) Oral daily PRN Constipation  melatonin 3 milliGRAM(s) Oral at bedtime PRN Insomnia  ondansetron Injectable 4 milliGRAM(s) IV Push every 8 hours PRN Nausea and/or Vomiting  traMADol 25 milliGRAM(s) Oral every 4 hours PRN Mild Pain (1 - 3)  traMADol 50 milliGRAM(s) Oral every 4 hours PRN Moderate Pain (4 - 6)      Allergies    Demerol HCl (Anaphylaxis)  Lobster (Unknown)    Intolerances        Social History:  lives home with wife and son  adls independent  ambulates independently  smoker: 1/2- 1 pack a day x 30 years   alcohol none  drugs marijuana occasionally for sleep (08 Feb 2023 23:33)      REVIEW OF SYSTEMS: c/o Pain RT Lower EXT much improved   CONSTITUTIONAL: No fever, No chills, No fatigue, No myalgia, No Body ache, No Weakness  EYES: No eye pain,  No visual disturbances, No discharge, No Redness  ENMT: No ear pain, No nose bleed, No vertigo; No sinus pain, No throat pain, No Congestion  NECK: No pain, No stiffness  RESPIRATORY: No cough, No wheezing, No hemoptysis, No shortness of breath  CARDIOVASCULAR: No chest pain, No palpitations  GASTROINTESTINAL: No abdominal pain, No epigastric pain. No nausea, No vomiting, No diarrhea, No constipation; [ x ] BM-  GENITOURINARY: No dysuria, No frequency, No urgency, No hematuria, No incontinence  NEUROLOGICAL: No headaches, No dizziness, No numbness, No tingling, No tremors, No weakness  EXTREMITIES: + Distal RLE Pain/Swelling/discoloration  SKIN: [ x ] No itching, burning, rashes, or lesions  + pain,  MUSCULOSKELETAL: No joint pain, No joint swelling; No muscle pain, No back pain, No extremity pain  PSYCHIATRIC: No depression, No anxiety, No mood swings, No difficulty sleeping at night  PAIN SCALE: [  ] None  [ x ] Other-  Rt lower Ext 3-4 /10   ROS Unable to obtain due to: [  ] Dementia  [  ] Lethargy  [  ] Sedated  [  ] Non verbal  REST OF REVIEW OF SYSTEMS: [ x ] Normal     Vital Signs Last 24 Hrs  T(C): 36.5 (12 Feb 2023 04:35), Max: 36.7 (11 Feb 2023 21:04)  T(F): 97.7 (12 Feb 2023 04:35), Max: 98.1 (11 Feb 2023 21:04)  HR: 64 (12 Feb 2023 04:35) (64 - 69)  BP: 134/57 (12 Feb 2023 04:35) (130/61 - 150/74)  BP(mean): --  RR: 18 (12 Feb 2023 04:35) (17 - 18)  SpO2: 98% (12 Feb 2023 04:35) (97% - 98%)    Parameters below as of 12 Feb 2023 04:35  Patient On (Oxygen Delivery Method): room air      Finger Stick          PHYSICAL EXAM:  GENERAL:  [ x ] NAD, [ x ] Well appearing, [  ] Agitated, [  ] Drowsy, [  ] Lethargy, [  ] Confused   HEAD:  [ x ] Normal, [  ] Other  EYES:  [ x ] EOMI, [ x ] PERRLA, [ x ] Conjunctiva and sclera clear normal, [  ] Other, [  ] Pallor, [  ] Discharge  ENMT:  [ x ] Normal, [ x ] Moist mucous membranes, [ x ] Good dentition, [ x ] No thrush  NECK:  [ x ] Supple, [  x] No JVD, [ x ] Normal thyroid, [  ] Lymphadenopathy, [  ] Other  CHEST/LUNG:  [ x ] Clear to auscultation bilaterally, [ x ] Breath Sounds equal B/L / decreased, [  ] Poor effort, [ x ] No rales, [ x ] No rhonchi, [ x ] No wheezing  HEART:  [ x ] Regular rate and rhythm, [  ] Tachycardia, [  ] Bradycardia, [  ] Irregular, [ x ] No murmurs, No rubs, No gallops, [  ] PPM in place (Mfr:  )  ABDOMEN:  [ x ] Soft, [ x ] Nontender, [ x ] Nondistended, [ x ] No mass, [ x ] Bowel sounds present, [  ] Obese  NERVOUS SYSTEM:  [ x ] Alert & Oriented x3__, [ x ] Nonfocal, [  ] Confusion, [  ] Encephalopathic, [  ] Sedated, [  ] Unable to assess, [  ] Dementia, [  ] Other-  EXTREMITIES:  [ x ] 2+ Peripheral Pulses, No clubbing, No cyanosis, left leg mo PP Rt foot  [  ] Edema B/L lower EXT, [x  ] Severe  PAD stasis skin changes Rt lower EXT, [  ] Wound, [ x ] - RLE tenderness to palpation, Erythema, Pain, warm  LYMPH:  No lymphadenopathy noted  SKIN:  [  ] No rashes or lesions, [  ] Pressure ulcers, [  ] Ecchymosis, [  ] Skin tears, [ x ] Other - RLE ankle swelling, skin discoloration     DIET: Diet, Regular:   Consistent Carbohydrate No Snacks (02-10-23 @ 23:22)      LABS:                        15.2   8.43  )-----------( 250      ( 11 Feb 2023 08:00 )             44.3     11 Feb 2023 08:00    142    |  106    |  15     ----------------------------<  137    4.3     |  31     |  0.81     Ca    9.7        11 Feb 2023 08:00    TPro  7.6    /  Alb  3.9    /  TBili  0.4    /  DBili  x      /  AST  11     /  ALT  15     /  AlkPhos  66     11 Feb 2023 08:00    PTT - ( 10 Feb 2023 07:47 )  PTT:103.8 sec            RADIOLOGY & ADDITIONAL TESTS:NONE      HEALTH ISSUES - PROBLEM Dx:  Acute lower limb ischemia    Type 2 diabetes mellitus    Need for other prophylactic measure    Tobacco dependence    Neuropathic pain, leg    Peripheral neuropathy    Peripheral vascular disease            Consultant(s) Notes Reviewed:  [ x ] YES     Care Discussed with [ x ] Consultants, [ x ] Patient, [ x ] Family, [  ] HCP, [ x ] , [  ] Social Service, [ x ] RN, [  ] Physical Therapy, [  ] Palliative Care Team  DVT PPX: [  ] Lovenox, [  ] SC Heparin, [  ] Coumadin, [  ] Xarelto, [  ] Eliquis, [  ] Pradaxa, [  ] IV Heparin drip, [  ] SCD, [  ] Ambulation, [  ] Contraindicated 2/2 GI Bleed, [  ] Contraindicated 2/2  Bleed, [  ] Contraindicated 2/2 Brain Bleed  Advanced Directive: [x  ] None, [  ] DNR/DNI

## 2023-02-12 NOTE — DISCHARGE NOTE PROVIDER - NSDCCPCAREPLAN_GEN_ALL_CORE_FT
PRINCIPAL DISCHARGE DIAGNOSIS  Diagnosis: Ischemia of foot  Assessment and Plan of Treatment: You came to the hospital with pain, swelling, and discoloration of the right foot. This was due to decreased blood flow to your foot, which was found on ultrasound and CT angiography of your leg. Vascular surgery was consulted, and they performed an angiogram to visualize the vessels in your R leg, then an angioplasty of your right lower extremity was performed to open up the artery in your leg and increase blood flow.   Please follow up with Vascular Surgery within 1 week of discharge.  Please follow up with your PCP within 1 week of discharge  Please take the following medications:  aspirin 81 mg daily  Plavix (clopidogrel) 75 mg daily  cilostazol 100 mg twice a day  atorvastatin 20 mg at bed time  Please take pain medication as prescribed ***      SECONDARY DISCHARGE DIAGNOSES  Diagnosis: Type 2 diabetes mellitus  Assessment and Plan of Treatment: Please take home metformin    Diagnosis: Tobacco dependence  Assessment and Plan of Treatment: Please continue to use nicotine patches, and follow up with your PCP within 1 week of discharge.     PRINCIPAL DISCHARGE DIAGNOSIS  Diagnosis: Ischemia of foot  Assessment and Plan of Treatment: You came to the hospital with pain, swelling, and discoloration of the right foot. This was due to decreased blood flow to your foot, which was found on ultrasound and CT angiography of your leg. Vascular surgery was consulted, and they performed an angiogram to visualize the vessels in your R leg, then an angioplasty of your right lower extremity was performed to open up the artery in your leg and increase blood flow.   Please follow up with Vascular Surgery Dr. Robles within 1 week of discharge.  Please follow up with your PCP within 1 week of discharge  Please follow up with Pain Management Dr. Pulido within 2 weeks of discharge  Please take the following medications:  aspirin 81 mg daily  Plavix (clopidogrel) 75 mg daily  cilostazol 100 mg twice a day  atorvastatin 20 mg at bed time  Please take pain medication as prescribed ***      SECONDARY DISCHARGE DIAGNOSES  Diagnosis: Type 2 diabetes mellitus  Assessment and Plan of Treatment: Please take home metformin    Diagnosis: Tobacco dependence  Assessment and Plan of Treatment: Please continue to use nicotine patches, and follow up with your PCP within 1 week of discharge.     PRINCIPAL DISCHARGE DIAGNOSIS  Diagnosis: Ischemia of foot  Assessment and Plan of Treatment: You came to the hospital with  worsening Rt lower Ext  pain, swelling, and discoloration of the right foot. This was due to decreased blood flow to your foot, which was found on ultrasound and CT angiography of your leg. Vascular surgery was consulted, and they performed an angiogram to visualize the vessels in your R leg, then an angioplasty of your right lower extremity was performed to open up the artery in your leg and increase blood flow.   Please follow up with Vascular Surgery Dr. Robles within 1 week of discharge.  Please follow up with your PCP within 1 week of discharge  Please follow up with Pain Management Dr. Pulido within 2 weeks of discharge  Please take the following medications:  aspirin 81 mg daily  Plavix (clopidogrel) 75 mg daily  cilostazol 100 mg twice a day  atorvastatin 20 mg at bed time  Please take pain medication as prescribed ***      SECONDARY DISCHARGE DIAGNOSES  Diagnosis: Type 2 diabetes mellitus  Assessment and Plan of Treatment: Please take home metformin    Diagnosis: Tobacco dependence  Assessment and Plan of Treatment: Please continue to use nicotine patches, and follow up with your PCP within 1 week of discharge.     PRINCIPAL DISCHARGE DIAGNOSIS  Diagnosis: Ischemia of foot  Assessment and Plan of Treatment: Severe RT Lower Ext Pain  with chronic Neuropathy & PAD  -You came to the hospital with  worsening Rt lower Ext  pain, swelling, and discoloration of the right foot. This was due to decreased blood flow to your foot, which was found on ultrasound and CT angiography of your leg. Vascular surgery was consulted, and they performed an angiogram to visualize the vessels in your R  lower Ext , then an angioplasty of your right lower extremity was performed to open up thestenosis in the  artery in your leg and increase blood flow.   Please follow up with Vascular Surgery Dr. Robles within 1 week of discharge.  Please follow up with your PCP within 1 week of discharge  Please follow up with Pain Management  next week as information provided  after discharge  Please take the following medications:   EC aspirin 81 mg daily with food  Plavix (clopidogrel) 75 mg daily with Food  cilostazol 100 mg twice a day  atorvastatin 20 mg at bed time  Start Amytriptyline 1tab  at bed time  STOP Smoking  Please take pain medication as prescribed Dilaudid 4 mg 1 tab q 4 hrs as needed for severe pain only      SECONDARY DISCHARGE DIAGNOSES  Diagnosis: Type 2 diabetes mellitus  Assessment and Plan of Treatment: Please take home metformin    Diagnosis: Tobacco dependence  Assessment and Plan of Treatment: Please continue to use nicotine patch 1 patch daily    and follow up with your PCP within 1 week of discharge.  Do NOT Smoke when using Patches as risks of arrythemia & death    Diagnosis: Neuropathic pain, leg  Assessment and Plan of Treatment: Chronic Neuropathy likely 2/2 DM & Smoking  Continue Gabpentene & Amytriptyline at bed time   Follow up with Neurologist & Pain management as out pt.

## 2023-02-12 NOTE — PROGRESS NOTE ADULT - SUBJECTIVE AND OBJECTIVE BOX
[facility] -- Sasha Kate Wachsman, Pannella, Patel, Savella, Goodger  Office # [phone]    Follow Up:  cardiac optimization     Subjective/Observations: seen and examined, awake, alert, sitting comfortably in bed, denies chest pain, dyspnea, palpitations or dizziness. Tolerating room air. c/o Right foot pain     REVIEW OF SYSTEMS: All other review of systems is negative unless indicated above  PAST MEDICAL & SURGICAL HISTORY:  Diabetes      Traumatic brain injury      Migraine      Neuropathy      PVD (peripheral vascular disease)      No significant past surgical history        MEDICATIONS  (STANDING):  amitriptyline 25 milliGRAM(s) Oral at bedtime  aspirin 325 milliGRAM(s) Oral daily  atorvastatin 20 milliGRAM(s) Oral at bedtime  cilostazol 100 milliGRAM(s) Oral two times a day  clopidogrel Tablet 75 milliGRAM(s) Oral daily  dextrose 5%. 1000 milliLiter(s) (50 mL/Hr) IV Continuous <Continuous>  dextrose 5%. 1000 milliLiter(s) (100 mL/Hr) IV Continuous <Continuous>  dextrose 50% Injectable 25 Gram(s) IV Push once  dextrose 50% Injectable 12.5 Gram(s) IV Push once  dextrose 50% Injectable 25 Gram(s) IV Push once  enoxaparin Injectable 40 milliGRAM(s) SubCutaneous every 24 hours  gabapentin 100 milliGRAM(s) Oral at bedtime  glucagon  Injectable 1 milliGRAM(s) IntraMuscular once  insulin lispro (ADMELOG) corrective regimen sliding scale   SubCutaneous three times a day before meals  naloxone Injectable 0.4 milliGRAM(s) IV Push once  nicotine -  14 mG/24Hr(s) Patch 1 Patch Transdermal daily  pantoprazole    Tablet 40 milliGRAM(s) Oral daily  polyethylene glycol 3350 17 Gram(s) Oral two times a day  senna 2 Tablet(s) Oral at bedtime  sucralfate 1 Gram(s) Oral four times a day    MEDICATIONS  (PRN):  acetaminophen     Tablet .. 650 milliGRAM(s) Oral every 6 hours PRN Mild Pain (1 - 3)  aluminum hydroxide/magnesium hydroxide/simethicone Suspension 30 milliLiter(s) Oral every 4 hours PRN Dyspepsia  bisacodyl 5 milliGRAM(s) Oral daily PRN Constipation  dextrose Oral Gel 15 Gram(s) Oral once PRN Blood Glucose LESS THAN 70 milliGRAM(s)/deciliter  HYDROmorphone   Tablet 4 milliGRAM(s) Oral every 4 hours PRN Severe Pain (7 - 10)  magnesium hydroxide Suspension 30 milliLiter(s) Oral daily PRN Constipation  melatonin 3 milliGRAM(s) Oral at bedtime PRN Insomnia  ondansetron Injectable 4 milliGRAM(s) IV Push every 8 hours PRN Nausea and/or Vomiting  traMADol 25 milliGRAM(s) Oral every 4 hours PRN Mild Pain (1 - 3)  traMADol 50 milliGRAM(s) Oral every 4 hours PRN Moderate Pain (4 - 6)    Allergies    Demerol HCl (Anaphylaxis)  Lobster (Unknown)    Intolerances      Vital Signs Last 24 Hrs  T(C): 36.5 (12 Feb 2023 04:35), Max: 36.7 (11 Feb 2023 21:04)  T(F): 97.7 (12 Feb 2023 04:35), Max: 98.1 (11 Feb 2023 21:04)  HR: 64 (12 Feb 2023 04:35) (64 - 69)  BP: 134/57 (12 Feb 2023 04:35) (130/61 - 150/74)  BP(mean): --  RR: 18 (12 Feb 2023 04:35) (17 - 18)  SpO2: 98% (12 Feb 2023 04:35) (97% - 98%)    Parameters below as of 12 Feb 2023 04:35  Patient On (Oxygen Delivery Method): room air    I&O's Summary    TELE: Not on telemetry   PHYSICAL EXAM:  Constitutional: NAD, awake and alert, well-developed  HEENT: Moist Mucous Membranes, Anicteric  Pulmonary: Non-labored, breath sounds are clear bilaterally, No wheezing, rales or rhonchi  Cardiovascular: Regular, S1 and S2, No murmurs, rubs, gallops or clicks  Gastrointestinal: Bowel Sounds present, soft, nontender.   Lymph: trace peripheral edema. No lymphadenopathy.  Skin: No visible rashes or ulcers.  Psych:  Mood & affect appropriate      LABS: All Labs Reviewed:                        14.1   6.68  )-----------( 222      ( 12 Feb 2023 08:25 )             41.5                         15.2   8.43  )-----------( 250      ( 11 Feb 2023 08:00 )             44.3                         14.5   7.07  )-----------( 219      ( 10 Feb 2023 07:47 )             42.9     12 Feb 2023 08:50    142    |  106    |  10     ----------------------------<  150    3.6     |  32     |  0.73   11 Feb 2023 08:00    142    |  106    |  15     ----------------------------<  137    4.3     |  31     |  0.81   10 Feb 2023 07:47    140    |  109    |  15     ----------------------------<  101    3.9     |  26     |  0.55     Ca    9.0        12 Feb 2023 08:50  Ca    9.7        11 Feb 2023 08:00  Ca    8.8        10 Feb 2023 07:47    TPro  6.8    /  Alb  3.6    /  TBili  0.3    /  DBili  x      /  AST  14     /  ALT  20     /  AlkPhos  55     12 Feb 2023 08:50  TPro  7.6    /  Alb  3.9    /  TBili  0.4    /  DBili  x      /  AST  11     /  ALT  15     /  AlkPhos  66     11 Feb 2023 08:00  TPro  6.4    /  Alb  3.2    /  TBili  0.3    /  DBili  x      /  AST  11     /  ALT  14     /  AlkPhos  58     10 Feb 2023 07:47          12 Lead ECG:   Ventricular Rate 64 BPM    Atrial Rate 64 BPM    P-R Interval 158 ms    QRS Duration 98 ms    Q-T Interval 404 ms    QTC Calculation(Bazett) 416 ms    P Axis 39 degrees    R Axis 1 degrees    T Axis 62 degrees    Diagnosis Line Normal sinus rhythm  Normal ECG  When compared with ECG of 19-NOV-2022 14:44,  No significant change was found  Confirmed by JASMYNE DIAZ ([id number]) on 2/9/2023 11:02:54 AM (02-09-23 @ 08:30)

## 2023-02-12 NOTE — PROGRESS NOTE ADULT - PROBLEM SELECTOR PROBLEM 2
Type 2 diabetes mellitus
Type 2 diabetes mellitus
Neuropathic pain, leg
Type 2 diabetes mellitus
Type 2 diabetes mellitus

## 2023-02-12 NOTE — PROGRESS NOTE ADULT - SUBJECTIVE AND OBJECTIVE BOX
VASCULAR SURGERY PA NOTE ON BEHALF OF DR. GODFREY:    S: Patient seen and examined at bedside.   No acute events overnight.  Patient reports increased sensation, less pain, and more swelling to RLE this am.  Denies fevers, chills, chest pain, SOB, palpitations.        MEDICATIONS:  acetaminophen     Tablet .. 650 milliGRAM(s) Oral every 6 hours PRN  aluminum hydroxide/magnesium hydroxide/simethicone Suspension 30 milliLiter(s) Oral every 4 hours PRN  amitriptyline 25 milliGRAM(s) Oral at bedtime  aspirin 325 milliGRAM(s) Oral daily  atorvastatin 20 milliGRAM(s) Oral at bedtime  bisacodyl 5 milliGRAM(s) Oral daily PRN  cilostazol 100 milliGRAM(s) Oral two times a day  clopidogrel Tablet 75 milliGRAM(s) Oral daily  dextrose 5%. 1000 milliLiter(s) IV Continuous <Continuous>  dextrose 5%. 1000 milliLiter(s) IV Continuous <Continuous>  dextrose 50% Injectable 25 Gram(s) IV Push once  dextrose 50% Injectable 12.5 Gram(s) IV Push once  dextrose 50% Injectable 25 Gram(s) IV Push once  dextrose Oral Gel 15 Gram(s) Oral once PRN  enoxaparin Injectable 40 milliGRAM(s) SubCutaneous every 24 hours  gabapentin 100 milliGRAM(s) Oral at bedtime  glucagon  Injectable 1 milliGRAM(s) IntraMuscular once  HYDROmorphone   Tablet 4 milliGRAM(s) Oral every 4 hours PRN  insulin lispro (ADMELOG) corrective regimen sliding scale   SubCutaneous three times a day before meals  magnesium hydroxide Suspension 30 milliLiter(s) Oral daily PRN  melatonin 3 milliGRAM(s) Oral at bedtime PRN  naloxone Injectable 0.4 milliGRAM(s) IV Push once  nicotine -  14 mG/24Hr(s) Patch 1 Patch Transdermal daily  ondansetron Injectable 4 milliGRAM(s) IV Push every 8 hours PRN  pantoprazole    Tablet 40 milliGRAM(s) Oral daily  polyethylene glycol 3350 17 Gram(s) Oral two times a day  senna 2 Tablet(s) Oral at bedtime  sucralfate 1 Gram(s) Oral four times a day  traMADol 25 milliGRAM(s) Oral every 4 hours PRN  traMADol 50 milliGRAM(s) Oral every 4 hours PRN      O:  Vital Signs Last 24 Hrs  T(C): 36.5 (12 Feb 2023 04:35), Max: 36.7 (11 Feb 2023 21:04)  T(F): 97.7 (12 Feb 2023 04:35), Max: 98.1 (11 Feb 2023 21:04)  HR: 64 (12 Feb 2023 04:35) (64 - 69)  BP: 134/57 (12 Feb 2023 04:35) (130/61 - 150/74)  BP(mean): --  RR: 18 (12 Feb 2023 04:35) (17 - 18)  SpO2: 98% (12 Feb 2023 04:35) (97% - 98%)    Parameters below as of 12 Feb 2023 04:35  Patient On (Oxygen Delivery Method): room air        I&O SUMMARY:      PHYSICAL EXAM:  Lungs: CTA bilat without W/R/R  Card: S1S2  Abd: Soft, NT, ND.  +BS x 4.  No rebound/guarding.    Ext: RLE warm, tender to touch, swelling and erythema noted.  RLE dopplerable PT and popliteal pulses.  AT and DP difficult to obtain on doppler.      LABS:                        14.1   6.68  )-----------( 222      ( 12 Feb 2023 08:25 )             41.5     02-12    142  |  106  |  10  ----------------------------<  150<H>  3.6   |  32<H>  |  0.73    Ca    9.0      12 Feb 2023 08:50    TPro  6.8  /  Alb  3.6  /  TBili  0.3  /  DBili  x   /  AST  14<L>  /  ALT  20  /  AlkPhos  55  02-12        Assessment:    52 year old male s/p R SFA angioplasty POD #2.  Patient with increased sensation to RLE.  Swelling and erythema noted to RLE.      Plan:  - continue ASA/Plavix/Cilostazol  - encouraged OOB/ambulation as tolerated.    - Continue care per primary team  - Discussed w/ Dr. Robles

## 2023-02-12 NOTE — PROGRESS NOTE ADULT - ASSESSMENT
51 yo M with PMHx of T2DM (not on insulin) with diabetic neuropathy, current smoker admitted for RLE limb ischemia.    Cardiac optimization   - p/w swelling, pain and discoloration of the right foot, found to have RLE, s/p R SFA angioplasty (2/10), vascular following, tolerated procedure well from CV standpoint   - CTA distal aorta with run off with popliteal occlusion and small distal targets  - on ASA, cilostazol   - pain management per primary     - No acute changes on EKG compared to previous  - no signs of volume overload   - BP, HR stable and controlled   - continue to monitor routine hemodynamics   - Monitor and replete Lytes. Keep K > 4 and Mg > 2    -+ smoker, willing to stop, would start nicotine patch   - rest of management per primary   - Will continue to follow.    Tita Colon [facility]  Nurse Practitioner - Cardiology   Spectra #[phone]/ [phone]

## 2023-02-12 NOTE — PROGRESS NOTE ADULT - PROVIDER SPECIALTY LIST ADULT
Vascular Surgery
Internal Medicine
Vascular Surgery
Cardiology
Vascular Surgery
Internal Medicine
Physiatry
Internal Medicine
Internal Medicine

## 2023-02-12 NOTE — DISCHARGE NOTE PROVIDER - PROVIDER TOKENS
PROVIDER:[TOKEN:[[id number]],FOLLOWUP:[1 week]],PROVIDER:[TOKEN:[[id number]],FOLLOWUP:[1 week]] PROVIDER:[TOKEN:[[id number]],FOLLOWUP:[1 week]],PROVIDER:[TOKEN:[[id number]],FOLLOWUP:[1 week]],PROVIDER:[TOKEN:[[id number]],FOLLOWUP:[2 weeks]] PROVIDER:[TOKEN:[[id number]],FOLLOWUP:[1 week]],PROVIDER:[TOKEN:[[id number]],FOLLOWUP:[1 week]],PROVIDER:[TOKEN:[[id number]]],FREE:[LAST:[Hadi],FIRST:[Medical group],PHONE:[phone]],FAX:[(   )    -],ADDRESS:[[address] [place] [address] [address] [place] [address]]]

## 2023-02-12 NOTE — DISCHARGE NOTE PROVIDER - NPI NUMBER (FOR SYSADMIN USE ONLY) :
[[id number]],[[id number] [[id number]],[[id number]],[[id number]] [[id number]],[[id number]]

## 2023-02-12 NOTE — PROGRESS NOTE ADULT - PROBLEM SELECTOR PROBLEM 1
Acute lower limb ischemia
Tobacco dependence
Acute lower limb ischemia

## 2023-02-12 NOTE — DISCHARGE NOTE PROVIDER - CARE PROVIDER_API CALL
Rebeca Angel)  Vascular Surgery  [address] [place] [address]  Phone: [phone]  Fax: [phone]  Follow Up Time: 1 week    Berto Vargas)  Family Medicine  [address] [place] [address]  Phone: [phone]  Fax: [phone]  Follow Up Time: 1 week   Rebeca Angel)  Vascular Surgery  [address] [place] [address]  Phone: [phone]  Fax: [phone]  Follow Up Time: 1 week    Berto Vargas)  Family Medicine  [facility] [place] [address]  Phone: [phone]  Fax: [phone]  Follow Up Time: 1 week    Arnel Pulido (DO)  PhysicalRehab Medicine  [address] [place] [address]  Phone: [phone]  Fax: [phone]  Follow Up Time: 2 weeks   Rebeca Angel)  Vascular Surgery  [address] [place] [address]  Phone: [phone]  Fax: [phone]  Follow Up Time: 1 week    Berto Vargas)  Family Medicine  [address] [facility], [facility] [place] [address]  Phone: [phone]  Fax: [phone]  Follow Up Time: 1 week    Berto Chauhan)  PhysicalRehab Medicine  [address] [place] [address], [address] [place] [address]  Phone: [phone]  Fax: [phone]  Follow Up Time:     Toby, Medical group  [address]  [facility]  Phone: [phone]  Fax: (   )    -  Follow Up Time:

## 2023-02-12 NOTE — PROGRESS NOTE ADULT - ASSESSMENT
52y year old Male with PVD and Peripheral diabetic neuropathic pain    Patients pain adequately managed with current regimen, dilaudid oral 4mg Q4H PRN, tylenol, gabapentin, and amitriptyline.    Can consider revision of amitriptyline to nortriptyline, and increasing gabapentin from once QHS to TID.    Patient should follow outpatient with pain mgmt to continue with opioid taper and titration of neuropathic pain medications, which I discussed with the patient and he is amenable to it. Discussed caution and avoidance of driving while on this medication, as well as operating heavy machinery. Patient is understanding of the risks. Reiterated smoking cessation strategies with patient importance of avoidance of smoking to avoid exacerbation of peripheral vascular disease. Optimal glycemic control is important for neuropathic pain related to diabetic neuropathy    Follow with: Dr. Gruber at [facility] across the street from [place]: [address] [facility] [address] [phone]    or Pain Management, Dr. Berto Chauhan [address] [facility] [address], [facility] [address] ([phone])    or [facility] Pain management, or other pain management physician of the patients choice.    Primary team notes are reviewed, vascular consultation is reviewed  Some nursing notes reviewed    Laboratory studies reviewed including those mentioned earlier/above    Discussed management/coordinated care with primary team, neurological consult.    High risk of morbidity from treatment with schedule II controlled substance medication use.    Overall 60 minutes spent during patient encounter:    ¦ preparing to see the patient (eg, review of tests and procedures)  ¦ performing a medically appropriate examination and/or evaluation   ¦ counseling and educating the patient regarding medication tapering/titration including uptitration of the neuropathic pain medications and gradual taper of the opioid medication, should be done under supervision of the pain management physician. Patient may benefit from medical marijuana, referral can be made from pain management itself following titration of medications.  ¦ referring and communicating with other health care professionals-- primary team, discussed discharge directives.  ¦ documenting clinical information in the electronic or other health record   ¦ care coordination with primary team.

## 2023-02-12 NOTE — PROGRESS NOTE ADULT - PROBLEM SELECTOR PROBLEM 4
Need for other prophylactic measure
Peripheral vascular disease
Need for other prophylactic measure

## 2023-02-12 NOTE — DISCHARGE NOTE PROVIDER - HOSPITAL COURSE
HPI:  51 yo M PMHx Type 2 Diabetes (not on insulin) with diabetic neuropathy presenting with swelling, pain and discoloration of the right foot. He noticed worsening numbness from mid calf to the foot 2 weeks ago which then progressed to swelling and discoloration. he went to his PCP dr. pickens who ordered a doppler venous ultrasound (?) which was negative for clots. He was referred to podiatry who he  saw today and was referred to the ER for evaluation.     ER Course:   168/90 HR 85/min 16/min T 97.7 98% RA   Labs: wnl  EKG pending   given morphine 4 mg x 1   imaging: US Duplex Extremity Right: Atherosclerotic disease with spectral broadening seen from the superficial femoral artery, distally, compatible with upstream stenosis. (08 Feb 2023 23:33)      ---  HOSPITAL COURSE:   Patient was admitted for acute R limb ischemia. CT angio of RLE performed showed Pt with popliteal occlusion and small distal targets. Vascualr surgery was consulted. Angiogram of RLE and femoropopliteal arterial angioplasty of RLE was performed. Patient's        ---  CONSULTANTS:     ---  TIME SPENT:  I, the attending physician, was physically present for the key portions of the evaluation and management (E/M) service provided. The total amount of time spent reviewing the hospital notes, laboratory values, imaging findings, assessing/counseling the patient, discussing with consultant physicians, social work, nursing staff was -- minutes    ---  Primary care provider was made aware of plan for discharge:      [  ] NO     [  ] YES   HPI:  51 yo M PMHx Type 2 Diabetes (not on insulin) with diabetic neuropathy presenting with swelling, pain and discoloration of the right foot. He noticed worsening numbness from mid calf to the foot 2 weeks ago which then progressed to swelling and discoloration. he went to his PCP dr. pickens who ordered a doppler venous ultrasound (?) which was negative for clots. He was referred to podiatry who he  saw today and was referred to the ER for evaluation.     ER Course:   168/90 HR 85/min 16/min T 97.7 98% RA   Labs: wnl  EKG pending   given morphine 4 mg x 1   imaging: US Duplex Extremity Right: Atherosclerotic disease with spectral broadening seen from the superficial femoral artery, distally, compatible with upstream stenosis. (08 Feb 2023 23:33)      ---  HOSPITAL COURSE:   Patient was admitted for acute R limb ischemia. CT angio of RLE performed showed Pt with popliteal occlusion and small distal targets. Vascualr surgery was consulted. Angiogram of RLE and femoropopliteal arterial angioplasty of RLE was performed. Patient was continued on Aspirin, plavix, and cilostazol. Pain Management was consulted, and patient was transitioned to PO pain medication and was able to tolerate pain. Patient ambulated in hospital. Patient is medically optimized for discharge.        ---  CONSULTANTS:     ---  TIME SPENT:  I, the attending physician, was physically present for the key portions of the evaluation and management (E/M) service provided. The total amount of time spent reviewing the hospital notes, laboratory values, imaging findings, assessing/counseling the patient, discussing with consultant physicians, social work, nursing staff was -- minutes    ---  Primary care provider was made aware of plan for discharge:      [  ] NO     [  ] YES   HPI:  53 yo M PMHx Type 2 Diabetes (not on insulin) with diabetic neuropathy presenting with swelling, pain and discoloration of the right foot. He noticed worsening numbness from mid calf to the foot 2 weeks ago which then progressed to swelling and discoloration. he went to his PCP dr. pickens who ordered a doppler venous ultrasound (?) which was negative for clots. He was referred to podiatry who he  saw today and was referred to the ER for evaluation.     ER Course:   168/90 HR 85/min 16/min T 97.7 98% RA   Labs: wnl  EKG pending   given morphine 4 mg x 1   imaging: US Duplex Extremity Right: Atherosclerotic disease with spectral broadening seen from the superficial femoral artery, distally, compatible with upstream stenosis. (08 Feb 2023 23:33)      ---  HOSPITAL COURSE:   Patient was admitted for acute R limb ischemia. CT angio of RLE performed showed Pt with popliteal occlusion and small distal targets. Vascular surgery and Cardiology was consulted. Angiogram of RLE and femoropopliteal arterial angioplasty of RLE was performed. Patient was started on Aspirin, plavix, and cilostazol. Pain Management was consulted, and patient was transitioned to PO pain medication and was able to tolerate pain. Patient ambulated in hospital. Patient is medically optimized for discharge.     T(C): 36.5 (02-12-23 @ 04:35), Max: 36.7 (02-11-23 @ 21:04)  HR: 64 (02-12-23 @ 04:35) (64 - 69)  BP: 134/57 (02-12-23 @ 04:35) (130/61 - 150/74)  RR: 18 (02-12-23 @ 04:35) (17 - 18)  SpO2: 98% (02-12-23 @ 04:35) (97% - 98%)    GENERAL: patient appears well, no acute distress, appropriately interactive  EYES: sclera clear, no exudates  ENMT: oropharynx clear without erythema, no exudates, moist mucous membranes  NECK: supple, soft  LUNGS: good air entry bilaterally, clear to auscultation, symmetric breath sounds, no wheezing or rhonchi appreciated  HEART: soft S1/S2, regular rate and rhythm, no murmurs noted, no lower extremity edema  GASTROINTESTINAL: abdomen is soft, nontender, nondistended, normoactive bowel sounds  INTEGUMENT: erythema and swelling of RLE  MUSCULOSKELETAL: no clubbing or cyanosis, no obvious deformity  EXTREMITIES: 2+ b/l pedal pulses. Severe  PAD stasis skin changes Rt lower EXT, RLE tenderness to palpation, Erythema, tenderness to palpation, warm  NEUROLOGIC: awake, alert, oriented x3, good muscle tone in all 4 extremities  HEME/LYMPH: no obvious ecchymosis or petechiae        ---  CONSULTANTS:     Cardiology: Dr. Mague Sevilla  PM&R: Dr. Arnel Pulido  Vascular Surgery: Dr. Robles    ---  TIME SPENT:  I, the attending physician, was physically present for the key portions of the evaluation and management (E/M) service provided. The total amount of time spent reviewing the hospital notes, laboratory values, imaging findings, assessing/counseling the patient, discussing with consultant physicians, social work, nursing staff was -- minutes    ---  Primary care provider was made aware of plan for discharge:      [  ] NO     [  ] YES   HPI:  51 yo M PMHx Type 2 Diabetes (not on insulin) with diabetic neuropathy presenting with swelling, pain and discoloration of the right foot. He noticed worsening numbness from mid calf to the foot 2 weeks ago which then progressed to swelling and discoloration. he went to his PCP dr. pickens who ordered a doppler venous ultrasound (?) which was negative for clots. He was referred to podiatry who he  saw today and was referred to the ER for evaluation.     ER Course:   168/90 HR 85/min 16/min T 97.7 98% RA   Labs: wnl  EKG pending   given morphine 4 mg x 1   imaging: US Duplex Extremity Right: Atherosclerotic disease with spectral broadening seen from the superficial femoral artery, distally, compatible with upstream stenosis. (08 Feb 2023 23:33)      ---  HOSPITAL COURSE:   Patient was admitted for acute R limb ischemia. CT angio of RLE performed showed Pt with popliteal occlusion and small distal targets. Vascular surgery and Cardiology was consulted. Angiogram of RLE and femoropopliteal arterial angioplasty of RLE was performed. Patient was started on Aspirin, plavix, and cilostazol. Pain Management was consulted, and patient was transitioned to PO pain medication and was able to tolerate pain. Patient ambulated in hospital. Patient seen and examined on day of discharge, and is medically optimized for discharge.     T(C): 36.5 (02-12-23 @ 04:35), Max: 36.7 (02-11-23 @ 21:04)  HR: 64 (02-12-23 @ 04:35) (64 - 69)  BP: 134/57 (02-12-23 @ 04:35) (130/61 - 150/74)  RR: 18 (02-12-23 @ 04:35) (17 - 18)  SpO2: 98% (02-12-23 @ 04:35) (97% - 98%)    GENERAL: patient appears well, no acute distress, appropriately interactive  EYES: sclera clear, no exudates  ENMT: oropharynx clear without erythema, no exudates, moist mucous membranes  NECK: supple, soft  LUNGS: good air entry bilaterally, clear to auscultation, symmetric breath sounds, no wheezing or rhonchi appreciated  HEART: soft S1/S2, regular rate and rhythm, no murmurs noted, no lower extremity edema  GASTROINTESTINAL: abdomen is soft, nontender, nondistended, normoactive bowel sounds  INTEGUMENT: erythema and swelling of RLE  MUSCULOSKELETAL: no clubbing or cyanosis, no obvious deformity  EXTREMITIES: 2+ b/l pedal pulses. Severe  PAD stasis skin changes Rt lower EXT, RLE tenderness to palpation, Erythema, tenderness to palpation, warm  NEUROLOGIC: awake, alert, oriented x3, good muscle tone in all 4 extremities  HEME/LYMPH: no obvious ecchymosis or petechiae        ---  CONSULTANTS:     Cardiology: Dr. Mague Sevilla  PM&R: Dr. Arnel Pulido  Vascular Surgery: Dr. Robles    ---  TIME SPENT:  I, the attending physician, was physically present for the key portions of the evaluation and management (E/M) service provided. The total amount of time spent reviewing the hospital notes, laboratory values, imaging findings, assessing/counseling the patient, discussing with consultant physicians, social work, nursing staff was -- minutes    ---  Primary care provider was made aware of plan for discharge:      [  ] NO     [  ] YES   HPI:  53 yo M PMHx Type 2 Diabetes (not on insulin) with diabetic neuropathy presenting with swelling, pain and discoloration of the right foot. He noticed worsening numbness from mid calf to the foot 2 weeks ago which then progressed to swelling and discoloration. he went to his PCP dr. pickens who ordered a doppler venous ultrasound (?) which was negative for clots. He was referred to podiatry who he  saw today and was referred to the ER for evaluation.     ER Course:   168/90 HR 85/min 16/min T 97.7 98% RA   Labs: wnl  EKG pending   given morphine 4 mg x 1   imaging: US Duplex Extremity Right: Atherosclerotic disease with spectral broadening seen from the superficial femoral artery, distally, compatible with upstream stenosis. (08 Feb 2023 23:33)      ---  HOSPITAL COURSE:   Patient was admitted for  possible  acute R limb ischemia with severe chronic neuropathy ,Pt seen by Vascular in ER & started on IV Heparin Drip.  CT angio of RLE performed showed Pt with popliteal occlusion and small distal targets. Vascular surgery Dr Crump and Cardiology Dr Kate group was consulted. Angiogram of RLE and femoropopliteal arterial angioplasty of RLE was performed. I V Heparin drip was stopped &  Patient was started on Aspirin, plavix, and cilostazol. Pt ADVISED & EDUCATED to stop smoking , Pain Management was consulted, and patient was transitioned to PO pain medication and was able to tolerate pain. Patient ambulated in hospital. Patient seen and examined on day of discharge, and is medically optimized for discharge. pt to follow up with Vascular Sx, Pain Management & Neurologist as out pt.      ---  CONSULTANTS:     Cardiology: Dr. Mague Sevilla  PM&R: Dr. Arnel Pulido  Vascular Surgery: Dr. Robles    ---  TIME SPENT:  I, the attending physician, was physically present for the key portions of the evaluation and management (E/M) service provided. The total amount of time spent reviewing the hospital notes, laboratory values, imaging findings, assessing/counseling the patient, discussing with consultant physicians, social work, nursing staff was 60  minutes

## 2023-02-15 PROBLEM — I73.9 PERIPHERAL VASCULAR DISEASE, UNSPECIFIED: Chronic | Status: ACTIVE | Noted: 2023-02-10

## 2023-02-15 PROBLEM — G62.9 POLYNEUROPATHY, UNSPECIFIED: Chronic | Status: ACTIVE | Noted: 2023-02-10

## 2023-02-17 ENCOUNTER — APPOINTMENT (OUTPATIENT)
Dept: VASCULAR SURGERY | Facility: CLINIC | Age: 53
End: 2023-02-17
Payer: COMMERCIAL

## 2023-02-17 VITALS
WEIGHT: 144 LBS | BODY MASS INDEX: 22.6 KG/M2 | DIASTOLIC BLOOD PRESSURE: 85 MMHG | SYSTOLIC BLOOD PRESSURE: 109 MMHG | HEIGHT: 67 IN | OXYGEN SATURATION: 97 % | HEART RATE: 75 BPM

## 2023-02-17 DIAGNOSIS — I73.9 PERIPHERAL VASCULAR DISEASE, UNSPECIFIED: ICD-10-CM

## 2023-02-17 PROCEDURE — 99213 OFFICE O/P EST LOW 20 MIN: CPT

## 2023-02-17 NOTE — PHYSICAL EXAM
[1+] : left 1+ [FreeTextEntry1] : L Groin is soft, no hematoma\par R toes Capillary refill 3 sec\par R Foot is pink and swollen\par Palpabled 3 lymph nodes [de-identified] : R groin 1 mm mod tender rubbery structures

## 2023-02-17 NOTE — ASSESSMENT
[FreeTextEntry1] : Mr. CARRINGTON is s/p R SFA angioplasty.  Right groin is soft, no hematoma. Pain is likely related to lymph node enlargement. \par

## 2023-02-17 NOTE — REASON FOR VISIT
[Procedure: _________] : a [unfilled] procedure visit [FreeTextEntry1] : 2/10 R LE angiogram, R SFA angioplasty

## 2023-02-17 NOTE — HISTORY OF PRESENT ILLNESS
[FreeTextEntry1] : 52 y o s/p L CFA ultrasound guided access, aortogram, RLE angiogram with R SFA angioplasty.  Pt presents to office complaining or R groin pain since yesterday.  Pain described as sharp non radiating and has improved since then but patient was concerned.  \par  Bexarotene Counseling:  I discussed with the patient the risks of bexarotene including but not limited to hair loss, dry lips/skin/eyes, liver abnormalities, hyperlipidemia, pancreatitis, depression/suicidal ideation, photosensitivity, drug rash/allergic reactions, hypothyroidism, anemia, leukopenia, infection, cataracts, and teratogenicity.  Patient understands that they will need regular blood tests to check lipid profile, liver function tests, white blood cell count, thyroid function tests and pregnancy test if applicable.

## 2023-02-23 ENCOUNTER — EMERGENCY (EMERGENCY)
Facility: HOSPITAL | Age: 53
LOS: 1 days | Discharge: HOME | End: 2023-02-23
Attending: EMERGENCY MEDICINE | Admitting: EMERGENCY MEDICINE
Payer: COMMERCIAL

## 2023-02-23 VITALS
SYSTOLIC BLOOD PRESSURE: 130 MMHG | RESPIRATION RATE: 16 BRPM | OXYGEN SATURATION: 97 % | TEMPERATURE: 98 F | DIASTOLIC BLOOD PRESSURE: 80 MMHG | HEART RATE: 79 BPM

## 2023-02-23 VITALS
OXYGEN SATURATION: 96 % | HEART RATE: 82 BPM | DIASTOLIC BLOOD PRESSURE: 91 MMHG | HEIGHT: 67 IN | WEIGHT: 143.96 LBS | SYSTOLIC BLOOD PRESSURE: 145 MMHG | RESPIRATION RATE: 18 BRPM | TEMPERATURE: 98 F

## 2023-02-23 PROCEDURE — 93971 EXTREMITY STUDY: CPT

## 2023-02-23 PROCEDURE — 99285 EMERGENCY DEPT VISIT HI MDM: CPT

## 2023-02-23 PROCEDURE — 93926 LOWER EXTREMITY STUDY: CPT

## 2023-02-23 PROCEDURE — 93971 EXTREMITY STUDY: CPT | Mod: 26,RT

## 2023-02-23 PROCEDURE — 99284 EMERGENCY DEPT VISIT MOD MDM: CPT | Mod: 25

## 2023-02-23 PROCEDURE — 93926 LOWER EXTREMITY STUDY: CPT | Mod: 26,RT

## 2023-02-23 RX ORDER — HYDROMORPHONE HYDROCHLORIDE 2 MG/ML
4 INJECTION INTRAMUSCULAR; INTRAVENOUS; SUBCUTANEOUS ONCE
Refills: 0 | Status: DISCONTINUED | OUTPATIENT
Start: 2023-02-23 | End: 2023-02-23

## 2023-02-23 RX ADMIN — HYDROMORPHONE HYDROCHLORIDE 4 MILLIGRAM(S): 2 INJECTION INTRAMUSCULAR; INTRAVENOUS; SUBCUTANEOUS at 13:02

## 2023-02-23 NOTE — ED PROVIDER NOTE - PROGRESS NOTE DETAILS
Case was reviewed with surgical service who reviewed arterial Doppler themselves and discussed it with their vascular attending and following that it was decided that patient could be discharged home with follow-up with vascular surgeon as outpatient.

## 2023-02-23 NOTE — ED ADULT NURSE NOTE - CAS DISCH TRANSFER METHOD
Zachary Lopez, jarocho 70 y.o. male presents to the ED w/ complaint of pain    Triage note: Pt presents with left sided pain since Sunday.   Chief Complaint   Patient presents with    Flank Pain     L sided since Sunday, denies problems urinating or hematuria. describes pain as sharp     Review of patient's allergies indicates:   Allergen Reactions    Amoxicillin Other (See Comments)     wheezing    Lipitor [atorvastatin]      Fatigue     Past Medical History:   Diagnosis Date    Corneal scar, right eye     Diabetes mellitus, type 2     Hypertension    Patient Identifiers for Zachary Lopez checked and correct  LOC: The patient is awake, alert and aware of environment with an appropriate affect, the patient is oriented x 3 and speaking appropriate.  APPEARANCE: Patient resting comfortably and in no acute distress, patient is clean and well groomed, patient's clothing is properly fastened.  SKIN: The skin is warm and dry, patient has normal skin turgor and moist mucus membranes,no rashes or lesions.Skin Intact , No Breakdown Noted  Musculoskeletal :  Normal range of motion noted. Moves all extremeties well, No swelling or tenderness noted  RESPIRATORY: Airway is open and patent, respirations are spontaneous, patient has a normal effort and rate.  CARDIAC: Patient has a normal rate and rhythm, no periphreal edema noted, capillary refill < 3 seconds.   ABDOMEN: Soft and non tender to palpation, no distention noted.   PULSES: 2+  And symmetrical in all extremeties  NEUROLOGIC: PERRL,  facial expression is symmetrical, patient moving all extremities, normal sensation in all extremities when touched with a finger.The patient is awake, alert and cooperative with a calm affect, patient is aware of environment.    Will continue to monitor   
Private car

## 2023-02-23 NOTE — ED PROVIDER NOTE - PHYSICAL EXAMINATION
Gen: WD/WN white male in mild distress  Head:  NC/AT  ENT:  PERRLA, EOMI, Mucous membranes moist  Neck: Supple/No Midline tenderness/No meningismus  Ext:  warm, well perfused, moving all extremities spontaneously, no peripheral edema. Exquisite sensitivity to even light touch RLE with 1-2+ pitting edema     Skin: intact w/o rash  Neuro:  A&Ox3, no focal neuro deficit, speech clear

## 2023-02-23 NOTE — CONSULT NOTE ADULT - SUBJECTIVE AND OBJECTIVE BOX
VASCULAR SURGERY PA CONSULT NOTE:    CHIEF COMPLAINT:  Patient is a 52y old  Male who presents with a chief complaint of RLE pain.    HPI FROM ED:  HPI:Patient is a 52-year-old white male with history of hypertension diabetes peripheral vascular disease peripheral neuropathy now presenting to the emergency department here at [facility] complaining of right lower extremity pain.  Patient had balloon dilatation for symptomatic peripheral vascular disease of the right lower extremity a few weeks ago and now presents to the emergency department with worsening pain beneath the level of right knee.  Pain is described as severe sensitivity even to light touch.  No weakness to lower extremity.  No warmth to lower extremity.  No fever no chills no nausea vomiting diarrhea.  Of note patient was evaluated by Dr. Velasco last week and had a that time unremarkable vascular profile.  No fever no chills no nausea vomiting diarrhea.  No trauma to extremity.    Interval HPI:   52yoM with PMHx of DM, diabetic neuropathy, well known to the vascular surgery team, presents to the ED for worsening RLE pain. Patient had R SFA angioplasty with Dr. Velasco on 2/10/23, and has recently seen Dr. Velasco last week when he was complaining of R groin pain, which patient states Dr. Velasco did u/s in office revealing "3 lymph nodes". Patient admits to worsening R foot pain, plantar and dorsal aspects of the foot, and superiorly to the knee, specifically worse in the upper lateral portion of the R shin. Patient describes pain as a constant, burning pain which has been present since before angioplasty, and this morning with "stabbing/jabbing electric-like" pain. Patient denies pain of the upper leg/hip, pain to the L foot different than his normal diabetic neuropathic pain. Patient states that he sleeps with R foot hanging off the bed. States foot "looked better yesterday" and was less red on the bottom aspect. Admits to taking medication as prescribed, including ASA, cilostazol, clopidogrel. Patient denies paralysis, motor changes,  CP/SOB/VU/palpitations/dizziness/lightheadedness.      PAST MEDICAL & SURGICAL HISTORY:  Diabetes  Traumatic brain injury  Migraine  Neuropathy  PVD (peripheral vascular disease)  No significant past surgical history    REVIEW OF SYSTEMS:  Constitutional: Denies fever, fatigue or weight loss.  Skin: Denies rash.  Eyes: Denies recent vision problems or eye pain.  ENT: Denies congestion, ear pain, or sore throat.  Endocrine: Denies thyroid problems.  Cardiovascular: Denies chest pain or palpation.  Respiratory: Denies cough, shortness of breath, congestion, or wheezing.  Gastrointestinal: Denies abdominal pain, nausea, vomiting, or diarrhea.  Genitourinary: Denies dysuria.  Musculoskeletal: See HPI  Neurologic: Denies headache.    MEDICATIONS:  Home Medications:  metformin 1000 mg oral tablet: 1  orally 2 times a day (09 Feb 2023 00:35)    MEDICATIONS  (STANDING):    MEDICATIONS  (PRN):      ALLERGIES:  Allergies    Demerol HCl (Anaphylaxis)  Lobster (Unknown)    Intolerances        SOCIAL HISTORY:  Social History:    Smoking: Yes [ ]  No [ ]   ______pk yrs  ETOH  Yes [ ]  No [ ]  Social [ ]  DRUGS:  Yes [ ]  No [ ]  if so what______________    FAMILY HISTORY:  FAMILY HISTORY:  FH: type 2 diabetes (Father, Grandparent)        GENERAL: No acute distress,   HEAD:  Atraumatic, Normocephalic  CHEST/LUNG: CTAB; No wheezes, rales, or rhonchi  HEART: Regular rate and rhythm; No murmurs, rubs, or gallops  ABDOMEN: Soft, non-tender, non-distended; bowel sounds+  EXT: calves non-tender b/l  NEUROLOGY: A&O x 3, no focal deficits    VITAL SIGNS:  Vital Signs Last 24 Hrs  T(C): 36.9 (23 Feb 2023 09:04), Max: 36.9 (23 Feb 2023 09:04)  T(F): 98.4 (23 Feb 2023 09:04), Max: 98.4 (23 Feb 2023 09:04)  HR: 82 (23 Feb 2023 09:04) (82 - 82)  BP: 145/91 (23 Feb 2023 09:04) (145/91 - 145/91)  BP(mean): --  RR: 18 (23 Feb 2023 09:04) (18 - 18)  SpO2: 96% (23 Feb 2023 09:04) (96% - 96%)    Parameters below as of 23 Feb 2023 09:04  Patient On (Oxygen Delivery Method): room air          LABS:                    RADIOLOGY & ADDITIONAL STUDIES:    ASSESSMENT:    PLAN: VASCULAR SURGERY PA CONSULT NOTE:    CHIEF COMPLAINT:  Patient is a 52y old  Male who presents with a chief complaint of RLE pain.    HPI FROM ED:  HPI:Patient is a 52-year-old white male with history of hypertension diabetes peripheral vascular disease peripheral neuropathy now presenting to the emergency department here at [facility] complaining of right lower extremity pain.  Patient had balloon dilatation for symptomatic peripheral vascular disease of the right lower extremity a few weeks ago and now presents to the emergency department with worsening pain beneath the level of right knee.  Pain is described as severe sensitivity even to light touch.  No weakness to lower extremity.  No warmth to lower extremity.  No fever no chills no nausea vomiting diarrhea.  Of note patient was evaluated by Dr. Velasco last week and had a that time unremarkable vascular profile.  No fever no chills no nausea vomiting diarrhea.  No trauma to extremity.    Interval HPI:   52yoM with PMHx of DM, diabetic neuropathy, well known to the vascular surgery team, presents to the ED for worsening RLE pain. Patient had R SFA angioplasty with Dr. Velasco on 2/10/23, and has recently seen Dr. Velasco last week when he was complaining of R groin pain, which patient states Dr. Velasco did u/s in office revealing "3 lymph nodes". Patient admits to worsening R foot pain, plantar and dorsal aspects of the foot, and superiorly to the knee, specifically worse in the upper lateral portion of the R shin. Patient describes pain as a constant, burning pain which has been present since before angioplasty, and this morning with "stabbing/jabbing electric-like" pain. Patient denies pain of the upper leg/hip, pain to the L foot different than his normal diabetic neuropathic pain. Patient states that he sleeps with R foot hanging off the bed. States foot "looked better yesterday" and was less red on the bottom aspect. Admits to taking medication as prescribed, including ASA, cilostazol, clopidogrel. Patient denies paralysis, motor changes,  CP/SOB/VU/palpitations/dizziness/lightheadedness.      PAST MEDICAL & SURGICAL HISTORY:  Diabetes  Traumatic brain injury  Migraine  Neuropathy  PVD (peripheral vascular disease)  No significant past surgical history    REVIEW OF SYSTEMS:  Constitutional: Denies fever, fatigue or weight loss.  Skin: Denies rash.  Eyes: Denies recent vision problems or eye pain.  ENT: Denies congestion, ear pain, or sore throat.  Endocrine: Denies thyroid problems.  Cardiovascular: Denies chest pain or palpation.  Respiratory: Denies cough, shortness of breath, congestion, or wheezing.  Gastrointestinal: Denies abdominal pain, nausea, vomiting, or diarrhea.  Genitourinary: Denies dysuria.  Musculoskeletal: See HPI  Neurologic: Denies headache.    MEDICATIONS:  Home Medications:  metformin 1000 mg oral tablet: 1  orally 2 times a day (09 Feb 2023 00:35)    MEDICATIONS  (STANDING):    MEDICATIONS  (PRN):    ALLERGIES:  Demerol HCl (Anaphylaxis)  Lobster (Unknown)    Intolerances    SOCIAL HISTORY:  Smoking: patient has not smoked for 2 weeks since coming into the ED last visit, now on nicotine patches  ambulates independently  alcohol none  drugs marijuana occasionally for sleep    FAMILY HISTORY:  FH: type 2 diabetes (Father, Grandparent)    PHYSICAL EXAM:   GENERAL: lying in bed  HEAD:  Atraumatic, Normocephalic  CHEST/LUNG: non-labored respirations  HEART: Regular rate and rhythm  EXT: R foot exquisitely tender to light touch, palpable popliteal pulse, DP and PT faintly palpable, signals auscultated at R DP distal, R AT and R PT, R foot is warm to touch, slightly edematous, with erythema of the plantar aspect. Tenderness to light palpation of the lateral aspect of the lower leg, motor function intact    NEUROLOGY: A&O x 3, no focal deficits    VITAL SIGNS:  Vital Signs Last 24 Hrs  T(C): 36.9 (23 Feb 2023 09:04), Max: 36.9 (23 Feb 2023 09:04)  T(F): 98.4 (23 Feb 2023 09:04), Max: 98.4 (23 Feb 2023 09:04)  HR: 82 (23 Feb 2023 09:04) (82 - 82)  BP: 145/91 (23 Feb 2023 09:04) (145/91 - 145/91)  RR: 18 (23 Feb 2023 09:04) (18 - 18)  SpO2: 96% (23 Feb 2023 09:04) (96% - 96%)    Parameters below as of 23 Feb 2023 09:04  Patient On (Oxygen Delivery Method): room air    RADIOLOGY & ADDITIONAL STUDIES:  Arterial duplex pending    ASSESSMENT:  52yoM PMHx DM and diabetic neuropathy, presents to ED for worsening RLE pain. Patient sees Dr. Velasco outpatient and known to the vascular team, had recent R SFA angioplasty on 2/10 with Dr. Velasco and followed up with her last week. Patient's exam is notable for well perfused R foot, with palpable popliteal, PT and DP pulses, signals heard at the AT, PT, DP of the RLE, foot is mildly edematous and without pallor or cyanotic features.      PLAN:  - F/u arterial duplex as ordered by ED  - Patient with well perfused RLE  - Pain regimen, patient requires pain management outpatient    Will discuss with Dr. Gilbert, covering for Dr. Velasco.  VASCULAR SURGERY PA CONSULT NOTE:    CHIEF COMPLAINT:  Patient is a 52y old  Male who presents with a chief complaint of RLE pain.    HPI FROM ED:  HPI:Patient is a 52-year-old white male with history of hypertension diabetes peripheral vascular disease peripheral neuropathy now presenting to the emergency department here at [facility] complaining of right lower extremity pain.  Patient had balloon dilatation for symptomatic peripheral vascular disease of the right lower extremity a few weeks ago and now presents to the emergency department with worsening pain beneath the level of right knee.  Pain is described as severe sensitivity even to light touch.  No weakness to lower extremity.  No warmth to lower extremity.  No fever no chills no nausea vomiting diarrhea.  Of note patient was evaluated by Dr. Velasco last week and had a that time unremarkable vascular profile.  No fever no chills no nausea vomiting diarrhea.  No trauma to extremity.    Interval HPI:   52yoM with PMHx of DM, diabetic neuropathy, well known to the vascular surgery team, presents to the ED for worsening RLE pain. Patient had R SFA angioplasty with Dr. Velasco on 2/10/23, and has recently seen Dr. Velasco last week when he was complaining of R groin pain, which patient states Dr. Velasco did u/s in office revealing "3 lymph nodes". Patient admits to worsening R foot pain, plantar and dorsal aspects of the foot, and superiorly to the knee, specifically worse in the upper lateral portion of the R shin. Patient describes pain as a constant, burning pain which has been present since before angioplasty, and this morning with "stabbing/jabbing electric-like" pain. Patient denies pain of the upper leg/hip, pain to the L foot different than his normal diabetic neuropathic pain. Patient states that he sleeps with R foot hanging off the bed. States foot "looked better yesterday" and was less red on the bottom aspect. Admits to taking medication as prescribed, including ASA, cilostazol, clopidogrel. Patient denies paralysis, motor changes,  CP/SOB/VU/palpitations/dizziness/lightheadedness.      PAST MEDICAL & SURGICAL HISTORY:  Diabetes  Traumatic brain injury  Migraine  Neuropathy  PVD (peripheral vascular disease)  No significant past surgical history    REVIEW OF SYSTEMS:  Constitutional: Denies fever, fatigue or weight loss.  Skin: Denies rash.  Eyes: Denies recent vision problems or eye pain.  ENT: Denies congestion, ear pain, or sore throat.  Endocrine: Denies thyroid problems.  Cardiovascular: Denies chest pain or palpation.  Respiratory: Denies cough, shortness of breath, congestion, or wheezing.  Gastrointestinal: Denies abdominal pain, nausea, vomiting, or diarrhea.  Genitourinary: Denies dysuria.  Musculoskeletal: See HPI  Neurologic: Denies headache.    MEDICATIONS:  Home Medications:  metformin 1000 mg oral tablet: 1  orally 2 times a day (09 Feb 2023 00:35)    MEDICATIONS  (STANDING):    MEDICATIONS  (PRN):    ALLERGIES:  Demerol HCl (Anaphylaxis)  Lobster (Unknown)    Intolerances    SOCIAL HISTORY:  Smoking: patient has not smoked for 2 weeks since coming into the ED last visit, now on nicotine patches  ambulates independently  alcohol none  drugs marijuana occasionally for sleep    FAMILY HISTORY:  FH: type 2 diabetes (Father, Grandparent)    PHYSICAL EXAM:   GENERAL: lying in bed  HEAD:  Atraumatic, Normocephalic  CHEST/LUNG: non-labored respirations  HEART: Regular rate and rhythm  EXT: R foot exquisitely tender to light touch, palpable popliteal pulse, DP and PT faintly palpable, signals auscultated at R DP distal, R AT and R PT, R foot is warm to touch, slightly edematous, with erythema of the plantar aspect. Tenderness to light palpation of the lateral aspect of the lower leg, motor function intact    NEUROLOGY: A&O x 3, no focal deficits    VITAL SIGNS:  Vital Signs Last 24 Hrs  T(C): 36.9 (23 Feb 2023 09:04), Max: 36.9 (23 Feb 2023 09:04)  T(F): 98.4 (23 Feb 2023 09:04), Max: 98.4 (23 Feb 2023 09:04)  HR: 82 (23 Feb 2023 09:04) (82 - 82)  BP: 145/91 (23 Feb 2023 09:04) (145/91 - 145/91)  RR: 18 (23 Feb 2023 09:04) (18 - 18)  SpO2: 96% (23 Feb 2023 09:04) (96% - 96%)    Parameters below as of 23 Feb 2023 09:04  Patient On (Oxygen Delivery Method): room air    RADIOLOGY & ADDITIONAL STUDIES:  Arterial duplex pending    ASSESSMENT:  52yoM PMHx DM and diabetic neuropathy, presents to ED for worsening RLE pain. Patient sees Dr. Velasco outpatient and known to the vascular team, had recent R SFA angioplasty on 2/10 with Dr. Velasco and followed up with her last week. Patient's exam is notable for well perfused R foot, with palpable popliteal, PT and DP pulses, signals heard at the AT, PT, DP of the RLE, foot is mildly edematous and without pallor or cyanotic features.      PLAN:  - F/u arterial/venous duplex as ordered by ED  - Patient with well perfused RLE.  - Pain regimen, patient requires pain management outpatient    Discuss with Dr. Gilbert, covering for Dr. Velasco.

## 2023-02-23 NOTE — ED ADULT TRIAGE NOTE - CHIEF COMPLAINT QUOTE
Presents to ED for right foot pain after having vascular procedure on 2/10/23.  Pt currently taking 4MG of PO dilaudid and it's not helping.

## 2023-02-23 NOTE — ED PROVIDER NOTE - OBJECTIVE STATEMENT
Patient is a 52-year-old white male with history of hypertension diabetes peripheral vascular disease peripheral neuropathy now presenting to the emergency department here at [facility] complaining of right lower extremity pain.  Patient had balloon dilatation for symptomatic peripheral vascular disease of the right lower extremity a few weeks ago and now presents to the emergency department with worsening pain beneath the level of right knee.  Pain is described as severe sensitivity even to light touch.  No weakness to lower extremity.  No warmth to lower extremity.  No fever no chills no nausea vomiting diarrhea.  Of note patient was evaluated by Dr. Velasco last week and had a that time unremarkable vascular profile.  No fever no chills no nausea vomiting diarrhea.  No trauma to extremity.

## 2023-02-23 NOTE — ED PROVIDER NOTE - PATIENT PORTAL LINK FT
You can access the FollowMyHealth Patient Portal offered by [facility] by registering at the following website: [email or web address]. By joining [facility]’s FollowMyHealth portal, you will also be able to view your health information using other applications (apps) compatible with our system.

## 2023-02-23 NOTE — ED PROVIDER NOTE - NSICDXPASTMEDICALHX_GEN_ALL_CORE_FT
PAST MEDICAL HISTORY:  Diabetes     Migraine     Neuropathy     PVD (peripheral vascular disease)     Traumatic brain injury

## 2023-02-23 NOTE — ED PROVIDER NOTE - NSFOLLOWUPINSTRUCTIONS_ED_ALL_ED_FT
Rest.  Continue taking medications as prescribed.  Follow-up with Dr. Lagunas as scheduled.  Follow-up with pain management.  Return here if needed.          Neuropathic Pain      Neuropathic pain is pain caused by damage to the nerves that are responsible for certain sensations in your body (sensory nerves).    Neuropathic pain can make you more sensitive to pain. Even a minor sensation can feel very painful. This is usually a long-term (chronic) condition that can be difficult to treat. The type of pain differs from person to person. It may:  •Start suddenly (acute), or it may develop slowly and become chronic.      •Come and go as damaged nerves heal, or it may stay at the same level for years.      •Cause emotional distress, loss of sleep, and a lower quality of life.        What are the causes?    The most common cause of this condition is diabetes. Many other diseases and conditions can also cause neuropathic pain. Causes of neuropathic pain can be classified as:  •Toxic. This is caused by medicines and chemicals. The most common causes of toxic neuropathic pain is damage from medicines that kill cancer cells (chemotherapy) or alcohol abuse.    •Metabolic. This can be caused by:  •Diabetes.      •Lack of vitamins like B12.        •Traumatic. Any injury that cuts, crushes, or stretches a nerve can cause damage and pain.      •Compression-related. If a sensory nerve gets trapped or compressed for a long period of time, the blood supply to the nerve can be cut off.      •Vascular. Many blood vessel diseases can cause neuropathic pain by decreasing blood supply and oxygen to nerves.      •Autoimmune. This type of pain results from diseases in which the body's defense system (immune system) mistakenly attacks sensory nerves. Examples of autoimmune diseases that can cause neuropathic pain include lupus and multiple sclerosis.      •Infectious. Many types of viral infections can damage sensory nerves and cause pain. Shingles infection is a common cause of this type of pain.      •Inherited. Neuropathic pain can be a symptom of many diseases that are passed down through families (genetic).        What increases the risk?    You are more likely to develop this condition if:  •You have diabetes.      •You smoke.      •You drink too much alcohol.      •You are taking certain medicines, including chemotherapy or medicines that treat immune system disorders.        What are the signs or symptoms?    The main symptom is pain. Neuropathic pain is often described as:  •Burning.      •Shock-like.      •Stinging.      •Hot or cold.      •Itching.        How is this diagnosed?    No single test can diagnose neuropathic pain. It is diagnosed based on:  •A physical exam and your symptoms. Your health care provider will ask you about your pain. You may be asked to use a pain scale to describe how bad your pain is.    •Tests. These may be done to see if you have a cause and location of any nerve damage. They include:  •Nerve conduction studies and electromyography to test how well nerve signals travel through your nerves and muscles (electrodiagnostic testing).      •Skin biopsy to evaluate for small fiber neuropathy.      •Imaging studies, such as:  •X-rays.      •CT scan.      •MRI.          How is this treated?    Treatment for neuropathic pain may change over time. You may need to try different treatment options or a combination of treatments. Some options include:  •Treating the underlying cause of the neuropathy, such as diabetes, kidney disease, or vitamin deficiencies.      •Stopping medicines that can cause neuropathy, such as chemotherapy.    •Medicine to relieve pain. Medicines may include:  •Prescription or over-the-counter pain medicine.      •Anti-seizure medicine.      •Antidepressant medicines.      •Pain-relieving patches or creams that are applied to painful areas of skin.      •A medicine to numb the area (local anesthetic), which can be injected as a nerve block.        •Transcutaneous nerve stimulation. This uses electrical currents to block painful nerve signals. The treatment is painless.    •Alternative treatments, such as:  •Acupuncture.      •Meditation.      •Massage.      •Occupational or physical therapy.      •Pain management programs.      •Counseling.          Follow these instructions at home:      Medicines   A prescription pill bottle with an example of a pill.   •Take over-the-counter and prescription medicines only as told by your health care provider.    •Ask your health care provider if the medicine prescribed to you:  •Requires you to avoid driving or using machinery.    •Can cause constipation. You may need to take these actions to prevent or treat constipation:  •Drink enough fluid to keep your urine pale yellow.       •Take over-the-counter or prescription medicines.      •Eat foods that are high in fiber, such as beans, whole grains, and fresh fruits and vegetables.       •Limit foods that are high in fat and processed sugars, such as fried or sweet foods.             Lifestyle   Two people talking with a counselor.   •Have a good support system at home.      •Consider joining a chronic pain support group.      • Do not use any products that contain nicotine or tobacco. These products include cigarettes, chewing tobacco, and vaping devices, such as e-cigarettes. If you need help quitting, ask your health care provider.      • Do not drink alcohol.      General instructions     •Learn as much as you can about your condition.      •Work closely with all your health care providers to find the treatment plan that works best for you.      •Ask your health care provider what activities are safe for you.      •Keep all follow-up visits. This is important.        Contact a health care provider if:    •Your pain treatments are not working.      •You are having side effects from your medicines.      •You are struggling with tiredness (fatigue), mood changes, depression, or anxiety.        Get help right away if:    •You have thoughts of hurting yourself.      Get help right away if you feel like you may hurt yourself or others, or have thoughts about taking your own life. Go to your nearest emergency room or:   • Call 911.       • Call the National Suicide Prevention Lifeline at 1-668.731.1529 or 559. This is open 24 hours a day.       • Text the Crisis Text Line at 460135.          Summary    •Neuropathic pain is pain caused by damage to the nerves that are responsible for certain sensations in your body (sensory nerves).      •Neuropathic pain may come and go as damaged nerves heal, or it may stay at the same level for years.      •Neuropathic pain is usually a long-term condition that can be difficult to treat. Consider joining a chronic pain support group.      This information is not intended to replace advice given to you by your health care provider. Make sure you discuss any questions you have with your health care provider.      Document Revised: 08/15/2022 Document Reviewed: 08/15/2022    Elsevier Patient Education © 2023 [employer]

## 2023-02-23 NOTE — ED PROVIDER NOTE - DIFFERENTIAL DIAGNOSIS
Differential diagnoses include vascular occlusion, peripheral neuropathy, cellulitis doubt, musculoskeletal pain doubt, radiculopathy doubt. Differential Diagnosis

## 2023-02-23 NOTE — ED ADULT NURSE REASSESSMENT NOTE - NS ED NURSE REASSESS COMMENT FT1
1245:  task rn:  patient states that he has pain, and though he was told he would not be given any pain medication and can take his own, he states that he left his pain medication home and accidentally took aspirin instead.  made MD aware, who will put in order for 4mg of Dilaudid PO.  patient was told that he will not get IV dose.  garima salamanca.

## 2023-02-23 NOTE — ED PROVIDER NOTE - CARE PROVIDER_API CALL
Doug Prado (MD)  Anesthesiology; Pain Medicine  [address]  [place] [address]  Phone: [phone]  Fax: [phone]  Follow Up Time:

## 2023-02-23 NOTE — ED PROVIDER NOTE - CLINICAL SUMMARY MEDICAL DECISION MAKING FREE TEXT BOX
Gradual worsening of right lower extremity pain in this individual with recent balloon treatment for PVD right lower extremity here requiring evaluation as well as Doppler arterial and venous right lower extremity.  In addition consultation with Dr. Lagunas's team.

## 2023-02-23 NOTE — ED ADULT NURSE REASSESSMENT NOTE - NS ED NURSE REASSESS COMMENT FT1
1300:  staff rn  patient medicated as ordered.  medication could not be scanned because it was administered as 2 2mg tablets and not 1 4 mg tablet.  pharmacy aware, but patient not wanting to wait for new order.  RN made aware.  garima salamanca

## 2023-03-01 ENCOUNTER — APPOINTMENT (OUTPATIENT)
Dept: VASCULAR SURGERY | Facility: CLINIC | Age: 53
End: 2023-03-01

## 2023-04-13 ENCOUNTER — EMERGENCY (EMERGENCY)
Facility: HOSPITAL | Age: 53
LOS: 1 days | Discharge: HOME | End: 2023-04-13
Attending: EMERGENCY MEDICINE | Admitting: EMERGENCY MEDICINE
Payer: COMMERCIAL

## 2023-04-13 VITALS
SYSTOLIC BLOOD PRESSURE: 145 MMHG | RESPIRATION RATE: 18 BRPM | HEART RATE: 78 BPM | DIASTOLIC BLOOD PRESSURE: 74 MMHG | OXYGEN SATURATION: 100 % | HEIGHT: 67 IN | TEMPERATURE: 97 F | WEIGHT: 139.99 LBS

## 2023-04-13 VITALS
HEART RATE: 73 BPM | SYSTOLIC BLOOD PRESSURE: 159 MMHG | OXYGEN SATURATION: 99 % | TEMPERATURE: 99 F | DIASTOLIC BLOOD PRESSURE: 77 MMHG | RESPIRATION RATE: 17 BRPM

## 2023-04-13 LAB
ALBUMIN SERPL ELPH-MCNC: 3.9 G/DL — SIGNIFICANT CHANGE UP (ref 3.3–5)
ALP SERPL-CCNC: 66 U/L — SIGNIFICANT CHANGE UP (ref 40–120)
ALT FLD-CCNC: 17 U/L — SIGNIFICANT CHANGE UP (ref 12–78)
AMPHET UR-MCNC: NEGATIVE — SIGNIFICANT CHANGE UP
ANION GAP SERPL CALC-SCNC: 11 MMOL/L — SIGNIFICANT CHANGE UP (ref 5–17)
APPEARANCE UR: CLEAR — SIGNIFICANT CHANGE UP
APTT BLD: 27.9 SEC — SIGNIFICANT CHANGE UP (ref 27.5–35.5)
AST SERPL-CCNC: 13 U/L — LOW (ref 15–37)
BARBITURATES UR SCN-MCNC: NEGATIVE — SIGNIFICANT CHANGE UP
BASOPHILS # BLD AUTO: 0.02 K/UL — SIGNIFICANT CHANGE UP (ref 0–0.2)
BASOPHILS # BLD AUTO: 0.06 K/UL — SIGNIFICANT CHANGE UP (ref 0–0.2)
BASOPHILS NFR BLD AUTO: 0.2 % — SIGNIFICANT CHANGE UP (ref 0–2)
BASOPHILS NFR BLD AUTO: 0.7 % — SIGNIFICANT CHANGE UP (ref 0–2)
BENZODIAZ UR-MCNC: NEGATIVE — SIGNIFICANT CHANGE UP
BILIRUB SERPL-MCNC: 0.5 MG/DL — SIGNIFICANT CHANGE UP (ref 0.2–1.2)
BILIRUB UR-MCNC: NEGATIVE — SIGNIFICANT CHANGE UP
BUN SERPL-MCNC: 14 MG/DL — SIGNIFICANT CHANGE UP (ref 7–23)
CALCIUM SERPL-MCNC: 9.9 MG/DL — SIGNIFICANT CHANGE UP (ref 8.5–10.1)
CHLORIDE SERPL-SCNC: 106 MMOL/L — SIGNIFICANT CHANGE UP (ref 96–108)
CK MB CFR SERPL CALC: <1 NG/ML — SIGNIFICANT CHANGE UP (ref 0–3.6)
CO2 SERPL-SCNC: 21 MMOL/L — LOW (ref 22–31)
COCAINE METAB.OTHER UR-MCNC: NEGATIVE — SIGNIFICANT CHANGE UP
COLOR SPEC: SIGNIFICANT CHANGE UP
CREAT SERPL-MCNC: 0.93 MG/DL — SIGNIFICANT CHANGE UP (ref 0.5–1.3)
DIFF PNL FLD: NEGATIVE — SIGNIFICANT CHANGE UP
EGFR: 98 ML/MIN/1.73M2 — SIGNIFICANT CHANGE UP
EOSINOPHIL # BLD AUTO: 0.01 K/UL — SIGNIFICANT CHANGE UP (ref 0–0.5)
EOSINOPHIL # BLD AUTO: 0.08 K/UL — SIGNIFICANT CHANGE UP (ref 0–0.5)
EOSINOPHIL NFR BLD AUTO: 0.1 % — SIGNIFICANT CHANGE UP (ref 0–6)
EOSINOPHIL NFR BLD AUTO: 0.9 % — SIGNIFICANT CHANGE UP (ref 0–6)
ETHANOL SERPL-MCNC: <10 MG/DL — SIGNIFICANT CHANGE UP (ref 0–10)
GLUCOSE SERPL-MCNC: 179 MG/DL — HIGH (ref 70–99)
GLUCOSE UR QL: 250
HCT VFR BLD CALC: 33.9 % — LOW (ref 39–50)
HCT VFR BLD CALC: 38.1 % — LOW (ref 39–50)
HGB BLD-MCNC: 11.8 G/DL — LOW (ref 13–17)
HGB BLD-MCNC: 13.3 G/DL — SIGNIFICANT CHANGE UP (ref 13–17)
IMM GRANULOCYTES NFR BLD AUTO: 0.2 % — SIGNIFICANT CHANGE UP (ref 0–0.9)
IMM GRANULOCYTES NFR BLD AUTO: 0.3 % — SIGNIFICANT CHANGE UP (ref 0–0.9)
INR BLD: 1.3 RATIO — HIGH (ref 0.88–1.16)
KETONES UR-MCNC: ABNORMAL
LACTATE SERPL-SCNC: 1.8 MMOL/L — SIGNIFICANT CHANGE UP (ref 0.7–2)
LACTATE SERPL-SCNC: 4.6 MMOL/L — CRITICAL HIGH (ref 0.7–2)
LEUKOCYTE ESTERASE UR-ACNC: NEGATIVE — SIGNIFICANT CHANGE UP
LIDOCAIN IGE QN: 43 U/L — LOW (ref 73–393)
LYMPHOCYTES # BLD AUTO: 1.06 K/UL — SIGNIFICANT CHANGE UP (ref 1–3.3)
LYMPHOCYTES # BLD AUTO: 10.5 % — LOW (ref 13–44)
LYMPHOCYTES # BLD AUTO: 2.22 K/UL — SIGNIFICANT CHANGE UP (ref 1–3.3)
LYMPHOCYTES # BLD AUTO: 24.5 % — SIGNIFICANT CHANGE UP (ref 13–44)
MAGNESIUM SERPL-MCNC: 1.8 MG/DL — SIGNIFICANT CHANGE UP (ref 1.6–2.6)
MCHC RBC-ENTMCNC: 29.6 PG — SIGNIFICANT CHANGE UP (ref 27–34)
MCHC RBC-ENTMCNC: 30.2 PG — SIGNIFICANT CHANGE UP (ref 27–34)
MCHC RBC-ENTMCNC: 34.8 GM/DL — SIGNIFICANT CHANGE UP (ref 32–36)
MCHC RBC-ENTMCNC: 34.9 GM/DL — SIGNIFICANT CHANGE UP (ref 32–36)
MCV RBC AUTO: 84.9 FL — SIGNIFICANT CHANGE UP (ref 80–100)
MCV RBC AUTO: 86.7 FL — SIGNIFICANT CHANGE UP (ref 80–100)
METHADONE UR-MCNC: NEGATIVE — SIGNIFICANT CHANGE UP
MONOCYTES # BLD AUTO: 0.18 K/UL — SIGNIFICANT CHANGE UP (ref 0–0.9)
MONOCYTES # BLD AUTO: 0.48 K/UL — SIGNIFICANT CHANGE UP (ref 0–0.9)
MONOCYTES NFR BLD AUTO: 1.8 % — LOW (ref 2–14)
MONOCYTES NFR BLD AUTO: 5.3 % — SIGNIFICANT CHANGE UP (ref 2–14)
NEUTROPHILS # BLD AUTO: 6.19 K/UL — SIGNIFICANT CHANGE UP (ref 1.8–7.4)
NEUTROPHILS # BLD AUTO: 8.78 K/UL — HIGH (ref 1.8–7.4)
NEUTROPHILS NFR BLD AUTO: 68.4 % — SIGNIFICANT CHANGE UP (ref 43–77)
NEUTROPHILS NFR BLD AUTO: 87.1 % — HIGH (ref 43–77)
NITRITE UR-MCNC: NEGATIVE — SIGNIFICANT CHANGE UP
NRBC # BLD: 0 /100 WBCS — SIGNIFICANT CHANGE UP (ref 0–0)
NRBC # BLD: 0 /100 WBCS — SIGNIFICANT CHANGE UP (ref 0–0)
NT-PROBNP SERPL-SCNC: 70 PG/ML — SIGNIFICANT CHANGE UP (ref 0–125)
OB PNL STL: POSITIVE
OPIATES UR-MCNC: POSITIVE — SIGNIFICANT CHANGE UP
PCP SPEC-MCNC: SIGNIFICANT CHANGE UP
PCP UR-MCNC: NEGATIVE — SIGNIFICANT CHANGE UP
PH UR: 7 — SIGNIFICANT CHANGE UP (ref 5–8)
PLATELET # BLD AUTO: 235 K/UL — SIGNIFICANT CHANGE UP (ref 150–400)
PLATELET # BLD AUTO: 309 K/UL — SIGNIFICANT CHANGE UP (ref 150–400)
POTASSIUM SERPL-MCNC: 3.7 MMOL/L — SIGNIFICANT CHANGE UP (ref 3.5–5.3)
POTASSIUM SERPL-SCNC: 3.7 MMOL/L — SIGNIFICANT CHANGE UP (ref 3.5–5.3)
PROT SERPL-MCNC: 7.4 G/DL — SIGNIFICANT CHANGE UP (ref 6–8.3)
PROT UR-MCNC: NEGATIVE — SIGNIFICANT CHANGE UP
PROTHROM AB SERPL-ACNC: 15.3 SEC — HIGH (ref 10.5–13.4)
RAPID RVP RESULT: SIGNIFICANT CHANGE UP
RBC # BLD: 3.91 M/UL — LOW (ref 4.2–5.8)
RBC # BLD: 4.49 M/UL — SIGNIFICANT CHANGE UP (ref 4.2–5.8)
RBC # FLD: 13.3 % — SIGNIFICANT CHANGE UP (ref 10.3–14.5)
RBC # FLD: 13.4 % — SIGNIFICANT CHANGE UP (ref 10.3–14.5)
SARS-COV-2 RNA SPEC QL NAA+PROBE: SIGNIFICANT CHANGE UP
SODIUM SERPL-SCNC: 138 MMOL/L — SIGNIFICANT CHANGE UP (ref 135–145)
SP GR SPEC: 1.01 — SIGNIFICANT CHANGE UP (ref 1.01–1.02)
THC UR QL: POSITIVE — SIGNIFICANT CHANGE UP
TROPONIN I, HIGH SENSITIVITY RESULT: 3.4 NG/L — SIGNIFICANT CHANGE UP
TSH SERPL-MCNC: 0.42 UIU/ML — SIGNIFICANT CHANGE UP (ref 0.36–3.74)
UROBILINOGEN FLD QL: NEGATIVE — SIGNIFICANT CHANGE UP
WBC # BLD: 10.08 K/UL — SIGNIFICANT CHANGE UP (ref 3.8–10.5)
WBC # BLD: 9.05 K/UL — SIGNIFICANT CHANGE UP (ref 3.8–10.5)
WBC # FLD AUTO: 10.08 K/UL — SIGNIFICANT CHANGE UP (ref 3.8–10.5)
WBC # FLD AUTO: 9.05 K/UL — SIGNIFICANT CHANGE UP (ref 3.8–10.5)

## 2023-04-13 PROCEDURE — 96372 THER/PROPH/DIAG INJ SC/IM: CPT

## 2023-04-13 PROCEDURE — 93010 ELECTROCARDIOGRAM REPORT: CPT

## 2023-04-13 PROCEDURE — 85610 PROTHROMBIN TIME: CPT

## 2023-04-13 PROCEDURE — 96375 TX/PRO/DX INJ NEW DRUG ADDON: CPT

## 2023-04-13 PROCEDURE — 83690 ASSAY OF LIPASE: CPT

## 2023-04-13 PROCEDURE — 83605 ASSAY OF LACTIC ACID: CPT

## 2023-04-13 PROCEDURE — 83880 ASSAY OF NATRIURETIC PEPTIDE: CPT

## 2023-04-13 PROCEDURE — 83735 ASSAY OF MAGNESIUM: CPT

## 2023-04-13 PROCEDURE — 36415 COLL VENOUS BLD VENIPUNCTURE: CPT

## 2023-04-13 PROCEDURE — 80053 COMPREHEN METABOLIC PANEL: CPT

## 2023-04-13 PROCEDURE — 84443 ASSAY THYROID STIM HORMONE: CPT

## 2023-04-13 PROCEDURE — 74177 CT ABD & PELVIS W/CONTRAST: CPT | Mod: 26,MA

## 2023-04-13 PROCEDURE — 96376 TX/PRO/DX INJ SAME DRUG ADON: CPT

## 2023-04-13 PROCEDURE — 87086 URINE CULTURE/COLONY COUNT: CPT

## 2023-04-13 PROCEDURE — 87040 BLOOD CULTURE FOR BACTERIA: CPT

## 2023-04-13 PROCEDURE — 99285 EMERGENCY DEPT VISIT HI MDM: CPT | Mod: 25

## 2023-04-13 PROCEDURE — 82272 OCCULT BLD FECES 1-3 TESTS: CPT

## 2023-04-13 PROCEDURE — 85730 THROMBOPLASTIN TIME PARTIAL: CPT

## 2023-04-13 PROCEDURE — 93005 ELECTROCARDIOGRAM TRACING: CPT

## 2023-04-13 PROCEDURE — 84484 ASSAY OF TROPONIN QUANT: CPT

## 2023-04-13 PROCEDURE — 96361 HYDRATE IV INFUSION ADD-ON: CPT

## 2023-04-13 PROCEDURE — 81003 URINALYSIS AUTO W/O SCOPE: CPT

## 2023-04-13 PROCEDURE — 96374 THER/PROPH/DIAG INJ IV PUSH: CPT | Mod: XU

## 2023-04-13 PROCEDURE — 74177 CT ABD & PELVIS W/CONTRAST: CPT | Mod: MA

## 2023-04-13 PROCEDURE — 85025 COMPLETE CBC W/AUTO DIFF WBC: CPT

## 2023-04-13 PROCEDURE — 82553 CREATINE MB FRACTION: CPT

## 2023-04-13 PROCEDURE — 71045 X-RAY EXAM CHEST 1 VIEW: CPT

## 2023-04-13 PROCEDURE — 71045 X-RAY EXAM CHEST 1 VIEW: CPT | Mod: 26

## 2023-04-13 PROCEDURE — 0225U NFCT DS DNA&RNA 21 SARSCOV2: CPT

## 2023-04-13 PROCEDURE — 99285 EMERGENCY DEPT VISIT HI MDM: CPT

## 2023-04-13 PROCEDURE — 99284 EMERGENCY DEPT VISIT MOD MDM: CPT | Mod: 25

## 2023-04-13 PROCEDURE — 80307 DRUG TEST PRSMV CHEM ANLYZR: CPT

## 2023-04-13 RX ORDER — SUCRALFATE 1 G
1 TABLET ORAL
Qty: 56 | Refills: 0
Start: 2023-04-13 | End: 2023-04-26

## 2023-04-13 RX ORDER — ONDANSETRON 8 MG/1
4 TABLET, FILM COATED ORAL ONCE
Refills: 0 | Status: COMPLETED | OUTPATIENT
Start: 2023-04-13 | End: 2023-04-13

## 2023-04-13 RX ORDER — SODIUM CHLORIDE 9 MG/ML
1000 INJECTION INTRAMUSCULAR; INTRAVENOUS; SUBCUTANEOUS ONCE
Refills: 0 | Status: COMPLETED | OUTPATIENT
Start: 2023-04-13 | End: 2023-04-13

## 2023-04-13 RX ORDER — MORPHINE SULFATE 50 MG/1
4 CAPSULE, EXTENDED RELEASE ORAL ONCE
Refills: 0 | Status: DISCONTINUED | OUTPATIENT
Start: 2023-04-13 | End: 2023-04-13

## 2023-04-13 RX ORDER — ONDANSETRON 8 MG/1
1 TABLET, FILM COATED ORAL
Qty: 15 | Refills: 0
Start: 2023-04-13 | End: 2023-04-17

## 2023-04-13 RX ORDER — PANTOPRAZOLE SODIUM 20 MG/1
1 TABLET, DELAYED RELEASE ORAL
Qty: 30 | Refills: 0
Start: 2023-04-13 | End: 2023-05-12

## 2023-04-13 RX ORDER — HALOPERIDOL DECANOATE 100 MG/ML
5 INJECTION INTRAMUSCULAR ONCE
Refills: 0 | Status: COMPLETED | OUTPATIENT
Start: 2023-04-13 | End: 2023-04-13

## 2023-04-13 RX ORDER — PANTOPRAZOLE SODIUM 20 MG/1
40 TABLET, DELAYED RELEASE ORAL ONCE
Refills: 0 | Status: COMPLETED | OUTPATIENT
Start: 2023-04-13 | End: 2023-04-13

## 2023-04-13 RX ORDER — METOCLOPRAMIDE HCL 10 MG
10 TABLET ORAL ONCE
Refills: 0 | Status: COMPLETED | OUTPATIENT
Start: 2023-04-13 | End: 2023-04-13

## 2023-04-13 RX ADMIN — HALOPERIDOL DECANOATE 5 MILLIGRAM(S): 100 INJECTION INTRAMUSCULAR at 20:05

## 2023-04-13 RX ADMIN — Medication 10 MILLIGRAM(S): at 20:05

## 2023-04-13 RX ADMIN — SODIUM CHLORIDE 1000 MILLILITER(S): 9 INJECTION INTRAMUSCULAR; INTRAVENOUS; SUBCUTANEOUS at 20:05

## 2023-04-13 RX ADMIN — ONDANSETRON 4 MILLIGRAM(S): 8 TABLET, FILM COATED ORAL at 17:50

## 2023-04-13 RX ADMIN — MORPHINE SULFATE 4 MILLIGRAM(S): 50 CAPSULE, EXTENDED RELEASE ORAL at 18:11

## 2023-04-13 RX ADMIN — ONDANSETRON 4 MILLIGRAM(S): 8 TABLET, FILM COATED ORAL at 19:48

## 2023-04-13 RX ADMIN — MORPHINE SULFATE 4 MILLIGRAM(S): 50 CAPSULE, EXTENDED RELEASE ORAL at 20:20

## 2023-04-13 RX ADMIN — SODIUM CHLORIDE 1000 MILLILITER(S): 9 INJECTION INTRAMUSCULAR; INTRAVENOUS; SUBCUTANEOUS at 17:50

## 2023-04-13 RX ADMIN — SODIUM CHLORIDE 1000 MILLILITER(S): 9 INJECTION INTRAMUSCULAR; INTRAVENOUS; SUBCUTANEOUS at 19:55

## 2023-04-13 RX ADMIN — MORPHINE SULFATE 4 MILLIGRAM(S): 50 CAPSULE, EXTENDED RELEASE ORAL at 20:05

## 2023-04-13 RX ADMIN — SODIUM CHLORIDE 1000 MILLILITER(S): 9 INJECTION INTRAMUSCULAR; INTRAVENOUS; SUBCUTANEOUS at 18:11

## 2023-04-13 RX ADMIN — PANTOPRAZOLE SODIUM 40 MILLIGRAM(S): 20 TABLET, DELAYED RELEASE ORAL at 18:03

## 2023-04-13 RX ADMIN — SODIUM CHLORIDE 1000 MILLILITER(S): 9 INJECTION INTRAMUSCULAR; INTRAVENOUS; SUBCUTANEOUS at 21:08

## 2023-04-13 NOTE — ED PROVIDER NOTE - CLINICAL SUMMARY MEDICAL DECISION MAKING FREE TEXT BOX
52-year-old male with nausea vomiting diarrhea and abdominal pain with chills, states he is vomiting blood and having bloody diarrhea, send CBC, CMP, lactate level, blood cultures, lipase level, start IV fluids, pain control, IV Protonix, antiemetics, CT abdomen pelvis and reevaluate.

## 2023-04-13 NOTE — ED PROVIDER NOTE - OBJECTIVE STATEMENT
52-year-old male with past medical history of type 2 diabetes, diabetic neuropathy, recent admission 2/8 through 2/12/2023 in which patient had right and ischemia with popliteal occlusion seen by vascular and had femoral popliteal arterial angioplasty of right lower extremity, patient presents to the ER complaining of nausea, vomiting, diarrhea, chills, started this morning associated with abdominal pain, feeling lightheaded and dizzy, states that he is having blood in his diarrhea and had vomited blood.

## 2023-04-13 NOTE — ED PROVIDER NOTE - NSFOLLOWUPINSTRUCTIONS_ED_ALL_ED_FT
Follow-up with your primary care doctor, recommend gastroenterology.  Take Protonix, sucralfate, Zofran as directed.  Return to ER if having worsening of symptoms.

## 2023-04-13 NOTE — ED ADULT NURSE NOTE - NSIMPLEMENTINTERV_GEN_ALL_ED
Implemented All Fall Risk Interventions:  [place] to call system. Call bell, personal items and telephone within reach. Instruct patient to call for assistance. Room bathroom lighting operational. Non-slip footwear when patient is off stretcher. Physically safe environment: no spills, clutter or unnecessary equipment. Stretcher in lowest position, wheels locked, appropriate side rails in place. Provide visual cue, wrist band, yellow gown, etc. Monitor gait and stability. Monitor for mental status changes and reorient to person, place, and time. Review medications for side effects contributing to fall risk. Reinforce activity limits and safety measures with patient and family.

## 2023-04-13 NOTE — ED PROVIDER NOTE - PATIENT PORTAL LINK FT
You can access the FollowMyHealth Patient Portal offered by [facility] by registering at the following website: [email or web address]. By joining [employer]’s FollowMyHealth portal, you will also be able to view your health information using other applications (apps) compatible with our system.

## 2023-04-13 NOTE — ED PROVIDER NOTE - PROGRESS NOTE DETAILS
Labs, CAT scan results, repeat lactate, repeat CBC reviewed, patient had gotten IV fluids, antiemetics, pain control, patient to be discharged from ER and to follow-up with outpatient physician, recommend gastroenterology

## 2023-04-13 NOTE — ED PROVIDER NOTE - DIFFERENTIAL DIAGNOSIS
Colitis, gastroenteritis, diverticulitis, appendicitis, electrolyte imbalance Differential Diagnosis

## 2023-04-13 NOTE — ED ADULT NURSE NOTE - NS ED PATIENT SAFETY CONCERN
No Secondary Intention Text (Leave Blank If You Do Not Want): We discussed various closure modalities with the patient, including healing by second intention, primary closure, skin graft, and various flaps.  After discussion of the risks and benefits of these various options, the decision was made to allow the wound to heal by second intention.

## 2023-04-13 NOTE — ED ADULT NURSE NOTE - OBJECTIVE STATEMENT
pt BIBA from home a&ox4 with c/o n/v/d since this AM. reports he saw blood in emesis and in diarrhea. skin color normal. c/o accompanied abdominal pain. abdomen soft, tender to palpation in lower abdomen. denies fever. reports he recently had 4 stents placed in march. denies chest pain

## 2023-04-15 ENCOUNTER — EMERGENCY (EMERGENCY)
Facility: HOSPITAL | Age: 53
LOS: 1 days | Discharge: HOME | End: 2023-04-15
Attending: EMERGENCY MEDICINE | Admitting: EMERGENCY MEDICINE
Payer: COMMERCIAL

## 2023-04-15 VITALS
DIASTOLIC BLOOD PRESSURE: 82 MMHG | OXYGEN SATURATION: 99 % | RESPIRATION RATE: 18 BRPM | HEART RATE: 98 BPM | TEMPERATURE: 98 F | HEIGHT: 67 IN | SYSTOLIC BLOOD PRESSURE: 135 MMHG | WEIGHT: 134.92 LBS

## 2023-04-15 VITALS
HEART RATE: 85 BPM | DIASTOLIC BLOOD PRESSURE: 74 MMHG | SYSTOLIC BLOOD PRESSURE: 139 MMHG | TEMPERATURE: 98 F | RESPIRATION RATE: 19 BRPM | OXYGEN SATURATION: 97 %

## 2023-04-15 LAB
ALBUMIN SERPL ELPH-MCNC: 3.9 G/DL — SIGNIFICANT CHANGE UP (ref 3.3–5)
ALP SERPL-CCNC: 67 U/L — SIGNIFICANT CHANGE UP (ref 40–120)
ALT FLD-CCNC: 23 U/L — SIGNIFICANT CHANGE UP (ref 12–78)
ANION GAP SERPL CALC-SCNC: 11 MMOL/L — SIGNIFICANT CHANGE UP (ref 5–17)
APPEARANCE UR: CLEAR — SIGNIFICANT CHANGE UP
AST SERPL-CCNC: 27 U/L — SIGNIFICANT CHANGE UP (ref 15–37)
BASOPHILS # BLD AUTO: 0.02 K/UL — SIGNIFICANT CHANGE UP (ref 0–0.2)
BASOPHILS NFR BLD AUTO: 0.2 % — SIGNIFICANT CHANGE UP (ref 0–2)
BILIRUB SERPL-MCNC: 0.6 MG/DL — SIGNIFICANT CHANGE UP (ref 0.2–1.2)
BILIRUB UR-MCNC: NEGATIVE — SIGNIFICANT CHANGE UP
BUN SERPL-MCNC: 14 MG/DL — SIGNIFICANT CHANGE UP (ref 7–23)
CALCIUM SERPL-MCNC: 9.7 MG/DL — SIGNIFICANT CHANGE UP (ref 8.5–10.1)
CHLORIDE SERPL-SCNC: 102 MMOL/L — SIGNIFICANT CHANGE UP (ref 96–108)
CO2 SERPL-SCNC: 22 MMOL/L — SIGNIFICANT CHANGE UP (ref 22–31)
COLOR SPEC: SIGNIFICANT CHANGE UP
CREAT SERPL-MCNC: 0.9 MG/DL — SIGNIFICANT CHANGE UP (ref 0.5–1.3)
CULTURE RESULTS: NO GROWTH — SIGNIFICANT CHANGE UP
DIFF PNL FLD: NEGATIVE — SIGNIFICANT CHANGE UP
EGFR: 102 ML/MIN/1.73M2 — SIGNIFICANT CHANGE UP
EOSINOPHIL # BLD AUTO: 0.01 K/UL — SIGNIFICANT CHANGE UP (ref 0–0.5)
EOSINOPHIL NFR BLD AUTO: 0.1 % — SIGNIFICANT CHANGE UP (ref 0–6)
GLUCOSE SERPL-MCNC: 193 MG/DL — HIGH (ref 70–99)
GLUCOSE UR QL: 250
HCT VFR BLD CALC: 38.8 % — LOW (ref 39–50)
HGB BLD-MCNC: 13.8 G/DL — SIGNIFICANT CHANGE UP (ref 13–17)
IMM GRANULOCYTES NFR BLD AUTO: 0.4 % — SIGNIFICANT CHANGE UP (ref 0–0.9)
KETONES UR-MCNC: ABNORMAL
LEUKOCYTE ESTERASE UR-ACNC: NEGATIVE — SIGNIFICANT CHANGE UP
LIDOCAIN IGE QN: 56 U/L — LOW (ref 73–393)
LYMPHOCYTES # BLD AUTO: 1.26 K/UL — SIGNIFICANT CHANGE UP (ref 1–3.3)
LYMPHOCYTES # BLD AUTO: 12.5 % — LOW (ref 13–44)
MCHC RBC-ENTMCNC: 29.8 PG — SIGNIFICANT CHANGE UP (ref 27–34)
MCHC RBC-ENTMCNC: 35.6 GM/DL — SIGNIFICANT CHANGE UP (ref 32–36)
MCV RBC AUTO: 83.8 FL — SIGNIFICANT CHANGE UP (ref 80–100)
MONOCYTES # BLD AUTO: 0.21 K/UL — SIGNIFICANT CHANGE UP (ref 0–0.9)
MONOCYTES NFR BLD AUTO: 2.1 % — SIGNIFICANT CHANGE UP (ref 2–14)
NEUTROPHILS # BLD AUTO: 8.54 K/UL — HIGH (ref 1.8–7.4)
NEUTROPHILS NFR BLD AUTO: 84.7 % — HIGH (ref 43–77)
NITRITE UR-MCNC: NEGATIVE — SIGNIFICANT CHANGE UP
NRBC # BLD: 0 /100 WBCS — SIGNIFICANT CHANGE UP (ref 0–0)
PH UR: 8 — SIGNIFICANT CHANGE UP (ref 5–8)
PLATELET # BLD AUTO: 255 K/UL — SIGNIFICANT CHANGE UP (ref 150–400)
POTASSIUM SERPL-MCNC: 3.5 MMOL/L — SIGNIFICANT CHANGE UP (ref 3.5–5.3)
POTASSIUM SERPL-SCNC: 3.5 MMOL/L — SIGNIFICANT CHANGE UP (ref 3.5–5.3)
PROT SERPL-MCNC: 7.6 G/DL — SIGNIFICANT CHANGE UP (ref 6–8.3)
PROT UR-MCNC: 30 MG/DL
RBC # BLD: 4.63 M/UL — SIGNIFICANT CHANGE UP (ref 4.2–5.8)
RBC # FLD: 13.1 % — SIGNIFICANT CHANGE UP (ref 10.3–14.5)
SODIUM SERPL-SCNC: 135 MMOL/L — SIGNIFICANT CHANGE UP (ref 135–145)
SP GR SPEC: 1.01 — SIGNIFICANT CHANGE UP (ref 1.01–1.02)
SPECIMEN SOURCE: SIGNIFICANT CHANGE UP
UROBILINOGEN FLD QL: NEGATIVE — SIGNIFICANT CHANGE UP
WBC # BLD: 10.08 K/UL — SIGNIFICANT CHANGE UP (ref 3.8–10.5)
WBC # FLD AUTO: 10.08 K/UL — SIGNIFICANT CHANGE UP (ref 3.8–10.5)

## 2023-04-15 PROCEDURE — 99284 EMERGENCY DEPT VISIT MOD MDM: CPT | Mod: 25

## 2023-04-15 PROCEDURE — 81001 URINALYSIS AUTO W/SCOPE: CPT

## 2023-04-15 PROCEDURE — 36415 COLL VENOUS BLD VENIPUNCTURE: CPT

## 2023-04-15 PROCEDURE — 85025 COMPLETE CBC W/AUTO DIFF WBC: CPT

## 2023-04-15 PROCEDURE — 99284 EMERGENCY DEPT VISIT MOD MDM: CPT

## 2023-04-15 PROCEDURE — 80053 COMPREHEN METABOLIC PANEL: CPT

## 2023-04-15 PROCEDURE — 83690 ASSAY OF LIPASE: CPT

## 2023-04-15 RX ORDER — SODIUM CHLORIDE 9 MG/ML
1000 INJECTION INTRAMUSCULAR; INTRAVENOUS; SUBCUTANEOUS ONCE
Refills: 0 | Status: COMPLETED | OUTPATIENT
Start: 2023-04-15 | End: 2023-04-15

## 2023-04-15 RX ORDER — ONDANSETRON 8 MG/1
4 TABLET, FILM COATED ORAL ONCE
Refills: 0 | Status: COMPLETED | OUTPATIENT
Start: 2023-04-15 | End: 2023-04-15

## 2023-04-15 RX ORDER — METOCLOPRAMIDE HCL 10 MG
10 TABLET ORAL ONCE
Refills: 0 | Status: COMPLETED | OUTPATIENT
Start: 2023-04-15 | End: 2023-04-15

## 2023-04-15 RX ORDER — HALOPERIDOL DECANOATE 100 MG/ML
5 INJECTION INTRAMUSCULAR ONCE
Refills: 0 | Status: COMPLETED | OUTPATIENT
Start: 2023-04-15 | End: 2023-04-15

## 2023-04-15 RX ADMIN — ONDANSETRON 4 MILLIGRAM(S): 8 TABLET, FILM COATED ORAL at 14:35

## 2023-04-15 RX ADMIN — SODIUM CHLORIDE 1000 MILLILITER(S): 9 INJECTION INTRAMUSCULAR; INTRAVENOUS; SUBCUTANEOUS at 13:33

## 2023-04-15 RX ADMIN — ONDANSETRON 4 MILLIGRAM(S): 8 TABLET, FILM COATED ORAL at 15:57

## 2023-04-15 RX ADMIN — Medication 10 MILLIGRAM(S): at 14:35

## 2023-04-15 RX ADMIN — SODIUM CHLORIDE 1000 MILLILITER(S): 9 INJECTION INTRAMUSCULAR; INTRAVENOUS; SUBCUTANEOUS at 14:35

## 2023-04-15 RX ADMIN — HALOPERIDOL DECANOATE 5 MILLIGRAM(S): 100 INJECTION INTRAMUSCULAR at 13:33

## 2023-04-15 RX ADMIN — Medication 104 MILLIGRAM(S): at 13:33
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
## 2023-04-15 NOTE — ED ADULT NURSE NOTE - NSIMPLEMENTINTERV_GEN_ALL_ED
Implemented All Universal Safety Interventions:  [place] to call system. Call bell, personal items and telephone within reach. Instruct patient to call for assistance. Room bathroom lighting operational. Non-slip footwear when patient is off stretcher. Physically safe environment: no spills, clutter or unnecessary equipment. Stretcher in lowest position, wheels locked, appropriate side rails in place.

## 2023-04-15 NOTE — ED PROVIDER NOTE - PROGRESS NOTE DETAILS
Patient stable.  Overall symptoms improved.  Reports nausea improved.  Ambulatory with steady gait.  Labs unremarkable.  CAT scan reviewed from 2 days ago which was unremarkable.  Offered repeat CAT scan which patient declines since CAT scan 2 days ago was unremarkable.  Discussed symptoms may be related to cyclic vomiting as patient smokes marijuana on a regular basis.  Recommend to avoid smoking marijuana, patient verbalizes understanding.  Importance of close follow-up with GI discussed, states will make appointment.  Patient has prescription of Zofran at home to use as needed.  Return precautions discussed.

## 2023-04-15 NOTE — ED ADULT NURSE NOTE - OBJECTIVE STATEMENT
Pt. received alert and oriented x3 with chief complaint of multiple episodes of nausea and vomiting since this morning w/ generalized abdominal pain.

## 2023-04-15 NOTE — ED PROVIDER NOTE - DIFFERENTIAL DIAGNOSIS
Differentials include but not limited to viral illness, enteritis, gastritis, colitis, cyclic vomiting, dehydration, electrolyte abnormality Differential Diagnosis

## 2023-04-15 NOTE — ED PROVIDER NOTE - CARE PROVIDER_API CALL
Jared Bell ()  Internal Medicine  [address] [place] [address]  Phone: [phone]  Fax: [phone]  Follow Up Time: 1-3 Days

## 2023-04-15 NOTE — ED PROVIDER NOTE - CHIEF COMPLAINT
Writer verified can proceed without testing kidney function last was tested  12/2021    RN COVID TREATMENT VISIT  12/26/22    Edwina Zhou  64 year old  Current weight? 170 lbs    Has the patient been seen by a primary care provider at an [facility] or [facility] within the past two years? Yes.   Have you been in close proximity to/do you have a known exposure to a person with a confirmed case of influenza? No.     Date of positive COVID test (PCR or at home)?  12/26/22    Current COVID symptoms: muscle or body aches, sore throat, fatigue, nasal drainage    Date COVID symptoms began: 12/25/22    Do you have any of the following conditions that place you at risk of being very sick from COVID-19? 65 years or older    Is patient eligible to continue? Yes, established patient, 12 years or older weighing at least 88.2 lbs, who has COVID symptoms that started in the past 5 days and is at risk for being very sick from COVID-19.       Have you received monoclonal antibodies or oral antiviral medications since testing positive to COVID-19? No    Are you currently hospitalized for COVID-19? No    Do you have a history of hepatitis? No    Are you currently pregnant or nursing? No    Do you have a clinically significant hypersensitivity to nirmatrelvir, ritonavir, or molnupiravir? No    Do you have any history of severe renal impairment (eGFR < 30mL/min)? No    Do you have any history of hepatic impairment or abnormalities (e.g. hepatic panel, ALT, AST, ALK Phos, bilirubin)? No    Have you had a coronary stent placed in the previous 6 months? No    Is patient eligible to continue?   Yes, patient meets all eligibility requirements for the RN COVID treatment (as denoted by all no responses above).     Current Outpatient Medications   Medication Sig Dispense Refill     Cholecalciferol (VITAMIN D3) 50 MCG (2000 UT) CAPS Take 1 capsule by mouth daily 1 capsule daily  0     hydrocortisone 2.5 % cream  Apply very small amount to eyelids up to twice daily 20 g 3     levothyroxine (SYNTHROID/LEVOTHROID) 25 MCG tablet TAKE 1 TABLET BY MOUTH EVERY DAY 30 tablet 0     loratadine (CLARITIN) 10 MG tablet Take 1 tablet (10 mg) by mouth 2 times daily  1 YEAR     Melatonin ER 5 MG TBCR        Omega-3 Fatty Acids (FISH OIL) 875 MG CHEW 1 tab chewable twice daily       rosuvastatin (CRESTOR) 5 MG tablet TAKE 1 TABLET BY MOUTH EVERY DAY 90 tablet 2       Medications from List 1 of the standing order (on medications that exclude the use of Paxlovid) that patient is taking: NONE. Is patient taking Salcha's Wort? No  Is patient taking Salcha's Wort or any meds from List 1? No.   Medications from List 2 of the standing order (on meds that provider needs to adjust) that patient is taking: NONE. Is patient on any of the meds from List 2? No.   Medications from List 3 of standing order (on meds that a RN needs to adjust) that patient is taking: rosuvastatin (Crestor): Instructed patient to stop rosuvastatin while taking Paxlovid and restart rosuvastatin 1 day after the completion of Paxlovid.  Is patient on any meds from List 3? Yes. Patient is on meds from list 3. No meds require a provider visit and at least one med required RN to adjust.   In order of efficacy, Paxlovid has an approximate 90% reduction in hospitalization. Paxlovid can possibly cause altered sense of taste, diarrhea (loose, watery stools), high blood pressure, muscle aches.  The other option is molnupiravir which has an approximate 30% reduction in hospitalization. Molnupirarivr can possibly cause diarrhea (loose, watery stools), nausea (feeling sick to your stomach), dizziness, headaches.    Which treatment option does the patient prefer?   Paxlovid.   Lab Results   Component Value Date    GFRESTIMATED >90 12/17/2021       Was last eGFR reduced? No, eGFR 60 or greater/ No Result on record. Patient can receive the normal renal function dose. Paxlovid Rx sent to  [facility] [place] pharmacy   [facility] pharmacy per patient preference-verified the pharmacy has Paxlovid in stock.    Hours:  Mon-Fri: 8:00a - 5:00p  Sat-9am-12pm     Drive-thru services available.    Temporary change to home medications:     All medication adjustments (holds, etc) were discussed with the patient and patient was asked to repeat back (teachback) their med adjustment.  Did patient understand med adjustment? Yes, patient repeated back and understood correctly.        Reviewed the following instructions with the patient:    Paxlovid (nimatrelvir and ritonavir)    How it works  Two medicines (nirmatrelvir and ritonavir) are taken together. They stop the virus from growing. Less amount of virus is easier for your body to fight.    How to take    Medicine comes in a daily container with both medicine tablets. Take by mouth twice daily (once in the morning, once at night) for 5 days.    The number of tablets to take varies by patient.    Don't chew or break capsules. Swallow whole.    When to take  Take as soon as possible after positive COVID-19 test result, and within 5 days of your first symptoms.    Possible side effects  Can cause altered sense of taste, diarrhea (loose, watery stools), high blood pressure, muscle aches.    Dana HERNANDEZ RN   [facility] Triage                   The patient is a 52y Male complaining of nausea, vomiting, diarrhea.

## 2023-04-15 NOTE — ED PROVIDER NOTE - CLINICAL SUMMARY MEDICAL DECISION MAKING FREE TEXT BOX
52-year-old male with a history of type 2 diabetes, diabetic neuropathy, recent popliteal vascular occlusions, presenting with nausea vomiting, some diarrhea.  Patient had similar symptoms and was seen in the ER on April 13.  Patient was given Haldol and Reglan, and felt improved.  Patient states he would like to receive this again.  Patient had an negative work-up including CT of the abdomen pelvis on April 13.  No aggravating or alleviating factors otherwise noted.  No other acute complaints.  Patient has been still using marijuana, states that he does not believe this is related to his symptoms.  No other acute complaints at this time.  Exam: Nontoxic appearing, normal respiratory effort.  Nonfocal neuro exam.  Positive tenderness to mid to upper abdomen.  No rebound or guarding.  No C/C/E.  No other acute findings on exam.  Acute vomiting with some recent diarrhea, normal CT a day and a half ago.  Will check labs, IV fluids, meds, reeval

## 2023-05-05 NOTE — ED ADULT TRIAGE NOTE - TEMPERATURE IN FAHRENHEIT (DEGREES F)
Problem: At Risk for Falls  Goal: # Patient does not fall  Outcome: Outcome Met, Continue evaluating goal progress toward completion  Goal: # Takes action to control fall-related risks  Outcome: Outcome Met, Continue evaluating goal progress toward completion      97.9

## 2023-11-21 NOTE — ED ADULT NURSE NOTE - CAS EDN DISCHARGE ASSESSMENT
[facility] ([phone])
Alert and oriented to person, place and time
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
## 2024-03-07 ENCOUNTER — INPATIENT (INPATIENT)
Facility: HOSPITAL | Age: 54
LOS: 1 days | Discharge: LEFT BEFORE DISCHARGE | DRG: 392 | End: 2024-03-09
Attending: STUDENT IN AN ORGANIZED HEALTH CARE EDUCATION/TRAINING PROGRAM | Admitting: STUDENT IN AN ORGANIZED HEALTH CARE EDUCATION/TRAINING PROGRAM
Payer: COMMERCIAL

## 2024-03-07 VITALS
OXYGEN SATURATION: 100 % | RESPIRATION RATE: 20 BRPM | SYSTOLIC BLOOD PRESSURE: 145 MMHG | TEMPERATURE: 98 F | DIASTOLIC BLOOD PRESSURE: 95 MMHG | HEART RATE: 125 BPM

## 2024-03-07 LAB
ALBUMIN SERPL ELPH-MCNC: 3.8 G/DL — SIGNIFICANT CHANGE UP (ref 3.3–5)
ALP SERPL-CCNC: 81 U/L — SIGNIFICANT CHANGE UP (ref 40–120)
ALT FLD-CCNC: 25 U/L — SIGNIFICANT CHANGE UP (ref 12–78)
ANION GAP SERPL CALC-SCNC: 16 MMOL/L — SIGNIFICANT CHANGE UP (ref 5–17)
APPEARANCE UR: CLEAR — SIGNIFICANT CHANGE UP
APTT BLD: 26.1 SEC — SIGNIFICANT CHANGE UP (ref 24.5–35.6)
AST SERPL-CCNC: 21 U/L — SIGNIFICANT CHANGE UP (ref 15–37)
BASOPHILS # BLD AUTO: 0.02 K/UL — SIGNIFICANT CHANGE UP (ref 0–0.2)
BASOPHILS NFR BLD AUTO: 0.2 % — SIGNIFICANT CHANGE UP (ref 0–2)
BILIRUB SERPL-MCNC: 0.7 MG/DL — SIGNIFICANT CHANGE UP (ref 0.2–1.2)
BILIRUB UR-MCNC: NEGATIVE — SIGNIFICANT CHANGE UP
BUN SERPL-MCNC: 25 MG/DL — HIGH (ref 7–23)
CALCIUM SERPL-MCNC: 8.9 MG/DL — SIGNIFICANT CHANGE UP (ref 8.5–10.1)
CHLORIDE SERPL-SCNC: 92 MMOL/L — LOW (ref 96–108)
CO2 SERPL-SCNC: 23 MMOL/L — SIGNIFICANT CHANGE UP (ref 22–31)
COLOR SPEC: YELLOW — SIGNIFICANT CHANGE UP
CREAT SERPL-MCNC: 0.94 MG/DL — SIGNIFICANT CHANGE UP (ref 0.5–1.3)
DIFF PNL FLD: NEGATIVE — SIGNIFICANT CHANGE UP
EGFR: 96 ML/MIN/1.73M2 — SIGNIFICANT CHANGE UP
EOSINOPHIL # BLD AUTO: 0 K/UL — SIGNIFICANT CHANGE UP (ref 0–0.5)
EOSINOPHIL NFR BLD AUTO: 0 % — SIGNIFICANT CHANGE UP (ref 0–6)
ETHANOL SERPL-MCNC: <10 MG/DL — SIGNIFICANT CHANGE UP (ref 0–10)
FLUAV AG NPH QL: SIGNIFICANT CHANGE UP
FLUBV AG NPH QL: SIGNIFICANT CHANGE UP
GLUCOSE SERPL-MCNC: 285 MG/DL — HIGH (ref 70–99)
GLUCOSE UR QL: >=1000 MG/DL
HCT VFR BLD CALC: 47.1 % — SIGNIFICANT CHANGE UP (ref 39–50)
HGB BLD-MCNC: 17.3 G/DL — HIGH (ref 13–17)
IMM GRANULOCYTES NFR BLD AUTO: 0.3 % — SIGNIFICANT CHANGE UP (ref 0–0.9)
INR BLD: 1.07 RATIO — SIGNIFICANT CHANGE UP (ref 0.85–1.18)
KETONES UR-MCNC: >=160 MG/DL
LACTATE SERPL-SCNC: 1.9 MMOL/L — SIGNIFICANT CHANGE UP (ref 0.7–2)
LEUKOCYTE ESTERASE UR-ACNC: NEGATIVE — SIGNIFICANT CHANGE UP
LIDOCAIN IGE QN: 84 U/L — HIGH (ref 13–75)
LYMPHOCYTES # BLD AUTO: 18.5 % — SIGNIFICANT CHANGE UP (ref 13–44)
LYMPHOCYTES # BLD AUTO: 2.38 K/UL — SIGNIFICANT CHANGE UP (ref 1–3.3)
MAGNESIUM SERPL-MCNC: 1.8 MG/DL — SIGNIFICANT CHANGE UP (ref 1.6–2.6)
MCHC RBC-ENTMCNC: 30.6 PG — SIGNIFICANT CHANGE UP (ref 27–34)
MCHC RBC-ENTMCNC: 36.7 GM/DL — HIGH (ref 32–36)
MCV RBC AUTO: 83.2 FL — SIGNIFICANT CHANGE UP (ref 80–100)
MONOCYTES # BLD AUTO: 1.05 K/UL — HIGH (ref 0–0.9)
MONOCYTES NFR BLD AUTO: 8.2 % — SIGNIFICANT CHANGE UP (ref 2–14)
NEUTROPHILS # BLD AUTO: 9.37 K/UL — HIGH (ref 1.8–7.4)
NEUTROPHILS NFR BLD AUTO: 72.8 % — SIGNIFICANT CHANGE UP (ref 43–77)
NITRITE UR-MCNC: NEGATIVE — SIGNIFICANT CHANGE UP
NRBC # BLD: 0 /100 WBCS — SIGNIFICANT CHANGE UP (ref 0–0)
NT-PROBNP SERPL-SCNC: 164 PG/ML — HIGH (ref 0–125)
PCP SPEC-MCNC: SIGNIFICANT CHANGE UP
PH UR: 5.5 — SIGNIFICANT CHANGE UP (ref 5–8)
PLATELET # BLD AUTO: 251 K/UL — SIGNIFICANT CHANGE UP (ref 150–400)
POTASSIUM SERPL-MCNC: 3.6 MMOL/L — SIGNIFICANT CHANGE UP (ref 3.5–5.3)
POTASSIUM SERPL-SCNC: 3.6 MMOL/L — SIGNIFICANT CHANGE UP (ref 3.5–5.3)
PROT SERPL-MCNC: 7.5 G/DL — SIGNIFICANT CHANGE UP (ref 6–8.3)
PROT UR-MCNC: 300 MG/DL
PROTHROM AB SERPL-ACNC: 12.5 SEC — SIGNIFICANT CHANGE UP (ref 9.5–13)
RAPID RVP RESULT: SIGNIFICANT CHANGE UP
RBC # BLD: 5.66 M/UL — SIGNIFICANT CHANGE UP (ref 4.2–5.8)
RBC # FLD: 12.6 % — SIGNIFICANT CHANGE UP (ref 10.3–14.5)
RSV RNA NPH QL NAA+NON-PROBE: SIGNIFICANT CHANGE UP
SARS-COV-2 RNA SPEC QL NAA+PROBE: SIGNIFICANT CHANGE UP
SARS-COV-2 RNA SPEC QL NAA+PROBE: SIGNIFICANT CHANGE UP
SODIUM SERPL-SCNC: 131 MMOL/L — LOW (ref 135–145)
SP GR SPEC: 1.03 — HIGH (ref 1–1.03)
TROPONIN I, HIGH SENSITIVITY RESULT: 11.7 NG/L — SIGNIFICANT CHANGE UP
TSH SERPL-MCNC: 0.46 UIU/ML — SIGNIFICANT CHANGE UP (ref 0.36–3.74)
UROBILINOGEN FLD QL: 1 MG/DL — SIGNIFICANT CHANGE UP (ref 0.2–1)
WBC # BLD: 12.86 K/UL — HIGH (ref 3.8–10.5)
WBC # FLD AUTO: 12.86 K/UL — HIGH (ref 3.8–10.5)

## 2024-03-07 PROCEDURE — 76705 ECHO EXAM OF ABDOMEN: CPT | Mod: 26,59

## 2024-03-07 PROCEDURE — 74177 CT ABD & PELVIS W/CONTRAST: CPT | Mod: 26,MC

## 2024-03-07 PROCEDURE — 71045 X-RAY EXAM CHEST 1 VIEW: CPT | Mod: 26

## 2024-03-07 PROCEDURE — 93975 VASCULAR STUDY: CPT | Mod: 26

## 2024-03-07 PROCEDURE — 99285 EMERGENCY DEPT VISIT HI MDM: CPT

## 2024-03-07 PROCEDURE — 76705 ECHO EXAM OF ABDOMEN: CPT | Mod: 26,59,77

## 2024-03-07 PROCEDURE — 93010 ELECTROCARDIOGRAM REPORT: CPT

## 2024-03-07 PROCEDURE — 76870 US EXAM SCROTUM: CPT | Mod: 26

## 2024-03-07 RX ORDER — ACETAMINOPHEN 500 MG
1000 TABLET ORAL ONCE
Refills: 0 | Status: COMPLETED | OUTPATIENT
Start: 2024-03-07 | End: 2024-03-07

## 2024-03-07 RX ORDER — SODIUM CHLORIDE 9 MG/ML
1000 INJECTION INTRAMUSCULAR; INTRAVENOUS; SUBCUTANEOUS ONCE
Refills: 0 | Status: COMPLETED | OUTPATIENT
Start: 2024-03-07 | End: 2024-03-07

## 2024-03-07 RX ORDER — HYDROMORPHONE HYDROCHLORIDE 2 MG/ML
1 INJECTION INTRAMUSCULAR; INTRAVENOUS; SUBCUTANEOUS ONCE
Refills: 0 | Status: DISCONTINUED | OUTPATIENT
Start: 2024-03-07 | End: 2024-03-07

## 2024-03-07 RX ORDER — ONDANSETRON 8 MG/1
4 TABLET, FILM COATED ORAL ONCE
Refills: 0 | Status: COMPLETED | OUTPATIENT
Start: 2024-03-07 | End: 2024-03-07

## 2024-03-07 RX ORDER — HYDROMORPHONE HYDROCHLORIDE 2 MG/ML
0.5 INJECTION INTRAMUSCULAR; INTRAVENOUS; SUBCUTANEOUS ONCE
Refills: 0 | Status: DISCONTINUED | OUTPATIENT
Start: 2024-03-07 | End: 2024-03-07

## 2024-03-07 RX ORDER — METOCLOPRAMIDE HCL 10 MG
10 TABLET ORAL ONCE
Refills: 0 | Status: COMPLETED | OUTPATIENT
Start: 2024-03-07 | End: 2024-03-07

## 2024-03-07 RX ORDER — FAMOTIDINE 10 MG/ML
20 INJECTION INTRAVENOUS ONCE
Refills: 0 | Status: COMPLETED | OUTPATIENT
Start: 2024-03-07 | End: 2024-03-07

## 2024-03-07 RX ORDER — MORPHINE SULFATE 50 MG/1
4 CAPSULE, EXTENDED RELEASE ORAL ONCE
Refills: 0 | Status: DISCONTINUED | OUTPATIENT
Start: 2024-03-07 | End: 2024-03-07

## 2024-03-07 RX ADMIN — HYDROMORPHONE HYDROCHLORIDE 1 MILLIGRAM(S): 2 INJECTION INTRAMUSCULAR; INTRAVENOUS; SUBCUTANEOUS at 20:43

## 2024-03-07 RX ADMIN — HYDROMORPHONE HYDROCHLORIDE 1 MILLIGRAM(S): 2 INJECTION INTRAMUSCULAR; INTRAVENOUS; SUBCUTANEOUS at 20:03

## 2024-03-07 RX ADMIN — SODIUM CHLORIDE 1000 MILLILITER(S): 9 INJECTION INTRAMUSCULAR; INTRAVENOUS; SUBCUTANEOUS at 18:29

## 2024-03-07 RX ADMIN — ONDANSETRON 4 MILLIGRAM(S): 8 TABLET, FILM COATED ORAL at 18:29

## 2024-03-07 RX ADMIN — FAMOTIDINE 20 MILLIGRAM(S): 10 INJECTION INTRAVENOUS at 18:28

## 2024-03-07 RX ADMIN — SODIUM CHLORIDE 1000 MILLILITER(S): 9 INJECTION INTRAMUSCULAR; INTRAVENOUS; SUBCUTANEOUS at 19:50

## 2024-03-07 RX ADMIN — Medication 400 MILLIGRAM(S): at 18:29

## 2024-03-07 RX ADMIN — Medication 10 MILLIGRAM(S): at 19:51

## 2024-03-07 NOTE — ED ADULT NURSE NOTE - NSFALLUNIVINTERV_ED_ALL_ED
Bed/Stretcher in lowest position, wheels locked, appropriate side rails in place/Call bell, personal items and telephone in reach/Instruct patient to call for assistance before getting out of bed/chair/stretcher/Non-slip footwear applied when patient is off stretcher/[place] to call system/Physically safe environment - no spills, clutter or unnecessary equipment/Purposeful proactive rounding/Room/bathroom lighting operational, light cord in reach

## 2024-03-07 NOTE — ED ADULT TRIAGE NOTE - ARRIVAL FROM
TELEPHONIC VISIT PROGRESS NOTE    {Telephonic Consent Adult & Peds:[id number]}    CHIEF COMPLAINT  No chief complaint on file.      SUBJECTIVE  The patient is a 3 month old female who is being evaluated via a Telephonic Visit for ***    REVIEW OF SYSTEMS  All systems reviewed and are negative with the exception of the findings noted in the history of present illness.     PHYSICAL EXAM  There were no vitals filed for this visit.   She is alert, nontoxic with fluent speech  ***    ASSESSMENT/PLAN  There are no diagnoses linked to this encounter.    FOLLOW UP  No follow-ups on file.      
SUBJECTIVE  Patient ID: Arelis Mcnamara is a 3 month old female who is accompanied by:{-:[id number]}    No chief complaint on file.      HPI    Review of Systems    No past medical history on file.  Family History   Problem Relation Age of Onset   • Cancer Maternal Grandmother      No past surgical history on file.  No outpatient medications have been marked as taking for the 8/10/20 encounter (Appointment) with Diane Marlow CNP.         OBJECTIVE    Vitals:There were no vitals taken for this visit.    Physical Exam      ASSESSMENT/ PLAN  There are no diagnoses linked to this encounter.          Instructed to call if problem worsens or does not improve within *** otherwise follow-up prn    Diane Marlow CNP  2020  8:59 AM  
Home

## 2024-03-07 NOTE — ED ADULT NURSE REASSESSMENT NOTE - NSFALLHARMRISKINTERV_ED_ALL_ED

## 2024-03-07 NOTE — ED PROVIDER NOTE - OBJECTIVE STATEMENT
The patient is a 53y Male with pmhx of DM2 (not on insulin), PAD s/p 5 stents in his right leg p/w nausea, vomiting, and diarrhea for the last 2 days. Pt's wife at bedside. Patient states he has been unable to hold anything down for the last 2 days and has had multiple episodes of soft stools/diarrhea. Patient denies any black stool or BRBPR. Just prior to coming to the hospital, patient starting seeing some small amounts of bright red blood in his emesis. Patient states he has had a burning feeling in his chest along with diffuse abd tenderness. Patient denies fever, chills, CP, SOB, urinary symptoms, leg swelling. Denies eating any unusual foods recently. Denies any sick contacts. Denies any recent travel. No recent abx use. Pt also mentions that he has pain in both of his testicles.

## 2024-03-07 NOTE — ED PROVIDER NOTE - CLINICAL SUMMARY MEDICAL DECISION MAKING FREE TEXT BOX
Braydon Lin MD (Attending Physician): The patient is a 53y Male with pmhx of DM2 (not on insulin), PAD s/p 5 stents in his right leg p/w nausea, vomiting, and diarrhea for the last 2 days. DDx includes, but not limited to: appendicitis, diverticulitis, UTI, pancreatitis, cholecystitis, cannabinoid hyperemesis syndrome, illicit drug intoxication, viral syndrome, SBO, testicular torsion, atypical ACS. ekg, cxr, US testicles, CT a/p, RUQ US, labs, urine, pain control, zofran, reglan, pepcid, IVF. Dispo pending w/u.

## 2024-03-07 NOTE — ED PROVIDER NOTE - PHYSICAL EXAMINATION
GEN - +In mild discomfort, A&Ox3  HEAD - NC/AT  EYES - PERRL, EOMI  ENT - Airway patent, +mucous membranes dry  PULMONARY - CTA b/l, symmetric breath sounds, no W/R/R  CARDIAC - +S1S2, RRR, no M/G/R, no JVD  ABDOMEN - +BS, ND, +diffusely TTP, soft, no guarding, no rebound, no masses, no rigidity   - No CVA TTP b/l. +B/l testicles soft, but TTP.  EXTREMITIES - FROM, symmetric pulses, no edema  SKIN - No rash or bruising  NEUROLOGIC - Alert, speech clear, no focal deficits  PSYCH - Normal mood/affect, normal insight

## 2024-03-07 NOTE — ED ADULT NURSE REASSESSMENT NOTE - NS ED NURSE REASSESS COMMENT FT1
Pt resting comfortably in bed at this time, nausea and pain resolved after receiving dilaudid 1mg ivp and reglan 10mg ivp.  Denies any chest pain or SOB.  EKG completed- NSR.  Pending ultrasound results and RVP results.  Wife at bedside.  Pt refused rectal temp.  Afebrile with oral temp.  Maintain comfort and safety.

## 2024-03-08 DIAGNOSIS — E11.9 TYPE 2 DIABETES MELLITUS WITHOUT COMPLICATIONS: ICD-10-CM

## 2024-03-08 DIAGNOSIS — R11.2 NAUSEA WITH VOMITING, UNSPECIFIED: ICD-10-CM

## 2024-03-08 DIAGNOSIS — R89.9 UNSPECIFIED ABNORMAL FINDING IN SPECIMENS FROM OTHER ORGANS, SYSTEMS AND TISSUES: ICD-10-CM

## 2024-03-08 DIAGNOSIS — I73.9 PERIPHERAL VASCULAR DISEASE, UNSPECIFIED: ICD-10-CM

## 2024-03-08 DIAGNOSIS — Z29.9 ENCOUNTER FOR PROPHYLACTIC MEASURES, UNSPECIFIED: ICD-10-CM

## 2024-03-08 LAB
A1C WITH ESTIMATED AVERAGE GLUCOSE RESULT: 9.8 % — HIGH (ref 4–5.6)
ALBUMIN SERPL ELPH-MCNC: 3.2 G/DL — LOW (ref 3.3–5)
ALP SERPL-CCNC: 65 U/L — SIGNIFICANT CHANGE UP (ref 40–120)
ALT FLD-CCNC: 21 U/L — SIGNIFICANT CHANGE UP (ref 12–78)
ANION GAP SERPL CALC-SCNC: 10 MMOL/L — SIGNIFICANT CHANGE UP (ref 5–17)
APTT BLD: 26.8 SEC — SIGNIFICANT CHANGE UP (ref 24.5–35.6)
AST SERPL-CCNC: 16 U/L — SIGNIFICANT CHANGE UP (ref 15–37)
BASOPHILS # BLD AUTO: 0.02 K/UL — SIGNIFICANT CHANGE UP (ref 0–0.2)
BASOPHILS NFR BLD AUTO: 0.1 % — SIGNIFICANT CHANGE UP (ref 0–2)
BILIRUB SERPL-MCNC: 0.6 MG/DL — SIGNIFICANT CHANGE UP (ref 0.2–1.2)
BUN SERPL-MCNC: 16 MG/DL — SIGNIFICANT CHANGE UP (ref 7–23)
CALCIUM SERPL-MCNC: 8.2 MG/DL — LOW (ref 8.5–10.1)
CHLORIDE SERPL-SCNC: 98 MMOL/L — SIGNIFICANT CHANGE UP (ref 96–108)
CHOLEST SERPL-MCNC: 144 MG/DL — SIGNIFICANT CHANGE UP
CO2 SERPL-SCNC: 27 MMOL/L — SIGNIFICANT CHANGE UP (ref 22–31)
CREAT SERPL-MCNC: 0.82 MG/DL — SIGNIFICANT CHANGE UP (ref 0.5–1.3)
CULTURE RESULTS: SIGNIFICANT CHANGE UP
EGFR: 104 ML/MIN/1.73M2 — SIGNIFICANT CHANGE UP
EOSINOPHIL # BLD AUTO: 0 K/UL — SIGNIFICANT CHANGE UP (ref 0–0.5)
EOSINOPHIL NFR BLD AUTO: 0 % — SIGNIFICANT CHANGE UP (ref 0–6)
ESTIMATED AVERAGE GLUCOSE: 235 MG/DL — HIGH (ref 68–114)
GLUCOSE SERPL-MCNC: 222 MG/DL — HIGH (ref 70–99)
HCT VFR BLD CALC: 44.2 % — SIGNIFICANT CHANGE UP (ref 39–50)
HDLC SERPL-MCNC: 32 MG/DL — LOW
HGB BLD-MCNC: 16.1 G/DL — SIGNIFICANT CHANGE UP (ref 13–17)
IMM GRANULOCYTES NFR BLD AUTO: 0.4 % — SIGNIFICANT CHANGE UP (ref 0–0.9)
INR BLD: 1.01 RATIO — SIGNIFICANT CHANGE UP (ref 0.85–1.18)
LIPID PNL WITH DIRECT LDL SERPL: 79 MG/DL — SIGNIFICANT CHANGE UP
LYMPHOCYTES # BLD AUTO: 16.3 % — SIGNIFICANT CHANGE UP (ref 13–44)
LYMPHOCYTES # BLD AUTO: 2.21 K/UL — SIGNIFICANT CHANGE UP (ref 1–3.3)
MAGNESIUM SERPL-MCNC: 2.2 MG/DL — SIGNIFICANT CHANGE UP (ref 1.6–2.6)
MCHC RBC-ENTMCNC: 30.8 PG — SIGNIFICANT CHANGE UP (ref 27–34)
MCHC RBC-ENTMCNC: 36.4 GM/DL — HIGH (ref 32–36)
MCV RBC AUTO: 84.7 FL — SIGNIFICANT CHANGE UP (ref 80–100)
MONOCYTES # BLD AUTO: 1.08 K/UL — HIGH (ref 0–0.9)
MONOCYTES NFR BLD AUTO: 8 % — SIGNIFICANT CHANGE UP (ref 2–14)
NEUTROPHILS # BLD AUTO: 10.2 K/UL — HIGH (ref 1.8–7.4)
NEUTROPHILS NFR BLD AUTO: 75.2 % — SIGNIFICANT CHANGE UP (ref 43–77)
NON HDL CHOLESTEROL: 112 MG/DL — SIGNIFICANT CHANGE UP
NRBC # BLD: 0 /100 WBCS — SIGNIFICANT CHANGE UP (ref 0–0)
PHOSPHATE SERPL-MCNC: 1.6 MG/DL — LOW (ref 2.5–4.5)
PLATELET # BLD AUTO: 232 K/UL — SIGNIFICANT CHANGE UP (ref 150–400)
POTASSIUM SERPL-MCNC: 3.4 MMOL/L — LOW (ref 3.5–5.3)
POTASSIUM SERPL-SCNC: 3.4 MMOL/L — LOW (ref 3.5–5.3)
PROT SERPL-MCNC: 6.2 G/DL — SIGNIFICANT CHANGE UP (ref 6–8.3)
PROTHROM AB SERPL-ACNC: 11.8 SEC — SIGNIFICANT CHANGE UP (ref 9.5–13)
RBC # BLD: 5.22 M/UL — SIGNIFICANT CHANGE UP (ref 4.2–5.8)
RBC # FLD: 12.7 % — SIGNIFICANT CHANGE UP (ref 10.3–14.5)
SODIUM SERPL-SCNC: 135 MMOL/L — SIGNIFICANT CHANGE UP (ref 135–145)
SPECIMEN SOURCE: SIGNIFICANT CHANGE UP
TRIGL SERPL-MCNC: 199 MG/DL — HIGH
WBC # BLD: 13.57 K/UL — HIGH (ref 3.8–10.5)
WBC # FLD AUTO: 13.57 K/UL — HIGH (ref 3.8–10.5)

## 2024-03-08 PROCEDURE — 99223 1ST HOSP IP/OBS HIGH 75: CPT | Mod: GC

## 2024-03-08 RX ORDER — HYDROMORPHONE HYDROCHLORIDE 2 MG/ML
4 INJECTION INTRAMUSCULAR; INTRAVENOUS; SUBCUTANEOUS EVERY 8 HOURS
Refills: 0 | Status: DISCONTINUED | OUTPATIENT
Start: 2024-03-08 | End: 2024-03-09

## 2024-03-08 RX ORDER — INSULIN LISPRO 100/ML
VIAL (ML) SUBCUTANEOUS
Refills: 0 | Status: DISCONTINUED | OUTPATIENT
Start: 2024-03-08 | End: 2024-03-09

## 2024-03-08 RX ORDER — INSULIN LISPRO 100/ML
VIAL (ML) SUBCUTANEOUS AT BEDTIME
Refills: 0 | Status: DISCONTINUED | OUTPATIENT
Start: 2024-03-08 | End: 2024-03-09

## 2024-03-08 RX ORDER — ASPIRIN/CALCIUM CARB/MAGNESIUM 324 MG
81 TABLET ORAL DAILY
Refills: 0 | Status: DISCONTINUED | OUTPATIENT
Start: 2024-03-08 | End: 2024-03-09

## 2024-03-08 RX ORDER — APIXABAN 2.5 MG/1
5 TABLET, FILM COATED ORAL
Refills: 0 | Status: DISCONTINUED | OUTPATIENT
Start: 2024-03-08 | End: 2024-03-09

## 2024-03-08 RX ORDER — DEXTROSE 50 % IN WATER 50 %
15 SYRINGE (ML) INTRAVENOUS ONCE
Refills: 0 | Status: DISCONTINUED | OUTPATIENT
Start: 2024-03-08 | End: 2024-03-09

## 2024-03-08 RX ORDER — SODIUM CHLORIDE 9 MG/ML
1000 INJECTION, SOLUTION INTRAVENOUS
Refills: 0 | Status: DISCONTINUED | OUTPATIENT
Start: 2024-03-08 | End: 2024-03-09

## 2024-03-08 RX ORDER — HYDROXYZINE HCL 10 MG
25 TABLET ORAL AT BEDTIME
Refills: 0 | Status: DISCONTINUED | OUTPATIENT
Start: 2024-03-08 | End: 2024-03-09

## 2024-03-08 RX ORDER — ATORVASTATIN CALCIUM 80 MG/1
80 TABLET, FILM COATED ORAL AT BEDTIME
Refills: 0 | Status: DISCONTINUED | OUTPATIENT
Start: 2024-03-08 | End: 2024-03-09

## 2024-03-08 RX ORDER — METOCLOPRAMIDE HCL 10 MG
10 TABLET ORAL ONCE
Refills: 0 | Status: COMPLETED | OUTPATIENT
Start: 2024-03-08 | End: 2024-03-08

## 2024-03-08 RX ORDER — APIXABAN 2.5 MG/1
1 TABLET, FILM COATED ORAL
Refills: 0 | DISCHARGE

## 2024-03-08 RX ORDER — METOCLOPRAMIDE HCL 10 MG
10 TABLET ORAL EVERY 8 HOURS
Refills: 0 | Status: DISCONTINUED | OUTPATIENT
Start: 2024-03-08 | End: 2024-03-09

## 2024-03-08 RX ORDER — SODIUM CHLORIDE 9 MG/ML
1000 INJECTION INTRAMUSCULAR; INTRAVENOUS; SUBCUTANEOUS
Refills: 0 | Status: DISCONTINUED | OUTPATIENT
Start: 2024-03-08 | End: 2024-03-09

## 2024-03-08 RX ORDER — PANTOPRAZOLE SODIUM 20 MG/1
40 TABLET, DELAYED RELEASE ORAL DAILY
Refills: 0 | Status: DISCONTINUED | OUTPATIENT
Start: 2024-03-08 | End: 2024-03-09

## 2024-03-08 RX ORDER — DEXTROSE 50 % IN WATER 50 %
25 SYRINGE (ML) INTRAVENOUS ONCE
Refills: 0 | Status: DISCONTINUED | OUTPATIENT
Start: 2024-03-08 | End: 2024-03-09

## 2024-03-08 RX ORDER — LANOLIN ALCOHOL/MO/W.PET/CERES
3 CREAM (GRAM) TOPICAL AT BEDTIME
Refills: 0 | Status: DISCONTINUED | OUTPATIENT
Start: 2024-03-08 | End: 2024-03-09

## 2024-03-08 RX ORDER — DEXTROSE 50 % IN WATER 50 %
12.5 SYRINGE (ML) INTRAVENOUS ONCE
Refills: 0 | Status: DISCONTINUED | OUTPATIENT
Start: 2024-03-08 | End: 2024-03-09

## 2024-03-08 RX ORDER — HALOPERIDOL DECANOATE 100 MG/ML
2.5 INJECTION INTRAMUSCULAR ONCE
Refills: 0 | Status: COMPLETED | OUTPATIENT
Start: 2024-03-08 | End: 2024-03-08

## 2024-03-08 RX ORDER — ONDANSETRON 8 MG/1
4 TABLET, FILM COATED ORAL EVERY 8 HOURS
Refills: 0 | Status: DISCONTINUED | OUTPATIENT
Start: 2024-03-08 | End: 2024-03-08

## 2024-03-08 RX ORDER — GLUCAGON INJECTION, SOLUTION 0.5 MG/.1ML
1 INJECTION, SOLUTION SUBCUTANEOUS ONCE
Refills: 0 | Status: DISCONTINUED | OUTPATIENT
Start: 2024-03-08 | End: 2024-03-09

## 2024-03-08 RX ORDER — SODIUM CHLORIDE 9 MG/ML
1000 INJECTION INTRAMUSCULAR; INTRAVENOUS; SUBCUTANEOUS
Refills: 0 | Status: DISCONTINUED | OUTPATIENT
Start: 2024-03-08 | End: 2024-03-08

## 2024-03-08 RX ORDER — ACETAMINOPHEN 500 MG
650 TABLET ORAL EVERY 6 HOURS
Refills: 0 | Status: DISCONTINUED | OUTPATIENT
Start: 2024-03-08 | End: 2024-03-09

## 2024-03-08 RX ORDER — CLOPIDOGREL BISULFATE 75 MG/1
75 TABLET, FILM COATED ORAL DAILY
Refills: 0 | Status: DISCONTINUED | OUTPATIENT
Start: 2024-03-08 | End: 2024-03-09

## 2024-03-08 RX ORDER — NICOTINE POLACRILEX 2 MG
1 GUM BUCCAL DAILY
Refills: 0 | Status: DISCONTINUED | OUTPATIENT
Start: 2024-03-08 | End: 2024-03-09

## 2024-03-08 RX ORDER — ATORVASTATIN CALCIUM 80 MG/1
1 TABLET, FILM COATED ORAL
Refills: 0 | DISCHARGE

## 2024-03-08 RX ORDER — HYDROMORPHONE HYDROCHLORIDE 2 MG/ML
0.5 INJECTION INTRAMUSCULAR; INTRAVENOUS; SUBCUTANEOUS ONCE
Refills: 0 | Status: DISCONTINUED | OUTPATIENT
Start: 2024-03-08 | End: 2024-03-08

## 2024-03-08 RX ADMIN — APIXABAN 5 MILLIGRAM(S): 2.5 TABLET, FILM COATED ORAL at 17:38

## 2024-03-08 RX ADMIN — HYDROMORPHONE HYDROCHLORIDE 0.5 MILLIGRAM(S): 2 INJECTION INTRAMUSCULAR; INTRAVENOUS; SUBCUTANEOUS at 11:41

## 2024-03-08 RX ADMIN — Medication 10 MILLIGRAM(S): at 15:26

## 2024-03-08 RX ADMIN — PANTOPRAZOLE SODIUM 40 MILLIGRAM(S): 20 TABLET, DELAYED RELEASE ORAL at 05:42

## 2024-03-08 RX ADMIN — SODIUM CHLORIDE 75 MILLILITER(S): 9 INJECTION INTRAMUSCULAR; INTRAVENOUS; SUBCUTANEOUS at 04:20

## 2024-03-08 RX ADMIN — HALOPERIDOL DECANOATE 2.5 MILLIGRAM(S): 100 INJECTION INTRAMUSCULAR at 00:59

## 2024-03-08 RX ADMIN — APIXABAN 5 MILLIGRAM(S): 2.5 TABLET, FILM COATED ORAL at 05:42

## 2024-03-08 RX ADMIN — Medication 10 MILLIGRAM(S): at 10:38

## 2024-03-08 RX ADMIN — Medication 1: at 17:37

## 2024-03-08 RX ADMIN — HYDROMORPHONE HYDROCHLORIDE 4 MILLIGRAM(S): 2 INJECTION INTRAMUSCULAR; INTRAVENOUS; SUBCUTANEOUS at 19:43

## 2024-03-08 RX ADMIN — HYDROMORPHONE HYDROCHLORIDE 4 MILLIGRAM(S): 2 INJECTION INTRAMUSCULAR; INTRAVENOUS; SUBCUTANEOUS at 20:49

## 2024-03-08 RX ADMIN — Medication 3: at 08:31

## 2024-03-08 RX ADMIN — HYDROMORPHONE HYDROCHLORIDE 0.5 MILLIGRAM(S): 2 INJECTION INTRAMUSCULAR; INTRAVENOUS; SUBCUTANEOUS at 12:11

## 2024-03-08 RX ADMIN — Medication 10 MILLIGRAM(S): at 04:20

## 2024-03-08 NOTE — H&P ADULT - PROBLEM SELECTOR PLAN 3
s/p 5 arterial stents in right lower extremity  continue home statin  continue home plavix  continue home eliquis  f/u pt/ptt/inr in AM  f/u lipid panel in AM

## 2024-03-08 NOTE — H&P ADULT - NSHPPHYSICALEXAM_GEN_ALL_CORE
VITALS:   T(C): 36.7 (03-07-24 @ 22:35), Max: 36.7 (03-07-24 @ 17:26)  HR: 86 (03-07-24 @ 22:35) (86 - 125)  BP: 129/78 (03-07-24 @ 22:35) (129/78 - 145/95)  RR: 18 (03-07-24 @ 22:35) (18 - 20)  SpO2: 98% (03-07-24 @ 22:35) (98% - 100%)    GENERAL: NAD, lying in bed comfortably  HEAD:  Atraumatic  EYES: conjunctiva and sclera clear  ENT: Moist mucous membranes  NECK: no JVD  HEART: Regular rate and rhythm, no murmurs, rubs, or gallops  LUNGS: Unlabored respirations.  Clear to auscultation bilaterally, no crackles, wheezing, or rhonchi  ABDOMEN: Soft, nontender, nondistended, +BS  EXTREMITIES: 2+ peripheral pulses bilaterally. No clubbing, cyanosis, or edema  NERVOUS SYSTEM:  A&Ox3, no focal deficits VITALS:   T(C): 36.7 (03-07-24 @ 22:35), Max: 36.7 (03-07-24 @ 17:26)  HR: 86 (03-07-24 @ 22:35) (86 - 125)  BP: 129/78 (03-07-24 @ 22:35) (129/78 - 145/95)  RR: 18 (03-07-24 @ 22:35) (18 - 20)  SpO2: 98% (03-07-24 @ 22:35) (98% - 100%)    GENERAL: sleeping in bed  HEAD:  Atraumatic  EYES: conjunctiva and sclera clear  ENT: Moist mucous membranes  NECK: no JVD  HEART: Regular rate and rhythm, no murmurs, rubs, or gallops  LUNGS: Unlabored respirations.  Clear to auscultation bilaterally, no crackles, wheezing, or rhonchi  ABDOMEN: Soft, tender in epigastric region, tender intercoastal and flank abdominal   EXTREMITIES: 2+ peripheral pulses bilaterally. No clubbing, cyanosis, or edema  NERVOUS SYSTEM:  A&Ox3, no focal deficits VITALS:   T(C): 36.7 (03-07-24 @ 22:35), Max: 36.7 (03-07-24 @ 17:26)  HR: 86 (03-07-24 @ 22:35) (86 - 125)  BP: 129/78 (03-07-24 @ 22:35) (129/78 - 145/95)  RR: 18 (03-07-24 @ 22:35) (18 - 20)  SpO2: 98% (03-07-24 @ 22:35) (98% - 100%)  GENERAL: sleeping in bed  HEAD:  Atraumatic  EYES: conjunctiva and sclera clear  ENT: Moist mucous membranes  NECK: no JVD  HEART: Regular rate and rhythm, no murmurs, rubs, or gallops  LUNGS: Unlabored respirations.  Clear to auscultation bilaterally, no crackles, wheezing, or rhonchi  ABDOMEN: Soft, tender in epigastric region, tender intercoastal and flank abdominal   EXTREMITIES: 2+ peripheral pulses bilaterally. No clubbing, cyanosis, or edema  NERVOUS SYSTEM:  A&Ox3, no focal deficits

## 2024-03-08 NOTE — CARE COORDINATION ASSESSMENT. - PRO ARRIVE FROM
CM met w pt. Per pt  lives with spouse and 21 yr old son and independent w ADL, ambulation,  and med management prior to admission. Pt stated he no longer drives. Pt drives to appointments, has 0 stairs to enter home, 2 flights steps inside stated he stays on 1st level and everything is on same level, denies community services, has cane and walker. CM verified: PCP: Dr. Vargas Pharmacy: [facility] in [place]. : stated no. Pt stated .his wife or son will  when medically safe for discharge./home

## 2024-03-08 NOTE — CARE COORDINATION ASSESSMENT. - OTHER PERTINENT DISCHARGE PLANNING INFORMATION:
Met patient at bedside.  Explained role of CM, verbalized understanding. Pt was made aware a CM will remain available through hospitalization.  Contact information given in discharge/ transitions resource folder. CM met w pt. Per pt  lives with spouse and 21 yr old son and independent w ADL, ambulation,  and med management prior to admission. Pt stated he no longer drives. Pt drives to appointments, has 0 stairs to enter home, 2 flights steps inside stated he stays on 1st level and everything is on same level, denies community services, has cane and walker. CM verified: PCP: Dr. Vargas Pharmacy: [facility] in [place]. : stated no. Pt stated .his wife or son will  when medically safe for discharge.

## 2024-03-08 NOTE — H&P ADULT - NSHPREVIEWOFSYSTEMS_GEN_ALL_CORE
REVIEW OF SYSTEMS:    CONSTITUTIONAL:  No weakness, fevers or chills  EYES/ENT:  No visual changes;  No vertigo or throat pain   NECK:  No pain or stiffness  RESPIRATORY:  No cough, wheezing, hemoptysis; No shortness of breath  CARDIOVASCULAR:  No chest pain or palpitations  GASTROINTESTINAL:  + abdomenal soreness, nausea, vomiting, diahrea.  GENITOURINARY:  No dysuria, frequency or hematuria  NEUROLOGICAL:  No numbness or weakness  SKIN:  No itching, rashes REVIEW OF SYSTEMS:  CONSTITUTIONAL:  No weakness, fevers or chills  EYES/ENT:  No visual changes;  No vertigo or throat pain   NECK:  No pain or stiffness  RESPIRATORY:  No cough, wheezing, hemoptysis; No shortness of breath  CARDIOVASCULAR:  No chest pain or palpitations  GASTROINTESTINAL:  + abdominal soreness, nausea, vomiting, diarrhea.  GENITOURINARY:  No dysuria, frequency or hematuria  NEUROLOGICAL:  No numbness or weakness  SKIN:  No itching, rashes

## 2024-03-08 NOTE — CHART NOTE - NSCHARTNOTEFT_GEN_A_CORE
Hospitalist Attending Chart Update / Progress Note    Please see H&P written early today by Dr. Whitley, for more information.     Pt seen, interviewed, and examined by me.   He is complaining of severe leg pain. states that he has chronic pain but only intermittently takes dilaudid when it is very severe. Pt denies further nausea, vomiting or diarrhea but states that is because he has had no po intake. He c/o diffuse abdominal pain , worse mid epigastric that he attributes to retching. Denies all other complaints.     T(C): 36.6 (03-08-24 @ 06:17), Max: 36.7 (03-07-24 @ 17:26)  HR: 73 (03-08-24 @ 06:17) (67 - 125)  BP: 169/91 (03-08-24 @ 06:17) (129/78 - 172/93)  RR: 18 (03-08-24 @ 06:17) (18 - 20)  SpO2: 98% (03-08-24 @ 06:17) (97% - 100%)      GENERAL: uncomfortable appearing but in NAD   HEAD:  Atraumatic  EYES: conjunctiva and sclera clear  ENT: Moist mucous membranes  NECK: no JVD  HEART: Regular rate and rhythm, no murmurs, rubs, or gallops  LUNGS: Unlabored respirations.  Clear to auscultation bilaterally, no crackles, wheezing, or rhonchi  ABDOMEN: Soft, mild diffuse tenderness worse midepigastric. No rebound or guarding. +bowel sounds.  EXTREMITIES: 2+ peripheral pulses bilaterally. No clubbing, cyanosis, or edema  NERVOUS SYSTEM:  A&Ox3, no focal deficits    53 yo M PMHx Type 2 Diabetes (not on insulin) with diabetic neuropathy, PAD s/p  R SFA angioplasty admitted for nausea and vomiting    #Nausea/vomiting  - on clear liq diet- pt states he has not had anything to eat yet this am. continue IVF until tolerating adequate po  - continue antiemetics   - GI consulted, awaiting recs   - f/u GI PCR       #PAD (peripheral artery disease)  - h/o PAD with chronic LE pain   - has prn diluadid at home that he takes "once to twice a week for severe pain" will order PRN for now. if pt requiring frequent prns will consult pain management.    Remainder of plan per H&P      Time spent: 40min Hospitalist Attending Chart Update / Progress Note    Please see H&P written early today by Dr. Whitley, for more information.     Pt seen, interviewed, and examined by me.   He is complaining of severe leg pain. states that he has chronic pain but only intermittently takes dilaudid when it is very severe. Pt denies further nausea, vomiting or diarrhea but states that is because he has had no po intake. He c/o diffuse abdominal pain , worse mid epigastric that he attributes to retching. Denies all other complaints.     T(C): 36.6 (03-08-24 @ 06:17), Max: 36.7 (03-07-24 @ 17:26)  HR: 73 (03-08-24 @ 06:17) (67 - 125)  BP: 169/91 (03-08-24 @ 06:17) (129/78 - 172/93)  RR: 18 (03-08-24 @ 06:17) (18 - 20)  SpO2: 98% (03-08-24 @ 06:17) (97% - 100%)      GENERAL: uncomfortable appearing but in NAD   HEAD:  Atraumatic  EYES: conjunctiva and sclera clear  ENT: Moist mucous membranes  NECK: no JVD  HEART: Regular rate and rhythm, no murmurs, rubs, or gallops  LUNGS: Unlabored respirations.  Clear to auscultation bilaterally, no crackles, wheezing, or rhonchi  ABDOMEN: Soft, mild diffuse tenderness worse midepigastric. No rebound or guarding. +bowel sounds.  EXTREMITIES: 2+ peripheral pulses bilaterally. No clubbing, cyanosis, or edema  NERVOUS SYSTEM:  A&Ox3, no focal deficits    53 yo M PMHx Type 2 Diabetes (not on insulin) with diabetic neuropathy, PAD s/p  R SFA angioplasty admitted for nausea and vomiting    #Nausea/vomiting  - on clear liq diet- pt states he has not had anything to eat yet this am so will make NPO awaiting GI eval. continue IVF   - continue antiemetics   - GI consulted, awaiting recs   - f/u GI PCR       #PAD (peripheral artery disease)  - h/o PAD with chronic LE pain   - has prn diluadid at home that he takes "once to twice a week for severe pain" will order PRN for now. if pt requiring frequent prns will consult pain management.    Remainder of plan per H&P      Time spent: 40min

## 2024-03-08 NOTE — CARE COORDINATION ASSESSMENT. - NSCAREPROVIDERS_GEN_ALL_CORE_FT
CARE PROVIDERS:  Accepting Physician: Mariangel Whitley  Administration: Adilson Savage  Administration: Bryon Loco  Admitting: Mariangel Whitley  Attending: Mariangel Whitley  Case Management: Everardo Phan  Covering Team: Harvey Mckeon  ED Attending: Braydon Lin  ED Nurse: Marguerite Maddox  Nurse: Lelia Giraldo  Nurse: Karson Steiner  Nurse: Vita Millan  Nurse: Martin Sommers  Nurse: Celia Dennis  Ordered: Doctor, Unknown  Ordered: ADM, User  Outpatient Provider: Fernando Parmar  Override: Roro Woodson  Override: Celia Dennis  Override: Martin Sommers  PCA/Nursing Assistant: Beulah Moran  PCA/Nursing Assistant: kvng Grossman  PCA/Nursing Assistant: Karina Marion  Primary Team: Sandi Cadena  Primary Team: Ángel Simeon  Primary Team: Oliver Jo  Primary Team: Jacki Rosado  Registered Dietitian: Chanel Erickson

## 2024-03-08 NOTE — H&P ADULT - PROBLEM SELECTOR PLAN 1
NVD for 2 days duration  norovirus vs gastroparesis vs cyclic vomiting syndrome  CT A/P: IMPRESSION: No evidence of acute inflammatory or obstructive process in  the abdomen and pelvis.  RUQ US: IMPRESSION: No cholelithiasis or sonographic evidence of cholecystitis. Hepatic steatosis.  S/p: 2L NS, metoclopramide 10mg. 2.5mg haldol, famotidine 20mg  tox screen + for THC and methadone, states he smokes nightly, and Dilaudid 1mg was given prior to tox screen  f/u GI PCR  omeprazole 40mg QD  metoclopramide q8:   IVF hydration NS 75ml HR for 12 hours  clear liquid diet   GI consult: NVD for 2 days duration  norovirus vs gastroparesis vs cyclic vomiting syndrome  CT A/P: IMPRESSION: No evidence of acute inflammatory or obstructive process in  the abdomen and pelvis.  RUQ US: IMPRESSION: No cholelithiasis or sonographic evidence of cholecystitis. Hepatic steatosis.  S/p: 2L NS, metoclopramide 10mg. 2.5mg haldol, famotidine 20mg  tox screen + for THC and methadone, states he smokes nightly,  f/u GI PCR  omeprazole 40mg QD  metoclopramide q8:   IVF hydration NS 75ml HR for 12 hours  clear liquid diet   GI consult: NVD for 2 days duration  Norovirus vs gastroparesis vs cyclic vomiting syndrome  CT A/P: IMPRESSION: No evidence of acute inflammatory or obstructive process in  the abdomen and pelvis.  RUQ US: IMPRESSION: No cholelithiasis or sonographic evidence of cholecystitis. Hepatic steatosis.  S/p: 2L NS, metoclopramide 10mg. 2.5mg haldol, famotidine 20mg  tox screen + for THC and methadone, states he smokes nightly, denies methadone  f/u GI PCR  omeprazole 40mg QD  metoclopramide q8:   IVF hydration NS 75ml HR for 12 hours  clear liquid diet   GI consult:

## 2024-03-08 NOTE — PATIENT CHOICE NOTE. - NSPTCHOICESTATE_GEN_ALL_CORE

## 2024-03-08 NOTE — CONSULT NOTE ADULT - SUBJECTIVE AND OBJECTIVE BOX
[facility]  Demario Manning PA-C  [address] [place], [place] [id number] [phone]      Chief Complaint:  Patient is a 53y old  Male who presents with a chief complaint of Nausea (08 Mar 2024 02:42)      HPI:53 yo M PMHx Type 2 Diabetes (not on insulin) , PAD s/p  5 stents in his right leg, presenting with nausea, vomiting and diahreaa for 2 days duration. Patient states he has been unable to hold anything down for 2 days and has had multiple bouts of soft stools. Patient denies any black stool or blood in his stool, however what prompted him to come to the hospital was that just prior coming to the hospital patient starting seeing some small amounts of bright red blood in his emesis. patient states he has had a burning feeling in his chest along with tenderness by his belly. Patient deneis any fever, chills, CP, SOB. States he has abdominal soreness from wrenching frequently.       Allergies:  Lobster (Unknown)  Demerol HCl (Anaphylaxis)      Medications:  acetaminophen     Tablet .. 650 milliGRAM(s) Oral every 6 hours PRN  apixaban 5 milliGRAM(s) Oral two times a day  aspirin enteric coated 81 milliGRAM(s) Oral daily  atorvastatin 80 milliGRAM(s) Oral at bedtime  clopidogrel Tablet 75 milliGRAM(s) Oral daily  dextrose 5%. 1000 milliLiter(s) IV Continuous <Continuous>  dextrose 5%. 1000 milliLiter(s) IV Continuous <Continuous>  dextrose 50% Injectable 25 Gram(s) IV Push once  dextrose 50% Injectable 12.5 Gram(s) IV Push once  dextrose 50% Injectable 25 Gram(s) IV Push once  dextrose Oral Gel 15 Gram(s) Oral once PRN  glucagon  Injectable 1 milliGRAM(s) IntraMuscular once  HYDROmorphone   Tablet 4 milliGRAM(s) Oral every 8 hours PRN  hydrOXYzine hydrochloride 25 milliGRAM(s) Oral at bedtime PRN  insulin lispro (ADMELOG) corrective regimen sliding scale   SubCutaneous three times a day before meals  insulin lispro (ADMELOG) corrective regimen sliding scale   SubCutaneous at bedtime  melatonin 3 milliGRAM(s) Oral at bedtime PRN  metoclopramide 10 milliGRAM(s) Oral every 8 hours PRN  metoclopramide Injectable 10 milliGRAM(s) IV Push once  nicotine -   7 mG/24Hr(s) Patch 1 Patch Transdermal daily  pantoprazole  Injectable 40 milliGRAM(s) IV Push daily  sodium chloride 0.9%. 1000 milliLiter(s) IV Continuous <Continuous>      PMHX/PSHX:  Diabetes    Traumatic brain injury    Migraine    Neuropathy    PVD (peripheral vascular disease)    No significant past surgical history        Family history:  FH: type 2 diabetes (Father, Grandparent)        Social History:     ROS:     General:  no fevers, chills, night sweats, fatigue,   Eyes:  Good vision, no reported pain  ENT:  No sore throat, pain, runny nose, dysphagia  CV:  No pain, palpitations, hypo/hypertension  Resp:  No dyspnea, cough, tachypnea, wheezing  GI:  No pain, No nausea, No vomiting, No diarrhea, No constipation, No weight loss, No fever, No pruritis, No rectal bleeding, No tarry stools, No dysphagia,  :  No pain, bleeding, incontinence, nocturia  Muscle:  No pain, weakness  Neuro:  No weakness, tingling, memory problems  Psych:  No fatigue, insomnia, mood problems, depression  Endocrine:  No polyuria, polydipsia, cold/heat intolerance  Heme:  No petechiae, ecchymosis, easy bruisability  Skin:  No rash, tattoos, scars, edema      PHYSICAL EXAM:   Vital Signs:  Vital Signs Last 24 Hrs  T(C): 36.6 (08 Mar 2024 06:17), Max: 36.7 (07 Mar 2024 17:26)  T(F): 97.9 (08 Mar 2024 06:17), Max: 98.1 (08 Mar 2024 03:33)  HR: 73 (08 Mar 2024 06:17) (67 - 125)  BP: 169/91 (08 Mar 2024 06:17) (129/78 - 172/93)  BP(mean): --  RR: 18 (08 Mar 2024 06:17) (18 - 20)  SpO2: 98% (08 Mar 2024 06:17) (97% - 100%)    Parameters below as of 08 Mar 2024 06:17  Patient On (Oxygen Delivery Method): room air      Daily     Daily     GENERAL:  Appears stated age,   HEENT:  NC/AT,    CHEST:  Full & symmetric excursion,   HEART:  Regular rhythm  ABDOMEN:  Soft, non-tender, non-distended,   EXTEREMITIES:  no cyanosis,clubbing or edema  SKIN:  No rash  NEURO:  Alert,    LABS:                        16.1   13.57 )-----------( 232      ( 08 Mar 2024 05:58 )             44.2     03-08    135  |  98  |  16  ----------------------------<  222<H>  3.4<L>   |  27  |  0.82    Ca    8.2<L>      08 Mar 2024 10:35  Phos  1.6     03-08  Mg     2.2     03-08    TPro  6.2  /  Alb  3.2<L>  /  TBili  0.6  /  DBili  x   /  AST  16  /  ALT  21  /  AlkPhos  65  03-08    LIVER FUNCTIONS - ( 08 Mar 2024 10:35 )  Alb: 3.2 g/dL / Pro: 6.2 g/dL / ALK PHOS: 65 U/L / ALT: 21 U/L / AST: 16 U/L / GGT: x           PT/INR - ( 08 Mar 2024 05:58 )   PT: 11.8 sec;   INR: 1.01 ratio         PTT - ( 08 Mar 2024 05:58 )  PTT:26.8 sec  Urinalysis Basic - ( 08 Mar 2024 10:35 )    Color: x / Appearance: x / SG: x / pH: x  Gluc: 222 mg/dL / Ketone: x  / Bili: x / Urobili: x   Blood: x / Protein: x / Nitrite: x   Leuk Esterase: x / RBC: x / WBC x   Sq Epi: x / Non Sq Epi: x / Bacteria: x      Amylase Serum--      Lipase serum84       Ammonia--      Imaging:

## 2024-03-08 NOTE — CONSULT NOTE ADULT - ASSESSMENT
nausea/vomiting    diet as tolerated  iv fluid  anti-emetics as needed  utox noted + for THC  check gi pcr r/o acute gastroenteritis  proton pump inhibitor daily  ct noted  will follow   d/w patient

## 2024-03-08 NOTE — CHART NOTE - NSCHARTNOTEFT_GEN_A_CORE
Reference #: [id number]    Patient Demographic Information (PDI)       PDI	First Name	Last Name	Birth Date	Gender	Street Address	[facility]	Zip Code  A	Phuc Mcnally	1970	Male	[address]	[place]	[id number]    Prescription Information      PDI Filter:    PDI	Current Rx	Drug Type	Rx Written	Rx Dispensed	Drug	Quantity	Days Supply	Prescriber Name	Prescriber ISRAEL #	Payment Method	Dispenser  A	N	O	01/31/2024	01/31/2024	hydromorphone 4 mg tablet	24	6	Yvonne Diaz [id number]	Insurance	[id number]  A	N	O	01/17/2024	01/17/2024	oxycodone hcl (ir) 5 mg [id number]	2	[facility]	[id number]	Insurance	[id number]  A	N	O	11/09/2023	11/09/2023	hydromorphone 4 mg tablet	20	5	Stepan Neely MD	[id number]	Insurance	[id number]  A	N	O	04/05/2023	04/05/2023	hydromorphone 4 mg tablet	60	12	Berto Vagras	[id number]	Insurance	[id number]  A	N	O	03/23/2023	03/23/2023	hydromorphone 4 mg tablet	60	15	Berto Vargas	[id number]	Insurance	[id number]  A	N	O	03/11/2023	03/11/2023	hydromorphone 4 mg tablet	30	8	Berto Vargas	[id number]	Insurance	[id number]

## 2024-03-08 NOTE — H&P ADULT - ATTENDING COMMENTS
51 yo M PMHx Type 2 Diabetes (not on insulin) with diabetic neuropathy, PAD s/p  R SFA angioplasty admitted for nausea and vomiting    Agree with above. Edited where appropriate

## 2024-03-08 NOTE — H&P ADULT - PROBLEM SELECTOR PLAN 4
eliquis for dvt ppx  nicotine patch for tobacco use + for methadone in drug screen  patient states he takes pain medications as prescribed, see I stop + for methadone in drug screen, patient denies use   patient states he takes pain medications as prescribed, see I stop

## 2024-03-08 NOTE — PHARMACOTHERAPY INTERVENTION NOTE - COMMENTS
Patient is a 54 y/o male on Eliquis 5mg BID, Aspirin 81mg and Clopidogrel 75mg QD for history of PAD. Discussed w/ Dr. Rosado that  Eliquis is not an FDA approved indication for PAD and only approved DOAC is xarelto 2.5mg BID in combination with aspirin 81mg. Additionally discussed the increased risk of bleeds since patient is on triple therapy. MD aware but would like to continue with current regimen since it is patient's home meds.  Patient is a 52 y/o male on Eliquis 5mg BID, Aspirin 81mg and Clopidogrel 75mg QD for history of PAD. Discussed w/ Dr. Rosado that Eliquis is not an FDA approved indication for PAD and the only approved DOAC use for PAD is xarelto 2.5mg BID in combination with aspirin 81mg. Additionally discussed the increased risk of bleeding since patient is on triple therapy. MD aware but would like to continue with current regimen since it is patient's home meds.

## 2024-03-08 NOTE — CARE COORDINATION ASSESSMENT. - NSPASTMEDSURGHISTORY_GEN_ALL_CORE_FT
PAST MEDICAL & SURGICAL HISTORY:  Diabetes      No significant past surgical history      PVD (peripheral vascular disease)      Neuropathy

## 2024-03-08 NOTE — H&P ADULT - ASSESSMENT
53 yo M PMHx Type 2 Diabetes (not on insulin) with diabetic neuropathy, PAD s/p  R SFA angioplasty 53 yo M PMHx Type 2 Diabetes (not on insulin) with diabetic neuropathy, PAD s/p  R SFA angioplasty admitted for nausea and vomiting

## 2024-03-08 NOTE — H&P ADULT - PROBLEM SELECTOR PLAN 2
Not on home insulin  hold home oral agents  low dose ISS, no basal or premeal  acuchecks per protocol  hypoglycemic protocol  A1C in AM Not on home insulin  hold home oral agents  low dose ISS, no basal or pre meal  acu checks per protocol  hypoglycemic protocol  A1C in AM

## 2024-03-08 NOTE — H&P ADULT - HISTORY OF PRESENT ILLNESS
53 yo M PMHx Type 2 Diabetes (not on insulin) with diabetic neuropathy, PAD s/p  R SFA angioplasty  In the ED pts VS were T(F): 98 HR: 86 BP: 129/78 RR: 18 SpO2: 98%  Labs show: H.3   WBC:12.86  Plt:251  Creatinine: .94  Drug screen: + THC, Methadone    CT A/P: IMPRESSION: No evidence of acute inflammatory or obstructive process in   the abdomen and pelvis.  RUQ US: IMPRESSION:  No cholelithiasis or sonographic evidence of cholecystitis.    Hepatic steatosis.      S/p: 2L NS, metoclopramide 10mg. 2.5mg haldol, 1mg hydromorphone, famotidine 20mg, 1g tylenol IV    Pt is admitted for further workup and management   53 yo M PMHx Type 2 Diabetes (not on insulin) , PAD s/p  5 stents in his right leg, presenting with nausea, vomiting and diahreaa for 2 days duration. Patient states he has been unable to hold anything down for 2 days and has had multiple bouts of soft stools. Patient deneis any black stool or blood in his stool, however what prompted him to come to the hospital was that just prior coming to the hospital patient starting seeing some small amounts of bright red blood in his emesis. patient states he has had a burning feeling in his chest along with tenderness by his belly. Patient deneis any fever, chills, CP, SOB. States he has abdominal soreness from wrenching frequently.       In the ED pts VS were T(F): 98 HR: 86 BP: 129/78 RR: 18 SpO2: 98%  Labs show: H.3   WBC:12.86  Plt:251  Creatinine: .94  Drug screen: + THC, Methadone    CT A/P: IMPRESSION: No evidence of acute inflammatory or obstructive process in   the abdomen and pelvis.  RUQ US: IMPRESSION:  No cholelithiasis or sonographic evidence of cholecystitis.    Hepatic steatosis.      S/p: 2L NS, metoclopramide 10mg. 2.5mg haldol, 1mg hydromorphone, famotidine 20mg, 1g tylenol IV    Pt is admitted for further workup and management   51 yo M PMHx Type 2 Diabetes (not on insulin) , PAD s/p  5 stents in his right leg, presenting with nausea, vomiting and diahreaa for 2 days duration. Patient states he has been unable to hold anything down for 2 days and has had multiple bouts of soft stools. Patient denies any black stool or blood in his stool, however what prompted him to come to the hospital was that just prior coming to the hospital patient starting seeing some small amounts of bright red blood in his emesis. patient states he has had a burning feeling in his chest along with tenderness by his belly. Patient deneis any fever, chills, CP, SOB. States he has abdominal soreness from wrenching frequently.       In the ED pts VS were T(F): 98 HR: 86 BP: 129/78 RR: 18 SpO2: 98%  Labs show: H.3   WBC:12.86  Plt:251  Creatinine: .94  Drug screen: + THC, Methadone    CT A/P: IMPRESSION: No evidence of acute inflammatory or obstructive process in   the abdomen and pelvis.  RUQ US: IMPRESSION:  No cholelithiasis or sonographic evidence of cholecystitis.    Hepatic steatosis.    S/p: 2L NS, metoclopramide 10mg. 2.5mg haldol, 1mg hydromorphone, famotidine 20mg, 1g tylenol IV

## 2024-03-09 ENCOUNTER — TRANSCRIPTION ENCOUNTER (OUTPATIENT)
Age: 54
End: 2024-03-09

## 2024-03-09 VITALS
DIASTOLIC BLOOD PRESSURE: 89 MMHG | HEART RATE: 63 BPM | TEMPERATURE: 98 F | SYSTOLIC BLOOD PRESSURE: 187 MMHG | RESPIRATION RATE: 18 BRPM | OXYGEN SATURATION: 98 %

## 2024-03-09 LAB
A1C WITH ESTIMATED AVERAGE GLUCOSE RESULT: 9.7 % — HIGH (ref 4–5.6)
ANION GAP SERPL CALC-SCNC: 11 MMOL/L — SIGNIFICANT CHANGE UP (ref 5–17)
ANION GAP SERPL CALC-SCNC: 7 MMOL/L — SIGNIFICANT CHANGE UP (ref 5–17)
BUN SERPL-MCNC: 13 MG/DL — SIGNIFICANT CHANGE UP (ref 7–23)
BUN SERPL-MCNC: 16 MG/DL — SIGNIFICANT CHANGE UP (ref 7–23)
CALCIUM SERPL-MCNC: 8.2 MG/DL — LOW (ref 8.5–10.1)
CALCIUM SERPL-MCNC: 8.8 MG/DL — SIGNIFICANT CHANGE UP (ref 8.5–10.1)
CHLORIDE SERPL-SCNC: 100 MMOL/L — SIGNIFICANT CHANGE UP (ref 96–108)
CHLORIDE SERPL-SCNC: 99 MMOL/L — SIGNIFICANT CHANGE UP (ref 96–108)
CO2 SERPL-SCNC: 26 MMOL/L — SIGNIFICANT CHANGE UP (ref 22–31)
CO2 SERPL-SCNC: 29 MMOL/L — SIGNIFICANT CHANGE UP (ref 22–31)
CREAT SERPL-MCNC: 0.7 MG/DL — SIGNIFICANT CHANGE UP (ref 0.5–1.3)
CREAT SERPL-MCNC: 0.74 MG/DL — SIGNIFICANT CHANGE UP (ref 0.5–1.3)
EGFR: 108 ML/MIN/1.73M2 — SIGNIFICANT CHANGE UP
EGFR: 110 ML/MIN/1.73M2 — SIGNIFICANT CHANGE UP
ESTIMATED AVERAGE GLUCOSE: 232 MG/DL — HIGH (ref 68–114)
GLUCOSE SERPL-MCNC: 146 MG/DL — HIGH (ref 70–99)
GLUCOSE SERPL-MCNC: 146 MG/DL — HIGH (ref 70–99)
HCT VFR BLD CALC: 41.3 % — SIGNIFICANT CHANGE UP (ref 39–50)
HGB BLD-MCNC: 14.8 G/DL — SIGNIFICANT CHANGE UP (ref 13–17)
LACTATE SERPL-SCNC: 1.5 MMOL/L — SIGNIFICANT CHANGE UP (ref 0.7–2)
MCHC RBC-ENTMCNC: 30.7 PG — SIGNIFICANT CHANGE UP (ref 27–34)
MCHC RBC-ENTMCNC: 35.8 GM/DL — SIGNIFICANT CHANGE UP (ref 32–36)
MCV RBC AUTO: 85.7 FL — SIGNIFICANT CHANGE UP (ref 80–100)
NRBC # BLD: 0 /100 WBCS — SIGNIFICANT CHANGE UP (ref 0–0)
PHOSPHATE SERPL-MCNC: 3.5 MG/DL — SIGNIFICANT CHANGE UP (ref 2.5–4.5)
PLATELET # BLD AUTO: 217 K/UL — SIGNIFICANT CHANGE UP (ref 150–400)
POTASSIUM SERPL-MCNC: 3.3 MMOL/L — LOW (ref 3.5–5.3)
POTASSIUM SERPL-MCNC: 3.8 MMOL/L — SIGNIFICANT CHANGE UP (ref 3.5–5.3)
POTASSIUM SERPL-SCNC: 3.3 MMOL/L — LOW (ref 3.5–5.3)
POTASSIUM SERPL-SCNC: 3.8 MMOL/L — SIGNIFICANT CHANGE UP (ref 3.5–5.3)
RBC # BLD: 4.82 M/UL — SIGNIFICANT CHANGE UP (ref 4.2–5.8)
RBC # FLD: 12.6 % — SIGNIFICANT CHANGE UP (ref 10.3–14.5)
SODIUM SERPL-SCNC: 136 MMOL/L — SIGNIFICANT CHANGE UP (ref 135–145)
SODIUM SERPL-SCNC: 136 MMOL/L — SIGNIFICANT CHANGE UP (ref 135–145)
TROPONIN I, HIGH SENSITIVITY RESULT: 10.5 NG/L — SIGNIFICANT CHANGE UP
WBC # BLD: 9.43 K/UL — SIGNIFICANT CHANGE UP (ref 3.8–10.5)
WBC # FLD AUTO: 9.43 K/UL — SIGNIFICANT CHANGE UP (ref 3.8–10.5)

## 2024-03-09 PROCEDURE — 36415 COLL VENOUS BLD VENIPUNCTURE: CPT

## 2024-03-09 PROCEDURE — 99233 SBSQ HOSP IP/OBS HIGH 50: CPT

## 2024-03-09 PROCEDURE — 83036 HEMOGLOBIN GLYCOSYLATED A1C: CPT

## 2024-03-09 PROCEDURE — 80061 LIPID PANEL: CPT

## 2024-03-09 PROCEDURE — 71045 X-RAY EXAM CHEST 1 VIEW: CPT

## 2024-03-09 PROCEDURE — 96374 THER/PROPH/DIAG INJ IV PUSH: CPT

## 2024-03-09 PROCEDURE — 76705 ECHO EXAM OF ABDOMEN: CPT

## 2024-03-09 PROCEDURE — 0225U NFCT DS DNA&RNA 21 SARSCOV2: CPT

## 2024-03-09 PROCEDURE — 87040 BLOOD CULTURE FOR BACTERIA: CPT

## 2024-03-09 PROCEDURE — 80048 BASIC METABOLIC PNL TOTAL CA: CPT

## 2024-03-09 PROCEDURE — 85025 COMPLETE CBC W/AUTO DIFF WBC: CPT

## 2024-03-09 PROCEDURE — 99285 EMERGENCY DEPT VISIT HI MDM: CPT

## 2024-03-09 PROCEDURE — 84443 ASSAY THYROID STIM HORMONE: CPT

## 2024-03-09 PROCEDURE — 93975 VASCULAR STUDY: CPT

## 2024-03-09 PROCEDURE — 83880 ASSAY OF NATRIURETIC PEPTIDE: CPT

## 2024-03-09 PROCEDURE — 80307 DRUG TEST PRSMV CHEM ANLYZR: CPT

## 2024-03-09 PROCEDURE — 93010 ELECTROCARDIOGRAM REPORT: CPT

## 2024-03-09 PROCEDURE — 96375 TX/PRO/DX INJ NEW DRUG ADDON: CPT

## 2024-03-09 PROCEDURE — 85730 THROMBOPLASTIN TIME PARTIAL: CPT

## 2024-03-09 PROCEDURE — 93005 ELECTROCARDIOGRAM TRACING: CPT

## 2024-03-09 PROCEDURE — 85027 COMPLETE CBC AUTOMATED: CPT

## 2024-03-09 PROCEDURE — 83605 ASSAY OF LACTIC ACID: CPT

## 2024-03-09 PROCEDURE — 84484 ASSAY OF TROPONIN QUANT: CPT

## 2024-03-09 PROCEDURE — 76870 US EXAM SCROTUM: CPT

## 2024-03-09 PROCEDURE — 83735 ASSAY OF MAGNESIUM: CPT

## 2024-03-09 PROCEDURE — 81001 URINALYSIS AUTO W/SCOPE: CPT

## 2024-03-09 PROCEDURE — 83690 ASSAY OF LIPASE: CPT

## 2024-03-09 PROCEDURE — 87637 SARSCOV2&INF A&B&RSV AMP PRB: CPT

## 2024-03-09 PROCEDURE — 84100 ASSAY OF PHOSPHORUS: CPT

## 2024-03-09 PROCEDURE — 80053 COMPREHEN METABOLIC PANEL: CPT

## 2024-03-09 PROCEDURE — 87086 URINE CULTURE/COLONY COUNT: CPT

## 2024-03-09 PROCEDURE — 74177 CT ABD & PELVIS W/CONTRAST: CPT | Mod: MC

## 2024-03-09 PROCEDURE — 85610 PROTHROMBIN TIME: CPT

## 2024-03-09 PROCEDURE — 82962 GLUCOSE BLOOD TEST: CPT

## 2024-03-09 RX ORDER — PANTOPRAZOLE SODIUM 20 MG/1
40 TABLET, DELAYED RELEASE ORAL EVERY 12 HOURS
Refills: 0 | Status: DISCONTINUED | OUTPATIENT
Start: 2024-03-09 | End: 2024-03-09

## 2024-03-09 RX ORDER — HYDROMORPHONE HYDROCHLORIDE 2 MG/ML
0.4 INJECTION INTRAMUSCULAR; INTRAVENOUS; SUBCUTANEOUS EVERY 6 HOURS
Refills: 0 | Status: DISCONTINUED | OUTPATIENT
Start: 2024-03-09 | End: 2024-03-09

## 2024-03-09 RX ORDER — AMLODIPINE BESYLATE 2.5 MG/1
5 TABLET ORAL DAILY
Refills: 0 | Status: DISCONTINUED | OUTPATIENT
Start: 2024-03-09 | End: 2024-03-09

## 2024-03-09 RX ORDER — HYDROMORPHONE HYDROCHLORIDE 2 MG/ML
0.2 INJECTION INTRAMUSCULAR; INTRAVENOUS; SUBCUTANEOUS EVERY 4 HOURS
Refills: 0 | Status: DISCONTINUED | OUTPATIENT
Start: 2024-03-09 | End: 2024-03-09

## 2024-03-09 RX ORDER — HYDROMORPHONE HYDROCHLORIDE 2 MG/ML
0.2 INJECTION INTRAMUSCULAR; INTRAVENOUS; SUBCUTANEOUS EVERY 6 HOURS
Refills: 0 | Status: DISCONTINUED | OUTPATIENT
Start: 2024-03-09 | End: 2024-03-09

## 2024-03-09 RX ORDER — ACETAMINOPHEN 500 MG
1000 TABLET ORAL EVERY 8 HOURS
Refills: 0 | Status: COMPLETED | OUTPATIENT
Start: 2024-03-09 | End: 2024-03-09

## 2024-03-09 RX ORDER — POTASSIUM PHOSPHATE, MONOBASIC POTASSIUM PHOSPHATE, DIBASIC 236; 224 MG/ML; MG/ML
30 INJECTION, SOLUTION INTRAVENOUS ONCE
Refills: 0 | Status: COMPLETED | OUTPATIENT
Start: 2024-03-09 | End: 2024-03-09

## 2024-03-09 RX ORDER — HYDROMORPHONE HYDROCHLORIDE 2 MG/ML
0.5 INJECTION INTRAMUSCULAR; INTRAVENOUS; SUBCUTANEOUS ONCE
Refills: 0 | Status: DISCONTINUED | OUTPATIENT
Start: 2024-03-09 | End: 2024-03-09

## 2024-03-09 RX ORDER — HYDROMORPHONE HYDROCHLORIDE 2 MG/ML
0.4 INJECTION INTRAMUSCULAR; INTRAVENOUS; SUBCUTANEOUS EVERY 4 HOURS
Refills: 0 | Status: DISCONTINUED | OUTPATIENT
Start: 2024-03-09 | End: 2024-03-09

## 2024-03-09 RX ADMIN — Medication 1: at 17:20

## 2024-03-09 RX ADMIN — CLOPIDOGREL BISULFATE 75 MILLIGRAM(S): 75 TABLET, FILM COATED ORAL at 17:20

## 2024-03-09 RX ADMIN — HYDROMORPHONE HYDROCHLORIDE 4 MILLIGRAM(S): 2 INJECTION INTRAMUSCULAR; INTRAVENOUS; SUBCUTANEOUS at 04:12

## 2024-03-09 RX ADMIN — Medication 81 MILLIGRAM(S): at 17:20

## 2024-03-09 RX ADMIN — HYDROMORPHONE HYDROCHLORIDE 0.4 MILLIGRAM(S): 2 INJECTION INTRAMUSCULAR; INTRAVENOUS; SUBCUTANEOUS at 16:35

## 2024-03-09 RX ADMIN — Medication 400 MILLIGRAM(S): at 13:50

## 2024-03-09 RX ADMIN — POTASSIUM PHOSPHATE, MONOBASIC POTASSIUM PHOSPHATE, DIBASIC 83.33 MILLIMOLE(S): 236; 224 INJECTION, SOLUTION INTRAVENOUS at 11:38

## 2024-03-09 RX ADMIN — HYDROMORPHONE HYDROCHLORIDE 0.4 MILLIGRAM(S): 2 INJECTION INTRAMUSCULAR; INTRAVENOUS; SUBCUTANEOUS at 16:04

## 2024-03-09 RX ADMIN — PANTOPRAZOLE SODIUM 40 MILLIGRAM(S): 20 TABLET, DELAYED RELEASE ORAL at 05:27

## 2024-03-09 RX ADMIN — Medication 1: at 08:12

## 2024-03-09 RX ADMIN — APIXABAN 5 MILLIGRAM(S): 2.5 TABLET, FILM COATED ORAL at 17:20

## 2024-03-09 RX ADMIN — APIXABAN 5 MILLIGRAM(S): 2.5 TABLET, FILM COATED ORAL at 05:27

## 2024-03-09 RX ADMIN — Medication 1000 MILLIGRAM(S): at 14:20

## 2024-03-09 RX ADMIN — HYDROMORPHONE HYDROCHLORIDE 0.5 MILLIGRAM(S): 2 INJECTION INTRAMUSCULAR; INTRAVENOUS; SUBCUTANEOUS at 10:51

## 2024-03-09 RX ADMIN — PANTOPRAZOLE SODIUM 40 MILLIGRAM(S): 20 TABLET, DELAYED RELEASE ORAL at 17:21

## 2024-03-09 NOTE — PROGRESS NOTE ADULT - ASSESSMENT
nausea/vomiting    pt c/o abd pain and nausea  revert to NPO  iv fluid  anti-emetics as needed  utox noted + for THC  check gi pcr r/o acute gastroenteritis  proton pump inhibitor increase to BID  ct noted  will follow   d/w patient   
53 yo M PMHx Type 2 Diabetes (not on insulin) with diabetic neuropathy, PAD s/p  R SFA angioplasty admitted for nausea and vomiting
lr

## 2024-03-09 NOTE — PROGRESS NOTE ADULT - PROBLEM SELECTOR PLAN 3
s/p 5 arterial stents in right lower extremity  continue home statin  continue home plavix  continue home eliquis  - need to confirm pt is on triple therapy as outpatient given high risk of bleeding.

## 2024-03-09 NOTE — PROGRESS NOTE ADULT - PROBLEM SELECTOR PLAN 4
+ for methadone in drug screen, patient denies use   patient states he takes pain medications as prescribed, see I stop

## 2024-03-09 NOTE — PROGRESS NOTE ADULT - SUBJECTIVE AND OBJECTIVE BOX
[facility]  Demario Manning PA-C  [address] [place] [address]  [phone]      INTERVAL HPI/OVERNIGHT EVENTS:  Pt s/e  C/o abdominal pain after attempting clear liquids    MEDICATIONS  (STANDING):  apixaban 5 milliGRAM(s) Oral two times a day  aspirin enteric coated 81 milliGRAM(s) Oral daily  atorvastatin 80 milliGRAM(s) Oral at bedtime  clopidogrel Tablet 75 milliGRAM(s) Oral daily  dextrose 5%. 1000 milliLiter(s) (100 mL/Hr) IV Continuous <Continuous>  dextrose 5%. 1000 milliLiter(s) (50 mL/Hr) IV Continuous <Continuous>  dextrose 50% Injectable 25 Gram(s) IV Push once  dextrose 50% Injectable 12.5 Gram(s) IV Push once  dextrose 50% Injectable 25 Gram(s) IV Push once  glucagon  Injectable 1 milliGRAM(s) IntraMuscular once  insulin lispro (ADMELOG) corrective regimen sliding scale   SubCutaneous three times a day before meals  insulin lispro (ADMELOG) corrective regimen sliding scale   SubCutaneous at bedtime  nicotine -   7 mG/24Hr(s) Patch 1 Patch Transdermal daily  pantoprazole  Injectable 40 milliGRAM(s) IV Push daily  potassium phosphate IVPB 30 milliMole(s) IV Intermittent once  sodium chloride 0.9%. 1000 milliLiter(s) (75 mL/Hr) IV Continuous <Continuous>    MEDICATIONS  (PRN):  acetaminophen     Tablet .. 650 milliGRAM(s) Oral every 6 hours PRN Temp greater or equal to 38C (100.4F), Mild Pain (1 - 3)  dextrose Oral Gel 15 Gram(s) Oral once PRN Blood Glucose LESS THAN 70 milliGRAM(s)/deciliter  HYDROmorphone   Tablet 4 milliGRAM(s) Oral every 8 hours PRN Severe Pain (7 - 10)  hydrOXYzine hydrochloride 25 milliGRAM(s) Oral at bedtime PRN insomnia  melatonin 3 milliGRAM(s) Oral at bedtime PRN Insomnia  metoclopramide 10 milliGRAM(s) Oral every 8 hours PRN nausea      Allergies    Lobster (Unknown)  Demerol HCl (Anaphylaxis)    PHYSICAL EXAM:   Vital Signs:  Vital Signs Last 24 Hrs  T(C): 36.7 (09 Mar 2024 04:41), Max: 36.7 (08 Mar 2024 12:36)  T(F): 98.1 (09 Mar 2024 04:41), Max: 98.1 (08 Mar 2024 12:36)  HR: 74 (09 Mar 2024 04:41) (63 - 74)  BP: 155/85 (09 Mar 2024 04:41) (148/76 - 164/86)  BP(mean): --  RR: 18 (09 Mar 2024 04:41) (17 - 18)  SpO2: 96% (09 Mar 2024 04:41) (94% - 96%)    Parameters below as of 09 Mar 2024 04:41  Patient On (Oxygen Delivery Method): room air    GENERAL:  Appears stated age  HEENT:  NC/AT  CHEST:  Full & symmetric excursion  HEART:  Regular rhythm  ABDOMEN:  Soft, non-tender, non-distended  EXTEREMITIES:  no cyanosis  SKIN:  No rash  NEURO:  Alert      LABS:                        14.8   9.43  )-----------( 217      ( 09 Mar 2024 07:40 )             41.3     03-09    136  |  100  |  16  ----------------------------<  146<H>  3.3<L>   |  29  |  0.74    Ca    8.8      09 Mar 2024 07:40  Phos  1.6     03-08  Mg     2.2     03-08    TPro  6.2  /  Alb  3.2<L>  /  TBili  0.6  /  DBili  x   /  AST  16  /  ALT  21  /  AlkPhos  65  03-08    PT/INR - ( 08 Mar 2024 05:58 )   PT: 11.8 sec;   INR: 1.01 ratio         PTT - ( 08 Mar 2024 05:58 )  PTT:26.8 sec  Urinalysis Basic - ( 09 Mar 2024 07:40 )    Color: x / Appearance: x / SG: x / pH: x  Gluc: 146 mg/dL / Ketone: x  / Bili: x / Urobili: x   Blood: x / Protein: x / Nitrite: x   Leuk Esterase: x / RBC: x / WBC x   Sq Epi: x / Non Sq Epi: x / Bacteria: x  
Patient is a 53y old  Male who presents with a chief complaint of Nausea (09 Mar 2024 10:39)      Subjective:  INTERVAL HPI/OVERNIGHT EVENTS: Patient seen and examined at bedside. Pt has had no further bowel movements. He states he was feeling ok and wanted to eat this am. when he tried the clear liq diet he developed severe epigastric pain. he states he is always in chronic pain due to his neuropathy but this pain was worse. states it is making his breathing more difficult.    MEDICATIONS  (STANDING):  apixaban 5 milliGRAM(s) Oral two times a day  aspirin enteric coated 81 milliGRAM(s) Oral daily  atorvastatin 80 milliGRAM(s) Oral at bedtime  clopidogrel Tablet 75 milliGRAM(s) Oral daily  dextrose 5%. 1000 milliLiter(s) (100 mL/Hr) IV Continuous <Continuous>  dextrose 5%. 1000 milliLiter(s) (50 mL/Hr) IV Continuous <Continuous>  dextrose 50% Injectable 25 Gram(s) IV Push once  dextrose 50% Injectable 12.5 Gram(s) IV Push once  dextrose 50% Injectable 25 Gram(s) IV Push once  glucagon  Injectable 1 milliGRAM(s) IntraMuscular once  insulin lispro (ADMELOG) corrective regimen sliding scale   SubCutaneous three times a day before meals  insulin lispro (ADMELOG) corrective regimen sliding scale   SubCutaneous at bedtime  nicotine -   7 mG/24Hr(s) Patch 1 Patch Transdermal daily  pantoprazole  Injectable 40 milliGRAM(s) IV Push every 12 hours  potassium phosphate IVPB 30 milliMole(s) IV Intermittent once  sodium chloride 0.9%. 1000 milliLiter(s) (75 mL/Hr) IV Continuous <Continuous>    MEDICATIONS  (PRN):  acetaminophen     Tablet .. 650 milliGRAM(s) Oral every 6 hours PRN Temp greater or equal to 38C (100.4F), Mild Pain (1 - 3)  dextrose Oral Gel 15 Gram(s) Oral once PRN Blood Glucose LESS THAN 70 milliGRAM(s)/deciliter  HYDROmorphone   Tablet 4 milliGRAM(s) Oral every 8 hours PRN Severe Pain (7 - 10)  hydrOXYzine hydrochloride 25 milliGRAM(s) Oral at bedtime PRN insomnia  melatonin 3 milliGRAM(s) Oral at bedtime PRN Insomnia  metoclopramide 10 milliGRAM(s) Oral every 8 hours PRN nausea      Allergies    Lobster (Unknown)  Demerol HCl (Anaphylaxis)    Intolerances        REVIEW OF SYSTEMS:  CONSTITUTIONAL: No fever or chills  HEENT:  No headache, no sore throat  RESPIRATORY: No cough, wheezing, +sob when he has pain  CARDIOVASCULAR: No chest pain, palpitations  GASTROINTESTINAL: +abd pain. no nausea or vomiting   NEUROLOGICAL: no focal weakness or dizziness  MUSCULOSKELETAL: no myalgias     Objective:  Vital Signs Last 24 Hrs  T(C): 36.7 (09 Mar 2024 04:41), Max: 36.7 (08 Mar 2024 12:36)  T(F): 98.1 (09 Mar 2024 04:41), Max: 98.1 (08 Mar 2024 12:36)  HR: 74 (09 Mar 2024 04:41) (63 - 74)  BP: 155/85 (09 Mar 2024 04:41) (148/76 - 164/86)  BP(mean): --  RR: 18 (09 Mar 2024 04:41) (17 - 18)  SpO2: 96% (09 Mar 2024 04:41) (94% - 96%)    Parameters below as of 09 Mar 2024 04:41  Patient On (Oxygen Delivery Method): room air        GENERAL: moderate distress due to pain  HEAD:  Atraumatic, Normocephalic  EYES: EOMI,  conjunctiva and sclera clear  ENT: dry mucous membranes  NECK: Supple, No JVD  CHEST/LUNG: Clear to auscultation bilaterally; No rales, rhonchi, wheezing, or rubs. Unlabored respirations  HEART: Regular rate and rhythm; No murmurs, rubs, or gallops  ABDOMEN: significant epigastric ttp. no rebound or guarding. remainder of abd exam benign   EXTREMITIES:  2+ Peripheral Pulses, brisk capillary refill. No clubbing, cyanosis, or edema  NERVOUS SYSTEM:  Alert & Oriented X3  MSK: FROM all 4 extremities, full and equal strength  SKIN: No rashes or lesions    LABS:                        14.8   9.43  )-----------( 217      ( 09 Mar 2024 07:40 )             41.3     CBC Full  -  ( 09 Mar 2024 07:40 )  WBC Count : 9.43 K/uL  Hemoglobin : 14.8 g/dL  Hematocrit : 41.3 %  Platelet Count - Automated : 217 K/uL  Mean Cell Volume : 85.7 fl  Mean Cell Hemoglobin : 30.7 pg  Mean Cell Hemoglobin Concentration : 35.8 gm/dL  Auto Neutrophil # : x  Auto Lymphocyte # : x  Auto Monocyte # : x  Auto Eosinophil # : x  Auto Basophil # : x  Auto Neutrophil % : x  Auto Lymphocyte % : x  Auto Monocyte % : x  Auto Eosinophil % : x  Auto Basophil % : x    09 Mar 2024 07:40    136    |  100    |  16     ----------------------------<  146    3.3     |  29     |  0.74     Ca    8.8        09 Mar 2024 07:40      PT/INR - ( 08 Mar 2024 05:58 )   PT: 11.8 sec;   INR: 1.01 ratio         PTT - ( 08 Mar 2024 05:58 )  PTT:26.8 sec  Urinalysis Basic - ( 09 Mar 2024 07:40 )    Color: x / Appearance: x / SG: x / pH: x  Gluc: 146 mg/dL / Ketone: x  / Bili: x / Urobili: x   Blood: x / Protein: x / Nitrite: x   Leuk Esterase: x / RBC: x / WBC x   Sq Epi: x / Non Sq Epi: x / Bacteria: x      CAPILLARY BLOOD GLUCOSE      POCT Blood Glucose.: 156 mg/dL (09 Mar 2024 07:59)  POCT Blood Glucose.: 151 mg/dL (08 Mar 2024 21:37)  POCT Blood Glucose.: 200 mg/dL (08 Mar 2024 16:59)  POCT Blood Glucose.: 189 mg/dL (08 Mar 2024 11:45)        Culture - Urine (collected 03-07-24 @ 20:00)  Source: Clean Catch Clean Catch (Midstream)  Final Report (03-08-24 @ 22:15):    <10,000 CFU/mL Normal Urogenital Carmelina    Culture - Blood (collected 03-07-24 @ 19:50)  Source: .Blood Blood-Peripheral  Preliminary Report (03-09-24 @ 01:03):    No growth at 24 hours    Culture - Blood (collected 03-07-24 @ 19:45)  Source: .Blood Blood-Peripheral  Preliminary Report (03-09-24 @ 01:02):    No growth at 24 hours        RADIOLOGY & ADDITIONAL TESTS:    Personally reviewed.     Consultant(s) Notes Reviewed:  [x] YES  [ ] NO

## 2024-03-09 NOTE — PROGRESS NOTE ADULT - PROBLEM SELECTOR PLAN 1
NVD for 2 days duration prior to admission   diarrhea resolved. now with severe abdominal pain after CLD on 3/8  r/o alternative etiology of pain - check ekg, troponin. check stat lactate   dilaudid x 1   make npo with ice chips   CT A/P: IMPRESSION: No evidence of acute inflammatory or obstructive process in  the abdomen and pelvis.  RUQ US: IMPRESSION: No cholelithiasis or sonographic evidence of cholecystitis. Hepatic steatosis.  tox screen + for THC and methadone, states he smokes nightly, denies methadone  f/u GI PCR  omeprazole 40mg QD  metoclopramide q8:   IVF   GI consult (pt evaluated with GI at bedside- agrees with above plan)

## 2024-03-09 NOTE — PROGRESS NOTE ADULT - PROBLEM SELECTOR PLAN 2
Not on home insulin  hold home oral agents  low dose ISS, no basal or pre meal  acu checks per protocol  hypoglycemic protocol  A1C

## 2024-03-09 NOTE — PROGRESS NOTE ADULT - TIME BILLING
Note written by attending, see above.  Time spent: 50min. More than 50% of the visit was spent counseling the patient on medical condition and coordination of care

## 2024-03-10 NOTE — DISCHARGE NOTE PROVIDER - CARE PROVIDER_API CALL
Enrique Stacy  Gastroenterology  [address] [place] [id number]  Phone: [phone]  Fax: [phone]  Follow Up Time:     Sam [address], [facility] [place] [address]  Phone: [phone]  Fax: [phone]  Follow Up Time: 2 weeks

## 2024-03-10 NOTE — DISCHARGE NOTE PROVIDER - ATTENDING DISCHARGE PHYSICAL EXAMINATION:
Patient was last examined by me on 3/9/24. Please see progress note for further details. Pt left against medical advice prior to being seen by attending and was counseled by resident on the consequences of doing so. I have not modified the care plan as this is the information that was provided to the patient at time of AMA.

## 2024-03-10 NOTE — DISCHARGE NOTE PROVIDER - CARE PROVIDERS DIRECT ADDRESSES
,DirectAddress_Unknown,[email or web address]

## 2024-03-10 NOTE — DISCHARGE NOTE PROVIDER - NSDCCPCAREPLAN_GEN_ALL_CORE_FT
PRINCIPAL DISCHARGE DIAGNOSIS  Diagnosis: Nausea & vomiting  Assessment and Plan of Treatment: You presented to [facility] with nausea and vomiting. You had a CT of your abdomen & pelvis completed, which showed no obstruction. You had an ultrasound of your liver and gallbladder, which showed no acute process. You were evaluated by our gastroenterologist, who recommended checking your stool to evaluate for an infectious process. You were unhappy with the care you were receiving and left against medical advice.      SECONDARY DISCHARGE DIAGNOSES  Diagnosis: PAD (peripheral artery disease)  Assessment and Plan of Treatment: You have a history of peripheral artery disease. You are on medications at home to control your peripheral artery disease and manage the pain. You were started on an intravenous regimen of dilaudid to control your pain.  You were unhappy with the care you were receiving and left against medical advice.    Diagnosis: T2DM (type 2 diabetes mellitus)  Assessment and Plan of Treatment: You have a history of type 2 diabetes. You are on medication at home to control your blood sugar levels. Home medications were held and you were started on an insulin sliding scale to control your glucose levels in the hospital.  You were placed on precautions to prevent too low blood sugar levels. You were unhappy with the care you were receiving and left against medical advice.     PRINCIPAL DISCHARGE DIAGNOSIS  Diagnosis: Nausea & vomiting  Assessment and Plan of Treatment: You presented to [facility] with nausea and vomiting. You had a CT of your abdomen & pelvis completed, which showed no obstruction. You had an ultrasound of your liver and gallbladder, which showed no acute process. You were evaluated by our gastroenterologist, who recommended checking your stool to evaluate for an infectious process. You were unhappy with the care you were receiving and left against medical advice.  Please follow up with your PCP within the next 2 weeks.  Please follow up with a gastroenterologist.      SECONDARY DISCHARGE DIAGNOSES  Diagnosis: PAD (peripheral artery disease)  Assessment and Plan of Treatment: You have a history of peripheral artery disease. You are on medications at home to control your peripheral artery disease and manage the pain. You were started on an intravenous regimen of dilaudid to control your pain.  You were unhappy with the care you were receiving and left against medical advice.  Please follow up with your PCP within the next 2 weeks.    Diagnosis: T2DM (type 2 diabetes mellitus)  Assessment and Plan of Treatment: You have a history of type 2 diabetes. You are on medication at home to control your blood sugar levels. Home medications were held and you were started on an insulin sliding scale to control your glucose levels in the hospital.  You were placed on precautions to prevent too low blood sugar levels. You were unhappy with the care you were receiving and left against medical advice.  Please follow up with your PCP within the next 2 weeks.

## 2024-03-10 NOTE — DISCHARGE NOTE PROVIDER - NSDCMRMEDTOKEN_GEN_ALL_CORE_FT
aspirin 81 mg oral delayed release tablet: 1 tab(s) orally once a day  atorvastatin 80 mg oral tablet: 1 tab(s) orally once a day  clopidogrel 75 mg oral tablet: 1 tab(s) orally once a day  Eliquis 5 mg oral tablet: 1 tab(s) orally 2 times a day  metformin 1000 mg oral tablet: 1  orally 2 times a day

## 2024-03-10 NOTE — DISCHARGE NOTE PROVIDER - HOSPITAL COURSE
ADMISSION DATE:  24    ---  FROM ADMISSION H+P:   HPI:  53 yo M PMHx Type 2 Diabetes (not on insulin) , PAD s/p  5 stents in his right leg, presenting with nausea, vomiting and diahreaa for 2 days duration. Patient states he has been unable to hold anything down for 2 days and has had multiple bouts of soft stools. Patient denies any black stool or blood in his stool, however what prompted him to come to the hospital was that just prior coming to the hospital patient starting seeing some small amounts of bright red blood in his emesis. patient states he has had a burning feeling in his chest along with tenderness by his belly. Patient deneis any fever, chills, CP, SOB. States he has abdominal soreness from wrenching frequently.       In the ED pts VS were T(F): 98 HR: 86 BP: 129/78 RR: 18 SpO2: 98%  Labs show: H.3   WBC:12.86  Plt:251  Creatinine: .94  Drug screen: + THC, Methadone    CT A/P: IMPRESSION: No evidence of acute inflammatory or obstructive process in   the abdomen and pelvis.  RUQ US: IMPRESSION:  No cholelithiasis or sonographic evidence of cholecystitis.    Hepatic steatosis.    S/p: 2L NS, metoclopramide 10mg. 2.5mg haldol, 1mg hydromorphone, famotidine 20mg, 1g tylenol IV     (08 Mar 2024 02:42)      ---  HOSPITAL COURSE/PERTINENT LABS/PROCEDURES PERFORMED/PENDING TESTS:   Pt was admitted for abdominal pain, NVD; CT A&P showing no evidence of acute inflammatory or obstructive process in the abdomen and pelvis. RUQ US showing no cholelithiasis or sonographic evidence of cholecystitis of hepatic steatosis. UTox was positive for THC and methadone. GI was consulted, who recommended checking GI PCR to r/o and starting PPI.  Pt's home diabetic medications were held and started on ISS with accuchecks and hypoglycemia protocol. Pt's home medications for his PAD was continued and started on IV dilaudid pain regimen. Pt was unhappy with the dosing and frequency of his pain medication and wished to leave the hospital AMA if it was not adjusted. Frequency of medication was increased from Q6H to Q4H but patient continued to be unhappy with current regimen and overall care at [facility]. Pt left AMA.     ---  PATIENT CONDITION:  - Patient left AMA.     --  VITALS:   T(C): 36.8 (24 @ 12:50), Max: 36.8 (24 @ 12:50)  HR: 63 (24 @ 12:50) (63 - 74)  BP: 187/89 (24 @ 12:50) (155/85 - 187/89)  RR: 18 (24 @ 12:50) (18 - 18)  SpO2: 98% (24 @ 12:50) (96% - 98%)    ---  CONSULTANTS:   - Dr. Stacy ()     ---  ADVANCED CARE PLANNING:  - Code status:    FULL CODE  - MOLST completed:      [ X ] NO     [  ] YES    ---  TIME SPENT:  I, the attending physician, was physically present for the key portions of the evaluation and management (E/M) service provided. The total amount of time spent reviewing the hospital notes, laboratory values, imaging findings, assessing/counseling the patient, discussing with consultant physicians, social work, nursing staff was -- minutes       ADMISSION DATE:  24    ---  FROM ADMISSION H+P:   HPI:  51 yo M PMHx Type 2 Diabetes (not on insulin) , PAD s/p  5 stents in his right leg, presenting with nausea, vomiting and diahreaa for 2 days duration. Patient states he has been unable to hold anything down for 2 days and has had multiple bouts of soft stools. Patient denies any black stool or blood in his stool, however what prompted him to come to the hospital was that just prior coming to the hospital patient starting seeing some small amounts of bright red blood in his emesis. patient states he has had a burning feeling in his chest along with tenderness by his belly. Patient deneis any fever, chills, CP, SOB. States he has abdominal soreness from wrenching frequently.       In the ED pts VS were T(F): 98 HR: 86 BP: 129/78 RR: 18 SpO2: 98%  Labs show: H.3   WBC:12.86  Plt:251  Creatinine: .94  Drug screen: + THC, Methadone    CT A/P: IMPRESSION: No evidence of acute inflammatory or obstructive process in   the abdomen and pelvis.  RUQ US: IMPRESSION:  No cholelithiasis or sonographic evidence of cholecystitis.    Hepatic steatosis.    S/p: 2L NS, metoclopramide 10mg. 2.5mg haldol, 1mg hydromorphone, famotidine 20mg, 1g tylenol IV     (08 Mar 2024 02:42)      ---  HOSPITAL COURSE/PERTINENT LABS/PROCEDURES PERFORMED/PENDING TESTS:   Pt was admitted for abdominal pain, NVD; CT A&P showing no evidence of acute inflammatory or obstructive process in the abdomen and pelvis. RUQ US showing no cholelithiasis or sonographic evidence of cholecystitis of hepatic steatosis. UTox was positive for THC and methadone. GI was consulted, who recommended checking GI PCR and starting PPI.  Pt's home diabetic medications were held and started on ISS with accuchecks and hypoglycemia protocol. Pt's home medications for his PAD was continued and started on IV dilaudid pain regimen. Pt was unhappy with the dosing and frequency of his pain medication and wished to leave the hospital AMA if it was not adjusted. Frequency of medication was increased from Q6H to Q4H but patient continued to be unhappy with current regimen and overall care at [facility]. Pt left AMA.     ---  PATIENT CONDITION:  - Patient left AMA.   Pt was not evaluated by Attending on day of AMA as he left prior to being seen.    --  VITALS:   T(C): 36.8 (24 @ 12:50), Max: 36.8 (24 @ 12:50)  HR: 63 (24 @ 12:50) (63 - 74)  BP: 187/89 (24 @ 12:50) (155/85 - 187/89)  RR: 18 (24 @ 12:50) (18 - 18)  SpO2: 98% (24 @ 12:50) (96% - 98%)    ---  CONSULTANTS:   - Dr. Stacy ()

## 2024-04-10 NOTE — ED PROVIDER NOTE - PROGRESS NOTE DETAILS
Can you set him up with the [facility]? Pt has been vaccinated and had covid in August.     Pt called in stating he has a very sore throat. Cough that is causing burning in his chest. Pt states he coughed so hard last night that he actually felt like he cracked a rib, now it hurts his ribs when he takes a deep breath. Pt has slight fever and cold symptoms that have settled in his chest and lungs. Please advise. Pt is reachable at [phone] this all started Thursday night. Scheduled for today [facility] at 4:20. 86 y/o m with PMH of HLD, HTN, DM, CAD s/p PCI June 2023, s/p cholecystectomy, BPH presents to ED with episode of unresponsiveness with right sided gaze, left facial droop at 7:15am while at [facility] waiting for appt for post op cataract. As per daughter who is also a nurse, pt had similar episode two weeks prior and was seen by PMD and cards with neg workup.  Pt wearing Holter Monitor.  Pt did not have neuro workup.  In ED most of symptoms resolved and pt states he feels back to normal.  CODE stroke called after my eval. Braydon Lin MD (Attending Physician): Labs and imaging non-actionable. Pt was PO challenged after receiving pain meds and zofran/reglan, but felt nauseous again as if he was going to vomit. Will give pt haldol for possible cannabinoid hyperemesis syndrome. Pt admitted to Medicine for GI evaluation for possible diabetic gastroparesis.

## 2024-09-18 NOTE — ED ADULT NURSE NOTE - NSFALLRSKASSESASSIST_ED_ALL_ED
Writer called patient and reviewed below from Kimmy Bae. Patient verbalizes understanding.  Confirmed understanding of NOV 11/19/24.     no

## 2024-12-27 NOTE — ED ADULT NURSE NOTE - NS ED NOTE ABUSE RESPONSE YN
Yes 24HR EVENTS:     SUBJECTIVE: Pt seen and examined on morning rounds. Pain well controlled. Patient denies CP/SOB/N/V. Urinating without issue.      Vital Signs Last 24 Hrs  T(C): 36.7 (27 Dec 2024 05:02), Max: 36.8 (26 Dec 2024 14:05)  T(F): 98.1 (27 Dec 2024 05:02), Max: 98.2 (26 Dec 2024 14:05)  HR: 86 (27 Dec 2024 05:02) (75 - 86)  BP: 141/62 (27 Dec 2024 05:02) (136/64 - 167/66)  BP(mean): 92 (26 Dec 2024 14:05) (92 - 106)  RR: 19 (27 Dec 2024 05:02) (16 - 20)  SpO2: 93% (27 Dec 2024 05:02) (93% - 100%)    Parameters below as of 27 Dec 2024 05:02  Patient On (Oxygen Delivery Method): room air        Physical Exam:  General: NAD, resting comfortably in bed      RLE:  SILT L2-S1, symmetric  Motor 5/5 L2-S1, symmetric  Pulses 2+    LLE:  SILT L2-S1, symmetric  Motor 5/5 L2-S1, symmetric  Pulses 2+      Assessment/Plan:  81yF s/p WINIFRED, Exploration of fusion, extension of fusion to T1 w/ Tethers to C7 by Dr. TONO Salguero on 12-23  - Weight Bearing Status: WBAT  - Pain control  - DVT PPx: SCDs  - PT rec: AR  - F/u AM labs  - Dispo: AR  - Continue isak Lopez, PGY-1  Orthopedic Surgery

## 2025-01-01 ENCOUNTER — EMERGENCY (EMERGENCY)
Facility: HOSPITAL | Age: 55
LOS: 1 days | End: 2025-01-01
Attending: EMERGENCY MEDICINE | Admitting: EMERGENCY MEDICINE
Payer: SELF-PAY

## 2025-01-01 ENCOUNTER — INPATIENT (INPATIENT)
Facility: HOSPITAL | Age: 55
LOS: 36 days | DRG: 3 | End: 2025-06-14
Attending: STUDENT IN AN ORGANIZED HEALTH CARE EDUCATION/TRAINING PROGRAM | Admitting: PSYCHIATRY & NEUROLOGY
Payer: SELF-PAY

## 2025-01-01 VITALS
HEART RATE: 66 BPM | DIASTOLIC BLOOD PRESSURE: 87 MMHG | RESPIRATION RATE: 16 BRPM | OXYGEN SATURATION: 100 % | SYSTOLIC BLOOD PRESSURE: 146 MMHG | TEMPERATURE: 98 F

## 2025-01-01 VITALS
TEMPERATURE: 97 F | DIASTOLIC BLOOD PRESSURE: 65 MMHG | HEART RATE: 67 BPM | RESPIRATION RATE: 18 BRPM | SYSTOLIC BLOOD PRESSURE: 152 MMHG | OXYGEN SATURATION: 96 %

## 2025-01-01 VITALS
HEART RATE: 74 BPM | OXYGEN SATURATION: 100 % | TEMPERATURE: 97 F | DIASTOLIC BLOOD PRESSURE: 91 MMHG | RESPIRATION RATE: 12 BRPM | SYSTOLIC BLOOD PRESSURE: 171 MMHG

## 2025-01-01 VITALS
HEART RATE: 73 BPM | OXYGEN SATURATION: 99 % | WEIGHT: 142.2 LBS | HEIGHT: 66 IN | RESPIRATION RATE: 14 BRPM | DIASTOLIC BLOOD PRESSURE: 62 MMHG | SYSTOLIC BLOOD PRESSURE: 92 MMHG | TEMPERATURE: 97 F

## 2025-01-01 DIAGNOSIS — Z99.11 DEPENDENCE ON RESPIRATOR [VENTILATOR] STATUS: ICD-10-CM

## 2025-01-01 DIAGNOSIS — R50.9 FEVER, UNSPECIFIED: ICD-10-CM

## 2025-01-01 DIAGNOSIS — A41.9 SEPSIS, UNSPECIFIED ORGANISM: ICD-10-CM

## 2025-01-01 DIAGNOSIS — Z71.89 OTHER SPECIFIED COUNSELING: ICD-10-CM

## 2025-01-01 DIAGNOSIS — Z51.5 ENCOUNTER FOR PALLIATIVE CARE: ICD-10-CM

## 2025-01-01 DIAGNOSIS — R45.1 RESTLESSNESS AND AGITATION: ICD-10-CM

## 2025-01-01 DIAGNOSIS — F17.200 NICOTINE DEPENDENCE, UNSPECIFIED, UNCOMPLICATED: ICD-10-CM

## 2025-01-01 DIAGNOSIS — I63.9 CEREBRAL INFARCTION, UNSPECIFIED: ICD-10-CM

## 2025-01-01 LAB
-  AZTREONAM: SIGNIFICANT CHANGE UP
-  BLOOD PCR PANEL: SIGNIFICANT CHANGE UP
-  CEFEPIME: SIGNIFICANT CHANGE UP
-  CEFTAZIDIME/AVIBACTAM: SIGNIFICANT CHANGE UP
-  CEFTAZIDIME: SIGNIFICANT CHANGE UP
-  CEFTOLOZANE/TAZOBACTAM: SIGNIFICANT CHANGE UP
-  CIPROFLOXACIN: SIGNIFICANT CHANGE UP
-  IMIPENEM: SIGNIFICANT CHANGE UP
-  LEVOFLOXACIN: SIGNIFICANT CHANGE UP
-  MEROPENEM: SIGNIFICANT CHANGE UP
-  PIPERACILLIN/TAZOBACTAM: SIGNIFICANT CHANGE UP
-  STAPHYLOCOCCUS EPIDERMIDIS, METHICILLIN RESISTANT: SIGNIFICANT CHANGE UP
A1C WITH ESTIMATED AVERAGE GLUCOSE RESULT: 12.1 % — HIGH (ref 4–5.6)
ALBUMIN SERPL ELPH-MCNC: 2.8 G/DL — LOW (ref 3.3–5.2)
ALBUMIN SERPL ELPH-MCNC: 2.9 G/DL — LOW (ref 3.3–5.2)
ALBUMIN SERPL ELPH-MCNC: 3 G/DL — LOW (ref 3.3–5.2)
ALBUMIN SERPL ELPH-MCNC: 3.4 G/DL — SIGNIFICANT CHANGE UP (ref 3.3–5)
ALBUMIN SERPL ELPH-MCNC: 3.5 G/DL — SIGNIFICANT CHANGE UP (ref 3.3–5.2)
ALP SERPL-CCNC: 46 U/L — SIGNIFICANT CHANGE UP (ref 40–120)
ALP SERPL-CCNC: 55 U/L — SIGNIFICANT CHANGE UP (ref 40–120)
ALP SERPL-CCNC: 57 U/L — SIGNIFICANT CHANGE UP (ref 40–120)
ALP SERPL-CCNC: 58 U/L — SIGNIFICANT CHANGE UP (ref 40–120)
ALP SERPL-CCNC: 62 U/L — SIGNIFICANT CHANGE UP (ref 40–120)
ALP SERPL-CCNC: 84 U/L — SIGNIFICANT CHANGE UP (ref 40–120)
ALT FLD-CCNC: 11 U/L — SIGNIFICANT CHANGE UP
ALT FLD-CCNC: 15 U/L — SIGNIFICANT CHANGE UP
ALT FLD-CCNC: 16 U/L — SIGNIFICANT CHANGE UP
ALT FLD-CCNC: 17 U/L — SIGNIFICANT CHANGE UP
ALT FLD-CCNC: 18 U/L — SIGNIFICANT CHANGE UP
ALT FLD-CCNC: 20 U/L — SIGNIFICANT CHANGE UP
ALT FLD-CCNC: 20 U/L — SIGNIFICANT CHANGE UP (ref 12–78)
ALT FLD-CCNC: 9 U/L — SIGNIFICANT CHANGE UP
ANION GAP SERPL CALC-SCNC: 10 MMOL/L — SIGNIFICANT CHANGE UP (ref 5–17)
ANION GAP SERPL CALC-SCNC: 10 MMOL/L — SIGNIFICANT CHANGE UP (ref 5–17)
ANION GAP SERPL CALC-SCNC: 11 MMOL/L — SIGNIFICANT CHANGE UP (ref 5–17)
ANION GAP SERPL CALC-SCNC: 11 MMOL/L — SIGNIFICANT CHANGE UP (ref 5–17)
ANION GAP SERPL CALC-SCNC: 12 MMOL/L — SIGNIFICANT CHANGE UP (ref 5–17)
ANION GAP SERPL CALC-SCNC: 12 MMOL/L — SIGNIFICANT CHANGE UP (ref 5–17)
ANION GAP SERPL CALC-SCNC: 13 MMOL/L — SIGNIFICANT CHANGE UP (ref 5–17)
ANION GAP SERPL CALC-SCNC: 14 MMOL/L — SIGNIFICANT CHANGE UP (ref 5–17)
ANION GAP SERPL CALC-SCNC: 14 MMOL/L — SIGNIFICANT CHANGE UP (ref 5–17)
ANION GAP SERPL CALC-SCNC: 15 MMOL/L — SIGNIFICANT CHANGE UP (ref 5–17)
ANION GAP SERPL CALC-SCNC: 16 MMOL/L — SIGNIFICANT CHANGE UP (ref 5–17)
ANION GAP SERPL CALC-SCNC: 17 MMOL/L — SIGNIFICANT CHANGE UP (ref 5–17)
ANION GAP SERPL CALC-SCNC: 8 MMOL/L — SIGNIFICANT CHANGE UP (ref 5–17)
ANION GAP SERPL CALC-SCNC: 9 MMOL/L — SIGNIFICANT CHANGE UP (ref 5–17)
APPEARANCE UR: CLEAR — SIGNIFICANT CHANGE UP
APTT BLD: 23.3 SEC — LOW (ref 26.1–36.8)
APTT BLD: 23.8 SEC — LOW (ref 26.1–36.8)
APTT BLD: 26.1 SEC — SIGNIFICANT CHANGE UP (ref 26.1–36.8)
APTT BLD: 26.1 SEC — SIGNIFICANT CHANGE UP (ref 26.1–36.8)
APTT BLD: 30.3 SEC — SIGNIFICANT CHANGE UP (ref 26.1–36.8)
APTT BLD: 31.6 SEC — SIGNIFICANT CHANGE UP (ref 26.1–36.8)
APTT BLD: 35.8 SEC — SIGNIFICANT CHANGE UP (ref 26.1–36.8)
APTT BLD: 39.6 SEC — HIGH (ref 26.1–36.8)
APTT BLD: 44.4 SEC — HIGH (ref 26.1–36.8)
APTT BLD: 47.2 SEC — HIGH (ref 26.1–36.8)
APTT BLD: 47.4 SEC — HIGH (ref 26.1–36.8)
APTT BLD: 47.5 SEC — HIGH (ref 26.1–36.8)
APTT BLD: 48.3 SEC — HIGH (ref 26.1–36.8)
APTT BLD: 49.7 SEC — HIGH (ref 26.1–36.8)
APTT BLD: 50.5 SEC — HIGH (ref 26.1–36.8)
APTT BLD: 52 SEC — HIGH (ref 26.1–36.8)
APTT BLD: 52.2 SEC — HIGH (ref 26.1–36.8)
APTT BLD: 53.5 SEC — HIGH (ref 26.1–36.8)
APTT BLD: 54.1 SEC — HIGH (ref 26.1–36.8)
APTT BLD: 56.4 SEC — HIGH (ref 26.1–36.8)
APTT BLD: 57.2 SEC — HIGH (ref 26.1–36.8)
APTT BLD: 58.1 SEC — HIGH (ref 26.1–36.8)
APTT BLD: 84.9 SEC — HIGH (ref 26.1–36.8)
APTT BLD: 88.1 SEC — HIGH (ref 26.1–36.8)
APTT BLD: 91.1 SEC — HIGH (ref 26.1–36.8)
AST SERPL-CCNC: 14 U/L — LOW (ref 15–37)
AST SERPL-CCNC: 14 U/L — SIGNIFICANT CHANGE UP
AST SERPL-CCNC: 14 U/L — SIGNIFICANT CHANGE UP
AST SERPL-CCNC: 19 U/L — SIGNIFICANT CHANGE UP
AST SERPL-CCNC: 20 U/L — SIGNIFICANT CHANGE UP
AST SERPL-CCNC: 24 U/L — SIGNIFICANT CHANGE UP
AST SERPL-CCNC: 24 U/L — SIGNIFICANT CHANGE UP
AST SERPL-CCNC: 36 U/L — SIGNIFICANT CHANGE UP
BACTERIA # UR AUTO: ABNORMAL /HPF
BACTERIA # UR AUTO: NEGATIVE /HPF — SIGNIFICANT CHANGE UP
BASE EXCESS BLDA CALC-SCNC: -3.8 MMOL/L — LOW (ref -2–3)
BASOPHILS # BLD AUTO: 0.04 K/UL — SIGNIFICANT CHANGE UP (ref 0–0.2)
BASOPHILS # BLD AUTO: 0.04 K/UL — SIGNIFICANT CHANGE UP (ref 0–0.2)
BASOPHILS # BLD AUTO: 0.05 K/UL — SIGNIFICANT CHANGE UP (ref 0–0.2)
BASOPHILS # BLD AUTO: 0.05 K/UL — SIGNIFICANT CHANGE UP (ref 0–0.2)
BASOPHILS # BLD AUTO: 0.06 K/UL — SIGNIFICANT CHANGE UP (ref 0–0.2)
BASOPHILS # BLD AUTO: 0.06 K/UL — SIGNIFICANT CHANGE UP (ref 0–0.2)
BASOPHILS # BLD AUTO: 0.07 K/UL — SIGNIFICANT CHANGE UP (ref 0–0.2)
BASOPHILS # BLD AUTO: 0.1 K/UL — SIGNIFICANT CHANGE UP (ref 0–0.2)
BASOPHILS NFR BLD AUTO: 0.3 % — SIGNIFICANT CHANGE UP (ref 0–2)
BASOPHILS NFR BLD AUTO: 0.4 % — SIGNIFICANT CHANGE UP (ref 0–2)
BASOPHILS NFR BLD AUTO: 0.4 % — SIGNIFICANT CHANGE UP (ref 0–2)
BASOPHILS NFR BLD AUTO: 0.7 % — SIGNIFICANT CHANGE UP (ref 0–2)
BASOPHILS NFR BLD AUTO: 1 % — SIGNIFICANT CHANGE UP (ref 0–2)
BILIRUB DIRECT SERPL-MCNC: 0.1 MG/DL — SIGNIFICANT CHANGE UP (ref 0–0.3)
BILIRUB INDIRECT FLD-MCNC: SIGNIFICANT CHANGE UP MG/DL (ref 0.2–1)
BILIRUB SERPL-MCNC: 0.2 MG/DL — LOW (ref 0.4–2)
BILIRUB SERPL-MCNC: 0.2 MG/DL — LOW (ref 0.4–2)
BILIRUB SERPL-MCNC: 0.2 MG/DL — SIGNIFICANT CHANGE UP (ref 0.2–1.2)
BILIRUB SERPL-MCNC: 0.3 MG/DL — LOW (ref 0.4–2)
BILIRUB SERPL-MCNC: <0.2 MG/DL — LOW (ref 0.4–2)
BILIRUB UR-MCNC: NEGATIVE — SIGNIFICANT CHANGE UP
BLANDM BLD POS QL PROBE: SIGNIFICANT CHANGE UP
BLOOD GAS COMMENTS ARTERIAL: SIGNIFICANT CHANGE UP
BUN SERPL-MCNC: 10.1 MG/DL — SIGNIFICANT CHANGE UP (ref 8–20)
BUN SERPL-MCNC: 10.5 MG/DL — SIGNIFICANT CHANGE UP (ref 8–20)
BUN SERPL-MCNC: 12.1 MG/DL — SIGNIFICANT CHANGE UP (ref 8–20)
BUN SERPL-MCNC: 12.6 MG/DL — SIGNIFICANT CHANGE UP (ref 8–20)
BUN SERPL-MCNC: 12.6 MG/DL — SIGNIFICANT CHANGE UP (ref 8–20)
BUN SERPL-MCNC: 12.7 MG/DL — SIGNIFICANT CHANGE UP (ref 8–20)
BUN SERPL-MCNC: 14.1 MG/DL — SIGNIFICANT CHANGE UP (ref 8–20)
BUN SERPL-MCNC: 16.1 MG/DL — SIGNIFICANT CHANGE UP (ref 8–20)
BUN SERPL-MCNC: 16.9 MG/DL — SIGNIFICANT CHANGE UP (ref 8–20)
BUN SERPL-MCNC: 17.8 MG/DL — SIGNIFICANT CHANGE UP (ref 8–20)
BUN SERPL-MCNC: 18.5 MG/DL — SIGNIFICANT CHANGE UP (ref 8–20)
BUN SERPL-MCNC: 19.2 MG/DL — SIGNIFICANT CHANGE UP (ref 8–20)
BUN SERPL-MCNC: 19.5 MG/DL — SIGNIFICANT CHANGE UP (ref 8–20)
BUN SERPL-MCNC: 21 MG/DL — SIGNIFICANT CHANGE UP (ref 7–23)
BUN SERPL-MCNC: 21.4 MG/DL — HIGH (ref 8–20)
BUN SERPL-MCNC: 23.7 MG/DL — HIGH (ref 8–20)
BUN SERPL-MCNC: 29.2 MG/DL — HIGH (ref 8–20)
BUN SERPL-MCNC: 32 MG/DL — HIGH (ref 8–20)
BUN SERPL-MCNC: 43.1 MG/DL — HIGH (ref 8–20)
BUN SERPL-MCNC: 43.2 MG/DL — HIGH (ref 8–20)
BUN SERPL-MCNC: 48 MG/DL — HIGH (ref 8–20)
BUN SERPL-MCNC: 48.2 MG/DL — HIGH (ref 8–20)
BUN SERPL-MCNC: 7.4 MG/DL — LOW (ref 8–20)
BUN SERPL-MCNC: 9.4 MG/DL — SIGNIFICANT CHANGE UP (ref 8–20)
CALCIUM SERPL-MCNC: 8.2 MG/DL — LOW (ref 8.4–10.5)
CALCIUM SERPL-MCNC: 8.2 MG/DL — LOW (ref 8.4–10.5)
CALCIUM SERPL-MCNC: 8.3 MG/DL — LOW (ref 8.4–10.5)
CALCIUM SERPL-MCNC: 8.4 MG/DL — SIGNIFICANT CHANGE UP (ref 8.4–10.5)
CALCIUM SERPL-MCNC: 8.6 MG/DL — SIGNIFICANT CHANGE UP (ref 8.4–10.5)
CALCIUM SERPL-MCNC: 8.6 MG/DL — SIGNIFICANT CHANGE UP (ref 8.4–10.5)
CALCIUM SERPL-MCNC: 8.7 MG/DL — SIGNIFICANT CHANGE UP (ref 8.4–10.5)
CALCIUM SERPL-MCNC: 8.8 MG/DL — SIGNIFICANT CHANGE UP (ref 8.4–10.5)
CALCIUM SERPL-MCNC: 8.9 MG/DL — SIGNIFICANT CHANGE UP (ref 8.4–10.5)
CALCIUM SERPL-MCNC: 9 MG/DL — SIGNIFICANT CHANGE UP (ref 8.4–10.5)
CALCIUM SERPL-MCNC: 9 MG/DL — SIGNIFICANT CHANGE UP (ref 8.4–10.5)
CALCIUM SERPL-MCNC: 9.1 MG/DL — SIGNIFICANT CHANGE UP (ref 8.4–10.5)
CALCIUM SERPL-MCNC: 9.2 MG/DL — SIGNIFICANT CHANGE UP (ref 8.4–10.5)
CALCIUM SERPL-MCNC: 9.3 MG/DL — SIGNIFICANT CHANGE UP (ref 8.5–10.1)
CALCIUM SERPL-MCNC: 9.5 MG/DL — SIGNIFICANT CHANGE UP (ref 8.4–10.5)
CALCIUM SERPL-MCNC: 9.6 MG/DL — SIGNIFICANT CHANGE UP (ref 8.4–10.5)
CALCIUM SERPL-MCNC: 9.7 MG/DL — SIGNIFICANT CHANGE UP (ref 8.4–10.5)
CAST: 1 /LPF — SIGNIFICANT CHANGE UP (ref 0–4)
CAST: 1 /LPF — SIGNIFICANT CHANGE UP (ref 0–4)
CAST: 2 /LPF — SIGNIFICANT CHANGE UP (ref 0–4)
CHLORIDE SERPL-SCNC: 100 MMOL/L — SIGNIFICANT CHANGE UP (ref 96–108)
CHLORIDE SERPL-SCNC: 101 MMOL/L — SIGNIFICANT CHANGE UP (ref 96–108)
CHLORIDE SERPL-SCNC: 102 MMOL/L — SIGNIFICANT CHANGE UP (ref 96–108)
CHLORIDE SERPL-SCNC: 102 MMOL/L — SIGNIFICANT CHANGE UP (ref 96–108)
CHLORIDE SERPL-SCNC: 103 MMOL/L — SIGNIFICANT CHANGE UP (ref 96–108)
CHLORIDE SERPL-SCNC: 104 MMOL/L — SIGNIFICANT CHANGE UP (ref 96–108)
CHLORIDE SERPL-SCNC: 105 MMOL/L — SIGNIFICANT CHANGE UP (ref 96–108)
CHLORIDE SERPL-SCNC: 106 MMOL/L — SIGNIFICANT CHANGE UP (ref 96–108)
CHLORIDE SERPL-SCNC: 107 MMOL/L — SIGNIFICANT CHANGE UP (ref 96–108)
CHLORIDE SERPL-SCNC: 98 MMOL/L — SIGNIFICANT CHANGE UP (ref 96–108)
CHLORIDE SERPL-SCNC: 99 MMOL/L — SIGNIFICANT CHANGE UP (ref 96–108)
CHOLEST SERPL-MCNC: 159 MG/DL — SIGNIFICANT CHANGE UP
CO2 SERPL-SCNC: 20 MMOL/L — LOW (ref 22–29)
CO2 SERPL-SCNC: 22 MMOL/L — SIGNIFICANT CHANGE UP (ref 22–29)
CO2 SERPL-SCNC: 23 MMOL/L — SIGNIFICANT CHANGE UP (ref 22–29)
CO2 SERPL-SCNC: 24 MMOL/L — SIGNIFICANT CHANGE UP (ref 22–31)
CO2 SERPL-SCNC: 25 MMOL/L — SIGNIFICANT CHANGE UP (ref 22–29)
CO2 SERPL-SCNC: 26 MMOL/L — SIGNIFICANT CHANGE UP (ref 22–29)
CO2 SERPL-SCNC: 27 MMOL/L — SIGNIFICANT CHANGE UP (ref 22–29)
CO2 SERPL-SCNC: 27 MMOL/L — SIGNIFICANT CHANGE UP (ref 22–29)
CO2 SERPL-SCNC: 28 MMOL/L — SIGNIFICANT CHANGE UP (ref 22–29)
CO2 SERPL-SCNC: 30 MMOL/L — HIGH (ref 22–29)
COLOR SPEC: YELLOW — SIGNIFICANT CHANGE UP
CREAT SERPL-MCNC: 0.52 MG/DL — SIGNIFICANT CHANGE UP (ref 0.5–1.3)
CREAT SERPL-MCNC: 0.53 MG/DL — SIGNIFICANT CHANGE UP (ref 0.5–1.3)
CREAT SERPL-MCNC: 0.55 MG/DL — SIGNIFICANT CHANGE UP (ref 0.5–1.3)
CREAT SERPL-MCNC: 0.55 MG/DL — SIGNIFICANT CHANGE UP (ref 0.5–1.3)
CREAT SERPL-MCNC: 0.56 MG/DL — SIGNIFICANT CHANGE UP (ref 0.5–1.3)
CREAT SERPL-MCNC: 0.58 MG/DL — SIGNIFICANT CHANGE UP (ref 0.5–1.3)
CREAT SERPL-MCNC: 0.59 MG/DL — SIGNIFICANT CHANGE UP (ref 0.5–1.3)
CREAT SERPL-MCNC: 0.6 MG/DL — SIGNIFICANT CHANGE UP (ref 0.5–1.3)
CREAT SERPL-MCNC: 0.61 MG/DL — SIGNIFICANT CHANGE UP (ref 0.5–1.3)
CREAT SERPL-MCNC: 0.68 MG/DL — SIGNIFICANT CHANGE UP (ref 0.5–1.3)
CREAT SERPL-MCNC: 0.71 MG/DL — SIGNIFICANT CHANGE UP (ref 0.5–1.3)
CREAT SERPL-MCNC: 0.89 MG/DL — SIGNIFICANT CHANGE UP (ref 0.5–1.3)
CREAT SERPL-MCNC: 0.9 MG/DL — SIGNIFICANT CHANGE UP (ref 0.5–1.3)
CREAT SERPL-MCNC: 0.91 MG/DL — SIGNIFICANT CHANGE UP (ref 0.5–1.3)
CREAT SERPL-MCNC: 0.92 MG/DL — SIGNIFICANT CHANGE UP (ref 0.5–1.3)
CREAT SERPL-MCNC: 0.95 MG/DL — SIGNIFICANT CHANGE UP (ref 0.5–1.3)
CREAT SERPL-MCNC: 0.96 MG/DL — SIGNIFICANT CHANGE UP (ref 0.5–1.3)
CREAT SERPL-MCNC: 1 MG/DL — SIGNIFICANT CHANGE UP (ref 0.5–1.3)
CREAT SERPL-MCNC: 1.02 MG/DL — SIGNIFICANT CHANGE UP (ref 0.5–1.3)
CREAT SERPL-MCNC: 1.13 MG/DL — SIGNIFICANT CHANGE UP (ref 0.5–1.3)
CREAT SERPL-MCNC: 1.22 MG/DL — SIGNIFICANT CHANGE UP (ref 0.5–1.3)
CREAT SERPL-MCNC: 1.29 MG/DL — SIGNIFICANT CHANGE UP (ref 0.5–1.3)
CREAT SERPL-MCNC: 1.3 MG/DL — SIGNIFICANT CHANGE UP (ref 0.5–1.3)
CREAT SERPL-MCNC: 1.31 MG/DL — HIGH (ref 0.5–1.3)
CULTURE RESULTS: ABNORMAL
CULTURE RESULTS: SIGNIFICANT CHANGE UP
DIFF PNL FLD: ABNORMAL
DIFF PNL FLD: NEGATIVE — SIGNIFICANT CHANGE UP
EGFR: 100 ML/MIN/1.73M2 — SIGNIFICANT CHANGE UP
EGFR: 100 ML/MIN/1.73M2 — SIGNIFICANT CHANGE UP
EGFR: 101 ML/MIN/1.73M2 — SIGNIFICANT CHANGE UP
EGFR: 108 ML/MIN/1.73M2 — SIGNIFICANT CHANGE UP
EGFR: 108 ML/MIN/1.73M2 — SIGNIFICANT CHANGE UP
EGFR: 110 ML/MIN/1.73M2 — SIGNIFICANT CHANGE UP
EGFR: 110 ML/MIN/1.73M2 — SIGNIFICANT CHANGE UP
EGFR: 113 ML/MIN/1.73M2 — SIGNIFICANT CHANGE UP
EGFR: 113 ML/MIN/1.73M2 — SIGNIFICANT CHANGE UP
EGFR: 114 ML/MIN/1.73M2 — SIGNIFICANT CHANGE UP
EGFR: 114 ML/MIN/1.73M2 — SIGNIFICANT CHANGE UP
EGFR: 115 ML/MIN/1.73M2 — SIGNIFICANT CHANGE UP
EGFR: 116 ML/MIN/1.73M2 — SIGNIFICANT CHANGE UP
EGFR: 116 ML/MIN/1.73M2 — SIGNIFICANT CHANGE UP
EGFR: 117 ML/MIN/1.73M2 — SIGNIFICANT CHANGE UP
EGFR: 118 ML/MIN/1.73M2 — SIGNIFICANT CHANGE UP
EGFR: 118 ML/MIN/1.73M2 — SIGNIFICANT CHANGE UP
EGFR: 119 ML/MIN/1.73M2 — SIGNIFICANT CHANGE UP
EGFR: 119 ML/MIN/1.73M2 — SIGNIFICANT CHANGE UP
EGFR: 64 ML/MIN/1.73M2 — SIGNIFICANT CHANGE UP
EGFR: 64 ML/MIN/1.73M2 — SIGNIFICANT CHANGE UP
EGFR: 65 ML/MIN/1.73M2 — SIGNIFICANT CHANGE UP
EGFR: 70 ML/MIN/1.73M2 — SIGNIFICANT CHANGE UP
EGFR: 70 ML/MIN/1.73M2 — SIGNIFICANT CHANGE UP
EGFR: 77 ML/MIN/1.73M2 — SIGNIFICANT CHANGE UP
EGFR: 77 ML/MIN/1.73M2 — SIGNIFICANT CHANGE UP
EGFR: 87 ML/MIN/1.73M2 — SIGNIFICANT CHANGE UP
EGFR: 87 ML/MIN/1.73M2 — SIGNIFICANT CHANGE UP
EGFR: 89 ML/MIN/1.73M2 — SIGNIFICANT CHANGE UP
EGFR: 89 ML/MIN/1.73M2 — SIGNIFICANT CHANGE UP
EGFR: 93 ML/MIN/1.73M2 — SIGNIFICANT CHANGE UP
EGFR: 93 ML/MIN/1.73M2 — SIGNIFICANT CHANGE UP
EGFR: 95 ML/MIN/1.73M2 — SIGNIFICANT CHANGE UP
EGFR: 95 ML/MIN/1.73M2 — SIGNIFICANT CHANGE UP
EGFR: 98 ML/MIN/1.73M2 — SIGNIFICANT CHANGE UP
EGFR: 98 ML/MIN/1.73M2 — SIGNIFICANT CHANGE UP
EOSINOPHIL # BLD AUTO: 0.01 K/UL — SIGNIFICANT CHANGE UP (ref 0–0.5)
EOSINOPHIL # BLD AUTO: 0.12 K/UL — SIGNIFICANT CHANGE UP (ref 0–0.5)
EOSINOPHIL # BLD AUTO: 0.14 K/UL — SIGNIFICANT CHANGE UP (ref 0–0.5)
EOSINOPHIL # BLD AUTO: 0.17 K/UL — SIGNIFICANT CHANGE UP (ref 0–0.5)
EOSINOPHIL # BLD AUTO: 0.3 K/UL — SIGNIFICANT CHANGE UP (ref 0–0.5)
EOSINOPHIL # BLD AUTO: 0.45 K/UL — SIGNIFICANT CHANGE UP (ref 0–0.5)
EOSINOPHIL # BLD AUTO: 0.55 K/UL — HIGH (ref 0–0.5)
EOSINOPHIL # BLD AUTO: 0.58 K/UL — HIGH (ref 0–0.5)
EOSINOPHIL NFR BLD AUTO: 0 % — SIGNIFICANT CHANGE UP (ref 0–6)
EOSINOPHIL NFR BLD AUTO: 0.6 % — SIGNIFICANT CHANGE UP (ref 0–6)
EOSINOPHIL NFR BLD AUTO: 0.8 % — SIGNIFICANT CHANGE UP (ref 0–6)
EOSINOPHIL NFR BLD AUTO: 1.4 % — SIGNIFICANT CHANGE UP (ref 0–6)
EOSINOPHIL NFR BLD AUTO: 3.4 % — SIGNIFICANT CHANGE UP (ref 0–6)
EOSINOPHIL NFR BLD AUTO: 3.4 % — SIGNIFICANT CHANGE UP (ref 0–6)
EOSINOPHIL NFR BLD AUTO: 5.5 % — SIGNIFICANT CHANGE UP (ref 0–6)
EOSINOPHIL NFR BLD AUTO: 5.6 % — SIGNIFICANT CHANGE UP (ref 0–6)
ESTIMATED AVERAGE GLUCOSE: 301 MG/DL — HIGH (ref 68–114)
ETHANOL SERPL-MCNC: 14 MG/DL — HIGH (ref 0–10)
FLUAV AG NPH QL: SIGNIFICANT CHANGE UP
FLUBV AG NPH QL: SIGNIFICANT CHANGE UP
GAS PNL BLDA: SIGNIFICANT CHANGE UP
GLUCOSE BLDC GLUCOMTR-MCNC: 102 MG/DL — HIGH (ref 70–99)
GLUCOSE BLDC GLUCOMTR-MCNC: 105 MG/DL — HIGH (ref 70–99)
GLUCOSE BLDC GLUCOMTR-MCNC: 106 MG/DL — HIGH (ref 70–99)
GLUCOSE BLDC GLUCOMTR-MCNC: 108 MG/DL — HIGH (ref 70–99)
GLUCOSE BLDC GLUCOMTR-MCNC: 109 MG/DL — HIGH (ref 70–99)
GLUCOSE BLDC GLUCOMTR-MCNC: 113 MG/DL — HIGH (ref 70–99)
GLUCOSE BLDC GLUCOMTR-MCNC: 114 MG/DL — HIGH (ref 70–99)
GLUCOSE BLDC GLUCOMTR-MCNC: 115 MG/DL — HIGH (ref 70–99)
GLUCOSE BLDC GLUCOMTR-MCNC: 115 MG/DL — HIGH (ref 70–99)
GLUCOSE BLDC GLUCOMTR-MCNC: 116 MG/DL — HIGH (ref 70–99)
GLUCOSE BLDC GLUCOMTR-MCNC: 119 MG/DL — HIGH (ref 70–99)
GLUCOSE BLDC GLUCOMTR-MCNC: 121 MG/DL — HIGH (ref 70–99)
GLUCOSE BLDC GLUCOMTR-MCNC: 121 MG/DL — HIGH (ref 70–99)
GLUCOSE BLDC GLUCOMTR-MCNC: 125 MG/DL — HIGH (ref 70–99)
GLUCOSE BLDC GLUCOMTR-MCNC: 126 MG/DL — HIGH (ref 70–99)
GLUCOSE BLDC GLUCOMTR-MCNC: 126 MG/DL — HIGH (ref 70–99)
GLUCOSE BLDC GLUCOMTR-MCNC: 129 MG/DL — HIGH (ref 70–99)
GLUCOSE BLDC GLUCOMTR-MCNC: 131 MG/DL — HIGH (ref 70–99)
GLUCOSE BLDC GLUCOMTR-MCNC: 132 MG/DL — HIGH (ref 70–99)
GLUCOSE BLDC GLUCOMTR-MCNC: 134 MG/DL — HIGH (ref 70–99)
GLUCOSE BLDC GLUCOMTR-MCNC: 134 MG/DL — HIGH (ref 70–99)
GLUCOSE BLDC GLUCOMTR-MCNC: 138 MG/DL — HIGH (ref 70–99)
GLUCOSE BLDC GLUCOMTR-MCNC: 140 MG/DL — HIGH (ref 70–99)
GLUCOSE BLDC GLUCOMTR-MCNC: 140 MG/DL — HIGH (ref 70–99)
GLUCOSE BLDC GLUCOMTR-MCNC: 141 MG/DL — HIGH (ref 70–99)
GLUCOSE BLDC GLUCOMTR-MCNC: 142 MG/DL — HIGH (ref 70–99)
GLUCOSE BLDC GLUCOMTR-MCNC: 144 MG/DL — HIGH (ref 70–99)
GLUCOSE BLDC GLUCOMTR-MCNC: 145 MG/DL — HIGH (ref 70–99)
GLUCOSE BLDC GLUCOMTR-MCNC: 146 MG/DL — HIGH (ref 70–99)
GLUCOSE BLDC GLUCOMTR-MCNC: 146 MG/DL — HIGH (ref 70–99)
GLUCOSE BLDC GLUCOMTR-MCNC: 147 MG/DL — HIGH (ref 70–99)
GLUCOSE BLDC GLUCOMTR-MCNC: 148 MG/DL — HIGH (ref 70–99)
GLUCOSE BLDC GLUCOMTR-MCNC: 150 MG/DL — HIGH (ref 70–99)
GLUCOSE BLDC GLUCOMTR-MCNC: 152 MG/DL — HIGH (ref 70–99)
GLUCOSE BLDC GLUCOMTR-MCNC: 153 MG/DL — HIGH (ref 70–99)
GLUCOSE BLDC GLUCOMTR-MCNC: 153 MG/DL — HIGH (ref 70–99)
GLUCOSE BLDC GLUCOMTR-MCNC: 155 MG/DL — HIGH (ref 70–99)
GLUCOSE BLDC GLUCOMTR-MCNC: 157 MG/DL — HIGH (ref 70–99)
GLUCOSE BLDC GLUCOMTR-MCNC: 160 MG/DL — HIGH (ref 70–99)
GLUCOSE BLDC GLUCOMTR-MCNC: 161 MG/DL — HIGH (ref 70–99)
GLUCOSE BLDC GLUCOMTR-MCNC: 162 MG/DL — HIGH (ref 70–99)
GLUCOSE BLDC GLUCOMTR-MCNC: 162 MG/DL — HIGH (ref 70–99)
GLUCOSE BLDC GLUCOMTR-MCNC: 163 MG/DL — HIGH (ref 70–99)
GLUCOSE BLDC GLUCOMTR-MCNC: 164 MG/DL — HIGH (ref 70–99)
GLUCOSE BLDC GLUCOMTR-MCNC: 164 MG/DL — HIGH (ref 70–99)
GLUCOSE BLDC GLUCOMTR-MCNC: 165 MG/DL — HIGH (ref 70–99)
GLUCOSE BLDC GLUCOMTR-MCNC: 166 MG/DL — HIGH (ref 70–99)
GLUCOSE BLDC GLUCOMTR-MCNC: 167 MG/DL — HIGH (ref 70–99)
GLUCOSE BLDC GLUCOMTR-MCNC: 167 MG/DL — HIGH (ref 70–99)
GLUCOSE BLDC GLUCOMTR-MCNC: 168 MG/DL — HIGH (ref 70–99)
GLUCOSE BLDC GLUCOMTR-MCNC: 169 MG/DL — HIGH (ref 70–99)
GLUCOSE BLDC GLUCOMTR-MCNC: 172 MG/DL — HIGH (ref 70–99)
GLUCOSE BLDC GLUCOMTR-MCNC: 172 MG/DL — HIGH (ref 70–99)
GLUCOSE BLDC GLUCOMTR-MCNC: 173 MG/DL — HIGH (ref 70–99)
GLUCOSE BLDC GLUCOMTR-MCNC: 174 MG/DL — HIGH (ref 70–99)
GLUCOSE BLDC GLUCOMTR-MCNC: 174 MG/DL — HIGH (ref 70–99)
GLUCOSE BLDC GLUCOMTR-MCNC: 175 MG/DL — HIGH (ref 70–99)
GLUCOSE BLDC GLUCOMTR-MCNC: 175 MG/DL — HIGH (ref 70–99)
GLUCOSE BLDC GLUCOMTR-MCNC: 176 MG/DL — HIGH (ref 70–99)
GLUCOSE BLDC GLUCOMTR-MCNC: 177 MG/DL — HIGH (ref 70–99)
GLUCOSE BLDC GLUCOMTR-MCNC: 178 MG/DL — HIGH (ref 70–99)
GLUCOSE BLDC GLUCOMTR-MCNC: 180 MG/DL — HIGH (ref 70–99)
GLUCOSE BLDC GLUCOMTR-MCNC: 181 MG/DL — HIGH (ref 70–99)
GLUCOSE BLDC GLUCOMTR-MCNC: 182 MG/DL — HIGH (ref 70–99)
GLUCOSE BLDC GLUCOMTR-MCNC: 182 MG/DL — HIGH (ref 70–99)
GLUCOSE BLDC GLUCOMTR-MCNC: 183 MG/DL — HIGH (ref 70–99)
GLUCOSE BLDC GLUCOMTR-MCNC: 184 MG/DL — HIGH (ref 70–99)
GLUCOSE BLDC GLUCOMTR-MCNC: 185 MG/DL — HIGH (ref 70–99)
GLUCOSE BLDC GLUCOMTR-MCNC: 186 MG/DL — HIGH (ref 70–99)
GLUCOSE BLDC GLUCOMTR-MCNC: 186 MG/DL — HIGH (ref 70–99)
GLUCOSE BLDC GLUCOMTR-MCNC: 187 MG/DL — HIGH (ref 70–99)
GLUCOSE BLDC GLUCOMTR-MCNC: 189 MG/DL — HIGH (ref 70–99)
GLUCOSE BLDC GLUCOMTR-MCNC: 189 MG/DL — HIGH (ref 70–99)
GLUCOSE BLDC GLUCOMTR-MCNC: 190 MG/DL — HIGH (ref 70–99)
GLUCOSE BLDC GLUCOMTR-MCNC: 191 MG/DL — HIGH (ref 70–99)
GLUCOSE BLDC GLUCOMTR-MCNC: 194 MG/DL — HIGH (ref 70–99)
GLUCOSE BLDC GLUCOMTR-MCNC: 194 MG/DL — HIGH (ref 70–99)
GLUCOSE BLDC GLUCOMTR-MCNC: 195 MG/DL — HIGH (ref 70–99)
GLUCOSE BLDC GLUCOMTR-MCNC: 196 MG/DL — HIGH (ref 70–99)
GLUCOSE BLDC GLUCOMTR-MCNC: 196 MG/DL — HIGH (ref 70–99)
GLUCOSE BLDC GLUCOMTR-MCNC: 197 MG/DL — HIGH (ref 70–99)
GLUCOSE BLDC GLUCOMTR-MCNC: 198 MG/DL — HIGH (ref 70–99)
GLUCOSE BLDC GLUCOMTR-MCNC: 199 MG/DL — HIGH (ref 70–99)
GLUCOSE BLDC GLUCOMTR-MCNC: 200 MG/DL — HIGH (ref 70–99)
GLUCOSE BLDC GLUCOMTR-MCNC: 201 MG/DL — HIGH (ref 70–99)
GLUCOSE BLDC GLUCOMTR-MCNC: 202 MG/DL — HIGH (ref 70–99)
GLUCOSE BLDC GLUCOMTR-MCNC: 203 MG/DL — HIGH (ref 70–99)
GLUCOSE BLDC GLUCOMTR-MCNC: 203 MG/DL — HIGH (ref 70–99)
GLUCOSE BLDC GLUCOMTR-MCNC: 204 MG/DL — HIGH (ref 70–99)
GLUCOSE BLDC GLUCOMTR-MCNC: 204 MG/DL — HIGH (ref 70–99)
GLUCOSE BLDC GLUCOMTR-MCNC: 205 MG/DL — HIGH (ref 70–99)
GLUCOSE BLDC GLUCOMTR-MCNC: 207 MG/DL — HIGH (ref 70–99)
GLUCOSE BLDC GLUCOMTR-MCNC: 208 MG/DL — HIGH (ref 70–99)
GLUCOSE BLDC GLUCOMTR-MCNC: 209 MG/DL — HIGH (ref 70–99)
GLUCOSE BLDC GLUCOMTR-MCNC: 210 MG/DL — HIGH (ref 70–99)
GLUCOSE BLDC GLUCOMTR-MCNC: 211 MG/DL — HIGH (ref 70–99)
GLUCOSE BLDC GLUCOMTR-MCNC: 212 MG/DL — HIGH (ref 70–99)
GLUCOSE BLDC GLUCOMTR-MCNC: 212 MG/DL — HIGH (ref 70–99)
GLUCOSE BLDC GLUCOMTR-MCNC: 213 MG/DL — HIGH (ref 70–99)
GLUCOSE BLDC GLUCOMTR-MCNC: 214 MG/DL — HIGH (ref 70–99)
GLUCOSE BLDC GLUCOMTR-MCNC: 214 MG/DL — HIGH (ref 70–99)
GLUCOSE BLDC GLUCOMTR-MCNC: 215 MG/DL — HIGH (ref 70–99)
GLUCOSE BLDC GLUCOMTR-MCNC: 216 MG/DL — HIGH (ref 70–99)
GLUCOSE BLDC GLUCOMTR-MCNC: 217 MG/DL — HIGH (ref 70–99)
GLUCOSE BLDC GLUCOMTR-MCNC: 221 MG/DL — HIGH (ref 70–99)
GLUCOSE BLDC GLUCOMTR-MCNC: 221 MG/DL — HIGH (ref 70–99)
GLUCOSE BLDC GLUCOMTR-MCNC: 222 MG/DL — HIGH (ref 70–99)
GLUCOSE BLDC GLUCOMTR-MCNC: 225 MG/DL — HIGH (ref 70–99)
GLUCOSE BLDC GLUCOMTR-MCNC: 225 MG/DL — HIGH (ref 70–99)
GLUCOSE BLDC GLUCOMTR-MCNC: 228 MG/DL — HIGH (ref 70–99)
GLUCOSE BLDC GLUCOMTR-MCNC: 229 MG/DL — HIGH (ref 70–99)
GLUCOSE BLDC GLUCOMTR-MCNC: 230 MG/DL — HIGH (ref 70–99)
GLUCOSE BLDC GLUCOMTR-MCNC: 231 MG/DL — HIGH (ref 70–99)
GLUCOSE BLDC GLUCOMTR-MCNC: 233 MG/DL — HIGH (ref 70–99)
GLUCOSE BLDC GLUCOMTR-MCNC: 235 MG/DL — HIGH (ref 70–99)
GLUCOSE BLDC GLUCOMTR-MCNC: 235 MG/DL — HIGH (ref 70–99)
GLUCOSE BLDC GLUCOMTR-MCNC: 236 MG/DL — HIGH (ref 70–99)
GLUCOSE BLDC GLUCOMTR-MCNC: 236 MG/DL — HIGH (ref 70–99)
GLUCOSE BLDC GLUCOMTR-MCNC: 237 MG/DL — HIGH (ref 70–99)
GLUCOSE BLDC GLUCOMTR-MCNC: 241 MG/DL — HIGH (ref 70–99)
GLUCOSE BLDC GLUCOMTR-MCNC: 241 MG/DL — HIGH (ref 70–99)
GLUCOSE BLDC GLUCOMTR-MCNC: 243 MG/DL — HIGH (ref 70–99)
GLUCOSE BLDC GLUCOMTR-MCNC: 244 MG/DL — HIGH (ref 70–99)
GLUCOSE BLDC GLUCOMTR-MCNC: 245 MG/DL — HIGH (ref 70–99)
GLUCOSE BLDC GLUCOMTR-MCNC: 246 MG/DL — HIGH (ref 70–99)
GLUCOSE BLDC GLUCOMTR-MCNC: 246 MG/DL — HIGH (ref 70–99)
GLUCOSE BLDC GLUCOMTR-MCNC: 248 MG/DL — HIGH (ref 70–99)
GLUCOSE BLDC GLUCOMTR-MCNC: 249 MG/DL — HIGH (ref 70–99)
GLUCOSE BLDC GLUCOMTR-MCNC: 250 MG/DL — HIGH (ref 70–99)
GLUCOSE BLDC GLUCOMTR-MCNC: 251 MG/DL — HIGH (ref 70–99)
GLUCOSE BLDC GLUCOMTR-MCNC: 251 MG/DL — HIGH (ref 70–99)
GLUCOSE BLDC GLUCOMTR-MCNC: 253 MG/DL — HIGH (ref 70–99)
GLUCOSE BLDC GLUCOMTR-MCNC: 256 MG/DL — HIGH (ref 70–99)
GLUCOSE BLDC GLUCOMTR-MCNC: 256 MG/DL — HIGH (ref 70–99)
GLUCOSE BLDC GLUCOMTR-MCNC: 259 MG/DL — HIGH (ref 70–99)
GLUCOSE BLDC GLUCOMTR-MCNC: 260 MG/DL — HIGH (ref 70–99)
GLUCOSE BLDC GLUCOMTR-MCNC: 268 MG/DL — HIGH (ref 70–99)
GLUCOSE BLDC GLUCOMTR-MCNC: 268 MG/DL — HIGH (ref 70–99)
GLUCOSE BLDC GLUCOMTR-MCNC: 270 MG/DL — HIGH (ref 70–99)
GLUCOSE BLDC GLUCOMTR-MCNC: 280 MG/DL — HIGH (ref 70–99)
GLUCOSE BLDC GLUCOMTR-MCNC: 298 MG/DL — HIGH (ref 70–99)
GLUCOSE BLDC GLUCOMTR-MCNC: 299 MG/DL — HIGH (ref 70–99)
GLUCOSE BLDC GLUCOMTR-MCNC: 325 MG/DL — HIGH (ref 70–99)
GLUCOSE BLDC GLUCOMTR-MCNC: 337 MG/DL — HIGH (ref 70–99)
GLUCOSE BLDC GLUCOMTR-MCNC: 342 MG/DL — HIGH (ref 70–99)
GLUCOSE BLDC GLUCOMTR-MCNC: 347 MG/DL — HIGH (ref 70–99)
GLUCOSE BLDC GLUCOMTR-MCNC: 359 MG/DL — HIGH (ref 70–99)
GLUCOSE BLDC GLUCOMTR-MCNC: 89 MG/DL — SIGNIFICANT CHANGE UP (ref 70–99)
GLUCOSE BLDC GLUCOMTR-MCNC: 97 MG/DL — SIGNIFICANT CHANGE UP (ref 70–99)
GLUCOSE BLDC GLUCOMTR-MCNC: 98 MG/DL — SIGNIFICANT CHANGE UP (ref 70–99)
GLUCOSE SERPL-MCNC: 132 MG/DL — HIGH (ref 70–99)
GLUCOSE SERPL-MCNC: 135 MG/DL — HIGH (ref 70–99)
GLUCOSE SERPL-MCNC: 138 MG/DL — HIGH (ref 70–99)
GLUCOSE SERPL-MCNC: 139 MG/DL — HIGH (ref 70–99)
GLUCOSE SERPL-MCNC: 140 MG/DL — HIGH (ref 70–99)
GLUCOSE SERPL-MCNC: 163 MG/DL — HIGH (ref 70–99)
GLUCOSE SERPL-MCNC: 164 MG/DL — HIGH (ref 70–99)
GLUCOSE SERPL-MCNC: 169 MG/DL — HIGH (ref 70–99)
GLUCOSE SERPL-MCNC: 169 MG/DL — HIGH (ref 70–99)
GLUCOSE SERPL-MCNC: 170 MG/DL — HIGH (ref 70–99)
GLUCOSE SERPL-MCNC: 175 MG/DL — HIGH (ref 70–99)
GLUCOSE SERPL-MCNC: 180 MG/DL — HIGH (ref 70–99)
GLUCOSE SERPL-MCNC: 204 MG/DL — HIGH (ref 70–99)
GLUCOSE SERPL-MCNC: 206 MG/DL — HIGH (ref 70–99)
GLUCOSE SERPL-MCNC: 212 MG/DL — HIGH (ref 70–99)
GLUCOSE SERPL-MCNC: 213 MG/DL — HIGH (ref 70–99)
GLUCOSE SERPL-MCNC: 216 MG/DL — HIGH (ref 70–99)
GLUCOSE SERPL-MCNC: 228 MG/DL — HIGH (ref 70–99)
GLUCOSE SERPL-MCNC: 245 MG/DL — HIGH (ref 70–99)
GLUCOSE SERPL-MCNC: 250 MG/DL — HIGH (ref 70–99)
GLUCOSE SERPL-MCNC: 257 MG/DL — HIGH (ref 70–99)
GLUCOSE SERPL-MCNC: 280 MG/DL — HIGH (ref 70–99)
GLUCOSE SERPL-MCNC: 328 MG/DL — HIGH (ref 70–99)
GLUCOSE SERPL-MCNC: 351 MG/DL — HIGH (ref 70–99)
GLUCOSE UR QL: >=1000 MG/DL
GLUCOSE UR QL: NEGATIVE MG/DL — SIGNIFICANT CHANGE UP
GRAM STN FLD: ABNORMAL
HCO3 BLDA-SCNC: 23 MMOL/L — SIGNIFICANT CHANGE UP (ref 21–28)
HCT VFR BLD CALC: 31.7 % — LOW (ref 39–50)
HCT VFR BLD CALC: 33 % — LOW (ref 39–50)
HCT VFR BLD CALC: 33.3 % — LOW (ref 39–50)
HCT VFR BLD CALC: 33.6 % — LOW (ref 39–50)
HCT VFR BLD CALC: 34 % — LOW (ref 39–50)
HCT VFR BLD CALC: 34.2 % — LOW (ref 39–50)
HCT VFR BLD CALC: 34.2 % — LOW (ref 39–50)
HCT VFR BLD CALC: 34.4 % — LOW (ref 39–50)
HCT VFR BLD CALC: 34.7 % — LOW (ref 39–50)
HCT VFR BLD CALC: 34.7 % — LOW (ref 39–50)
HCT VFR BLD CALC: 35 % — LOW (ref 39–50)
HCT VFR BLD CALC: 35.7 % — LOW (ref 39–50)
HCT VFR BLD CALC: 36 % — LOW (ref 39–50)
HCT VFR BLD CALC: 36.6 % — LOW (ref 39–50)
HCT VFR BLD CALC: 36.6 % — LOW (ref 39–50)
HCT VFR BLD CALC: 36.9 % — LOW (ref 39–50)
HCT VFR BLD CALC: 38 % — LOW (ref 39–50)
HCT VFR BLD CALC: 38.6 % — LOW (ref 39–50)
HCT VFR BLD CALC: 38.7 % — LOW (ref 39–50)
HCT VFR BLD CALC: 38.9 % — LOW (ref 39–50)
HCT VFR BLD CALC: 39.3 % — SIGNIFICANT CHANGE UP (ref 39–50)
HCT VFR BLD CALC: 40 % — SIGNIFICANT CHANGE UP (ref 39–50)
HCT VFR BLD CALC: 40.6 % — SIGNIFICANT CHANGE UP (ref 39–50)
HCT VFR BLD CALC: 40.6 % — SIGNIFICANT CHANGE UP (ref 39–50)
HCT VFR BLD CALC: 41.7 % — SIGNIFICANT CHANGE UP (ref 39–50)
HCT VFR BLD CALC: 41.7 % — SIGNIFICANT CHANGE UP (ref 39–50)
HCT VFR BLD CALC: 43.3 % — SIGNIFICANT CHANGE UP (ref 39–50)
HCT VFR BLD CALC: 49.4 % — SIGNIFICANT CHANGE UP (ref 39–50)
HDLC SERPL-MCNC: 42 MG/DL — SIGNIFICANT CHANGE UP
HGB BLD-MCNC: 10.3 G/DL — LOW (ref 13–17)
HGB BLD-MCNC: 10.4 G/DL — LOW (ref 13–17)
HGB BLD-MCNC: 11 G/DL — LOW (ref 13–17)
HGB BLD-MCNC: 11.1 G/DL — LOW (ref 13–17)
HGB BLD-MCNC: 11.2 G/DL — LOW (ref 13–17)
HGB BLD-MCNC: 11.2 G/DL — LOW (ref 13–17)
HGB BLD-MCNC: 11.4 G/DL — LOW (ref 13–17)
HGB BLD-MCNC: 11.5 G/DL — LOW (ref 13–17)
HGB BLD-MCNC: 11.5 G/DL — LOW (ref 13–17)
HGB BLD-MCNC: 11.6 G/DL — LOW (ref 13–17)
HGB BLD-MCNC: 11.7 G/DL — LOW (ref 13–17)
HGB BLD-MCNC: 11.7 G/DL — LOW (ref 13–17)
HGB BLD-MCNC: 11.8 G/DL — LOW (ref 13–17)
HGB BLD-MCNC: 12 G/DL — LOW (ref 13–17)
HGB BLD-MCNC: 12.1 G/DL — LOW (ref 13–17)
HGB BLD-MCNC: 12.1 G/DL — LOW (ref 13–17)
HGB BLD-MCNC: 12.2 G/DL — LOW (ref 13–17)
HGB BLD-MCNC: 12.2 G/DL — LOW (ref 13–17)
HGB BLD-MCNC: 12.6 G/DL — LOW (ref 13–17)
HGB BLD-MCNC: 12.8 G/DL — LOW (ref 13–17)
HGB BLD-MCNC: 12.8 G/DL — LOW (ref 13–17)
HGB BLD-MCNC: 13.2 G/DL — SIGNIFICANT CHANGE UP (ref 13–17)
HGB BLD-MCNC: 13.2 G/DL — SIGNIFICANT CHANGE UP (ref 13–17)
HGB BLD-MCNC: 13.5 G/DL — SIGNIFICANT CHANGE UP (ref 13–17)
HGB BLD-MCNC: 14.1 G/DL — SIGNIFICANT CHANGE UP (ref 13–17)
HGB BLD-MCNC: 16.3 G/DL — SIGNIFICANT CHANGE UP (ref 13–17)
IMM GRANULOCYTES # BLD AUTO: 0.03 K/UL — SIGNIFICANT CHANGE UP (ref 0–0.07)
IMM GRANULOCYTES # BLD AUTO: 0.04 K/UL — SIGNIFICANT CHANGE UP (ref 0–0.07)
IMM GRANULOCYTES # BLD AUTO: 0.04 K/UL — SIGNIFICANT CHANGE UP (ref 0–0.07)
IMM GRANULOCYTES # BLD AUTO: 0.07 K/UL — SIGNIFICANT CHANGE UP (ref 0–0.07)
IMM GRANULOCYTES # BLD AUTO: 0.08 K/UL — HIGH (ref 0–0.07)
IMM GRANULOCYTES # BLD AUTO: 0.08 K/UL — HIGH (ref 0–0.07)
IMM GRANULOCYTES # BLD AUTO: 0.09 K/UL — HIGH (ref 0–0.07)
IMM GRANULOCYTES NFR BLD AUTO: 0.3 % — SIGNIFICANT CHANGE UP (ref 0–0.9)
IMM GRANULOCYTES NFR BLD AUTO: 0.4 % — SIGNIFICANT CHANGE UP (ref 0–0.9)
IMM GRANULOCYTES NFR BLD AUTO: 0.6 % — SIGNIFICANT CHANGE UP (ref 0–0.9)
INR BLD: 0.85 RATIO — SIGNIFICANT CHANGE UP (ref 0.85–1.16)
INR BLD: 0.94 RATIO — SIGNIFICANT CHANGE UP (ref 0.85–1.16)
INR BLD: 0.96 RATIO — SIGNIFICANT CHANGE UP (ref 0.85–1.16)
INR BLD: 1.1 RATIO — SIGNIFICANT CHANGE UP (ref 0.85–1.16)
INR BLD: 1.3 RATIO — HIGH (ref 0.85–1.16)
KETONES UR QL: 15 MG/DL
KETONES UR QL: 15 MG/DL
KETONES UR QL: 40 MG/DL
KETONES UR QL: ABNORMAL MG/DL
KETONES UR-MCNC: 15 MG/DL
LACTATE SERPL-SCNC: 1.1 MMOL/L — SIGNIFICANT CHANGE UP (ref 0.5–2)
LACTATE SERPL-SCNC: 1.3 MMOL/L — SIGNIFICANT CHANGE UP (ref 0.5–2)
LDLC SERPL-MCNC: 94 MG/DL — SIGNIFICANT CHANGE UP
LEUKOCYTE ESTERASE UR-ACNC: NEGATIVE — SIGNIFICANT CHANGE UP
LIPID PNL WITH DIRECT LDL SERPL: 94 MG/DL — SIGNIFICANT CHANGE UP
LYMPHOCYTES # BLD AUTO: 0.87 K/UL — LOW (ref 1–3.3)
LYMPHOCYTES # BLD AUTO: 1.16 K/UL — SIGNIFICANT CHANGE UP (ref 1–3.3)
LYMPHOCYTES # BLD AUTO: 2.07 K/UL — SIGNIFICANT CHANGE UP (ref 1–3.3)
LYMPHOCYTES # BLD AUTO: 2.36 K/UL — SIGNIFICANT CHANGE UP (ref 1–3.3)
LYMPHOCYTES # BLD AUTO: 2.42 K/UL — SIGNIFICANT CHANGE UP (ref 1–3.3)
LYMPHOCYTES # BLD AUTO: 2.6 K/UL — SIGNIFICANT CHANGE UP (ref 1–3.3)
LYMPHOCYTES # BLD AUTO: 3.22 K/UL — SIGNIFICANT CHANGE UP (ref 1–3.3)
LYMPHOCYTES # BLD AUTO: 3.51 K/UL — HIGH (ref 1–3.3)
LYMPHOCYTES # BLD AUTO: 33.7 % — SIGNIFICANT CHANGE UP (ref 13–44)
LYMPHOCYTES NFR BLD AUTO: 11.5 % — LOW (ref 13–44)
LYMPHOCYTES NFR BLD AUTO: 17.4 % — SIGNIFICANT CHANGE UP (ref 13–44)
LYMPHOCYTES NFR BLD AUTO: 17.7 % — SIGNIFICANT CHANGE UP (ref 13–44)
LYMPHOCYTES NFR BLD AUTO: 24 % — SIGNIFICANT CHANGE UP (ref 13–44)
LYMPHOCYTES NFR BLD AUTO: 29.5 % — SIGNIFICANT CHANGE UP (ref 13–44)
LYMPHOCYTES NFR BLD AUTO: 5.7 % — LOW (ref 13–44)
LYMPHOCYTES NFR BLD AUTO: 7.3 % — LOW (ref 13–44)
MAGNESIUM SERPL-MCNC: 1.7 MG/DL — LOW (ref 1.8–2.6)
MAGNESIUM SERPL-MCNC: 1.7 MG/DL — SIGNIFICANT CHANGE UP (ref 1.6–2.6)
MAGNESIUM SERPL-MCNC: 1.8 MG/DL — SIGNIFICANT CHANGE UP (ref 1.6–2.6)
MAGNESIUM SERPL-MCNC: 1.9 MG/DL — SIGNIFICANT CHANGE UP (ref 1.6–2.6)
MAGNESIUM SERPL-MCNC: 1.9 MG/DL — SIGNIFICANT CHANGE UP (ref 1.6–2.6)
MAGNESIUM SERPL-MCNC: 2 MG/DL — SIGNIFICANT CHANGE UP (ref 1.6–2.6)
MAGNESIUM SERPL-MCNC: 2.2 MG/DL — SIGNIFICANT CHANGE UP (ref 1.6–2.6)
MAGNESIUM SERPL-MCNC: 2.7 MG/DL — HIGH (ref 1.6–2.6)
MAGNESIUM SERPL-MCNC: 2.8 MG/DL — HIGH (ref 1.6–2.6)
MCHC RBC-ENTMCNC: 27.1 PG — SIGNIFICANT CHANGE UP (ref 27–34)
MCHC RBC-ENTMCNC: 27.2 PG — SIGNIFICANT CHANGE UP (ref 27–34)
MCHC RBC-ENTMCNC: 27.4 PG — SIGNIFICANT CHANGE UP (ref 27–34)
MCHC RBC-ENTMCNC: 27.5 PG — SIGNIFICANT CHANGE UP (ref 27–34)
MCHC RBC-ENTMCNC: 27.6 PG — SIGNIFICANT CHANGE UP (ref 27–34)
MCHC RBC-ENTMCNC: 27.7 PG — SIGNIFICANT CHANGE UP (ref 27–34)
MCHC RBC-ENTMCNC: 27.8 PG — SIGNIFICANT CHANGE UP (ref 27–34)
MCHC RBC-ENTMCNC: 27.9 PG — SIGNIFICANT CHANGE UP (ref 27–34)
MCHC RBC-ENTMCNC: 27.9 PG — SIGNIFICANT CHANGE UP (ref 27–34)
MCHC RBC-ENTMCNC: 28 PG — SIGNIFICANT CHANGE UP (ref 27–34)
MCHC RBC-ENTMCNC: 31.3 G/DL — LOW (ref 32–36)
MCHC RBC-ENTMCNC: 31.5 G/DL — LOW (ref 32–36)
MCHC RBC-ENTMCNC: 32 G/DL — SIGNIFICANT CHANGE UP (ref 32–36)
MCHC RBC-ENTMCNC: 32.1 G/DL — SIGNIFICANT CHANGE UP (ref 32–36)
MCHC RBC-ENTMCNC: 32.4 G/DL — SIGNIFICANT CHANGE UP (ref 32–36)
MCHC RBC-ENTMCNC: 32.4 G/DL — SIGNIFICANT CHANGE UP (ref 32–36)
MCHC RBC-ENTMCNC: 32.5 G/DL — SIGNIFICANT CHANGE UP (ref 32–36)
MCHC RBC-ENTMCNC: 32.5 G/DL — SIGNIFICANT CHANGE UP (ref 32–36)
MCHC RBC-ENTMCNC: 32.6 G/DL — SIGNIFICANT CHANGE UP (ref 32–36)
MCHC RBC-ENTMCNC: 32.7 G/DL — SIGNIFICANT CHANGE UP (ref 32–36)
MCHC RBC-ENTMCNC: 33 G/DL — SIGNIFICANT CHANGE UP (ref 32–36)
MCHC RBC-ENTMCNC: 33 G/DL — SIGNIFICANT CHANGE UP (ref 32–36)
MCHC RBC-ENTMCNC: 33.1 G/DL — SIGNIFICANT CHANGE UP (ref 32–36)
MCHC RBC-ENTMCNC: 33.3 G/DL — SIGNIFICANT CHANGE UP (ref 32–36)
MCHC RBC-ENTMCNC: 33.4 G/DL — SIGNIFICANT CHANGE UP (ref 32–36)
MCHC RBC-ENTMCNC: 33.7 G/DL — SIGNIFICANT CHANGE UP (ref 32–36)
MCHC RBC-ENTMCNC: 33.8 G/DL — SIGNIFICANT CHANGE UP (ref 32–36)
MCHC RBC-ENTMCNC: 33.9 G/DL — SIGNIFICANT CHANGE UP (ref 32–36)
MCHC RBC-ENTMCNC: 33.9 G/DL — SIGNIFICANT CHANGE UP (ref 32–36)
MCV RBC AUTO: 82.1 FL — SIGNIFICANT CHANGE UP (ref 80–100)
MCV RBC AUTO: 82.3 FL — SIGNIFICANT CHANGE UP (ref 80–100)
MCV RBC AUTO: 82.3 FL — SIGNIFICANT CHANGE UP (ref 80–100)
MCV RBC AUTO: 82.4 FL — SIGNIFICANT CHANGE UP (ref 80–100)
MCV RBC AUTO: 82.8 FL — SIGNIFICANT CHANGE UP (ref 80–100)
MCV RBC AUTO: 82.9 FL — SIGNIFICANT CHANGE UP (ref 80–100)
MCV RBC AUTO: 83 FL — SIGNIFICANT CHANGE UP (ref 80–100)
MCV RBC AUTO: 83.3 FL — SIGNIFICANT CHANGE UP (ref 80–100)
MCV RBC AUTO: 83.4 FL — SIGNIFICANT CHANGE UP (ref 80–100)
MCV RBC AUTO: 83.5 FL — SIGNIFICANT CHANGE UP (ref 80–100)
MCV RBC AUTO: 83.5 FL — SIGNIFICANT CHANGE UP (ref 80–100)
MCV RBC AUTO: 83.6 FL — SIGNIFICANT CHANGE UP (ref 80–100)
MCV RBC AUTO: 83.7 FL — SIGNIFICANT CHANGE UP (ref 80–100)
MCV RBC AUTO: 84 FL — SIGNIFICANT CHANGE UP (ref 80–100)
MCV RBC AUTO: 84.1 FL — SIGNIFICANT CHANGE UP (ref 80–100)
MCV RBC AUTO: 84.2 FL — SIGNIFICANT CHANGE UP (ref 80–100)
MCV RBC AUTO: 84.7 FL — SIGNIFICANT CHANGE UP (ref 80–100)
MCV RBC AUTO: 84.9 FL — SIGNIFICANT CHANGE UP (ref 80–100)
MCV RBC AUTO: 85.1 FL — SIGNIFICANT CHANGE UP (ref 80–100)
MCV RBC AUTO: 85.2 FL — SIGNIFICANT CHANGE UP (ref 80–100)
MCV RBC AUTO: 85.2 FL — SIGNIFICANT CHANGE UP (ref 80–100)
MCV RBC AUTO: 85.5 FL — SIGNIFICANT CHANGE UP (ref 80–100)
MCV RBC AUTO: 85.5 FL — SIGNIFICANT CHANGE UP (ref 80–100)
MCV RBC AUTO: 85.8 FL — SIGNIFICANT CHANGE UP (ref 80–100)
MCV RBC AUTO: 85.9 FL — SIGNIFICANT CHANGE UP (ref 80–100)
MCV RBC AUTO: 86.7 FL — SIGNIFICANT CHANGE UP (ref 80–100)
MCV RBC AUTO: 86.8 FL — SIGNIFICANT CHANGE UP (ref 80–100)
MCV RBC AUTO: 87.3 FL — SIGNIFICANT CHANGE UP (ref 80–100)
METHOD TYPE: SIGNIFICANT CHANGE UP
MONOCYTES # BLD AUTO: 0.52 K/UL — SIGNIFICANT CHANGE UP (ref 0–0.9)
MONOCYTES # BLD AUTO: 0.76 K/UL — SIGNIFICANT CHANGE UP (ref 0–0.9)
MONOCYTES # BLD AUTO: 0.79 K/UL — SIGNIFICANT CHANGE UP (ref 0–0.9)
MONOCYTES # BLD AUTO: 0.84 K/UL — SIGNIFICANT CHANGE UP (ref 0–0.9)
MONOCYTES # BLD AUTO: 1 K/UL — HIGH (ref 0–0.9)
MONOCYTES # BLD AUTO: 1.08 K/UL — HIGH (ref 0–0.9)
MONOCYTES # BLD AUTO: 1.1 K/UL — HIGH (ref 0–0.9)
MONOCYTES # BLD AUTO: 1.43 K/UL — HIGH (ref 0–0.9)
MONOCYTES NFR BLD AUTO: 4.4 % — SIGNIFICANT CHANGE UP (ref 2–14)
MONOCYTES NFR BLD AUTO: 5.8 % — SIGNIFICANT CHANGE UP (ref 2–14)
MONOCYTES NFR BLD AUTO: 6.1 % — SIGNIFICANT CHANGE UP (ref 2–14)
MONOCYTES NFR BLD AUTO: 7.1 % — SIGNIFICANT CHANGE UP (ref 2–14)
MONOCYTES NFR BLD AUTO: 7.5 % — SIGNIFICANT CHANGE UP (ref 2–14)
MONOCYTES NFR BLD AUTO: 7.8 % — SIGNIFICANT CHANGE UP (ref 2–14)
MONOCYTES NFR BLD AUTO: 8.1 % — SIGNIFICANT CHANGE UP (ref 2–14)
MONOCYTES NFR BLD AUTO: 8.6 % — SIGNIFICANT CHANGE UP (ref 2–14)
MRSA PCR RESULT.: SIGNIFICANT CHANGE UP
MRSA PCR RESULT.: SIGNIFICANT CHANGE UP
NEUTROPHILS # BLD AUTO: 10.24 K/UL — HIGH (ref 1.8–7.4)
NEUTROPHILS # BLD AUTO: 13.98 K/UL — HIGH (ref 1.8–7.4)
NEUTROPHILS # BLD AUTO: 14.62 K/UL — HIGH (ref 1.8–7.4)
NEUTROPHILS # BLD AUTO: 17.43 K/UL — HIGH (ref 1.8–7.4)
NEUTROPHILS # BLD AUTO: 5.07 K/UL — SIGNIFICANT CHANGE UP (ref 1.8–7.4)
NEUTROPHILS # BLD AUTO: 5.35 K/UL — SIGNIFICANT CHANGE UP (ref 1.8–7.4)
NEUTROPHILS # BLD AUTO: 6.24 K/UL — SIGNIFICANT CHANGE UP (ref 1.8–7.4)
NEUTROPHILS # BLD AUTO: 9.36 K/UL — HIGH (ref 1.8–7.4)
NEUTROPHILS NFR BLD AUTO: 51.3 % — SIGNIFICANT CHANGE UP (ref 43–77)
NEUTROPHILS NFR BLD AUTO: 57.5 % — SIGNIFICANT CHANGE UP (ref 43–77)
NEUTROPHILS NFR BLD AUTO: 61.9 % — SIGNIFICANT CHANGE UP (ref 43–77)
NEUTROPHILS NFR BLD AUTO: 70.4 % — SIGNIFICANT CHANGE UP (ref 43–77)
NEUTROPHILS NFR BLD AUTO: 75.5 % — SIGNIFICANT CHANGE UP (ref 43–77)
NEUTROPHILS NFR BLD AUTO: 80.9 % — HIGH (ref 43–77)
NEUTROPHILS NFR BLD AUTO: 86.3 % — HIGH (ref 43–77)
NEUTROPHILS NFR BLD AUTO: 86.5 % — HIGH (ref 43–77)
NITRITE UR-MCNC: NEGATIVE — SIGNIFICANT CHANGE UP
NITRITE UR-MCNC: POSITIVE
NONHDLC SERPL-MCNC: 117 MG/DL — SIGNIFICANT CHANGE UP
NRBC # BLD AUTO: 0 K/UL — SIGNIFICANT CHANGE UP (ref 0–0)
NRBC # FLD: 0 K/UL — SIGNIFICANT CHANGE UP (ref 0–0)
NRBC BLD AUTO-RTO: 0 /100 WBCS — SIGNIFICANT CHANGE UP (ref 0–0)
ORGANISM # SPEC MICROSCOPIC CNT: ABNORMAL
ORGANISM # SPEC MICROSCOPIC CNT: SIGNIFICANT CHANGE UP
PA ADP PRP-ACNC: 242 PRU — SIGNIFICANT CHANGE UP (ref 182–335)
PCO2 BLDA: 46 MMHG — SIGNIFICANT CHANGE UP (ref 35–48)
PH BLDA: 7.3 — LOW (ref 7.35–7.45)
PH UR: 5.5 — SIGNIFICANT CHANGE UP (ref 5–8)
PH UR: 6.5 — SIGNIFICANT CHANGE UP (ref 5–8)
PH UR: 6.5 — SIGNIFICANT CHANGE UP (ref 5–8)
PH UR: 7 — SIGNIFICANT CHANGE UP (ref 5–8)
PH UR: 7.5 — SIGNIFICANT CHANGE UP (ref 5–8)
PHOSPHATE SERPL-MCNC: 1.8 MG/DL — LOW (ref 2.4–4.7)
PHOSPHATE SERPL-MCNC: 2.2 MG/DL — LOW (ref 2.4–4.7)
PHOSPHATE SERPL-MCNC: 2.2 MG/DL — LOW (ref 2.4–4.7)
PHOSPHATE SERPL-MCNC: 2.4 MG/DL — SIGNIFICANT CHANGE UP (ref 2.4–4.7)
PHOSPHATE SERPL-MCNC: 2.7 MG/DL — SIGNIFICANT CHANGE UP (ref 2.4–4.7)
PHOSPHATE SERPL-MCNC: 2.8 MG/DL — SIGNIFICANT CHANGE UP (ref 2.4–4.7)
PHOSPHATE SERPL-MCNC: 2.9 MG/DL — SIGNIFICANT CHANGE UP (ref 2.4–4.7)
PHOSPHATE SERPL-MCNC: 2.9 MG/DL — SIGNIFICANT CHANGE UP (ref 2.4–4.7)
PHOSPHATE SERPL-MCNC: 3.4 MG/DL — SIGNIFICANT CHANGE UP (ref 2.4–4.7)
PHOSPHATE SERPL-MCNC: 3.5 MG/DL — SIGNIFICANT CHANGE UP (ref 2.4–4.7)
PHOSPHATE SERPL-MCNC: 3.5 MG/DL — SIGNIFICANT CHANGE UP (ref 2.4–4.7)
PHOSPHATE SERPL-MCNC: 3.7 MG/DL — SIGNIFICANT CHANGE UP (ref 2.4–4.7)
PLATELET # BLD AUTO: 202 K/UL — SIGNIFICANT CHANGE UP (ref 150–400)
PLATELET # BLD AUTO: 209 K/UL — SIGNIFICANT CHANGE UP (ref 150–400)
PLATELET # BLD AUTO: 218 K/UL — SIGNIFICANT CHANGE UP (ref 150–400)
PLATELET # BLD AUTO: 224 K/UL — SIGNIFICANT CHANGE UP (ref 150–400)
PLATELET # BLD AUTO: 224 K/UL — SIGNIFICANT CHANGE UP (ref 150–400)
PLATELET # BLD AUTO: 226 K/UL — SIGNIFICANT CHANGE UP (ref 150–400)
PLATELET # BLD AUTO: 227 K/UL — SIGNIFICANT CHANGE UP (ref 150–400)
PLATELET # BLD AUTO: 233 K/UL — SIGNIFICANT CHANGE UP (ref 150–400)
PLATELET # BLD AUTO: 236 K/UL — SIGNIFICANT CHANGE UP (ref 150–400)
PLATELET # BLD AUTO: 250 K/UL — SIGNIFICANT CHANGE UP (ref 150–400)
PLATELET # BLD AUTO: 251 K/UL — SIGNIFICANT CHANGE UP (ref 150–400)
PLATELET # BLD AUTO: 251 K/UL — SIGNIFICANT CHANGE UP (ref 150–400)
PLATELET # BLD AUTO: 267 K/UL — SIGNIFICANT CHANGE UP (ref 150–400)
PLATELET # BLD AUTO: 271 K/UL — SIGNIFICANT CHANGE UP (ref 150–400)
PLATELET # BLD AUTO: 279 K/UL — SIGNIFICANT CHANGE UP (ref 150–400)
PLATELET # BLD AUTO: 318 K/UL — SIGNIFICANT CHANGE UP (ref 150–400)
PLATELET # BLD AUTO: 328 K/UL — SIGNIFICANT CHANGE UP (ref 150–400)
PLATELET # BLD AUTO: 382 K/UL — SIGNIFICANT CHANGE UP (ref 150–400)
PLATELET # BLD AUTO: 398 K/UL — SIGNIFICANT CHANGE UP (ref 150–400)
PLATELET # BLD AUTO: 413 K/UL — HIGH (ref 150–400)
PLATELET # BLD AUTO: 447 K/UL — HIGH (ref 150–400)
PLATELET # BLD AUTO: 460 K/UL — HIGH (ref 150–400)
PLATELET # BLD AUTO: 469 K/UL — HIGH (ref 150–400)
PLATELET # BLD AUTO: 481 K/UL — HIGH (ref 150–400)
PLATELET # BLD AUTO: 483 K/UL — HIGH (ref 150–400)
PLATELET # BLD AUTO: 546 K/UL — HIGH (ref 150–400)
PLATELET # BLD AUTO: 547 K/UL — HIGH (ref 150–400)
PLATELET # BLD AUTO: 582 K/UL — HIGH (ref 150–400)
PMV BLD: 10.1 FL — SIGNIFICANT CHANGE UP (ref 7–13)
PMV BLD: 10.2 FL — SIGNIFICANT CHANGE UP (ref 7–13)
PMV BLD: 10.4 FL — SIGNIFICANT CHANGE UP (ref 7–13)
PMV BLD: 10.5 FL — SIGNIFICANT CHANGE UP (ref 7–13)
PMV BLD: 10.6 FL — SIGNIFICANT CHANGE UP (ref 7–13)
PMV BLD: 10.7 FL — SIGNIFICANT CHANGE UP (ref 7–13)
PMV BLD: 10.8 FL — SIGNIFICANT CHANGE UP (ref 7–13)
PMV BLD: 10.9 FL — SIGNIFICANT CHANGE UP (ref 7–13)
PMV BLD: 11 FL — SIGNIFICANT CHANGE UP (ref 7–13)
PMV BLD: 11 FL — SIGNIFICANT CHANGE UP (ref 7–13)
PMV BLD: 12.4 FL — SIGNIFICANT CHANGE UP (ref 7–13)
PMV BLD: 12.6 FL — SIGNIFICANT CHANGE UP (ref 7–13)
PMV BLD: 12.6 FL — SIGNIFICANT CHANGE UP (ref 7–13)
PMV BLD: 12.8 FL — SIGNIFICANT CHANGE UP (ref 7–13)
PMV BLD: 9.9 FL — SIGNIFICANT CHANGE UP (ref 7–13)
PO2 BLDA: 188 MMHG — HIGH (ref 83–108)
POTASSIUM SERPL-MCNC: 3.7 MMOL/L — SIGNIFICANT CHANGE UP (ref 3.5–5.3)
POTASSIUM SERPL-MCNC: 3.8 MMOL/L — SIGNIFICANT CHANGE UP (ref 3.5–5.3)
POTASSIUM SERPL-MCNC: 3.8 MMOL/L — SIGNIFICANT CHANGE UP (ref 3.5–5.3)
POTASSIUM SERPL-MCNC: 3.9 MMOL/L — SIGNIFICANT CHANGE UP (ref 3.5–5.3)
POTASSIUM SERPL-MCNC: 3.9 MMOL/L — SIGNIFICANT CHANGE UP (ref 3.5–5.3)
POTASSIUM SERPL-MCNC: 4 MMOL/L — SIGNIFICANT CHANGE UP (ref 3.5–5.3)
POTASSIUM SERPL-MCNC: 4.1 MMOL/L — SIGNIFICANT CHANGE UP (ref 3.5–5.3)
POTASSIUM SERPL-MCNC: 4.2 MMOL/L — SIGNIFICANT CHANGE UP (ref 3.5–5.3)
POTASSIUM SERPL-MCNC: 4.2 MMOL/L — SIGNIFICANT CHANGE UP (ref 3.5–5.3)
POTASSIUM SERPL-MCNC: 4.3 MMOL/L — SIGNIFICANT CHANGE UP (ref 3.5–5.3)
POTASSIUM SERPL-MCNC: 4.4 MMOL/L — SIGNIFICANT CHANGE UP (ref 3.5–5.3)
POTASSIUM SERPL-MCNC: 4.5 MMOL/L — SIGNIFICANT CHANGE UP (ref 3.5–5.3)
POTASSIUM SERPL-MCNC: 4.6 MMOL/L — SIGNIFICANT CHANGE UP (ref 3.5–5.3)
POTASSIUM SERPL-MCNC: 4.7 MMOL/L — SIGNIFICANT CHANGE UP (ref 3.5–5.3)
POTASSIUM SERPL-MCNC: 4.7 MMOL/L — SIGNIFICANT CHANGE UP (ref 3.5–5.3)
POTASSIUM SERPL-MCNC: 4.8 MMOL/L — SIGNIFICANT CHANGE UP (ref 3.5–5.3)
POTASSIUM SERPL-MCNC: 5 MMOL/L — SIGNIFICANT CHANGE UP (ref 3.5–5.3)
POTASSIUM SERPL-MCNC: 5.1 MMOL/L — SIGNIFICANT CHANGE UP (ref 3.5–5.3)
POTASSIUM SERPL-MCNC: 5.2 MMOL/L — SIGNIFICANT CHANGE UP (ref 3.5–5.3)
POTASSIUM SERPL-SCNC: 3.7 MMOL/L — SIGNIFICANT CHANGE UP (ref 3.5–5.3)
POTASSIUM SERPL-SCNC: 3.8 MMOL/L — SIGNIFICANT CHANGE UP (ref 3.5–5.3)
POTASSIUM SERPL-SCNC: 3.8 MMOL/L — SIGNIFICANT CHANGE UP (ref 3.5–5.3)
POTASSIUM SERPL-SCNC: 3.9 MMOL/L — SIGNIFICANT CHANGE UP (ref 3.5–5.3)
POTASSIUM SERPL-SCNC: 3.9 MMOL/L — SIGNIFICANT CHANGE UP (ref 3.5–5.3)
POTASSIUM SERPL-SCNC: 4 MMOL/L — SIGNIFICANT CHANGE UP (ref 3.5–5.3)
POTASSIUM SERPL-SCNC: 4.1 MMOL/L — SIGNIFICANT CHANGE UP (ref 3.5–5.3)
POTASSIUM SERPL-SCNC: 4.2 MMOL/L — SIGNIFICANT CHANGE UP (ref 3.5–5.3)
POTASSIUM SERPL-SCNC: 4.2 MMOL/L — SIGNIFICANT CHANGE UP (ref 3.5–5.3)
POTASSIUM SERPL-SCNC: 4.3 MMOL/L — SIGNIFICANT CHANGE UP (ref 3.5–5.3)
POTASSIUM SERPL-SCNC: 4.4 MMOL/L — SIGNIFICANT CHANGE UP (ref 3.5–5.3)
POTASSIUM SERPL-SCNC: 4.5 MMOL/L — SIGNIFICANT CHANGE UP (ref 3.5–5.3)
POTASSIUM SERPL-SCNC: 4.6 MMOL/L — SIGNIFICANT CHANGE UP (ref 3.5–5.3)
POTASSIUM SERPL-SCNC: 4.7 MMOL/L — SIGNIFICANT CHANGE UP (ref 3.5–5.3)
POTASSIUM SERPL-SCNC: 4.7 MMOL/L — SIGNIFICANT CHANGE UP (ref 3.5–5.3)
POTASSIUM SERPL-SCNC: 4.8 MMOL/L — SIGNIFICANT CHANGE UP (ref 3.5–5.3)
POTASSIUM SERPL-SCNC: 5 MMOL/L — SIGNIFICANT CHANGE UP (ref 3.5–5.3)
POTASSIUM SERPL-SCNC: 5.1 MMOL/L — SIGNIFICANT CHANGE UP (ref 3.5–5.3)
POTASSIUM SERPL-SCNC: 5.2 MMOL/L — SIGNIFICANT CHANGE UP (ref 3.5–5.3)
PROCALCITONIN SERPL-MCNC: 0.07 NG/ML — SIGNIFICANT CHANGE UP (ref 0.02–0.1)
PROCALCITONIN SERPL-MCNC: 0.09 NG/ML — SIGNIFICANT CHANGE UP (ref 0.02–0.1)
PROCALCITONIN SERPL-MCNC: 0.14 NG/ML — HIGH (ref 0.02–0.1)
PROT SERPL-MCNC: 6.2 G/DL — LOW (ref 6.6–8.7)
PROT SERPL-MCNC: 6.4 G/DL — LOW (ref 6.6–8.7)
PROT SERPL-MCNC: 7.3 G/DL — SIGNIFICANT CHANGE UP (ref 6.6–8.7)
PROT SERPL-MCNC: 7.5 G/DL — SIGNIFICANT CHANGE UP (ref 6.6–8.7)
PROT SERPL-MCNC: 7.6 G/DL — SIGNIFICANT CHANGE UP (ref 6.6–8.7)
PROT SERPL-MCNC: 7.6 G/DL — SIGNIFICANT CHANGE UP (ref 6–8.3)
PROT SERPL-MCNC: 7.7 G/DL — SIGNIFICANT CHANGE UP (ref 6.6–8.7)
PROT SERPL-MCNC: 7.8 G/DL — SIGNIFICANT CHANGE UP (ref 6.6–8.7)
PROT UR-MCNC: 100 MG/DL
PROT UR-MCNC: 30 MG/DL
PROT UR-MCNC: 30 MG/DL
PROTHROM AB SERPL-ACNC: 10 SEC — SIGNIFICANT CHANGE UP (ref 9.9–13.4)
PROTHROM AB SERPL-ACNC: 10.6 SEC — SIGNIFICANT CHANGE UP (ref 9.9–13.4)
PROTHROM AB SERPL-ACNC: 10.9 SEC — SIGNIFICANT CHANGE UP (ref 9.9–13.4)
PROTHROM AB SERPL-ACNC: 12.7 SEC — SIGNIFICANT CHANGE UP (ref 9.9–13.4)
PROTHROM AB SERPL-ACNC: 15 SEC — HIGH (ref 9.9–13.4)
RAPID RVP RESULT: SIGNIFICANT CHANGE UP
RBC # BLD: 3.72 M/UL — LOW (ref 4.2–5.8)
RBC # BLD: 3.78 M/UL — LOW (ref 4.2–5.8)
RBC # BLD: 3.96 M/UL — LOW (ref 4.2–5.8)
RBC # BLD: 4 M/UL — LOW (ref 4.2–5.8)
RBC # BLD: 4.04 M/UL — LOW (ref 4.2–5.8)
RBC # BLD: 4.09 M/UL — LOW (ref 4.2–5.8)
RBC # BLD: 4.1 M/UL — LOW (ref 4.2–5.8)
RBC # BLD: 4.13 M/UL — LOW (ref 4.2–5.8)
RBC # BLD: 4.13 M/UL — LOW (ref 4.2–5.8)
RBC # BLD: 4.19 M/UL — LOW (ref 4.2–5.8)
RBC # BLD: 4.22 M/UL — SIGNIFICANT CHANGE UP (ref 4.2–5.8)
RBC # BLD: 4.3 M/UL — SIGNIFICANT CHANGE UP (ref 4.2–5.8)
RBC # BLD: 4.31 M/UL — SIGNIFICANT CHANGE UP (ref 4.2–5.8)
RBC # BLD: 4.32 M/UL — SIGNIFICANT CHANGE UP (ref 4.2–5.8)
RBC # BLD: 4.33 M/UL — SIGNIFICANT CHANGE UP (ref 4.2–5.8)
RBC # BLD: 4.41 M/UL — SIGNIFICANT CHANGE UP (ref 4.2–5.8)
RBC # BLD: 4.45 M/UL — SIGNIFICANT CHANGE UP (ref 4.2–5.8)
RBC # BLD: 4.46 M/UL — SIGNIFICANT CHANGE UP (ref 4.2–5.8)
RBC # BLD: 4.56 M/UL — SIGNIFICANT CHANGE UP (ref 4.2–5.8)
RBC # BLD: 4.61 M/UL — SIGNIFICANT CHANGE UP (ref 4.2–5.8)
RBC # BLD: 4.67 M/UL — SIGNIFICANT CHANGE UP (ref 4.2–5.8)
RBC # BLD: 4.74 M/UL — SIGNIFICANT CHANGE UP (ref 4.2–5.8)
RBC # BLD: 4.85 M/UL — SIGNIFICANT CHANGE UP (ref 4.2–5.8)
RBC # BLD: 4.86 M/UL — SIGNIFICANT CHANGE UP (ref 4.2–5.8)
RBC # BLD: 4.88 M/UL — SIGNIFICANT CHANGE UP (ref 4.2–5.8)
RBC # BLD: 4.88 M/UL — SIGNIFICANT CHANGE UP (ref 4.2–5.8)
RBC # BLD: 5.09 M/UL — SIGNIFICANT CHANGE UP (ref 4.2–5.8)
RBC # BLD: 6 M/UL — HIGH (ref 4.2–5.8)
RBC # FLD: 14.9 % — HIGH (ref 10.3–14.5)
RBC # FLD: 15 % — HIGH (ref 10.3–14.5)
RBC # FLD: 15.1 % — HIGH (ref 10.3–14.5)
RBC # FLD: 15.3 % — HIGH (ref 10.3–14.5)
RBC # FLD: 15.4 % — HIGH (ref 10.3–14.5)
RBC # FLD: 15.4 % — HIGH (ref 10.3–14.5)
RBC # FLD: 15.5 % — HIGH (ref 10.3–14.5)
RBC # FLD: 15.5 % — HIGH (ref 10.3–14.5)
RBC # FLD: 15.6 % — HIGH (ref 10.3–14.5)
RBC # FLD: 15.8 % — HIGH (ref 10.3–14.5)
RBC # FLD: 15.9 % — HIGH (ref 10.3–14.5)
RBC # FLD: 16 % — HIGH (ref 10.3–14.5)
RBC # FLD: 16.1 % — HIGH (ref 10.3–14.5)
RBC # FLD: 16.2 % — HIGH (ref 10.3–14.5)
RBC CASTS # UR COMP ASSIST: 1 /HPF — SIGNIFICANT CHANGE UP (ref 0–4)
RBC CASTS # UR COMP ASSIST: 1 /HPF — SIGNIFICANT CHANGE UP (ref 0–4)
RBC CASTS # UR COMP ASSIST: 2 /HPF — SIGNIFICANT CHANGE UP (ref 0–4)
RBC CASTS # UR COMP ASSIST: 2 /HPF — SIGNIFICANT CHANGE UP (ref 0–4)
RBC CASTS # UR COMP ASSIST: 38 /HPF — HIGH (ref 0–4)
RSV RNA NPH QL NAA+NON-PROBE: SIGNIFICANT CHANGE UP
S AUREUS DNA NOSE QL NAA+PROBE: SIGNIFICANT CHANGE UP
S AUREUS DNA NOSE QL NAA+PROBE: SIGNIFICANT CHANGE UP
SAO2 % BLDA: 100 % — HIGH (ref 94–98)
SARS-COV-2 RNA SPEC QL NAA+PROBE: SIGNIFICANT CHANGE UP
SODIUM SERPL-SCNC: 136 MMOL/L — SIGNIFICANT CHANGE UP (ref 135–145)
SODIUM SERPL-SCNC: 137 MMOL/L — SIGNIFICANT CHANGE UP (ref 135–145)
SODIUM SERPL-SCNC: 137 MMOL/L — SIGNIFICANT CHANGE UP (ref 135–145)
SODIUM SERPL-SCNC: 138 MMOL/L — SIGNIFICANT CHANGE UP (ref 135–145)
SODIUM SERPL-SCNC: 139 MMOL/L — SIGNIFICANT CHANGE UP (ref 135–145)
SODIUM SERPL-SCNC: 140 MMOL/L — SIGNIFICANT CHANGE UP (ref 135–145)
SODIUM SERPL-SCNC: 141 MMOL/L — SIGNIFICANT CHANGE UP (ref 135–145)
SODIUM SERPL-SCNC: 141 MMOL/L — SIGNIFICANT CHANGE UP (ref 135–145)
SODIUM SERPL-SCNC: 142 MMOL/L — SIGNIFICANT CHANGE UP (ref 135–145)
SODIUM SERPL-SCNC: 143 MMOL/L — SIGNIFICANT CHANGE UP (ref 135–145)
SODIUM SERPL-SCNC: 143 MMOL/L — SIGNIFICANT CHANGE UP (ref 135–145)
SODIUM SERPL-SCNC: 145 MMOL/L — SIGNIFICANT CHANGE UP (ref 135–145)
SODIUM SERPL-SCNC: 145 MMOL/L — SIGNIFICANT CHANGE UP (ref 135–145)
SODIUM SERPL-SCNC: 146 MMOL/L — HIGH (ref 135–145)
SOURCE RESPIRATORY: SIGNIFICANT CHANGE UP
SP GR SPEC: 1.01 — SIGNIFICANT CHANGE UP (ref 1–1.03)
SP GR SPEC: 1.02 — SIGNIFICANT CHANGE UP (ref 1–1.03)
SP GR SPEC: 1.02 — SIGNIFICANT CHANGE UP (ref 1–1.03)
SP GR SPEC: 1.03 — SIGNIFICANT CHANGE UP (ref 1–1.03)
SP GR SPEC: 1.03 — SIGNIFICANT CHANGE UP (ref 1–1.03)
SPECIMEN SOURCE: SIGNIFICANT CHANGE UP
SQUAMOUS # UR AUTO: 0 /HPF — SIGNIFICANT CHANGE UP (ref 0–5)
SQUAMOUS # UR AUTO: 1 /HPF — SIGNIFICANT CHANGE UP (ref 0–5)
TRIGL SERPL-MCNC: 131 MG/DL — SIGNIFICANT CHANGE UP
TROPONIN I, HIGH SENSITIVITY RESULT: <3 NG/L — SIGNIFICANT CHANGE UP
TSH SERPL-MCNC: 0.24 UIU/ML — LOW (ref 0.27–4.2)
UROBILINOGEN FLD QL: 0.2 MG/DL — SIGNIFICANT CHANGE UP (ref 0.2–1)
UROBILINOGEN FLD QL: 1 MG/DL — SIGNIFICANT CHANGE UP (ref 0.2–1)
VALPROATE SERPL-MCNC: 27 UG/ML — LOW (ref 50–100)
VALPROATE SERPL-MCNC: 35.3 UG/ML — LOW (ref 50–100)
VANCOMYCIN TROUGH SERPL-MCNC: 10.2 UG/ML — SIGNIFICANT CHANGE UP (ref 10–20)
WBC # BLD: 10.08 K/UL — SIGNIFICANT CHANGE UP (ref 3.8–10.5)
WBC # BLD: 10.15 K/UL — SIGNIFICANT CHANGE UP (ref 3.8–10.5)
WBC # BLD: 10.2 K/UL — SIGNIFICANT CHANGE UP (ref 3.8–10.5)
WBC # BLD: 10.41 K/UL — SIGNIFICANT CHANGE UP (ref 3.8–10.5)
WBC # BLD: 10.45 K/UL — SIGNIFICANT CHANGE UP (ref 3.8–10.5)
WBC # BLD: 10.6 K/UL — HIGH (ref 3.8–10.5)
WBC # BLD: 10.64 K/UL — HIGH (ref 3.8–10.5)
WBC # BLD: 11.3 K/UL — HIGH (ref 3.8–10.5)
WBC # BLD: 11.38 K/UL — HIGH (ref 3.8–10.5)
WBC # BLD: 11.43 K/UL — HIGH (ref 3.8–10.5)
WBC # BLD: 11.6 K/UL — HIGH (ref 3.8–10.5)
WBC # BLD: 11.82 K/UL — HIGH (ref 3.8–10.5)
WBC # BLD: 11.88 K/UL — HIGH (ref 3.8–10.5)
WBC # BLD: 11.92 K/UL — HIGH (ref 3.8–10.5)
WBC # BLD: 12.26 K/UL — HIGH (ref 3.8–10.5)
WBC # BLD: 12.35 K/UL — HIGH (ref 3.8–10.5)
WBC # BLD: 12.63 K/UL — HIGH (ref 3.8–10.5)
WBC # BLD: 13.3 K/UL — HIGH (ref 3.8–10.5)
WBC # BLD: 13.59 K/UL — HIGH (ref 3.8–10.5)
WBC # BLD: 18.06 K/UL — HIGH (ref 3.8–10.5)
WBC # BLD: 18.53 K/UL — HIGH (ref 3.8–10.5)
WBC # BLD: 20.19 K/UL — HIGH (ref 3.8–10.5)
WBC # BLD: 8.34 K/UL — SIGNIFICANT CHANGE UP (ref 3.8–10.5)
WBC # BLD: 8.75 K/UL — SIGNIFICANT CHANGE UP (ref 3.8–10.5)
WBC # BLD: 8.82 K/UL — SIGNIFICANT CHANGE UP (ref 3.8–10.5)
WBC # BLD: 9.29 K/UL — SIGNIFICANT CHANGE UP (ref 3.8–10.5)
WBC # BLD: 9.95 K/UL — SIGNIFICANT CHANGE UP (ref 3.8–10.5)
WBC # BLD: 9.98 K/UL — SIGNIFICANT CHANGE UP (ref 3.8–10.5)
WBC # FLD AUTO: 10.08 K/UL — SIGNIFICANT CHANGE UP (ref 3.8–10.5)
WBC # FLD AUTO: 10.15 K/UL — SIGNIFICANT CHANGE UP (ref 3.8–10.5)
WBC # FLD AUTO: 10.2 K/UL — SIGNIFICANT CHANGE UP (ref 3.8–10.5)
WBC # FLD AUTO: 10.41 K/UL — SIGNIFICANT CHANGE UP (ref 3.8–10.5)
WBC # FLD AUTO: 10.45 K/UL — SIGNIFICANT CHANGE UP (ref 3.8–10.5)
WBC # FLD AUTO: 10.6 K/UL — HIGH (ref 3.8–10.5)
WBC # FLD AUTO: 10.64 K/UL — HIGH (ref 3.8–10.5)
WBC # FLD AUTO: 11.3 K/UL — HIGH (ref 3.8–10.5)
WBC # FLD AUTO: 11.38 K/UL — HIGH (ref 3.8–10.5)
WBC # FLD AUTO: 11.43 K/UL — HIGH (ref 3.8–10.5)
WBC # FLD AUTO: 11.6 K/UL — HIGH (ref 3.8–10.5)
WBC # FLD AUTO: 11.82 K/UL — HIGH (ref 3.8–10.5)
WBC # FLD AUTO: 11.88 K/UL — HIGH (ref 3.8–10.5)
WBC # FLD AUTO: 11.92 K/UL — HIGH (ref 3.8–10.5)
WBC # FLD AUTO: 12.26 K/UL — HIGH (ref 3.8–10.5)
WBC # FLD AUTO: 12.35 K/UL — HIGH (ref 3.8–10.5)
WBC # FLD AUTO: 12.63 K/UL — HIGH (ref 3.8–10.5)
WBC # FLD AUTO: 13.3 K/UL — HIGH (ref 3.8–10.5)
WBC # FLD AUTO: 13.59 K/UL — HIGH (ref 3.8–10.5)
WBC # FLD AUTO: 18.06 K/UL — HIGH (ref 3.8–10.5)
WBC # FLD AUTO: 18.53 K/UL — HIGH (ref 3.8–10.5)
WBC # FLD AUTO: 20.19 K/UL — HIGH (ref 3.8–10.5)
WBC # FLD AUTO: 8.34 K/UL — SIGNIFICANT CHANGE UP (ref 3.8–10.5)
WBC # FLD AUTO: 8.75 K/UL — SIGNIFICANT CHANGE UP (ref 3.8–10.5)
WBC # FLD AUTO: 8.82 K/UL — SIGNIFICANT CHANGE UP (ref 3.8–10.5)
WBC # FLD AUTO: 9.29 K/UL — SIGNIFICANT CHANGE UP (ref 3.8–10.5)
WBC # FLD AUTO: 9.95 K/UL — SIGNIFICANT CHANGE UP (ref 3.8–10.5)
WBC # FLD AUTO: 9.98 K/UL — SIGNIFICANT CHANGE UP (ref 3.8–10.5)
WBC UR QL: 0 /HPF — SIGNIFICANT CHANGE UP (ref 0–5)
WBC UR QL: 1 /HPF — SIGNIFICANT CHANGE UP (ref 0–5)
WBC UR QL: 1 /HPF — SIGNIFICANT CHANGE UP (ref 0–5)
WBC UR QL: 2 /HPF — SIGNIFICANT CHANGE UP (ref 0–5)
WBC UR QL: 2 /HPF — SIGNIFICANT CHANGE UP (ref 0–5)

## 2025-01-01 PROCEDURE — 99232 SBSQ HOSP IP/OBS MODERATE 35: CPT

## 2025-01-01 PROCEDURE — 99233 SBSQ HOSP IP/OBS HIGH 50: CPT

## 2025-01-01 PROCEDURE — 36620 INSERTION CATHETER ARTERY: CPT

## 2025-01-01 PROCEDURE — 87205 SMEAR GRAM STAIN: CPT

## 2025-01-01 PROCEDURE — 96375 TX/PRO/DX INJ NEW DRUG ADDON: CPT | Mod: XU

## 2025-01-01 PROCEDURE — 87150 DNA/RNA AMPLIFIED PROBE: CPT

## 2025-01-01 PROCEDURE — 87186 SC STD MICRODIL/AGAR DIL: CPT

## 2025-01-01 PROCEDURE — 85025 COMPLETE CBC W/AUTO DIFF WBC: CPT

## 2025-01-01 PROCEDURE — 0042T: CPT

## 2025-01-01 PROCEDURE — 93970 EXTREMITY STUDY: CPT | Mod: 26

## 2025-01-01 PROCEDURE — G0545: CPT

## 2025-01-01 PROCEDURE — 84100 ASSAY OF PHOSPHORUS: CPT

## 2025-01-01 PROCEDURE — 82435 ASSAY OF BLOOD CHLORIDE: CPT

## 2025-01-01 PROCEDURE — 0241U: CPT

## 2025-01-01 PROCEDURE — 74177 CT ABD & PELVIS W/CONTRAST: CPT | Mod: 26

## 2025-01-01 PROCEDURE — 93005 ELECTROCARDIOGRAM TRACING: CPT

## 2025-01-01 PROCEDURE — 71045 X-RAY EXAM CHEST 1 VIEW: CPT | Mod: 26

## 2025-01-01 PROCEDURE — 71045 X-RAY EXAM CHEST 1 VIEW: CPT | Mod: 26,77

## 2025-01-01 PROCEDURE — 82803 BLOOD GASES ANY COMBINATION: CPT

## 2025-01-01 PROCEDURE — 87641 MR-STAPH DNA AMP PROBE: CPT

## 2025-01-01 PROCEDURE — 85610 PROTHROMBIN TIME: CPT

## 2025-01-01 PROCEDURE — 70496 CT ANGIOGRAPHY HEAD: CPT | Mod: 26

## 2025-01-01 PROCEDURE — 80164 ASSAY DIPROPYLACETIC ACD TOT: CPT

## 2025-01-01 PROCEDURE — 99233 SBSQ HOSP IP/OBS HIGH 50: CPT | Mod: GC

## 2025-01-01 PROCEDURE — 70551 MRI BRAIN STEM W/O DYE: CPT

## 2025-01-01 PROCEDURE — 82962 GLUCOSE BLOOD TEST: CPT

## 2025-01-01 PROCEDURE — 80061 LIPID PANEL: CPT

## 2025-01-01 PROCEDURE — 85014 HEMATOCRIT: CPT

## 2025-01-01 PROCEDURE — 80076 HEPATIC FUNCTION PANEL: CPT

## 2025-01-01 PROCEDURE — 70551 MRI BRAIN STEM W/O DYE: CPT | Mod: 26

## 2025-01-01 PROCEDURE — C1760: CPT

## 2025-01-01 PROCEDURE — 61645 PERQ ART M-THROMBECT &/NFS: CPT

## 2025-01-01 PROCEDURE — 70498 CT ANGIOGRAPHY NECK: CPT | Mod: 26

## 2025-01-01 PROCEDURE — 99291 CRITICAL CARE FIRST HOUR: CPT

## 2025-01-01 PROCEDURE — 93010 ELECTROCARDIOGRAM REPORT: CPT

## 2025-01-01 PROCEDURE — 84145 PROCALCITONIN (PCT): CPT

## 2025-01-01 PROCEDURE — 80307 DRUG TEST PRSMV CHEM ANLYZR: CPT

## 2025-01-01 PROCEDURE — 84132 ASSAY OF SERUM POTASSIUM: CPT

## 2025-01-01 PROCEDURE — 61645 PERQ ART M-THROMBECT &/NFS: CPT | Mod: LT

## 2025-01-01 PROCEDURE — 99497 ADVNCD CARE PLAN 30 MIN: CPT | Mod: 25

## 2025-01-01 PROCEDURE — 99222 1ST HOSP IP/OBS MODERATE 55: CPT

## 2025-01-01 PROCEDURE — 83735 ASSAY OF MAGNESIUM: CPT

## 2025-01-01 PROCEDURE — 99223 1ST HOSP IP/OBS HIGH 75: CPT

## 2025-01-01 PROCEDURE — 81001 URINALYSIS AUTO W/SCOPE: CPT

## 2025-01-01 PROCEDURE — C1894: CPT

## 2025-01-01 PROCEDURE — 85027 COMPLETE CBC AUTOMATED: CPT

## 2025-01-01 PROCEDURE — 85576 BLOOD PLATELET AGGREGATION: CPT

## 2025-01-01 PROCEDURE — 85018 HEMOGLOBIN: CPT

## 2025-01-01 PROCEDURE — C1889: CPT

## 2025-01-01 PROCEDURE — 0225U NFCT DS DNA&RNA 21 SARSCOV2: CPT

## 2025-01-01 PROCEDURE — 70450 CT HEAD/BRAIN W/O DYE: CPT | Mod: 26,59

## 2025-01-01 PROCEDURE — 87640 STAPH A DNA AMP PROBE: CPT

## 2025-01-01 PROCEDURE — 80053 COMPREHEN METABOLIC PANEL: CPT

## 2025-01-01 PROCEDURE — 36415 COLL VENOUS BLD VENIPUNCTURE: CPT

## 2025-01-01 PROCEDURE — 71045 X-RAY EXAM CHEST 1 VIEW: CPT

## 2025-01-01 PROCEDURE — 85730 THROMBOPLASTIN TIME PARTIAL: CPT

## 2025-01-01 PROCEDURE — 84295 ASSAY OF SERUM SODIUM: CPT

## 2025-01-01 PROCEDURE — 93306 TTE W/DOPPLER COMPLETE: CPT | Mod: 26

## 2025-01-01 PROCEDURE — 70450 CT HEAD/BRAIN W/O DYE: CPT | Mod: 26

## 2025-01-01 PROCEDURE — L8699: CPT

## 2025-01-01 PROCEDURE — 80048 BASIC METABOLIC PNL TOTAL CA: CPT

## 2025-01-01 PROCEDURE — 70498 CT ANGIOGRAPHY NECK: CPT

## 2025-01-01 PROCEDURE — 94640 AIRWAY INHALATION TREATMENT: CPT

## 2025-01-01 PROCEDURE — 99223 1ST HOSP IP/OBS HIGH 75: CPT | Mod: GC

## 2025-01-01 PROCEDURE — 70496 CT ANGIOGRAPHY HEAD: CPT

## 2025-01-01 PROCEDURE — 70450 CT HEAD/BRAIN W/O DYE: CPT

## 2025-01-01 PROCEDURE — C8929: CPT

## 2025-01-01 PROCEDURE — 84443 ASSAY THYROID STIM HORMONE: CPT

## 2025-01-01 PROCEDURE — 99291 CRITICAL CARE FIRST HOUR: CPT | Mod: 25

## 2025-01-01 PROCEDURE — 94002 VENT MGMT INPAT INIT DAY: CPT

## 2025-01-01 PROCEDURE — C1887: CPT

## 2025-01-01 PROCEDURE — C1769: CPT

## 2025-01-01 PROCEDURE — 84484 ASSAY OF TROPONIN QUANT: CPT

## 2025-01-01 PROCEDURE — 94799 UNLISTED PULMONARY SVC/PX: CPT

## 2025-01-01 PROCEDURE — 83036 HEMOGLOBIN GLYCOSYLATED A1C: CPT

## 2025-01-01 PROCEDURE — 82330 ASSAY OF CALCIUM: CPT

## 2025-01-01 PROCEDURE — 94003 VENT MGMT INPAT SUBQ DAY: CPT

## 2025-01-01 PROCEDURE — 96376 TX/PRO/DX INJ SAME DRUG ADON: CPT | Mod: XU

## 2025-01-01 PROCEDURE — 96374 THER/PROPH/DIAG INJ IV PUSH: CPT | Mod: XU

## 2025-01-01 PROCEDURE — 70450 CT HEAD/BRAIN W/O DYE: CPT | Mod: 26,59,77

## 2025-01-01 PROCEDURE — 87077 CULTURE AEROBIC IDENTIFY: CPT

## 2025-01-01 PROCEDURE — 76942 ECHO GUIDE FOR BIOPSY: CPT | Mod: 26

## 2025-01-01 PROCEDURE — 93970 EXTREMITY STUDY: CPT

## 2025-01-01 PROCEDURE — 36000 PLACE NEEDLE IN VEIN: CPT

## 2025-01-01 PROCEDURE — 43246 EGD PLACE GASTROSTOMY TUBE: CPT | Mod: GC

## 2025-01-01 PROCEDURE — 87070 CULTURE OTHR SPECIMN AEROBIC: CPT

## 2025-01-01 PROCEDURE — 90792 PSYCH DIAG EVAL W/MED SRVCS: CPT

## 2025-01-01 PROCEDURE — 31500 INSERT EMERGENCY AIRWAY: CPT

## 2025-01-01 PROCEDURE — 31600 PLANNED TRACHEOSTOMY: CPT | Mod: GC

## 2025-01-01 PROCEDURE — 74177 CT ABD & PELVIS W/CONTRAST: CPT

## 2025-01-01 PROCEDURE — 80202 ASSAY OF VANCOMYCIN: CPT

## 2025-01-01 PROCEDURE — 83605 ASSAY OF LACTIC ACID: CPT

## 2025-01-01 PROCEDURE — 36600 WITHDRAWAL OF ARTERIAL BLOOD: CPT

## 2025-01-01 PROCEDURE — 87040 BLOOD CULTURE FOR BACTERIA: CPT

## 2025-01-01 PROCEDURE — 82947 ASSAY GLUCOSE BLOOD QUANT: CPT

## 2025-01-01 DEVICE — SET PERC TRACH FULL NO TUBE BLU RHINO: Type: IMPLANTABLE DEVICE | Status: FUNCTIONAL

## 2025-01-01 DEVICE — TUBE TRACH SZ 6 CUFF FLEX DISP: Type: IMPLANTABLE DEVICE | Status: FUNCTIONAL

## 2025-01-01 DEVICE — FEEDING TUBE PEG KIT MIC 20FR PUSH: Type: IMPLANTABLE DEVICE | Status: FUNCTIONAL

## 2025-01-01 RX ORDER — MEROPENEM 1 G/30ML
1000 INJECTION INTRAVENOUS EVERY 8 HOURS
Refills: 0 | Status: DISCONTINUED | OUTPATIENT
Start: 2025-01-01 | End: 2025-01-01

## 2025-01-01 RX ORDER — SOD PHOS DI, MONO/K PHOS MONO 250 MG
1 TABLET ORAL EVERY 4 HOURS
Refills: 0 | Status: COMPLETED | OUTPATIENT
Start: 2025-01-01 | End: 2025-01-01

## 2025-01-01 RX ORDER — INSULIN GLARGINE-YFGN 100 [IU]/ML
10 INJECTION, SOLUTION SUBCUTANEOUS AT BEDTIME
Refills: 0 | Status: DISCONTINUED | OUTPATIENT
Start: 2025-01-01 | End: 2025-01-01

## 2025-01-01 RX ORDER — SOD PHOS DI, MONO/K PHOS MONO 250 MG
1 TABLET ORAL ONCE
Refills: 0 | Status: COMPLETED | OUTPATIENT
Start: 2025-01-01 | End: 2025-01-01

## 2025-01-01 RX ORDER — SODIUM PHOSPHATE,DIBASIC DIHYD
15 POWDER (GRAM) MISCELLANEOUS ONCE
Refills: 0 | Status: COMPLETED | OUTPATIENT
Start: 2025-01-01 | End: 2025-01-01

## 2025-01-01 RX ORDER — LORAZEPAM 4 MG/ML
2 VIAL (ML) INJECTION
Refills: 0 | Status: DISCONTINUED | OUTPATIENT
Start: 2025-01-01 | End: 2025-01-01

## 2025-01-01 RX ORDER — LABETALOL HYDROCHLORIDE 200 MG/1
10 TABLET, FILM COATED ORAL
Refills: 0 | Status: DISCONTINUED | OUTPATIENT
Start: 2025-01-01 | End: 2025-01-01

## 2025-01-01 RX ORDER — ACETAMINOPHEN 500 MG/5ML
1000 LIQUID (ML) ORAL ONCE
Refills: 0 | Status: COMPLETED | OUTPATIENT
Start: 2025-01-01 | End: 2025-01-01

## 2025-01-01 RX ORDER — HEPARIN SODIUM 1000 [USP'U]/ML
1300 INJECTION INTRAVENOUS; SUBCUTANEOUS
Qty: 25000 | Refills: 0 | Status: DISCONTINUED | OUTPATIENT
Start: 2025-01-01 | End: 2025-01-01

## 2025-01-01 RX ORDER — GLYCOPYRROLATE 0.2 MG/ML
0.2 INJECTION INTRAMUSCULAR; INTRAVENOUS EVERY 6 HOURS
Refills: 0 | Status: DISCONTINUED | OUTPATIENT
Start: 2025-01-01 | End: 2025-01-01

## 2025-01-01 RX ORDER — IPRATROPIUM BROMIDE AND ALBUTEROL SULFATE .5; 2.5 MG/3ML; MG/3ML
3 SOLUTION RESPIRATORY (INHALATION) ONCE
Refills: 0 | Status: COMPLETED | OUTPATIENT
Start: 2025-01-01 | End: 2025-01-01

## 2025-01-01 RX ORDER — MELATONIN 5 MG
3 TABLET ORAL AT BEDTIME
Refills: 0 | Status: DISCONTINUED | OUTPATIENT
Start: 2025-01-01 | End: 2025-01-01

## 2025-01-01 RX ORDER — OLANZAPINE 10 MG/1
5 TABLET ORAL EVERY 6 HOURS
Refills: 0 | Status: DISCONTINUED | OUTPATIENT
Start: 2025-01-01 | End: 2025-01-01

## 2025-01-01 RX ORDER — MAGNESIUM HYDROXIDE 400 MG/5ML
30 SUSPENSION ORAL DAILY
Refills: 0 | Status: DISCONTINUED | OUTPATIENT
Start: 2025-01-01 | End: 2025-01-01

## 2025-01-01 RX ORDER — MAGNESIUM SULFATE 500 MG/ML
2 SYRINGE (ML) INJECTION ONCE
Refills: 0 | Status: COMPLETED | OUTPATIENT
Start: 2025-01-01 | End: 2025-01-01

## 2025-01-01 RX ORDER — FUROSEMIDE 10 MG/ML
20 INJECTION INTRAMUSCULAR; INTRAVENOUS ONCE
Refills: 0 | Status: COMPLETED | OUTPATIENT
Start: 2025-01-01 | End: 2025-01-01

## 2025-01-01 RX ORDER — LORAZEPAM 4 MG/ML
1 VIAL (ML) INJECTION EVERY 4 HOURS
Refills: 0 | Status: DISCONTINUED | OUTPATIENT
Start: 2025-01-01 | End: 2025-01-01

## 2025-01-01 RX ORDER — IPRATROPIUM BROMIDE AND ALBUTEROL SULFATE .5; 2.5 MG/3ML; MG/3ML
3 SOLUTION RESPIRATORY (INHALATION) EVERY 12 HOURS
Refills: 0 | Status: DISCONTINUED | OUTPATIENT
Start: 2025-01-01 | End: 2025-01-01

## 2025-01-01 RX ORDER — VANCOMYCIN HCL IN 5 % DEXTROSE 1.5G/250ML
1000 PLASTIC BAG, INJECTION (ML) INTRAVENOUS ONCE
Refills: 0 | Status: COMPLETED | OUTPATIENT
Start: 2025-01-01 | End: 2025-01-01

## 2025-01-01 RX ORDER — HEPARIN SODIUM 1000 [USP'U]/ML
1450 INJECTION INTRAVENOUS; SUBCUTANEOUS
Qty: 25000 | Refills: 0 | Status: DISCONTINUED | OUTPATIENT
Start: 2025-01-01 | End: 2025-01-01

## 2025-01-01 RX ORDER — APIXABAN 2.5 MG/1
1 TABLET, FILM COATED ORAL
Refills: 0 | DISCHARGE

## 2025-01-01 RX ORDER — PROPOFOL 10 MG/ML
10 INJECTION, EMULSION INTRAVENOUS
Qty: 1000 | Refills: 0 | Status: DISCONTINUED | OUTPATIENT
Start: 2025-01-01 | End: 2025-01-01

## 2025-01-01 RX ORDER — CILOSTAZOL 50 MG/1
1 TABLET ORAL
Refills: 0 | DISCHARGE

## 2025-01-01 RX ORDER — INSULIN GLARGINE-YFGN 100 [IU]/ML
12 INJECTION, SOLUTION SUBCUTANEOUS
Refills: 0 | Status: DISCONTINUED | OUTPATIENT
Start: 2025-01-01 | End: 2025-01-01

## 2025-01-01 RX ORDER — EPINEPHRINE 11.25MG/ML
0.5 SOLUTION, NON-ORAL INHALATION ONCE
Refills: 0 | Status: COMPLETED | OUTPATIENT
Start: 2025-01-01 | End: 2025-01-01

## 2025-01-01 RX ORDER — AMANTADINE HCL 100 MG
100 CAPSULE ORAL
Refills: 0 | Status: DISCONTINUED | OUTPATIENT
Start: 2025-01-01 | End: 2025-01-01

## 2025-01-01 RX ORDER — POLYETHYLENE GLYCOL 3350 17 G/17G
17 POWDER, FOR SOLUTION ORAL DAILY
Refills: 0 | Status: DISCONTINUED | OUTPATIENT
Start: 2025-01-01 | End: 2025-01-01

## 2025-01-01 RX ORDER — GLUCAGON 3 MG/1
1 POWDER NASAL ONCE
Refills: 0 | Status: DISCONTINUED | OUTPATIENT
Start: 2025-01-01 | End: 2025-01-01

## 2025-01-01 RX ORDER — VANCOMYCIN HCL IN 5 % DEXTROSE 1.5G/250ML
1250 PLASTIC BAG, INJECTION (ML) INTRAVENOUS EVERY 12 HOURS
Refills: 0 | Status: DISCONTINUED | OUTPATIENT
Start: 2025-01-01 | End: 2025-01-01

## 2025-01-01 RX ORDER — ACETAMINOPHEN 500 MG/5ML
650 LIQUID (ML) ORAL EVERY 6 HOURS
Refills: 0 | Status: DISCONTINUED | OUTPATIENT
Start: 2025-01-01 | End: 2025-01-01

## 2025-01-01 RX ORDER — ACETAMINOPHEN 500 MG/5ML
650 LIQUID (ML) ORAL EVERY 8 HOURS
Refills: 0 | Status: DISCONTINUED | OUTPATIENT
Start: 2025-01-01 | End: 2025-01-01

## 2025-01-01 RX ORDER — SCOPOLAMINE 1 MG/3D
1 PATCH, EXTENDED RELEASE TRANSDERMAL ONCE
Refills: 0 | Status: COMPLETED | OUTPATIENT
Start: 2025-01-01 | End: 2025-01-01

## 2025-01-01 RX ORDER — SCOPOLAMINE 1 MG/3D
1 PATCH, EXTENDED RELEASE TRANSDERMAL ONCE
Refills: 0 | Status: DISCONTINUED | OUTPATIENT
Start: 2025-01-01 | End: 2025-01-01

## 2025-01-01 RX ORDER — POTASSIUM PHOSPHATE, MONOBASIC POTASSIUM PHOSPHATE, DIBASIC INJECTION, 236; 224 MG/ML; MG/ML
30 SOLUTION, CONCENTRATE INTRAVENOUS ONCE
Refills: 0 | Status: COMPLETED | OUTPATIENT
Start: 2025-01-01 | End: 2025-01-01

## 2025-01-01 RX ORDER — ONDANSETRON HCL/PF 4 MG/2 ML
4 VIAL (ML) INJECTION EVERY 6 HOURS
Refills: 0 | Status: DISCONTINUED | OUTPATIENT
Start: 2025-01-01 | End: 2025-01-01

## 2025-01-01 RX ORDER — GLYCOPYRROLATE 0.2 MG/ML
0.4 INJECTION INTRAMUSCULAR; INTRAVENOUS ONCE
Refills: 0 | Status: COMPLETED | OUTPATIENT
Start: 2025-01-01 | End: 2025-01-01

## 2025-01-01 RX ORDER — SCOPOLAMINE 1 MG/3D
1 PATCH, EXTENDED RELEASE TRANSDERMAL
Refills: 0 | Status: DISCONTINUED | OUTPATIENT
Start: 2025-01-01 | End: 2025-01-01

## 2025-01-01 RX ORDER — DULOXETINE 20 MG/1
0 CAPSULE, DELAYED RELEASE ORAL
Refills: 0 | DISCHARGE

## 2025-01-01 RX ORDER — MAGNESIUM SULFATE 500 MG/ML
3 SYRINGE (ML) INJECTION ONCE
Refills: 0 | Status: DISCONTINUED | OUTPATIENT
Start: 2025-01-01 | End: 2025-01-01

## 2025-01-01 RX ORDER — CYST/ALA/Q10/PHOS.SER/DHA/BROC 100-20-50
15 POWDER (GRAM) ORAL DAILY
Refills: 0 | Status: DISCONTINUED | OUTPATIENT
Start: 2025-01-01 | End: 2025-01-01

## 2025-01-01 RX ORDER — MIDAZOLAM IN 0.9 % SOD.CHLORID 1 MG/ML
1 PLASTIC BAG, INJECTION (ML) INTRAVENOUS ONCE
Refills: 0 | Status: DISCONTINUED | OUTPATIENT
Start: 2025-01-01 | End: 2025-01-01

## 2025-01-01 RX ORDER — PIPERACILLIN-TAZO-DEXTROSE,ISO 3.375G/5
4.5 IV SOLUTION, PIGGYBACK PREMIX FROZEN(ML) INTRAVENOUS ONCE
Refills: 0 | Status: COMPLETED | OUTPATIENT
Start: 2025-01-01 | End: 2025-01-01

## 2025-01-01 RX ORDER — ATORVASTATIN CALCIUM 80 MG/1
1 TABLET, FILM COATED ORAL
Refills: 0 | DISCHARGE

## 2025-01-01 RX ORDER — NOREPINEPHRINE BITARTRATE 8 MG
0.05 SOLUTION INTRAVENOUS
Qty: 8 | Refills: 0 | Status: DISCONTINUED | OUTPATIENT
Start: 2025-01-01 | End: 2025-01-01

## 2025-01-01 RX ORDER — LABETALOL HYDROCHLORIDE 200 MG/1
10 TABLET, FILM COATED ORAL ONCE
Refills: 0 | Status: COMPLETED | OUTPATIENT
Start: 2025-01-01 | End: 2025-01-01

## 2025-01-01 RX ORDER — INSULIN GLARGINE-YFGN 100 [IU]/ML
15 INJECTION, SOLUTION SUBCUTANEOUS
Refills: 0 | Status: DISCONTINUED | OUTPATIENT
Start: 2025-01-01 | End: 2025-01-01

## 2025-01-01 RX ORDER — MAGNESIUM SULFATE 500 MG/ML
1 SYRINGE (ML) INJECTION ONCE
Refills: 0 | Status: COMPLETED | OUTPATIENT
Start: 2025-01-01 | End: 2025-01-01

## 2025-01-01 RX ORDER — INSULIN LISPRO 100 U/ML
INJECTION, SOLUTION INTRAVENOUS; SUBCUTANEOUS EVERY 6 HOURS
Refills: 0 | Status: DISCONTINUED | OUTPATIENT
Start: 2025-01-01 | End: 2025-01-01

## 2025-01-01 RX ORDER — INSULIN LISPRO 100 U/ML
10 INJECTION, SOLUTION INTRAVENOUS; SUBCUTANEOUS ONCE
Refills: 0 | Status: COMPLETED | OUTPATIENT
Start: 2025-01-01 | End: 2025-01-01

## 2025-01-01 RX ORDER — ATORVASTATIN CALCIUM 80 MG/1
80 TABLET, FILM COATED ORAL AT BEDTIME
Refills: 0 | Status: DISCONTINUED | OUTPATIENT
Start: 2025-01-01 | End: 2025-01-01

## 2025-01-01 RX ORDER — POLYETHYLENE GLYCOL 3350 17 G/17G
17 POWDER, FOR SOLUTION ORAL
Refills: 0 | Status: DISCONTINUED | OUTPATIENT
Start: 2025-01-01 | End: 2025-01-01

## 2025-01-01 RX ORDER — LORAZEPAM 4 MG/ML
1 VIAL (ML) INJECTION
Refills: 0 | Status: DISCONTINUED | OUTPATIENT
Start: 2025-01-01 | End: 2025-01-01

## 2025-01-01 RX ORDER — DEXTROSE 50 % IN WATER 50 %
25 SYRINGE (ML) INTRAVENOUS ONCE
Refills: 0 | Status: DISCONTINUED | OUTPATIENT
Start: 2025-01-01 | End: 2025-01-01

## 2025-01-01 RX ORDER — MELATONIN 5 MG
5 TABLET ORAL AT BEDTIME
Refills: 0 | Status: DISCONTINUED | OUTPATIENT
Start: 2025-01-01 | End: 2025-01-01

## 2025-01-01 RX ORDER — INSULIN GLARGINE-YFGN 100 [IU]/ML
14 INJECTION, SOLUTION SUBCUTANEOUS AT BEDTIME
Refills: 0 | Status: DISCONTINUED | OUTPATIENT
Start: 2025-01-01 | End: 2025-01-01

## 2025-01-01 RX ORDER — MIDAZOLAM IN 0.9 % SOD.CHLORID 1 MG/ML
2 PLASTIC BAG, INJECTION (ML) INTRAVENOUS EVERY 4 HOURS
Refills: 0 | Status: DISCONTINUED | OUTPATIENT
Start: 2025-01-01 | End: 2025-01-01

## 2025-01-01 RX ORDER — PSYLLIUM SEED (WITH DEXTROSE)
1 POWDER (GRAM) ORAL
Refills: 0 | Status: DISCONTINUED | OUTPATIENT
Start: 2025-01-01 | End: 2025-01-01

## 2025-01-01 RX ORDER — GLYCOPYRROLATE 0.2 MG/ML
0.2 INJECTION INTRAMUSCULAR; INTRAVENOUS ONCE
Refills: 0 | Status: COMPLETED | OUTPATIENT
Start: 2025-01-01 | End: 2025-01-01

## 2025-01-01 RX ORDER — HEPARIN SODIUM 1000 [USP'U]/ML
1350 INJECTION INTRAVENOUS; SUBCUTANEOUS
Qty: 25000 | Refills: 0 | Status: DISCONTINUED | OUTPATIENT
Start: 2025-01-01 | End: 2025-01-01

## 2025-01-01 RX ORDER — GABAPENTIN 400 MG/1
400 CAPSULE ORAL AT BEDTIME
Refills: 0 | Status: DISCONTINUED | OUTPATIENT
Start: 2025-01-01 | End: 2025-01-01

## 2025-01-01 RX ORDER — DEXMEDETOMIDINE HYDROCHLORIDE IN SODIUM CHLORIDE 4 UG/ML
0.2 INJECTION INTRAVENOUS
Qty: 200 | Refills: 0 | Status: DISCONTINUED | OUTPATIENT
Start: 2025-01-01 | End: 2025-01-01

## 2025-01-01 RX ORDER — PIPERACILLIN-TAZO-DEXTROSE,ISO 3.375G/5
4.5 IV SOLUTION, PIGGYBACK PREMIX FROZEN(ML) INTRAVENOUS EVERY 8 HOURS
Refills: 0 | Status: DISCONTINUED | OUTPATIENT
Start: 2025-01-01 | End: 2025-01-01

## 2025-01-01 RX ORDER — ACETAMINOPHEN 500 MG/5ML
1000 LIQUID (ML) ORAL ONCE
Refills: 0 | Status: DISCONTINUED | OUTPATIENT
Start: 2025-01-01 | End: 2025-01-01

## 2025-01-01 RX ORDER — SODIUM CHLORIDE 9 G/1000ML
1000 INJECTION, SOLUTION INTRAVENOUS
Refills: 0 | Status: DISCONTINUED | OUTPATIENT
Start: 2025-01-01 | End: 2025-01-01

## 2025-01-01 RX ORDER — DEXTROSE 50 % IN WATER 50 %
12.5 SYRINGE (ML) INTRAVENOUS ONCE
Refills: 0 | Status: DISCONTINUED | OUTPATIENT
Start: 2025-01-01 | End: 2025-01-01

## 2025-01-01 RX ORDER — INSULIN GLARGINE-YFGN 100 [IU]/ML
10 INJECTION, SOLUTION SUBCUTANEOUS
Refills: 0 | Status: DISCONTINUED | OUTPATIENT
Start: 2025-01-01 | End: 2025-01-01

## 2025-01-01 RX ORDER — ASPIRIN 325 MG
300 TABLET ORAL ONCE
Refills: 0 | Status: COMPLETED | OUTPATIENT
Start: 2025-01-01 | End: 2025-01-01

## 2025-01-01 RX ORDER — CLOPIDOGREL BISULFATE 75 MG/1
1 TABLET, FILM COATED ORAL
Refills: 0 | DISCHARGE

## 2025-01-01 RX ORDER — DEXTROMETHORPHAN HBR, GUAIFENESIN 200 MG/10ML
200 LIQUID ORAL EVERY 6 HOURS
Refills: 0 | Status: DISCONTINUED | OUTPATIENT
Start: 2025-01-01 | End: 2025-01-01

## 2025-01-01 RX ORDER — APIXABAN 2.5 MG/1
5 TABLET, FILM COATED ORAL
Refills: 0 | Status: DISCONTINUED | OUTPATIENT
Start: 2025-01-01 | End: 2025-01-01

## 2025-01-01 RX ORDER — QUETIAPINE FUMARATE 25 MG/1
25 TABLET ORAL
Refills: 0 | Status: DISCONTINUED | OUTPATIENT
Start: 2025-01-01 | End: 2025-01-01

## 2025-01-01 RX ORDER — ATORVASTATIN CALCIUM 80 MG/1
40 TABLET, FILM COATED ORAL AT BEDTIME
Refills: 0 | Status: DISCONTINUED | OUTPATIENT
Start: 2025-01-01 | End: 2025-01-01

## 2025-01-01 RX ORDER — METFORMIN HYDROCHLORIDE 850 MG/1
1 TABLET ORAL
Refills: 0 | DISCHARGE

## 2025-01-01 RX ORDER — PIPERACILLIN-TAZO-DEXTROSE,ISO 3.375G/5
4.5 IV SOLUTION, PIGGYBACK PREMIX FROZEN(ML) INTRAVENOUS EVERY 8 HOURS
Refills: 0 | Status: COMPLETED | OUTPATIENT
Start: 2025-01-01 | End: 2025-01-01

## 2025-01-01 RX ORDER — ROCURONIUM BROMIDE 10 MG/ML
50 INJECTION, SOLUTION INTRAVENOUS ONCE
Refills: 0 | Status: COMPLETED | OUTPATIENT
Start: 2025-01-01 | End: 2025-01-01

## 2025-01-01 RX ORDER — HEPARIN SODIUM 1000 [USP'U]/ML
900 INJECTION INTRAVENOUS; SUBCUTANEOUS
Qty: 25000 | Refills: 0 | Status: DISCONTINUED | OUTPATIENT
Start: 2025-01-01 | End: 2025-01-01

## 2025-01-01 RX ORDER — ETOMIDATE 2 MG/ML
15 AMPUL (ML) INTRAVENOUS ONCE
Refills: 0 | Status: COMPLETED | OUTPATIENT
Start: 2025-01-01 | End: 2025-01-01

## 2025-01-01 RX ORDER — PROPOFOL 10 MG/ML
5 INJECTION, EMULSION INTRAVENOUS
Qty: 1000 | Refills: 0 | Status: DISCONTINUED | OUTPATIENT
Start: 2025-01-01 | End: 2025-01-01

## 2025-01-01 RX ORDER — INSULIN GLARGINE-YFGN 100 [IU]/ML
20 INJECTION, SOLUTION SUBCUTANEOUS
Refills: 0 | DISCHARGE

## 2025-01-01 RX ORDER — VANCOMYCIN HCL IN 5 % DEXTROSE 1.5G/250ML
1000 PLASTIC BAG, INJECTION (ML) INTRAVENOUS EVERY 12 HOURS
Refills: 0 | Status: DISCONTINUED | OUTPATIENT
Start: 2025-01-01 | End: 2025-01-01

## 2025-01-01 RX ORDER — SENNA 187 MG
10 TABLET ORAL AT BEDTIME
Refills: 0 | Status: DISCONTINUED | OUTPATIENT
Start: 2025-01-01 | End: 2025-01-01

## 2025-01-01 RX ORDER — DAPAGLIFLOZIN 5 MG/1
1 TABLET, FILM COATED ORAL
Refills: 0 | DISCHARGE

## 2025-01-01 RX ORDER — HYDROMORPHONE/SOD CHLOR,ISO/PF 2 MG/10 ML
0.5 SYRINGE (ML) INJECTION
Qty: 100 | Refills: 0 | Status: DISCONTINUED | OUTPATIENT
Start: 2025-01-01 | End: 2025-01-01

## 2025-01-01 RX ORDER — BISACODYL 5 MG
10 TABLET, DELAYED RELEASE (ENTERIC COATED) ORAL DAILY
Refills: 0 | Status: DISCONTINUED | OUTPATIENT
Start: 2025-01-01 | End: 2025-01-01

## 2025-01-01 RX ORDER — METHYLPREDNISOLONE ACETATE 80 MG/ML
125 INJECTION, SUSPENSION INTRA-ARTICULAR; INTRALESIONAL; INTRAMUSCULAR; SOFT TISSUE ONCE
Refills: 0 | Status: COMPLETED | OUTPATIENT
Start: 2025-01-01 | End: 2025-01-01

## 2025-01-01 RX ORDER — QUETIAPINE FUMARATE 25 MG/1
25 TABLET ORAL AT BEDTIME
Refills: 0 | Status: DISCONTINUED | OUTPATIENT
Start: 2025-01-01 | End: 2025-01-01

## 2025-01-01 RX ORDER — HEPARIN SODIUM 1000 [USP'U]/ML
1500 INJECTION INTRAVENOUS; SUBCUTANEOUS
Qty: 25000 | Refills: 0 | Status: DISCONTINUED | OUTPATIENT
Start: 2025-01-01 | End: 2025-01-01

## 2025-01-01 RX ORDER — ASPIRIN 325 MG
81 TABLET ORAL DAILY
Refills: 0 | Status: DISCONTINUED | OUTPATIENT
Start: 2025-01-01 | End: 2025-01-01

## 2025-01-01 RX ORDER — DEXTROSE 50 % IN WATER 50 %
15 SYRINGE (ML) INTRAVENOUS ONCE
Refills: 0 | Status: DISCONTINUED | OUTPATIENT
Start: 2025-01-01 | End: 2025-01-01

## 2025-01-01 RX ORDER — HYDROMORPHONE/SOD CHLOR,ISO/PF 2 MG/10 ML
1 SYRINGE (ML) INJECTION
Refills: 0 | Status: DISCONTINUED | OUTPATIENT
Start: 2025-01-01 | End: 2025-01-01

## 2025-01-01 RX ORDER — QUETIAPINE FUMARATE 25 MG/1
50 TABLET ORAL AT BEDTIME
Refills: 0 | Status: DISCONTINUED | OUTPATIENT
Start: 2025-01-01 | End: 2025-01-01

## 2025-01-01 RX ORDER — HYDROMORPHONE/SOD CHLOR,ISO/PF 2 MG/10 ML
1 SYRINGE (ML) INJECTION
Qty: 100 | Refills: 0 | Status: DISCONTINUED | OUTPATIENT
Start: 2025-01-01 | End: 2025-01-01

## 2025-01-01 RX ADMIN — Medication 3 MILLIGRAM(S): at 21:30

## 2025-01-01 RX ADMIN — METHYLPREDNISOLONE ACETATE 125 MILLIGRAM(S): 80 INJECTION, SUSPENSION INTRA-ARTICULAR; INTRALESIONAL; INTRAMUSCULAR; SOFT TISSUE at 11:34

## 2025-01-01 RX ADMIN — Medication 1 PACKET(S): at 05:46

## 2025-01-01 RX ADMIN — Medication 2 MILLIGRAM(S): at 03:56

## 2025-01-01 RX ADMIN — Medication 500 MILLIGRAM(S): at 07:30

## 2025-01-01 RX ADMIN — Medication 3 MILLIGRAM(S): at 21:15

## 2025-01-01 RX ADMIN — Medication 1 APPLICATION(S): at 05:43

## 2025-01-01 RX ADMIN — Medication 1 APPLICATION(S): at 05:44

## 2025-01-01 RX ADMIN — Medication 1 APPLICATION(S): at 05:20

## 2025-01-01 RX ADMIN — Medication 25 GRAM(S): at 13:05

## 2025-01-01 RX ADMIN — Medication 40 MILLIGRAM(S): at 08:15

## 2025-01-01 RX ADMIN — INSULIN GLARGINE-YFGN 10 UNIT(S): 100 INJECTION, SOLUTION SUBCUTANEOUS at 07:49

## 2025-01-01 RX ADMIN — HEPARIN SODIUM 15 UNIT(S)/HR: 1000 INJECTION INTRAVENOUS; SUBCUTANEOUS at 04:14

## 2025-01-01 RX ADMIN — Medication 25 GRAM(S): at 05:17

## 2025-01-01 RX ADMIN — INSULIN LISPRO 2: 100 INJECTION, SOLUTION INTRAVENOUS; SUBCUTANEOUS at 22:01

## 2025-01-01 RX ADMIN — Medication 1 APPLICATION(S): at 05:25

## 2025-01-01 RX ADMIN — INSULIN LISPRO 4: 100 INJECTION, SOLUTION INTRAVENOUS; SUBCUTANEOUS at 00:42

## 2025-01-01 RX ADMIN — DEXMEDETOMIDINE HYDROCHLORIDE IN SODIUM CHLORIDE 3.23 MICROGRAM(S)/KG/HR: 4 INJECTION INTRAVENOUS at 00:35

## 2025-01-01 RX ADMIN — Medication 400 MILLIGRAM(S): at 14:02

## 2025-01-01 RX ADMIN — Medication 500 MILLIGRAM(S): at 08:04

## 2025-01-01 RX ADMIN — Medication 3 MILLIGRAM(S): at 21:48

## 2025-01-01 RX ADMIN — Medication 4 MILLILITER(S): at 21:17

## 2025-01-01 RX ADMIN — Medication 650 MILLIGRAM(S): at 19:16

## 2025-01-01 RX ADMIN — QUETIAPINE FUMARATE 25 MILLIGRAM(S): 25 TABLET ORAL at 05:04

## 2025-01-01 RX ADMIN — Medication 650 MILLIGRAM(S): at 16:41

## 2025-01-01 RX ADMIN — Medication 20 MILLIEQUIVALENT(S): at 05:22

## 2025-01-01 RX ADMIN — Medication 40 MILLIGRAM(S): at 11:02

## 2025-01-01 RX ADMIN — INSULIN LISPRO 6: 100 INJECTION, SOLUTION INTRAVENOUS; SUBCUTANEOUS at 23:00

## 2025-01-01 RX ADMIN — Medication 3 MILLIGRAM(S): at 21:51

## 2025-01-01 RX ADMIN — INSULIN GLARGINE-YFGN 10 UNIT(S): 100 INJECTION, SOLUTION SUBCUTANEOUS at 22:16

## 2025-01-01 RX ADMIN — Medication 250 MILLIGRAM(S): at 13:44

## 2025-01-01 RX ADMIN — Medication 25 GRAM(S): at 05:16

## 2025-01-01 RX ADMIN — QUETIAPINE FUMARATE 25 MILLIGRAM(S): 25 TABLET ORAL at 05:31

## 2025-01-01 RX ADMIN — Medication 650 MILLIGRAM(S): at 13:51

## 2025-01-01 RX ADMIN — Medication 250 MILLIGRAM(S): at 05:44

## 2025-01-01 RX ADMIN — Medication 250 MILLIGRAM(S): at 13:01

## 2025-01-01 RX ADMIN — Medication 1 APPLICATION(S): at 05:06

## 2025-01-01 RX ADMIN — Medication 3 MILLIGRAM(S): at 21:14

## 2025-01-01 RX ADMIN — Medication 15 MILLILITER(S): at 11:40

## 2025-01-01 RX ADMIN — ROCURONIUM BROMIDE 50 MILLIGRAM(S): 10 INJECTION, SOLUTION INTRAVENOUS at 11:50

## 2025-01-01 RX ADMIN — Medication 650 MILLIGRAM(S): at 09:00

## 2025-01-01 RX ADMIN — Medication 75 MILLILITER(S): at 18:04

## 2025-01-01 RX ADMIN — PROPOFOL 3.87 MICROGRAM(S)/KG/MIN: 10 INJECTION, EMULSION INTRAVENOUS at 20:58

## 2025-01-01 RX ADMIN — INSULIN GLARGINE-YFGN 10 UNIT(S): 100 INJECTION, SOLUTION SUBCUTANEOUS at 08:19

## 2025-01-01 RX ADMIN — Medication 15 MILLILITER(S): at 17:32

## 2025-01-01 RX ADMIN — Medication 650 MILLIGRAM(S): at 17:11

## 2025-01-01 RX ADMIN — Medication 125 MILLIGRAM(S): at 12:55

## 2025-01-01 RX ADMIN — INSULIN LISPRO 2: 100 INJECTION, SOLUTION INTRAVENOUS; SUBCUTANEOUS at 12:04

## 2025-01-01 RX ADMIN — Medication 25 GRAM(S): at 10:24

## 2025-01-01 RX ADMIN — Medication 1 PACKET(S): at 05:29

## 2025-01-01 RX ADMIN — Medication 1 PACKET(S): at 05:25

## 2025-01-01 RX ADMIN — INSULIN LISPRO 4: 100 INJECTION, SOLUTION INTRAVENOUS; SUBCUTANEOUS at 06:02

## 2025-01-01 RX ADMIN — Medication 40 MILLIGRAM(S): at 14:05

## 2025-01-01 RX ADMIN — GABAPENTIN 400 MILLIGRAM(S): 400 CAPSULE ORAL at 23:11

## 2025-01-01 RX ADMIN — Medication 15 MILLILITER(S): at 17:01

## 2025-01-01 RX ADMIN — INSULIN LISPRO 6: 100 INJECTION, SOLUTION INTRAVENOUS; SUBCUTANEOUS at 11:52

## 2025-01-01 RX ADMIN — Medication 10 MILLIGRAM(S): at 08:02

## 2025-01-01 RX ADMIN — INSULIN GLARGINE-YFGN 10 UNIT(S): 100 INJECTION, SOLUTION SUBCUTANEOUS at 21:57

## 2025-01-01 RX ADMIN — SCOPOLAMINE 1 PATCH: 1 PATCH, EXTENDED RELEASE TRANSDERMAL at 16:19

## 2025-01-01 RX ADMIN — Medication 1 PACKET(S): at 05:44

## 2025-01-01 RX ADMIN — Medication 15 MILLILITER(S): at 17:46

## 2025-01-01 RX ADMIN — Medication 250 MILLIGRAM(S): at 22:13

## 2025-01-01 RX ADMIN — QUETIAPINE FUMARATE 25 MILLIGRAM(S): 25 TABLET ORAL at 18:24

## 2025-01-01 RX ADMIN — Medication 1 PACKET(S): at 05:07

## 2025-01-01 RX ADMIN — INSULIN LISPRO 2: 100 INJECTION, SOLUTION INTRAVENOUS; SUBCUTANEOUS at 23:06

## 2025-01-01 RX ADMIN — INSULIN LISPRO 4: 100 INJECTION, SOLUTION INTRAVENOUS; SUBCUTANEOUS at 12:07

## 2025-01-01 RX ADMIN — HEPARIN SODIUM 13 UNIT(S)/HR: 1000 INJECTION INTRAVENOUS; SUBCUTANEOUS at 19:22

## 2025-01-01 RX ADMIN — Medication 15 MILLILITER(S): at 07:31

## 2025-01-01 RX ADMIN — INSULIN GLARGINE-YFGN 10 UNIT(S): 100 INJECTION, SOLUTION SUBCUTANEOUS at 07:41

## 2025-01-01 RX ADMIN — Medication 40 MILLIGRAM(S): at 08:28

## 2025-01-01 RX ADMIN — Medication 15 MILLILITER(S): at 05:09

## 2025-01-01 RX ADMIN — IPRATROPIUM BROMIDE AND ALBUTEROL SULFATE 3 MILLILITER(S): .5; 2.5 SOLUTION RESPIRATORY (INHALATION) at 21:35

## 2025-01-01 RX ADMIN — INSULIN GLARGINE-YFGN 10 UNIT(S): 100 INJECTION, SOLUTION SUBCUTANEOUS at 08:17

## 2025-01-01 RX ADMIN — ATORVASTATIN CALCIUM 80 MILLIGRAM(S): 80 TABLET, FILM COATED ORAL at 22:59

## 2025-01-01 RX ADMIN — Medication 5 MILLIGRAM(S): at 22:03

## 2025-01-01 RX ADMIN — MEROPENEM 1000 MILLIGRAM(S): 1 INJECTION INTRAVENOUS at 13:07

## 2025-01-01 RX ADMIN — HEPARIN SODIUM 15 UNIT(S)/HR: 1000 INJECTION INTRAVENOUS; SUBCUTANEOUS at 07:09

## 2025-01-01 RX ADMIN — Medication 20 MILLIEQUIVALENT(S): at 05:25

## 2025-01-01 RX ADMIN — Medication 1 APPLICATION(S): at 05:37

## 2025-01-01 RX ADMIN — ATORVASTATIN CALCIUM 80 MILLIGRAM(S): 80 TABLET, FILM COATED ORAL at 21:10

## 2025-01-01 RX ADMIN — Medication 1 PACKET(S): at 05:40

## 2025-01-01 RX ADMIN — POLYETHYLENE GLYCOL 3350 17 GRAM(S): 17 POWDER, FOR SOLUTION ORAL at 17:12

## 2025-01-01 RX ADMIN — QUETIAPINE FUMARATE 25 MILLIGRAM(S): 25 TABLET ORAL at 21:15

## 2025-01-01 RX ADMIN — INSULIN GLARGINE-YFGN 14 UNIT(S): 100 INJECTION, SOLUTION SUBCUTANEOUS at 23:34

## 2025-01-01 RX ADMIN — INSULIN GLARGINE-YFGN 10 UNIT(S): 100 INJECTION, SOLUTION SUBCUTANEOUS at 23:10

## 2025-01-01 RX ADMIN — INSULIN LISPRO 2: 100 INJECTION, SOLUTION INTRAVENOUS; SUBCUTANEOUS at 17:39

## 2025-01-01 RX ADMIN — Medication 650 MILLIGRAM(S): at 12:35

## 2025-01-01 RX ADMIN — Medication 500 MILLIGRAM(S): at 12:24

## 2025-01-01 RX ADMIN — ATORVASTATIN CALCIUM 80 MILLIGRAM(S): 80 TABLET, FILM COATED ORAL at 21:24

## 2025-01-01 RX ADMIN — HEPARIN SODIUM 15 UNIT(S)/HR: 1000 INJECTION INTRAVENOUS; SUBCUTANEOUS at 20:51

## 2025-01-01 RX ADMIN — INSULIN LISPRO 4: 100 INJECTION, SOLUTION INTRAVENOUS; SUBCUTANEOUS at 23:28

## 2025-01-01 RX ADMIN — MEROPENEM 1000 MILLIGRAM(S): 1 INJECTION INTRAVENOUS at 05:40

## 2025-01-01 RX ADMIN — Medication 400 MILLIGRAM(S): at 03:58

## 2025-01-01 RX ADMIN — Medication 250 MILLIGRAM(S): at 13:16

## 2025-01-01 RX ADMIN — GLYCOPYRROLATE 0.2 MILLIGRAM(S): 0.2 INJECTION INTRAMUSCULAR; INTRAVENOUS at 09:52

## 2025-01-01 RX ADMIN — APIXABAN 5 MILLIGRAM(S): 2.5 TABLET, FILM COATED ORAL at 05:23

## 2025-01-01 RX ADMIN — HEPARIN SODIUM 14.5 UNIT(S)/HR: 1000 INJECTION INTRAVENOUS; SUBCUTANEOUS at 06:02

## 2025-01-01 RX ADMIN — HEPARIN SODIUM 11 UNIT(S)/HR: 1000 INJECTION INTRAVENOUS; SUBCUTANEOUS at 02:36

## 2025-01-01 RX ADMIN — MEROPENEM 1000 MILLIGRAM(S): 1 INJECTION INTRAVENOUS at 22:59

## 2025-01-01 RX ADMIN — Medication 1 MG/HR: at 13:52

## 2025-01-01 RX ADMIN — Medication 1 PACKET(S): at 16:55

## 2025-01-01 RX ADMIN — INSULIN GLARGINE-YFGN 14 UNIT(S): 100 INJECTION, SOLUTION SUBCUTANEOUS at 22:43

## 2025-01-01 RX ADMIN — Medication 500 MILLIGRAM(S): at 08:47

## 2025-01-01 RX ADMIN — Medication 25 GRAM(S): at 13:28

## 2025-01-01 RX ADMIN — POLYETHYLENE GLYCOL 3350 17 GRAM(S): 17 POWDER, FOR SOLUTION ORAL at 06:03

## 2025-01-01 RX ADMIN — INSULIN LISPRO 4: 100 INJECTION, SOLUTION INTRAVENOUS; SUBCUTANEOUS at 12:11

## 2025-01-01 RX ADMIN — INSULIN LISPRO 2: 100 INJECTION, SOLUTION INTRAVENOUS; SUBCUTANEOUS at 17:03

## 2025-01-01 RX ADMIN — GABAPENTIN 400 MILLIGRAM(S): 400 CAPSULE ORAL at 22:02

## 2025-01-01 RX ADMIN — Medication 15 MILLILITER(S): at 11:25

## 2025-01-01 RX ADMIN — Medication 15 MILLILITER(S): at 05:41

## 2025-01-01 RX ADMIN — Medication 1 PACKET(S): at 17:10

## 2025-01-01 RX ADMIN — INSULIN LISPRO 4: 100 INJECTION, SOLUTION INTRAVENOUS; SUBCUTANEOUS at 12:34

## 2025-01-01 RX ADMIN — Medication 500 MILLIGRAM(S): at 21:35

## 2025-01-01 RX ADMIN — INSULIN LISPRO 2: 100 INJECTION, SOLUTION INTRAVENOUS; SUBCUTANEOUS at 11:14

## 2025-01-01 RX ADMIN — Medication 650 MILLIGRAM(S): at 10:50

## 2025-01-01 RX ADMIN — Medication 40 MILLIGRAM(S): at 11:34

## 2025-01-01 RX ADMIN — INSULIN LISPRO 2: 100 INJECTION, SOLUTION INTRAVENOUS; SUBCUTANEOUS at 23:49

## 2025-01-01 RX ADMIN — INSULIN GLARGINE-YFGN 12 UNIT(S): 100 INJECTION, SOLUTION SUBCUTANEOUS at 23:07

## 2025-01-01 RX ADMIN — Medication 125 MILLIGRAM(S): at 13:32

## 2025-01-01 RX ADMIN — Medication 15 MILLILITER(S): at 05:44

## 2025-01-01 RX ADMIN — INSULIN LISPRO 2: 100 INJECTION, SOLUTION INTRAVENOUS; SUBCUTANEOUS at 23:21

## 2025-01-01 RX ADMIN — Medication 250 MILLIGRAM(S): at 05:52

## 2025-01-01 RX ADMIN — Medication 500 MILLIGRAM(S): at 11:18

## 2025-01-01 RX ADMIN — Medication 650 MILLIGRAM(S): at 16:30

## 2025-01-01 RX ADMIN — Medication 15 MILLILITER(S): at 05:17

## 2025-01-01 RX ADMIN — Medication 400 MILLIGRAM(S): at 07:21

## 2025-01-01 RX ADMIN — Medication 15 MILLILITER(S): at 18:25

## 2025-01-01 RX ADMIN — INSULIN LISPRO 4: 100 INJECTION, SOLUTION INTRAVENOUS; SUBCUTANEOUS at 23:00

## 2025-01-01 RX ADMIN — INSULIN LISPRO 2: 100 INJECTION, SOLUTION INTRAVENOUS; SUBCUTANEOUS at 23:48

## 2025-01-01 RX ADMIN — Medication 25 GRAM(S): at 22:09

## 2025-01-01 RX ADMIN — Medication 650 MILLIGRAM(S): at 12:56

## 2025-01-01 RX ADMIN — Medication 40 MILLIGRAM(S): at 12:04

## 2025-01-01 RX ADMIN — Medication 250 MILLIGRAM(S): at 14:05

## 2025-01-01 RX ADMIN — Medication 15 MILLILITER(S): at 05:43

## 2025-01-01 RX ADMIN — INSULIN LISPRO 4: 100 INJECTION, SOLUTION INTRAVENOUS; SUBCUTANEOUS at 17:49

## 2025-01-01 RX ADMIN — Medication 15 MILLILITER(S): at 05:04

## 2025-01-01 RX ADMIN — INSULIN GLARGINE-YFGN 10 UNIT(S): 100 INJECTION, SOLUTION SUBCUTANEOUS at 23:27

## 2025-01-01 RX ADMIN — APIXABAN 5 MILLIGRAM(S): 2.5 TABLET, FILM COATED ORAL at 17:46

## 2025-01-01 RX ADMIN — Medication 650 MILLIGRAM(S): at 11:00

## 2025-01-01 RX ADMIN — MAGNESIUM HYDROXIDE 30 MILLILITER(S): 400 SUSPENSION ORAL at 11:27

## 2025-01-01 RX ADMIN — Medication 125 MILLIGRAM(S): at 05:43

## 2025-01-01 RX ADMIN — INSULIN LISPRO 2: 100 INJECTION, SOLUTION INTRAVENOUS; SUBCUTANEOUS at 02:12

## 2025-01-01 RX ADMIN — Medication 40 MILLIGRAM(S): at 11:56

## 2025-01-01 RX ADMIN — INSULIN LISPRO 2: 100 INJECTION, SOLUTION INTRAVENOUS; SUBCUTANEOUS at 05:27

## 2025-01-01 RX ADMIN — INSULIN GLARGINE-YFGN 12 UNIT(S): 100 INJECTION, SOLUTION SUBCUTANEOUS at 09:54

## 2025-01-01 RX ADMIN — QUETIAPINE FUMARATE 25 MILLIGRAM(S): 25 TABLET ORAL at 05:05

## 2025-01-01 RX ADMIN — INSULIN LISPRO 2: 100 INJECTION, SOLUTION INTRAVENOUS; SUBCUTANEOUS at 18:09

## 2025-01-01 RX ADMIN — Medication 25 GRAM(S): at 21:52

## 2025-01-01 RX ADMIN — Medication 15 MILLILITER(S): at 05:25

## 2025-01-01 RX ADMIN — INSULIN LISPRO 4: 100 INJECTION, SOLUTION INTRAVENOUS; SUBCUTANEOUS at 17:11

## 2025-01-01 RX ADMIN — QUETIAPINE FUMARATE 50 MILLIGRAM(S): 25 TABLET ORAL at 21:31

## 2025-01-01 RX ADMIN — Medication 5 MILLIGRAM(S): at 21:23

## 2025-01-01 RX ADMIN — Medication 3 MILLIGRAM(S): at 21:52

## 2025-01-01 RX ADMIN — INSULIN LISPRO 2: 100 INJECTION, SOLUTION INTRAVENOUS; SUBCUTANEOUS at 06:59

## 2025-01-01 RX ADMIN — INSULIN GLARGINE-YFGN 10 UNIT(S): 100 INJECTION, SOLUTION SUBCUTANEOUS at 23:06

## 2025-01-01 RX ADMIN — Medication 500 MILLIGRAM(S): at 21:25

## 2025-01-01 RX ADMIN — Medication 100 MILLIGRAM(S): at 11:32

## 2025-01-01 RX ADMIN — Medication 1 APPLICATION(S): at 06:07

## 2025-01-01 RX ADMIN — Medication 15 MILLILITER(S): at 16:24

## 2025-01-01 RX ADMIN — Medication 40 MILLIGRAM(S): at 11:14

## 2025-01-01 RX ADMIN — Medication 650 MILLIGRAM(S): at 17:18

## 2025-01-01 RX ADMIN — POLYETHYLENE GLYCOL 3350 17 GRAM(S): 17 POWDER, FOR SOLUTION ORAL at 17:04

## 2025-01-01 RX ADMIN — Medication 650 MILLIGRAM(S): at 07:49

## 2025-01-01 RX ADMIN — APIXABAN 5 MILLIGRAM(S): 2.5 TABLET, FILM COATED ORAL at 05:57

## 2025-01-01 RX ADMIN — INSULIN GLARGINE-YFGN 10 UNIT(S): 100 INJECTION, SOLUTION SUBCUTANEOUS at 08:15

## 2025-01-01 RX ADMIN — Medication 650 MILLIGRAM(S): at 11:45

## 2025-01-01 RX ADMIN — APIXABAN 5 MILLIGRAM(S): 2.5 TABLET, FILM COATED ORAL at 18:50

## 2025-01-01 RX ADMIN — Medication 1 APPLICATION(S): at 05:08

## 2025-01-01 RX ADMIN — Medication 250 MILLIGRAM(S): at 17:24

## 2025-01-01 RX ADMIN — Medication 15 MILLILITER(S): at 17:58

## 2025-01-01 RX ADMIN — Medication 500 MILLIGRAM(S): at 11:22

## 2025-01-01 RX ADMIN — Medication 250 MILLIGRAM(S): at 05:05

## 2025-01-01 RX ADMIN — MEROPENEM 1000 MILLIGRAM(S): 1 INJECTION INTRAVENOUS at 12:44

## 2025-01-01 RX ADMIN — INSULIN LISPRO 4: 100 INJECTION, SOLUTION INTRAVENOUS; SUBCUTANEOUS at 05:41

## 2025-01-01 RX ADMIN — Medication 125 MILLIGRAM(S): at 05:23

## 2025-01-01 RX ADMIN — ATORVASTATIN CALCIUM 80 MILLIGRAM(S): 80 TABLET, FILM COATED ORAL at 21:47

## 2025-01-01 RX ADMIN — Medication 500 MILLIGRAM(S): at 11:34

## 2025-01-01 RX ADMIN — INSULIN LISPRO 4: 100 INJECTION, SOLUTION INTRAVENOUS; SUBCUTANEOUS at 05:22

## 2025-01-01 RX ADMIN — Medication 750 MILLIGRAM(S): at 22:59

## 2025-01-01 RX ADMIN — INSULIN LISPRO 4: 100 INJECTION, SOLUTION INTRAVENOUS; SUBCUTANEOUS at 17:56

## 2025-01-01 RX ADMIN — ATORVASTATIN CALCIUM 80 MILLIGRAM(S): 80 TABLET, FILM COATED ORAL at 22:18

## 2025-01-01 RX ADMIN — ATORVASTATIN CALCIUM 80 MILLIGRAM(S): 80 TABLET, FILM COATED ORAL at 21:45

## 2025-01-01 RX ADMIN — Medication 1000 MILLIGRAM(S): at 12:15

## 2025-01-01 RX ADMIN — Medication 650 MILLIGRAM(S): at 14:06

## 2025-01-01 RX ADMIN — Medication 25 GRAM(S): at 05:22

## 2025-01-01 RX ADMIN — ATORVASTATIN CALCIUM 80 MILLIGRAM(S): 80 TABLET, FILM COATED ORAL at 21:52

## 2025-01-01 RX ADMIN — ATORVASTATIN CALCIUM 80 MILLIGRAM(S): 80 TABLET, FILM COATED ORAL at 22:09

## 2025-01-01 RX ADMIN — Medication 40 MILLIGRAM(S): at 11:13

## 2025-01-01 RX ADMIN — INSULIN LISPRO 2: 100 INJECTION, SOLUTION INTRAVENOUS; SUBCUTANEOUS at 23:19

## 2025-01-01 RX ADMIN — Medication 10 MILLIGRAM(S): at 18:08

## 2025-01-01 RX ADMIN — Medication 250 MILLIGRAM(S): at 21:57

## 2025-01-01 RX ADMIN — Medication 40 MILLIGRAM(S): at 12:56

## 2025-01-01 RX ADMIN — Medication 25 GRAM(S): at 22:48

## 2025-01-01 RX ADMIN — INSULIN GLARGINE-YFGN 10 UNIT(S): 100 INJECTION, SOLUTION SUBCUTANEOUS at 23:20

## 2025-01-01 RX ADMIN — Medication 1 PACKET(S): at 05:41

## 2025-01-01 RX ADMIN — Medication 15 MILLILITER(S): at 17:18

## 2025-01-01 RX ADMIN — INSULIN GLARGINE-YFGN 14 UNIT(S): 100 INJECTION, SOLUTION SUBCUTANEOUS at 23:19

## 2025-01-01 RX ADMIN — Medication 500 MILLIGRAM(S): at 21:40

## 2025-01-01 RX ADMIN — ATORVASTATIN CALCIUM 80 MILLIGRAM(S): 80 TABLET, FILM COATED ORAL at 22:38

## 2025-01-01 RX ADMIN — INSULIN GLARGINE-YFGN 10 UNIT(S): 100 INJECTION, SOLUTION SUBCUTANEOUS at 22:59

## 2025-01-01 RX ADMIN — Medication 1000 MILLIGRAM(S): at 12:49

## 2025-01-01 RX ADMIN — Medication 2 MILLIGRAM(S): at 15:46

## 2025-01-01 RX ADMIN — Medication 40 MILLIGRAM(S): at 13:13

## 2025-01-01 RX ADMIN — APIXABAN 5 MILLIGRAM(S): 2.5 TABLET, FILM COATED ORAL at 17:25

## 2025-01-01 RX ADMIN — NOREPINEPHRINE BITARTRATE 6.23 MICROGRAM(S)/KG/MIN: 8 SOLUTION at 08:36

## 2025-01-01 RX ADMIN — Medication 3 MILLIGRAM(S): at 22:19

## 2025-01-01 RX ADMIN — Medication 500 MILLIGRAM(S): at 12:12

## 2025-01-01 RX ADMIN — QUETIAPINE FUMARATE 25 MILLIGRAM(S): 25 TABLET ORAL at 17:31

## 2025-01-01 RX ADMIN — Medication 125 MILLIGRAM(S): at 13:56

## 2025-01-01 RX ADMIN — Medication 650 MILLIGRAM(S): at 11:17

## 2025-01-01 RX ADMIN — Medication 40 MILLIGRAM(S): at 11:27

## 2025-01-01 RX ADMIN — Medication 3 MILLIGRAM(S): at 22:13

## 2025-01-01 RX ADMIN — Medication 15 MILLILITER(S): at 06:07

## 2025-01-01 RX ADMIN — Medication 25 GRAM(S): at 21:14

## 2025-01-01 RX ADMIN — GABAPENTIN 400 MILLIGRAM(S): 400 CAPSULE ORAL at 21:22

## 2025-01-01 RX ADMIN — Medication 1 PACKET(S): at 17:01

## 2025-01-01 RX ADMIN — Medication 25 GRAM(S): at 05:44

## 2025-01-01 RX ADMIN — Medication 2 MILLIGRAM(S): at 04:04

## 2025-01-01 RX ADMIN — APIXABAN 5 MILLIGRAM(S): 2.5 TABLET, FILM COATED ORAL at 17:18

## 2025-01-01 RX ADMIN — INSULIN LISPRO 2: 100 INJECTION, SOLUTION INTRAVENOUS; SUBCUTANEOUS at 05:20

## 2025-01-01 RX ADMIN — Medication 650 MILLIGRAM(S): at 08:49

## 2025-01-01 RX ADMIN — Medication 250 MILLIGRAM(S): at 21:30

## 2025-01-01 RX ADMIN — Medication 40 MILLIGRAM(S): at 11:17

## 2025-01-01 RX ADMIN — Medication 650 MILLIGRAM(S): at 16:47

## 2025-01-01 RX ADMIN — INSULIN LISPRO 6: 100 INJECTION, SOLUTION INTRAVENOUS; SUBCUTANEOUS at 00:00

## 2025-01-01 RX ADMIN — Medication 1 PACKET(S): at 14:30

## 2025-01-01 RX ADMIN — Medication 10 MILLILITER(S): at 22:00

## 2025-01-01 RX ADMIN — Medication 2 MILLIGRAM(S): at 12:08

## 2025-01-01 RX ADMIN — Medication 25 GRAM(S): at 05:08

## 2025-01-01 RX ADMIN — Medication 100 MILLIGRAM(S): at 05:11

## 2025-01-01 RX ADMIN — INSULIN GLARGINE-YFGN 12 UNIT(S): 100 INJECTION, SOLUTION SUBCUTANEOUS at 23:47

## 2025-01-01 RX ADMIN — INSULIN LISPRO 2: 100 INJECTION, SOLUTION INTRAVENOUS; SUBCUTANEOUS at 23:05

## 2025-01-01 RX ADMIN — Medication 40 MILLIGRAM(S): at 11:41

## 2025-01-01 RX ADMIN — Medication 1 PACKET(S): at 05:43

## 2025-01-01 RX ADMIN — Medication 1 PACKET(S): at 05:02

## 2025-01-01 RX ADMIN — IPRATROPIUM BROMIDE AND ALBUTEROL SULFATE 3 MILLILITER(S): .5; 2.5 SOLUTION RESPIRATORY (INHALATION) at 08:22

## 2025-01-01 RX ADMIN — Medication 500 MILLIGRAM(S): at 07:58

## 2025-01-01 RX ADMIN — INSULIN LISPRO 6: 100 INJECTION, SOLUTION INTRAVENOUS; SUBCUTANEOUS at 23:40

## 2025-01-01 RX ADMIN — Medication 1 APPLICATION(S): at 05:02

## 2025-01-01 RX ADMIN — Medication 3 MILLIGRAM(S): at 21:25

## 2025-01-01 RX ADMIN — Medication 1 APPLICATION(S): at 05:04

## 2025-01-01 RX ADMIN — DEXTROMETHORPHAN HBR, GUAIFENESIN 200 MILLIGRAM(S): 200 LIQUID ORAL at 08:04

## 2025-01-01 RX ADMIN — Medication 650 MILLIGRAM(S): at 11:04

## 2025-01-01 RX ADMIN — HEPARIN SODIUM 13 UNIT(S)/HR: 1000 INJECTION INTRAVENOUS; SUBCUTANEOUS at 17:06

## 2025-01-01 RX ADMIN — ATORVASTATIN CALCIUM 80 MILLIGRAM(S): 80 TABLET, FILM COATED ORAL at 22:48

## 2025-01-01 RX ADMIN — Medication 15 MILLILITER(S): at 17:11

## 2025-01-01 RX ADMIN — Medication 500 MILLIGRAM(S): at 11:26

## 2025-01-01 RX ADMIN — Medication 3 MILLIGRAM(S): at 21:10

## 2025-01-01 RX ADMIN — Medication 400 MILLIGRAM(S): at 11:53

## 2025-01-01 RX ADMIN — Medication 500 MILLIGRAM(S): at 10:07

## 2025-01-01 RX ADMIN — Medication 1 PACKET(S): at 16:13

## 2025-01-01 RX ADMIN — Medication 250 MILLIGRAM(S): at 21:55

## 2025-01-01 RX ADMIN — INSULIN LISPRO 2: 100 INJECTION, SOLUTION INTRAVENOUS; SUBCUTANEOUS at 12:18

## 2025-01-01 RX ADMIN — Medication 500 MILLIGRAM(S): at 14:32

## 2025-01-01 RX ADMIN — Medication 1 APPLICATION(S): at 05:24

## 2025-01-01 RX ADMIN — Medication 25 GRAM(S): at 14:14

## 2025-01-01 RX ADMIN — GABAPENTIN 400 MILLIGRAM(S): 400 CAPSULE ORAL at 21:25

## 2025-01-01 RX ADMIN — MEROPENEM 1000 MILLIGRAM(S): 1 INJECTION INTRAVENOUS at 05:51

## 2025-01-01 RX ADMIN — IPRATROPIUM BROMIDE AND ALBUTEROL SULFATE 3 MILLILITER(S): .5; 2.5 SOLUTION RESPIRATORY (INHALATION) at 11:35

## 2025-01-01 RX ADMIN — INSULIN LISPRO 2: 100 INJECTION, SOLUTION INTRAVENOUS; SUBCUTANEOUS at 11:15

## 2025-01-01 RX ADMIN — MEROPENEM 1000 MILLIGRAM(S): 1 INJECTION INTRAVENOUS at 14:44

## 2025-01-01 RX ADMIN — Medication 40 MILLIGRAM(S): at 07:31

## 2025-01-01 RX ADMIN — DEXMEDETOMIDINE HYDROCHLORIDE IN SODIUM CHLORIDE 3.23 MICROGRAM(S)/KG/HR: 4 INJECTION INTRAVENOUS at 04:17

## 2025-01-01 RX ADMIN — Medication 25 GRAM(S): at 05:24

## 2025-01-01 RX ADMIN — Medication 1000 MILLIGRAM(S): at 20:15

## 2025-01-01 RX ADMIN — Medication 25 GRAM(S): at 05:07

## 2025-01-01 RX ADMIN — Medication 650 MILLIGRAM(S): at 18:49

## 2025-01-01 RX ADMIN — INSULIN LISPRO 2: 100 INJECTION, SOLUTION INTRAVENOUS; SUBCUTANEOUS at 16:43

## 2025-01-01 RX ADMIN — Medication 25 GRAM(S): at 05:26

## 2025-01-01 RX ADMIN — QUETIAPINE FUMARATE 50 MILLIGRAM(S): 25 TABLET ORAL at 23:31

## 2025-01-01 RX ADMIN — APIXABAN 5 MILLIGRAM(S): 2.5 TABLET, FILM COATED ORAL at 16:43

## 2025-01-01 RX ADMIN — INSULIN LISPRO 2: 100 INJECTION, SOLUTION INTRAVENOUS; SUBCUTANEOUS at 17:11

## 2025-01-01 RX ADMIN — Medication 15 MILLILITER(S): at 17:19

## 2025-01-01 RX ADMIN — Medication 1 PACKET(S): at 16:45

## 2025-01-01 RX ADMIN — INSULIN LISPRO 4: 100 INJECTION, SOLUTION INTRAVENOUS; SUBCUTANEOUS at 17:01

## 2025-01-01 RX ADMIN — Medication 15 MILLILITER(S): at 16:43

## 2025-01-01 RX ADMIN — APIXABAN 5 MILLIGRAM(S): 2.5 TABLET, FILM COATED ORAL at 05:07

## 2025-01-01 RX ADMIN — QUETIAPINE FUMARATE 25 MILLIGRAM(S): 25 TABLET ORAL at 22:09

## 2025-01-01 RX ADMIN — Medication 15 MILLILITER(S): at 05:22

## 2025-01-01 RX ADMIN — Medication 1 APPLICATION(S): at 05:51

## 2025-01-01 RX ADMIN — APIXABAN 5 MILLIGRAM(S): 2.5 TABLET, FILM COATED ORAL at 05:31

## 2025-01-01 RX ADMIN — INSULIN GLARGINE-YFGN 10 UNIT(S): 100 INJECTION, SOLUTION SUBCUTANEOUS at 23:28

## 2025-01-01 RX ADMIN — Medication 500 MILLIGRAM(S): at 22:09

## 2025-01-01 RX ADMIN — Medication 40 MILLIGRAM(S): at 10:07

## 2025-01-01 RX ADMIN — INSULIN GLARGINE-YFGN 15 UNIT(S): 100 INJECTION, SOLUTION SUBCUTANEOUS at 22:03

## 2025-01-01 RX ADMIN — HEPARIN SODIUM 9 UNIT(S)/HR: 1000 INJECTION INTRAVENOUS; SUBCUTANEOUS at 19:45

## 2025-01-01 RX ADMIN — Medication 1 APPLICATION(S): at 06:16

## 2025-01-01 RX ADMIN — SCOPOLAMINE 1 PATCH: 1 PATCH, EXTENDED RELEASE TRANSDERMAL at 21:39

## 2025-01-01 RX ADMIN — Medication 1000 MILLIGRAM(S): at 17:36

## 2025-01-01 RX ADMIN — INSULIN GLARGINE-YFGN 10 UNIT(S): 100 INJECTION, SOLUTION SUBCUTANEOUS at 07:48

## 2025-01-01 RX ADMIN — Medication 650 MILLIGRAM(S): at 00:42

## 2025-01-01 RX ADMIN — QUETIAPINE FUMARATE 25 MILLIGRAM(S): 25 TABLET ORAL at 18:03

## 2025-01-01 RX ADMIN — Medication 100 MILLIGRAM(S): at 12:39

## 2025-01-01 RX ADMIN — Medication 15 MILLILITER(S): at 05:12

## 2025-01-01 RX ADMIN — INSULIN LISPRO 10 UNIT(S): 100 INJECTION, SOLUTION INTRAVENOUS; SUBCUTANEOUS at 14:42

## 2025-01-01 RX ADMIN — Medication 1 PACKET(S): at 17:19

## 2025-01-01 RX ADMIN — POLYETHYLENE GLYCOL 3350 17 GRAM(S): 17 POWDER, FOR SOLUTION ORAL at 05:25

## 2025-01-01 RX ADMIN — POLYETHYLENE GLYCOL 3350 17 GRAM(S): 17 POWDER, FOR SOLUTION ORAL at 05:11

## 2025-01-01 RX ADMIN — Medication 100 GRAM(S): at 06:00

## 2025-01-01 RX ADMIN — APIXABAN 5 MILLIGRAM(S): 2.5 TABLET, FILM COATED ORAL at 17:01

## 2025-01-01 RX ADMIN — Medication 500 MILLIGRAM(S): at 12:04

## 2025-01-01 RX ADMIN — INSULIN GLARGINE-YFGN 10 UNIT(S): 100 INJECTION, SOLUTION SUBCUTANEOUS at 00:02

## 2025-01-01 RX ADMIN — ATORVASTATIN CALCIUM 80 MILLIGRAM(S): 80 TABLET, FILM COATED ORAL at 21:39

## 2025-01-01 RX ADMIN — Medication 4 MILLILITER(S): at 20:45

## 2025-01-01 RX ADMIN — Medication 63.75 MILLIMOLE(S): at 05:08

## 2025-01-01 RX ADMIN — Medication 250 MILLIGRAM(S): at 21:52

## 2025-01-01 RX ADMIN — INSULIN GLARGINE-YFGN 12 UNIT(S): 100 INJECTION, SOLUTION SUBCUTANEOUS at 23:11

## 2025-01-01 RX ADMIN — APIXABAN 5 MILLIGRAM(S): 2.5 TABLET, FILM COATED ORAL at 16:13

## 2025-01-01 RX ADMIN — Medication 1 PACKET(S): at 17:12

## 2025-01-01 RX ADMIN — Medication 650 MILLIGRAM(S): at 00:45

## 2025-01-01 RX ADMIN — Medication 10 MILLILITER(S): at 21:15

## 2025-01-01 RX ADMIN — Medication 500 MILLIGRAM(S): at 14:05

## 2025-01-01 RX ADMIN — HEPARIN SODIUM 13 UNIT(S)/HR: 1000 INJECTION INTRAVENOUS; SUBCUTANEOUS at 04:33

## 2025-01-01 RX ADMIN — Medication 1 PACKET(S): at 17:37

## 2025-01-01 RX ADMIN — Medication 25 GRAM(S): at 21:31

## 2025-01-01 RX ADMIN — Medication 15 MILLILITER(S): at 16:49

## 2025-01-01 RX ADMIN — INSULIN GLARGINE-YFGN 12 UNIT(S): 100 INJECTION, SOLUTION SUBCUTANEOUS at 09:09

## 2025-01-01 RX ADMIN — Medication 300 MILLIGRAM(S): at 11:50

## 2025-01-01 RX ADMIN — INSULIN LISPRO 2: 100 INJECTION, SOLUTION INTRAVENOUS; SUBCUTANEOUS at 11:56

## 2025-01-01 RX ADMIN — SCOPOLAMINE 1 PATCH: 1 PATCH, EXTENDED RELEASE TRANSDERMAL at 21:30

## 2025-01-01 RX ADMIN — Medication 250 MILLIGRAM(S): at 05:23

## 2025-01-01 RX ADMIN — Medication 3 MILLIGRAM(S): at 21:57

## 2025-01-01 RX ADMIN — APIXABAN 5 MILLIGRAM(S): 2.5 TABLET, FILM COATED ORAL at 05:04

## 2025-01-01 RX ADMIN — INSULIN GLARGINE-YFGN 12 UNIT(S): 100 INJECTION, SOLUTION SUBCUTANEOUS at 08:17

## 2025-01-01 RX ADMIN — Medication 500 MILLIGRAM(S): at 08:02

## 2025-01-01 RX ADMIN — ATORVASTATIN CALCIUM 80 MILLIGRAM(S): 80 TABLET, FILM COATED ORAL at 21:12

## 2025-01-01 RX ADMIN — Medication 1 PACKET(S): at 16:49

## 2025-01-01 RX ADMIN — Medication 500 MILLIGRAM(S): at 11:14

## 2025-01-01 RX ADMIN — Medication 15 MILLILITER(S): at 14:05

## 2025-01-01 RX ADMIN — Medication 25 GRAM(S): at 13:32

## 2025-01-01 RX ADMIN — Medication 1 PACKET(S): at 17:17

## 2025-01-01 RX ADMIN — Medication 15 MILLILITER(S): at 11:21

## 2025-01-01 RX ADMIN — Medication 650 MILLIGRAM(S): at 13:24

## 2025-01-01 RX ADMIN — INSULIN GLARGINE-YFGN 12 UNIT(S): 100 INJECTION, SOLUTION SUBCUTANEOUS at 23:42

## 2025-01-01 RX ADMIN — Medication 0.5 MG/HR: at 18:30

## 2025-01-01 RX ADMIN — Medication 400 MILLIGRAM(S): at 12:23

## 2025-01-01 RX ADMIN — INSULIN GLARGINE-YFGN 10 UNIT(S): 100 INJECTION, SOLUTION SUBCUTANEOUS at 23:47

## 2025-01-01 RX ADMIN — INSULIN LISPRO 4: 100 INJECTION, SOLUTION INTRAVENOUS; SUBCUTANEOUS at 17:55

## 2025-01-01 RX ADMIN — Medication 1 PACKET(S): at 05:19

## 2025-01-01 RX ADMIN — Medication 1 APPLICATION(S): at 05:28

## 2025-01-01 RX ADMIN — INSULIN LISPRO 4: 100 INJECTION, SOLUTION INTRAVENOUS; SUBCUTANEOUS at 17:23

## 2025-01-01 RX ADMIN — IPRATROPIUM BROMIDE AND ALBUTEROL SULFATE 3 MILLILITER(S): .5; 2.5 SOLUTION RESPIRATORY (INHALATION) at 21:51

## 2025-01-01 RX ADMIN — Medication 650 MILLIGRAM(S): at 22:36

## 2025-01-01 RX ADMIN — Medication 3 MILLIGRAM(S): at 22:37

## 2025-01-01 RX ADMIN — APIXABAN 5 MILLIGRAM(S): 2.5 TABLET, FILM COATED ORAL at 17:37

## 2025-01-01 RX ADMIN — SCOPOLAMINE 1 PATCH: 1 PATCH, EXTENDED RELEASE TRANSDERMAL at 19:00

## 2025-01-01 RX ADMIN — INSULIN LISPRO 2: 100 INJECTION, SOLUTION INTRAVENOUS; SUBCUTANEOUS at 17:23

## 2025-01-01 RX ADMIN — Medication 650 MILLIGRAM(S): at 11:36

## 2025-01-01 RX ADMIN — INSULIN LISPRO 4: 100 INJECTION, SOLUTION INTRAVENOUS; SUBCUTANEOUS at 16:23

## 2025-01-01 RX ADMIN — INSULIN GLARGINE-YFGN 10 UNIT(S): 100 INJECTION, SOLUTION SUBCUTANEOUS at 08:46

## 2025-01-01 RX ADMIN — INSULIN GLARGINE-YFGN 12 UNIT(S): 100 INJECTION, SOLUTION SUBCUTANEOUS at 08:01

## 2025-01-01 RX ADMIN — Medication 2 MILLIGRAM(S): at 08:39

## 2025-01-01 RX ADMIN — Medication 3 MILLIGRAM(S): at 22:38

## 2025-01-01 RX ADMIN — POLYETHYLENE GLYCOL 3350 17 GRAM(S): 17 POWDER, FOR SOLUTION ORAL at 16:24

## 2025-01-01 RX ADMIN — Medication 40 MILLIGRAM(S): at 12:35

## 2025-01-01 RX ADMIN — Medication 500 MILLIGRAM(S): at 11:45

## 2025-01-01 RX ADMIN — QUETIAPINE FUMARATE 25 MILLIGRAM(S): 25 TABLET ORAL at 21:39

## 2025-01-01 RX ADMIN — Medication 250 MILLIGRAM(S): at 13:17

## 2025-01-01 RX ADMIN — APIXABAN 5 MILLIGRAM(S): 2.5 TABLET, FILM COATED ORAL at 05:43

## 2025-01-01 RX ADMIN — Medication 1 APPLICATION(S): at 05:17

## 2025-01-01 RX ADMIN — SCOPOLAMINE 1 PATCH: 1 PATCH, EXTENDED RELEASE TRANSDERMAL at 07:57

## 2025-01-01 RX ADMIN — INSULIN LISPRO 2: 100 INJECTION, SOLUTION INTRAVENOUS; SUBCUTANEOUS at 06:06

## 2025-01-01 RX ADMIN — Medication 1 MG/HR: at 14:15

## 2025-01-01 RX ADMIN — INSULIN LISPRO 2: 100 INJECTION, SOLUTION INTRAVENOUS; SUBCUTANEOUS at 17:12

## 2025-01-01 RX ADMIN — Medication 1000 MILLIGRAM(S): at 11:55

## 2025-01-01 RX ADMIN — Medication 40 MILLIGRAM(S): at 11:15

## 2025-01-01 RX ADMIN — Medication 25 GRAM(S): at 14:30

## 2025-01-01 RX ADMIN — Medication 250 MILLIGRAM(S): at 16:20

## 2025-01-01 RX ADMIN — INSULIN LISPRO 8: 100 INJECTION, SOLUTION INTRAVENOUS; SUBCUTANEOUS at 17:18

## 2025-01-01 RX ADMIN — Medication 40 MILLIGRAM(S): at 12:12

## 2025-01-01 RX ADMIN — Medication 500 MILLIGRAM(S): at 12:57

## 2025-01-01 RX ADMIN — Medication 40 MILLIGRAM(S): at 11:25

## 2025-01-01 RX ADMIN — SCOPOLAMINE 1 PATCH: 1 PATCH, EXTENDED RELEASE TRANSDERMAL at 11:55

## 2025-01-01 RX ADMIN — APIXABAN 5 MILLIGRAM(S): 2.5 TABLET, FILM COATED ORAL at 17:10

## 2025-01-01 RX ADMIN — APIXABAN 5 MILLIGRAM(S): 2.5 TABLET, FILM COATED ORAL at 05:28

## 2025-01-01 RX ADMIN — INSULIN GLARGINE-YFGN 12 UNIT(S): 100 INJECTION, SOLUTION SUBCUTANEOUS at 23:31

## 2025-01-01 RX ADMIN — Medication 25 GRAM(S): at 12:55

## 2025-01-01 RX ADMIN — Medication 650 MILLIGRAM(S): at 05:04

## 2025-01-01 RX ADMIN — Medication 250 MILLIGRAM(S): at 13:09

## 2025-01-01 RX ADMIN — APIXABAN 5 MILLIGRAM(S): 2.5 TABLET, FILM COATED ORAL at 06:28

## 2025-01-01 RX ADMIN — INSULIN GLARGINE-YFGN 10 UNIT(S): 100 INJECTION, SOLUTION SUBCUTANEOUS at 23:30

## 2025-01-01 RX ADMIN — SCOPOLAMINE 1 PATCH: 1 PATCH, EXTENDED RELEASE TRANSDERMAL at 19:30

## 2025-01-01 RX ADMIN — DEXMEDETOMIDINE HYDROCHLORIDE IN SODIUM CHLORIDE 3.23 MICROGRAM(S)/KG/HR: 4 INJECTION INTRAVENOUS at 04:30

## 2025-01-01 RX ADMIN — HEPARIN SODIUM 13 UNIT(S)/HR: 1000 INJECTION INTRAVENOUS; SUBCUTANEOUS at 04:08

## 2025-01-01 RX ADMIN — Medication 15 MILLILITER(S): at 05:16

## 2025-01-01 RX ADMIN — Medication 15 MILLILITER(S): at 05:07

## 2025-01-01 RX ADMIN — Medication 13.75 MILLIMOLE(S): at 06:36

## 2025-01-01 RX ADMIN — Medication 1 PACKET(S): at 18:03

## 2025-01-01 RX ADMIN — Medication 15 MILLILITER(S): at 08:28

## 2025-01-01 RX ADMIN — Medication 1 PACKET(S): at 17:46

## 2025-01-01 RX ADMIN — Medication 1000 MILLIGRAM(S): at 09:30

## 2025-01-01 RX ADMIN — Medication 125 MILLIGRAM(S): at 21:15

## 2025-01-01 RX ADMIN — MAGNESIUM HYDROXIDE 30 MILLILITER(S): 400 SUSPENSION ORAL at 11:10

## 2025-01-01 RX ADMIN — Medication 25 GRAM(S): at 21:16

## 2025-01-01 RX ADMIN — Medication 650 MILLIGRAM(S): at 09:59

## 2025-01-01 RX ADMIN — APIXABAN 5 MILLIGRAM(S): 2.5 TABLET, FILM COATED ORAL at 18:05

## 2025-01-01 RX ADMIN — Medication 15 MILLILITER(S): at 05:28

## 2025-01-01 RX ADMIN — Medication 10 MILLILITER(S): at 21:17

## 2025-01-01 RX ADMIN — INSULIN LISPRO 2: 100 INJECTION, SOLUTION INTRAVENOUS; SUBCUTANEOUS at 11:43

## 2025-01-01 RX ADMIN — INSULIN LISPRO 2: 100 INJECTION, SOLUTION INTRAVENOUS; SUBCUTANEOUS at 05:08

## 2025-01-01 RX ADMIN — INSULIN LISPRO 4: 100 INJECTION, SOLUTION INTRAVENOUS; SUBCUTANEOUS at 05:15

## 2025-01-01 RX ADMIN — INSULIN LISPRO 2: 100 INJECTION, SOLUTION INTRAVENOUS; SUBCUTANEOUS at 23:16

## 2025-01-01 RX ADMIN — Medication 200 GRAM(S): at 08:02

## 2025-01-01 RX ADMIN — Medication 100 MILLIGRAM(S): at 05:19

## 2025-01-01 RX ADMIN — FUROSEMIDE 20 MILLIGRAM(S): 10 INJECTION INTRAMUSCULAR; INTRAVENOUS at 09:52

## 2025-01-01 RX ADMIN — Medication 100 MILLIGRAM(S): at 11:02

## 2025-01-01 RX ADMIN — INSULIN LISPRO 6: 100 INJECTION, SOLUTION INTRAVENOUS; SUBCUTANEOUS at 23:47

## 2025-01-01 RX ADMIN — IPRATROPIUM BROMIDE AND ALBUTEROL SULFATE 3 MILLILITER(S): .5; 2.5 SOLUTION RESPIRATORY (INHALATION) at 08:48

## 2025-01-01 RX ADMIN — Medication 3 MILLIGRAM(S): at 21:17

## 2025-01-01 RX ADMIN — Medication 15 MILLILITER(S): at 05:19

## 2025-01-01 RX ADMIN — HEPARIN SODIUM 13 UNIT(S)/HR: 1000 INJECTION INTRAVENOUS; SUBCUTANEOUS at 04:38

## 2025-01-01 RX ADMIN — Medication 650 MILLIGRAM(S): at 05:02

## 2025-01-01 RX ADMIN — Medication 500 MILLIGRAM(S): at 08:28

## 2025-01-01 RX ADMIN — INSULIN LISPRO 6: 100 INJECTION, SOLUTION INTRAVENOUS; SUBCUTANEOUS at 23:02

## 2025-01-01 RX ADMIN — Medication 250 MILLILITER(S): at 03:20

## 2025-01-01 RX ADMIN — PROPOFOL 2.16 MICROGRAM(S)/KG/MIN: 10 INJECTION, EMULSION INTRAVENOUS at 07:50

## 2025-01-01 RX ADMIN — Medication 15 MILLILITER(S): at 17:23

## 2025-01-01 RX ADMIN — Medication 15 MILLILITER(S): at 17:17

## 2025-01-01 RX ADMIN — Medication 125 MILLIGRAM(S): at 21:11

## 2025-01-01 RX ADMIN — Medication 500 MILLIGRAM(S): at 11:40

## 2025-01-01 RX ADMIN — Medication 650 MILLIGRAM(S): at 09:54

## 2025-01-01 RX ADMIN — Medication 250 MILLIGRAM(S): at 21:13

## 2025-01-01 RX ADMIN — INSULIN GLARGINE-YFGN 10 UNIT(S): 100 INJECTION, SOLUTION SUBCUTANEOUS at 07:55

## 2025-01-01 RX ADMIN — POLYETHYLENE GLYCOL 3350 17 GRAM(S): 17 POWDER, FOR SOLUTION ORAL at 17:58

## 2025-01-01 RX ADMIN — INSULIN GLARGINE-YFGN 10 UNIT(S): 100 INJECTION, SOLUTION SUBCUTANEOUS at 07:58

## 2025-01-01 RX ADMIN — Medication 15 MILLILITER(S): at 16:45

## 2025-01-01 RX ADMIN — Medication 10 MILLILITER(S): at 21:51

## 2025-01-01 RX ADMIN — INSULIN LISPRO 4: 100 INJECTION, SOLUTION INTRAVENOUS; SUBCUTANEOUS at 05:42

## 2025-01-01 RX ADMIN — INSULIN LISPRO 10: 100 INJECTION, SOLUTION INTRAVENOUS; SUBCUTANEOUS at 23:11

## 2025-01-01 RX ADMIN — Medication 15 MILLILITER(S): at 12:04

## 2025-01-01 RX ADMIN — Medication 15 MILLILITER(S): at 08:05

## 2025-01-01 RX ADMIN — INSULIN LISPRO 2: 100 INJECTION, SOLUTION INTRAVENOUS; SUBCUTANEOUS at 16:52

## 2025-01-01 RX ADMIN — Medication 400 MILLIGRAM(S): at 16:36

## 2025-01-01 RX ADMIN — Medication 1 APPLICATION(S): at 05:05

## 2025-01-01 RX ADMIN — SCOPOLAMINE 1 PATCH: 1 PATCH, EXTENDED RELEASE TRANSDERMAL at 06:07

## 2025-01-01 RX ADMIN — Medication 500 MILLIGRAM(S): at 22:36

## 2025-01-01 RX ADMIN — SCOPOLAMINE 1 PATCH: 1 PATCH, EXTENDED RELEASE TRANSDERMAL at 18:20

## 2025-01-01 RX ADMIN — Medication 1 PACKET(S): at 05:37

## 2025-01-01 RX ADMIN — Medication 15 MILLILITER(S): at 08:02

## 2025-01-01 RX ADMIN — Medication 15 MILLILITER(S): at 05:35

## 2025-01-01 RX ADMIN — ATORVASTATIN CALCIUM 80 MILLIGRAM(S): 80 TABLET, FILM COATED ORAL at 22:25

## 2025-01-01 RX ADMIN — Medication 25 GRAM(S): at 05:39

## 2025-01-01 RX ADMIN — Medication 10 MILLILITER(S): at 21:38

## 2025-01-01 RX ADMIN — Medication 125 MILLIGRAM(S): at 05:08

## 2025-01-01 RX ADMIN — INSULIN LISPRO 2: 100 INJECTION, SOLUTION INTRAVENOUS; SUBCUTANEOUS at 23:22

## 2025-01-01 RX ADMIN — APIXABAN 5 MILLIGRAM(S): 2.5 TABLET, FILM COATED ORAL at 17:22

## 2025-01-01 RX ADMIN — Medication 1 PACKET(S): at 05:05

## 2025-01-01 RX ADMIN — Medication 250 MILLIGRAM(S): at 05:26

## 2025-01-01 RX ADMIN — Medication 500 MILLIGRAM(S): at 23:54

## 2025-01-01 RX ADMIN — Medication 1 APPLICATION(S): at 06:03

## 2025-01-01 RX ADMIN — PROPOFOL 3.87 MICROGRAM(S)/KG/MIN: 10 INJECTION, EMULSION INTRAVENOUS at 12:20

## 2025-01-01 RX ADMIN — INSULIN LISPRO 2: 100 INJECTION, SOLUTION INTRAVENOUS; SUBCUTANEOUS at 17:21

## 2025-01-01 RX ADMIN — INSULIN LISPRO 2: 100 INJECTION, SOLUTION INTRAVENOUS; SUBCUTANEOUS at 11:42

## 2025-01-01 RX ADMIN — NOREPINEPHRINE BITARTRATE 6.23 MICROGRAM(S)/KG/MIN: 8 SOLUTION at 09:08

## 2025-01-01 RX ADMIN — INSULIN GLARGINE-YFGN 10 UNIT(S): 100 INJECTION, SOLUTION SUBCUTANEOUS at 23:23

## 2025-01-01 RX ADMIN — HEPARIN SODIUM 13 UNIT(S)/HR: 1000 INJECTION INTRAVENOUS; SUBCUTANEOUS at 23:15

## 2025-01-01 RX ADMIN — Medication 15 MILLILITER(S): at 18:04

## 2025-01-01 RX ADMIN — Medication 100 MILLIGRAM(S): at 06:02

## 2025-01-01 RX ADMIN — Medication 400 MILLIGRAM(S): at 12:13

## 2025-01-01 RX ADMIN — Medication 1 MG/HR: at 20:54

## 2025-01-01 RX ADMIN — Medication 750 MILLIGRAM(S): at 23:12

## 2025-01-01 RX ADMIN — Medication 1 APPLICATION(S): at 05:19

## 2025-01-01 RX ADMIN — Medication 650 MILLIGRAM(S): at 09:25

## 2025-01-01 RX ADMIN — Medication 15 MILLILITER(S): at 12:31

## 2025-01-01 RX ADMIN — INSULIN LISPRO 4: 100 INJECTION, SOLUTION INTRAVENOUS; SUBCUTANEOUS at 05:04

## 2025-01-01 RX ADMIN — Medication 400 MILLIGRAM(S): at 11:25

## 2025-01-01 RX ADMIN — INSULIN LISPRO 4: 100 INJECTION, SOLUTION INTRAVENOUS; SUBCUTANEOUS at 23:11

## 2025-01-01 RX ADMIN — ATORVASTATIN CALCIUM 80 MILLIGRAM(S): 80 TABLET, FILM COATED ORAL at 23:31

## 2025-01-01 RX ADMIN — Medication 650 MILLIGRAM(S): at 17:45

## 2025-01-01 RX ADMIN — Medication 40 MILLIGRAM(S): at 11:45

## 2025-01-01 RX ADMIN — INSULIN GLARGINE-YFGN 12 UNIT(S): 100 INJECTION, SOLUTION SUBCUTANEOUS at 08:34

## 2025-01-01 RX ADMIN — QUETIAPINE FUMARATE 25 MILLIGRAM(S): 25 TABLET ORAL at 22:48

## 2025-01-01 RX ADMIN — Medication 250 MILLIGRAM(S): at 05:57

## 2025-01-01 RX ADMIN — APIXABAN 5 MILLIGRAM(S): 2.5 TABLET, FILM COATED ORAL at 16:44

## 2025-01-01 RX ADMIN — Medication 750 MILLIGRAM(S): at 21:23

## 2025-01-01 RX ADMIN — Medication 75 MILLILITER(S): at 17:02

## 2025-01-01 RX ADMIN — Medication 1000 MILLILITER(S): at 08:10

## 2025-01-01 RX ADMIN — Medication 15 MILLILITER(S): at 11:34

## 2025-01-01 RX ADMIN — Medication 25 GRAM(S): at 11:10

## 2025-01-01 RX ADMIN — Medication 650 MILLIGRAM(S): at 10:30

## 2025-01-01 RX ADMIN — Medication 1 PACKET(S): at 18:36

## 2025-01-01 RX ADMIN — ATORVASTATIN CALCIUM 80 MILLIGRAM(S): 80 TABLET, FILM COATED ORAL at 21:30

## 2025-01-01 RX ADMIN — Medication 250 MILLIGRAM(S): at 07:38

## 2025-01-01 RX ADMIN — Medication 500 MILLIGRAM(S): at 17:54

## 2025-01-01 RX ADMIN — Medication 1 MILLIGRAM(S): at 12:20

## 2025-01-01 RX ADMIN — Medication 250 MILLIGRAM(S): at 05:37

## 2025-01-01 RX ADMIN — Medication 3 MILLIGRAM(S): at 21:35

## 2025-01-01 RX ADMIN — Medication 15 MILLILITER(S): at 15:50

## 2025-01-01 RX ADMIN — ATORVASTATIN CALCIUM 80 MILLIGRAM(S): 80 TABLET, FILM COATED ORAL at 21:22

## 2025-01-01 RX ADMIN — DEXMEDETOMIDINE HYDROCHLORIDE IN SODIUM CHLORIDE 3.23 MICROGRAM(S)/KG/HR: 4 INJECTION INTRAVENOUS at 22:35

## 2025-01-01 RX ADMIN — INSULIN GLARGINE-YFGN 10 UNIT(S): 100 INJECTION, SOLUTION SUBCUTANEOUS at 07:52

## 2025-01-01 RX ADMIN — Medication 15 MILLILITER(S): at 05:52

## 2025-01-01 RX ADMIN — Medication 650 MILLIGRAM(S): at 04:28

## 2025-01-01 RX ADMIN — Medication 40 MILLIGRAM(S): at 14:32

## 2025-01-01 RX ADMIN — Medication 1 PACKET(S): at 05:04

## 2025-01-01 RX ADMIN — Medication 1 APPLICATION(S): at 06:02

## 2025-01-01 RX ADMIN — INSULIN GLARGINE-YFGN 10 UNIT(S): 100 INJECTION, SOLUTION SUBCUTANEOUS at 23:05

## 2025-01-01 RX ADMIN — Medication 750 MILLIGRAM(S): at 22:02

## 2025-01-01 RX ADMIN — Medication 650 MILLIGRAM(S): at 23:12

## 2025-01-01 RX ADMIN — Medication 15 MILLILITER(S): at 12:12

## 2025-01-01 RX ADMIN — Medication 1 PACKET(S): at 06:04

## 2025-01-01 RX ADMIN — INSULIN GLARGINE-YFGN 15 UNIT(S): 100 INJECTION, SOLUTION SUBCUTANEOUS at 07:49

## 2025-01-01 RX ADMIN — MAGNESIUM HYDROXIDE 30 MILLILITER(S): 400 SUSPENSION ORAL at 12:40

## 2025-01-01 RX ADMIN — INSULIN LISPRO 4: 100 INJECTION, SOLUTION INTRAVENOUS; SUBCUTANEOUS at 05:47

## 2025-01-01 RX ADMIN — Medication 1000 MILLIGRAM(S): at 08:21

## 2025-01-01 RX ADMIN — APIXABAN 5 MILLIGRAM(S): 2.5 TABLET, FILM COATED ORAL at 05:51

## 2025-01-01 RX ADMIN — APIXABAN 5 MILLIGRAM(S): 2.5 TABLET, FILM COATED ORAL at 17:16

## 2025-01-01 RX ADMIN — INSULIN LISPRO 2: 100 INJECTION, SOLUTION INTRAVENOUS; SUBCUTANEOUS at 23:44

## 2025-01-01 RX ADMIN — Medication 3 MILLIGRAM(S): at 22:25

## 2025-01-01 RX ADMIN — INSULIN LISPRO 4: 100 INJECTION, SOLUTION INTRAVENOUS; SUBCUTANEOUS at 18:24

## 2025-01-01 RX ADMIN — Medication 5 MILLIGRAM(S): at 21:22

## 2025-01-01 RX ADMIN — Medication 250 MILLIGRAM(S): at 05:06

## 2025-01-01 RX ADMIN — ATORVASTATIN CALCIUM 80 MILLIGRAM(S): 80 TABLET, FILM COATED ORAL at 21:23

## 2025-01-01 RX ADMIN — Medication 15 MILLILITER(S): at 05:37

## 2025-01-01 RX ADMIN — QUETIAPINE FUMARATE 50 MILLIGRAM(S): 25 TABLET ORAL at 22:36

## 2025-01-01 RX ADMIN — INSULIN LISPRO 4: 100 INJECTION, SOLUTION INTRAVENOUS; SUBCUTANEOUS at 11:28

## 2025-01-01 RX ADMIN — Medication 500 MILLIGRAM(S): at 11:15

## 2025-01-01 RX ADMIN — SCOPOLAMINE 1 PATCH: 1 PATCH, EXTENDED RELEASE TRANSDERMAL at 17:37

## 2025-01-01 RX ADMIN — LABETALOL HYDROCHLORIDE 10 MILLIGRAM(S): 200 TABLET, FILM COATED ORAL at 07:42

## 2025-01-01 RX ADMIN — QUETIAPINE FUMARATE 25 MILLIGRAM(S): 25 TABLET ORAL at 05:44

## 2025-01-01 RX ADMIN — IPRATROPIUM BROMIDE AND ALBUTEROL SULFATE 3 MILLILITER(S): .5; 2.5 SOLUTION RESPIRATORY (INHALATION) at 10:19

## 2025-01-01 RX ADMIN — Medication 250 MILLIGRAM(S): at 12:57

## 2025-01-01 RX ADMIN — INSULIN GLARGINE-YFGN 10 UNIT(S): 100 INJECTION, SOLUTION SUBCUTANEOUS at 10:26

## 2025-01-01 RX ADMIN — INSULIN GLARGINE-YFGN 10 UNIT(S): 100 INJECTION, SOLUTION SUBCUTANEOUS at 23:15

## 2025-01-01 RX ADMIN — INSULIN LISPRO 4: 100 INJECTION, SOLUTION INTRAVENOUS; SUBCUTANEOUS at 11:22

## 2025-01-01 RX ADMIN — Medication 15 MILLILITER(S): at 05:56

## 2025-01-01 RX ADMIN — Medication 500 MILLIGRAM(S): at 11:57

## 2025-01-01 RX ADMIN — Medication 650 MILLIGRAM(S): at 13:50

## 2025-01-01 RX ADMIN — Medication 750 MILLIGRAM(S): at 21:22

## 2025-01-01 RX ADMIN — APIXABAN 5 MILLIGRAM(S): 2.5 TABLET, FILM COATED ORAL at 16:54

## 2025-01-01 RX ADMIN — Medication 1 PACKET(S): at 17:21

## 2025-01-01 RX ADMIN — APIXABAN 5 MILLIGRAM(S): 2.5 TABLET, FILM COATED ORAL at 17:54

## 2025-01-01 RX ADMIN — Medication 15 MILLILITER(S): at 11:42

## 2025-01-01 RX ADMIN — Medication 15 MILLILITER(S): at 06:02

## 2025-01-01 RX ADMIN — Medication 15 MILLILITER(S): at 11:17

## 2025-01-01 RX ADMIN — HEPARIN SODIUM 14 UNIT(S)/HR: 1000 INJECTION INTRAVENOUS; SUBCUTANEOUS at 23:47

## 2025-01-01 RX ADMIN — Medication 650 MILLIGRAM(S): at 11:50

## 2025-01-01 RX ADMIN — INSULIN LISPRO 4: 100 INJECTION, SOLUTION INTRAVENOUS; SUBCUTANEOUS at 06:14

## 2025-01-01 RX ADMIN — Medication 250 MILLIGRAM(S): at 05:31

## 2025-01-01 RX ADMIN — Medication 1 PACKET(S): at 17:23

## 2025-01-01 RX ADMIN — Medication 25 GRAM(S): at 13:17

## 2025-01-01 RX ADMIN — ATORVASTATIN CALCIUM 80 MILLIGRAM(S): 80 TABLET, FILM COATED ORAL at 21:50

## 2025-01-01 RX ADMIN — SCOPOLAMINE 1 PATCH: 1 PATCH, EXTENDED RELEASE TRANSDERMAL at 07:24

## 2025-01-01 RX ADMIN — Medication 1 PACKET(S): at 18:50

## 2025-01-01 RX ADMIN — APIXABAN 5 MILLIGRAM(S): 2.5 TABLET, FILM COATED ORAL at 06:16

## 2025-01-01 RX ADMIN — QUETIAPINE FUMARATE 25 MILLIGRAM(S): 25 TABLET ORAL at 17:16

## 2025-01-01 RX ADMIN — INSULIN LISPRO 2: 100 INJECTION, SOLUTION INTRAVENOUS; SUBCUTANEOUS at 13:11

## 2025-01-01 RX ADMIN — APIXABAN 5 MILLIGRAM(S): 2.5 TABLET, FILM COATED ORAL at 17:45

## 2025-01-01 RX ADMIN — Medication 25 GRAM(S): at 05:09

## 2025-01-01 RX ADMIN — INSULIN LISPRO 4: 100 INJECTION, SOLUTION INTRAVENOUS; SUBCUTANEOUS at 05:50

## 2025-01-01 RX ADMIN — Medication 650 MILLIGRAM(S): at 08:59

## 2025-01-01 RX ADMIN — INSULIN GLARGINE-YFGN 14 UNIT(S): 100 INJECTION, SOLUTION SUBCUTANEOUS at 21:17

## 2025-01-01 RX ADMIN — Medication 1 PACKET(S): at 11:52

## 2025-01-01 RX ADMIN — INSULIN LISPRO 4: 100 INJECTION, SOLUTION INTRAVENOUS; SUBCUTANEOUS at 05:20

## 2025-01-01 RX ADMIN — Medication 250 MILLIGRAM(S): at 22:18

## 2025-01-01 RX ADMIN — Medication 250 MILLIGRAM(S): at 17:54

## 2025-01-01 RX ADMIN — Medication 15 MILLILITER(S): at 17:55

## 2025-01-01 RX ADMIN — Medication 1 PACKET(S): at 05:23

## 2025-01-01 RX ADMIN — Medication 75 MILLILITER(S): at 07:21

## 2025-01-01 RX ADMIN — ATORVASTATIN CALCIUM 80 MILLIGRAM(S): 80 TABLET, FILM COATED ORAL at 21:15

## 2025-01-01 RX ADMIN — INSULIN LISPRO 6: 100 INJECTION, SOLUTION INTRAVENOUS; SUBCUTANEOUS at 05:36

## 2025-01-01 RX ADMIN — INSULIN LISPRO 6: 100 INJECTION, SOLUTION INTRAVENOUS; SUBCUTANEOUS at 17:19

## 2025-01-01 RX ADMIN — Medication 15 MILLILITER(S): at 11:14

## 2025-01-01 RX ADMIN — ATORVASTATIN CALCIUM 80 MILLIGRAM(S): 80 TABLET, FILM COATED ORAL at 22:36

## 2025-01-01 RX ADMIN — INSULIN LISPRO 2: 100 INJECTION, SOLUTION INTRAVENOUS; SUBCUTANEOUS at 14:31

## 2025-01-01 RX ADMIN — Medication 500 MILLIGRAM(S): at 21:38

## 2025-01-01 RX ADMIN — Medication 100 MILLIGRAM(S): at 11:40

## 2025-01-01 RX ADMIN — INSULIN GLARGINE-YFGN 10 UNIT(S): 100 INJECTION, SOLUTION SUBCUTANEOUS at 23:44

## 2025-01-01 RX ADMIN — Medication 15 MILLILITER(S): at 13:14

## 2025-01-01 RX ADMIN — Medication 250 MILLIGRAM(S): at 05:19

## 2025-01-01 RX ADMIN — APIXABAN 5 MILLIGRAM(S): 2.5 TABLET, FILM COATED ORAL at 05:37

## 2025-01-01 RX ADMIN — MAGNESIUM HYDROXIDE 30 MILLILITER(S): 400 SUSPENSION ORAL at 11:32

## 2025-01-01 RX ADMIN — Medication 15 MILLILITER(S): at 05:11

## 2025-01-01 RX ADMIN — Medication 25 GRAM(S): at 21:11

## 2025-01-01 RX ADMIN — HEPARIN SODIUM 11 UNIT(S)/HR: 1000 INJECTION INTRAVENOUS; SUBCUTANEOUS at 07:05

## 2025-01-01 RX ADMIN — Medication 75 MILLILITER(S): at 15:18

## 2025-01-01 RX ADMIN — Medication 1 APPLICATION(S): at 05:53

## 2025-01-01 RX ADMIN — Medication 40 MILLIGRAM(S): at 11:10

## 2025-01-01 RX ADMIN — APIXABAN 5 MILLIGRAM(S): 2.5 TABLET, FILM COATED ORAL at 16:48

## 2025-01-01 RX ADMIN — QUETIAPINE FUMARATE 25 MILLIGRAM(S): 25 TABLET ORAL at 05:40

## 2025-01-01 RX ADMIN — INSULIN LISPRO 2: 100 INJECTION, SOLUTION INTRAVENOUS; SUBCUTANEOUS at 11:04

## 2025-01-01 RX ADMIN — Medication 25 GRAM(S): at 21:12

## 2025-01-01 RX ADMIN — Medication 10 MILLILITER(S): at 21:53

## 2025-01-01 RX ADMIN — Medication 15 MILLILITER(S): at 17:04

## 2025-01-01 RX ADMIN — Medication 650 MILLIGRAM(S): at 01:34

## 2025-01-01 RX ADMIN — INSULIN LISPRO 4: 100 INJECTION, SOLUTION INTRAVENOUS; SUBCUTANEOUS at 17:18

## 2025-01-01 RX ADMIN — Medication 1 PACKET(S): at 05:35

## 2025-01-01 RX ADMIN — INSULIN LISPRO 2: 100 INJECTION, SOLUTION INTRAVENOUS; SUBCUTANEOUS at 14:03

## 2025-01-01 RX ADMIN — INSULIN LISPRO 2: 100 INJECTION, SOLUTION INTRAVENOUS; SUBCUTANEOUS at 17:51

## 2025-01-01 RX ADMIN — ATORVASTATIN CALCIUM 80 MILLIGRAM(S): 80 TABLET, FILM COATED ORAL at 23:12

## 2025-01-01 RX ADMIN — Medication 40 MILLIGRAM(S): at 08:02

## 2025-01-01 RX ADMIN — Medication 650 MILLIGRAM(S): at 12:15

## 2025-01-01 RX ADMIN — Medication 15 MILLILITER(S): at 17:45

## 2025-01-01 RX ADMIN — INSULIN GLARGINE-YFGN 10 UNIT(S): 100 INJECTION, SOLUTION SUBCUTANEOUS at 07:05

## 2025-01-01 RX ADMIN — GABAPENTIN 400 MILLIGRAM(S): 400 CAPSULE ORAL at 21:23

## 2025-01-01 RX ADMIN — Medication 1 PACKET(S): at 06:37

## 2025-01-01 RX ADMIN — Medication 15 MILLILITER(S): at 05:02

## 2025-01-01 RX ADMIN — Medication 25 GRAM(S): at 07:47

## 2025-01-01 RX ADMIN — INSULIN LISPRO 2: 100 INJECTION, SOLUTION INTRAVENOUS; SUBCUTANEOUS at 05:22

## 2025-01-01 RX ADMIN — Medication 15 MILLILITER(S): at 06:16

## 2025-01-01 RX ADMIN — QUETIAPINE FUMARATE 25 MILLIGRAM(S): 25 TABLET ORAL at 21:10

## 2025-01-01 RX ADMIN — Medication 40 MILLIGRAM(S): at 11:53

## 2025-01-01 RX ADMIN — Medication 250 MILLIGRAM(S): at 12:56

## 2025-01-01 RX ADMIN — Medication 3 MILLIGRAM(S): at 21:12

## 2025-01-01 RX ADMIN — QUETIAPINE FUMARATE 50 MILLIGRAM(S): 25 TABLET ORAL at 21:35

## 2025-01-01 RX ADMIN — QUETIAPINE FUMARATE 50 MILLIGRAM(S): 25 TABLET ORAL at 22:25

## 2025-01-01 RX ADMIN — Medication 125 MILLIGRAM(S): at 22:48

## 2025-01-01 RX ADMIN — INSULIN LISPRO 6: 100 INJECTION, SOLUTION INTRAVENOUS; SUBCUTANEOUS at 23:16

## 2025-01-01 RX ADMIN — INSULIN LISPRO 2: 100 INJECTION, SOLUTION INTRAVENOUS; SUBCUTANEOUS at 11:22

## 2025-01-01 RX ADMIN — Medication 15 MILLILITER(S): at 17:10

## 2025-01-01 RX ADMIN — LABETALOL HYDROCHLORIDE 10 MILLIGRAM(S): 200 TABLET, FILM COATED ORAL at 11:29

## 2025-01-01 RX ADMIN — Medication 650 MILLIGRAM(S): at 23:30

## 2025-01-01 RX ADMIN — Medication 3 MILLIGRAM(S): at 21:39

## 2025-01-01 RX ADMIN — POLYETHYLENE GLYCOL 3350 17 GRAM(S): 17 POWDER, FOR SOLUTION ORAL at 05:20

## 2025-01-01 RX ADMIN — INSULIN GLARGINE-YFGN 15 UNIT(S): 100 INJECTION, SOLUTION SUBCUTANEOUS at 23:03

## 2025-01-01 RX ADMIN — APIXABAN 5 MILLIGRAM(S): 2.5 TABLET, FILM COATED ORAL at 05:02

## 2025-01-01 RX ADMIN — Medication 1 APPLICATION(S): at 05:21

## 2025-01-01 RX ADMIN — SCOPOLAMINE 1 PATCH: 1 PATCH, EXTENDED RELEASE TRANSDERMAL at 15:53

## 2025-01-01 RX ADMIN — APIXABAN 5 MILLIGRAM(S): 2.5 TABLET, FILM COATED ORAL at 17:21

## 2025-01-01 RX ADMIN — Medication 1 PACKET(S): at 05:06

## 2025-01-01 RX ADMIN — Medication 100 MILLIGRAM(S): at 05:07

## 2025-01-01 RX ADMIN — INSULIN LISPRO 2: 100 INJECTION, SOLUTION INTRAVENOUS; SUBCUTANEOUS at 05:41

## 2025-01-01 RX ADMIN — MEROPENEM 1000 MILLIGRAM(S): 1 INJECTION INTRAVENOUS at 21:23

## 2025-01-01 RX ADMIN — Medication 75 MILLILITER(S): at 02:49

## 2025-01-01 RX ADMIN — Medication 250 MILLIGRAM(S): at 05:03

## 2025-01-01 RX ADMIN — ATORVASTATIN CALCIUM 40 MILLIGRAM(S): 80 TABLET, FILM COATED ORAL at 21:17

## 2025-01-01 RX ADMIN — Medication 400 MILLIGRAM(S): at 19:54

## 2025-01-01 RX ADMIN — Medication 15 MILLILITER(S): at 17:12

## 2025-01-01 RX ADMIN — Medication 40 MILLIGRAM(S): at 12:33

## 2025-01-01 RX ADMIN — APIXABAN 5 MILLIGRAM(S): 2.5 TABLET, FILM COATED ORAL at 16:05

## 2025-01-01 RX ADMIN — Medication 4 MILLILITER(S): at 16:29

## 2025-01-01 RX ADMIN — INSULIN LISPRO 4: 100 INJECTION, SOLUTION INTRAVENOUS; SUBCUTANEOUS at 11:33

## 2025-01-01 RX ADMIN — Medication 650 MILLIGRAM(S): at 02:06

## 2025-01-01 RX ADMIN — Medication 40 MILLIGRAM(S): at 11:32

## 2025-01-01 RX ADMIN — Medication 2 MILLIGRAM(S): at 08:15

## 2025-01-01 RX ADMIN — Medication 2000 MILLILITER(S): at 03:51

## 2025-01-01 RX ADMIN — Medication 40 MILLIGRAM(S): at 11:19

## 2025-01-01 RX ADMIN — Medication 15 MILLILITER(S): at 16:56

## 2025-01-01 RX ADMIN — GLYCOPYRROLATE 0.4 MILLIGRAM(S): 0.2 INJECTION INTRAMUSCULAR; INTRAVENOUS at 09:14

## 2025-01-01 RX ADMIN — DEXMEDETOMIDINE HYDROCHLORIDE IN SODIUM CHLORIDE 3.23 MICROGRAM(S)/KG/HR: 4 INJECTION INTRAVENOUS at 03:48

## 2025-01-01 RX ADMIN — Medication 15 MILLILITER(S): at 18:03

## 2025-01-01 RX ADMIN — Medication 15 MILLILITER(S): at 17:21

## 2025-01-01 RX ADMIN — Medication 25 GRAM(S): at 21:40

## 2025-01-01 RX ADMIN — APIXABAN 5 MILLIGRAM(S): 2.5 TABLET, FILM COATED ORAL at 17:31

## 2025-01-01 RX ADMIN — Medication 25 GRAM(S): at 13:58

## 2025-01-01 RX ADMIN — Medication 250 MILLIGRAM(S): at 06:26

## 2025-01-01 RX ADMIN — Medication 250 MILLIGRAM(S): at 13:14

## 2025-01-01 RX ADMIN — Medication 500 MILLIGRAM(S): at 22:42

## 2025-01-01 RX ADMIN — LABETALOL HYDROCHLORIDE 10 MILLIGRAM(S): 200 TABLET, FILM COATED ORAL at 00:43

## 2025-01-01 RX ADMIN — Medication 1 PACKET(S): at 05:57

## 2025-01-01 RX ADMIN — INSULIN LISPRO 4: 100 INJECTION, SOLUTION INTRAVENOUS; SUBCUTANEOUS at 23:30

## 2025-01-01 RX ADMIN — INSULIN GLARGINE-YFGN 10 UNIT(S): 100 INJECTION, SOLUTION SUBCUTANEOUS at 22:12

## 2025-01-01 RX ADMIN — ATORVASTATIN CALCIUM 80 MILLIGRAM(S): 80 TABLET, FILM COATED ORAL at 21:13

## 2025-01-01 RX ADMIN — Medication 15 MILLILITER(S): at 08:15

## 2025-01-01 RX ADMIN — Medication 100 MILLIGRAM(S): at 11:19

## 2025-01-01 RX ADMIN — QUETIAPINE FUMARATE 50 MILLIGRAM(S): 25 TABLET ORAL at 21:38

## 2025-01-01 RX ADMIN — Medication 1 APPLICATION(S): at 05:11

## 2025-01-01 RX ADMIN — POTASSIUM PHOSPHATE, MONOBASIC POTASSIUM PHOSPHATE, DIBASIC INJECTION, 83.33 MILLIMOLE(S): 236; 224 SOLUTION, CONCENTRATE INTRAVENOUS at 08:30

## 2025-01-01 RX ADMIN — ATORVASTATIN CALCIUM 80 MILLIGRAM(S): 80 TABLET, FILM COATED ORAL at 21:57

## 2025-01-01 RX ADMIN — QUETIAPINE FUMARATE 25 MILLIGRAM(S): 25 TABLET ORAL at 17:22

## 2025-01-01 RX ADMIN — INSULIN LISPRO 2: 100 INJECTION, SOLUTION INTRAVENOUS; SUBCUTANEOUS at 05:19

## 2025-01-01 RX ADMIN — Medication 0.5 MG/HR: at 18:16

## 2025-01-01 RX ADMIN — Medication 10 MILLIGRAM(S): at 17:09

## 2025-01-01 RX ADMIN — APIXABAN 5 MILLIGRAM(S): 2.5 TABLET, FILM COATED ORAL at 05:09

## 2025-01-01 RX ADMIN — INSULIN GLARGINE-YFGN 12 UNIT(S): 100 INJECTION, SOLUTION SUBCUTANEOUS at 08:29

## 2025-01-01 RX ADMIN — QUETIAPINE FUMARATE 25 MILLIGRAM(S): 25 TABLET ORAL at 05:03

## 2025-01-01 RX ADMIN — INSULIN LISPRO 2: 100 INJECTION, SOLUTION INTRAVENOUS; SUBCUTANEOUS at 05:42

## 2025-01-01 RX ADMIN — MEROPENEM 1000 MILLIGRAM(S): 1 INJECTION INTRAVENOUS at 23:13

## 2025-01-01 RX ADMIN — INSULIN GLARGINE-YFGN 10 UNIT(S): 100 INJECTION, SOLUTION SUBCUTANEOUS at 08:51

## 2025-01-01 RX ADMIN — Medication 650 MILLIGRAM(S): at 03:28

## 2025-01-01 RX ADMIN — INSULIN LISPRO 4: 100 INJECTION, SOLUTION INTRAVENOUS; SUBCUTANEOUS at 23:42

## 2025-01-01 RX ADMIN — Medication 400 MILLIGRAM(S): at 20:08

## 2025-01-01 RX ADMIN — INSULIN LISPRO 2: 100 INJECTION, SOLUTION INTRAVENOUS; SUBCUTANEOUS at 11:03

## 2025-01-01 RX ADMIN — Medication 1 PACKET(S): at 05:22

## 2025-01-01 RX ADMIN — Medication 40 MILLIGRAM(S): at 11:22

## 2025-01-01 RX ADMIN — MEROPENEM 1000 MILLIGRAM(S): 1 INJECTION INTRAVENOUS at 16:37

## 2025-01-01 RX ADMIN — Medication 650 MILLIGRAM(S): at 14:31

## 2025-01-01 RX ADMIN — Medication 25 GRAM(S): at 17:21

## 2025-01-01 RX ADMIN — Medication 25 GRAM(S): at 21:41

## 2025-01-01 RX ADMIN — Medication 100 MILLIGRAM(S): at 05:36

## 2025-01-01 RX ADMIN — PROPOFOL 3.87 MICROGRAM(S)/KG/MIN: 10 INJECTION, EMULSION INTRAVENOUS at 07:42

## 2025-01-01 RX ADMIN — Medication 1000 MILLIGRAM(S): at 21:05

## 2025-01-01 RX ADMIN — Medication 125 MILLIGRAM(S): at 14:12

## 2025-01-01 RX ADMIN — Medication 25 GRAM(S): at 13:14

## 2025-01-01 RX ADMIN — INSULIN LISPRO 4: 100 INJECTION, SOLUTION INTRAVENOUS; SUBCUTANEOUS at 18:28

## 2025-01-01 RX ADMIN — APIXABAN 5 MILLIGRAM(S): 2.5 TABLET, FILM COATED ORAL at 05:44

## 2025-01-01 RX ADMIN — SCOPOLAMINE 1 PATCH: 1 PATCH, EXTENDED RELEASE TRANSDERMAL at 07:09

## 2025-01-01 RX ADMIN — Medication 15 MILLILITER(S): at 05:05

## 2025-01-01 RX ADMIN — Medication 650 MILLIGRAM(S): at 15:54

## 2025-01-01 RX ADMIN — APIXABAN 5 MILLIGRAM(S): 2.5 TABLET, FILM COATED ORAL at 05:22

## 2025-01-01 RX ADMIN — Medication 250 MILLIGRAM(S): at 05:04

## 2025-01-01 RX ADMIN — IPRATROPIUM BROMIDE AND ALBUTEROL SULFATE 3 MILLILITER(S): .5; 2.5 SOLUTION RESPIRATORY (INHALATION) at 08:16

## 2025-01-01 RX ADMIN — INSULIN GLARGINE-YFGN 10 UNIT(S): 100 INJECTION, SOLUTION SUBCUTANEOUS at 00:18

## 2025-01-01 RX ADMIN — Medication 1000 MILLIGRAM(S): at 12:53

## 2025-01-01 RX ADMIN — Medication 15 MILLILITER(S): at 17:24

## 2025-01-01 RX ADMIN — Medication 650 MILLIGRAM(S): at 18:02

## 2025-01-01 RX ADMIN — Medication 25 GRAM(S): at 08:31

## 2025-01-01 RX ADMIN — DEXMEDETOMIDINE HYDROCHLORIDE IN SODIUM CHLORIDE 3.23 MICROGRAM(S)/KG/HR: 4 INJECTION INTRAVENOUS at 18:45

## 2025-01-01 RX ADMIN — Medication 10 MILLIGRAM(S): at 11:28

## 2025-01-01 RX ADMIN — INSULIN GLARGINE-YFGN 10 UNIT(S): 100 INJECTION, SOLUTION SUBCUTANEOUS at 07:38

## 2025-01-01 RX ADMIN — INSULIN GLARGINE-YFGN 10 UNIT(S): 100 INJECTION, SOLUTION SUBCUTANEOUS at 23:17

## 2025-01-01 RX ADMIN — Medication 40 MILLIGRAM(S): at 11:03

## 2025-01-01 RX ADMIN — Medication 1000 MILLIGRAM(S): at 13:00

## 2025-01-01 RX ADMIN — Medication 15 MILLILITER(S): at 11:18

## 2025-01-01 RX ADMIN — Medication 15 MILLILITER(S): at 05:53

## 2025-01-01 RX ADMIN — Medication 15 MILLILITER(S): at 11:57

## 2025-01-01 RX ADMIN — Medication 75 MILLILITER(S): at 21:52

## 2025-01-01 RX ADMIN — APIXABAN 5 MILLIGRAM(S): 2.5 TABLET, FILM COATED ORAL at 17:11

## 2025-01-01 RX ADMIN — Medication 4 MILLILITER(S): at 10:18

## 2025-01-01 RX ADMIN — Medication 1 PACKET(S): at 17:18

## 2025-01-01 RX ADMIN — INSULIN LISPRO 4: 100 INJECTION, SOLUTION INTRAVENOUS; SUBCUTANEOUS at 23:06

## 2025-01-01 RX ADMIN — Medication 0.5 MILLILITER(S): at 11:34

## 2025-01-01 RX ADMIN — INSULIN LISPRO 4: 100 INJECTION, SOLUTION INTRAVENOUS; SUBCUTANEOUS at 23:22

## 2025-01-01 RX ADMIN — Medication 1 PACKET(S): at 05:27

## 2025-01-01 RX ADMIN — Medication 500 MILLIGRAM(S): at 22:25

## 2025-01-01 RX ADMIN — Medication 3 MILLIGRAM(S): at 21:45

## 2025-01-01 RX ADMIN — Medication 25 GRAM(S): at 22:13

## 2025-01-01 RX ADMIN — QUETIAPINE FUMARATE 25 MILLIGRAM(S): 25 TABLET ORAL at 17:10

## 2025-01-01 RX ADMIN — INSULIN LISPRO 2: 100 INJECTION, SOLUTION INTRAVENOUS; SUBCUTANEOUS at 17:45

## 2025-01-01 RX ADMIN — Medication 650 MILLIGRAM(S): at 12:18

## 2025-01-01 RX ADMIN — INSULIN LISPRO 4: 100 INJECTION, SOLUTION INTRAVENOUS; SUBCUTANEOUS at 05:18

## 2025-01-01 RX ADMIN — Medication 650 MILLIGRAM(S): at 18:18

## 2025-01-01 RX ADMIN — INSULIN LISPRO 4: 100 INJECTION, SOLUTION INTRAVENOUS; SUBCUTANEOUS at 11:07

## 2025-01-01 RX ADMIN — INSULIN LISPRO 6: 100 INJECTION, SOLUTION INTRAVENOUS; SUBCUTANEOUS at 05:31

## 2025-01-01 RX ADMIN — Medication 40 MILLIGRAM(S): at 12:24

## 2025-01-01 RX ADMIN — Medication 650 MILLIGRAM(S): at 21:21

## 2025-01-01 RX ADMIN — ATORVASTATIN CALCIUM 80 MILLIGRAM(S): 80 TABLET, FILM COATED ORAL at 22:13

## 2025-01-01 RX ADMIN — Medication 15 MILLIGRAM(S): at 11:50

## 2025-01-01 RX ADMIN — Medication 650 MILLIGRAM(S): at 00:46

## 2025-01-01 RX ADMIN — APIXABAN 5 MILLIGRAM(S): 2.5 TABLET, FILM COATED ORAL at 05:52

## 2025-01-01 RX ADMIN — SCOPOLAMINE 1 PATCH: 1 PATCH, EXTENDED RELEASE TRANSDERMAL at 11:36

## 2025-01-01 RX ADMIN — SCOPOLAMINE 1 PATCH: 1 PATCH, EXTENDED RELEASE TRANSDERMAL at 14:50

## 2025-01-01 RX ADMIN — INSULIN LISPRO 4: 100 INJECTION, SOLUTION INTRAVENOUS; SUBCUTANEOUS at 02:46

## 2025-01-01 RX ADMIN — Medication 15 MILLILITER(S): at 05:23

## 2025-01-01 RX ADMIN — OLANZAPINE 5 MILLIGRAM(S): 10 TABLET ORAL at 15:36

## 2025-01-01 RX ADMIN — Medication 1 PACKET(S): at 05:51

## 2025-01-01 RX ADMIN — INSULIN GLARGINE-YFGN 10 UNIT(S): 100 INJECTION, SOLUTION SUBCUTANEOUS at 08:27

## 2025-01-01 RX ADMIN — IPRATROPIUM BROMIDE AND ALBUTEROL SULFATE 3 MILLILITER(S): .5; 2.5 SOLUTION RESPIRATORY (INHALATION) at 09:02

## 2025-01-01 RX ADMIN — Medication 500 MILLIGRAM(S): at 17:10

## 2025-01-01 RX ADMIN — ATORVASTATIN CALCIUM 80 MILLIGRAM(S): 80 TABLET, FILM COATED ORAL at 21:34

## 2025-01-01 RX ADMIN — INSULIN LISPRO 4: 100 INJECTION, SOLUTION INTRAVENOUS; SUBCUTANEOUS at 23:35

## 2025-01-01 RX ADMIN — Medication 650 MILLIGRAM(S): at 08:14

## 2025-01-01 RX ADMIN — Medication 25 GRAM(S): at 01:59

## 2025-01-01 RX ADMIN — INSULIN LISPRO 2: 100 INJECTION, SOLUTION INTRAVENOUS; SUBCUTANEOUS at 12:58

## 2025-01-01 RX ADMIN — MEROPENEM 1000 MILLIGRAM(S): 1 INJECTION INTRAVENOUS at 21:21

## 2025-01-01 RX ADMIN — Medication 25 GRAM(S): at 17:00

## 2025-01-01 RX ADMIN — Medication 500 MILLIGRAM(S): at 13:13

## 2025-01-01 RX ADMIN — HEPARIN SODIUM 14.5 UNIT(S)/HR: 1000 INJECTION INTRAVENOUS; SUBCUTANEOUS at 14:58

## 2025-01-01 RX ADMIN — Medication 15 MILLILITER(S): at 12:56

## 2025-01-01 RX ADMIN — Medication 1 APPLICATION(S): at 05:23

## 2025-01-01 RX ADMIN — Medication 3 MILLIGRAM(S): at 22:47

## 2025-01-01 RX ADMIN — APIXABAN 5 MILLIGRAM(S): 2.5 TABLET, FILM COATED ORAL at 18:24

## 2025-01-01 RX ADMIN — Medication 3 MILLIGRAM(S): at 23:31

## 2025-01-01 RX ADMIN — INSULIN LISPRO 6: 100 INJECTION, SOLUTION INTRAVENOUS; SUBCUTANEOUS at 06:18

## 2025-01-01 RX ADMIN — Medication 25 GRAM(S): at 05:05

## 2025-01-01 RX ADMIN — DEXMEDETOMIDINE HYDROCHLORIDE IN SODIUM CHLORIDE 3.23 MICROGRAM(S)/KG/HR: 4 INJECTION INTRAVENOUS at 16:19

## 2025-01-01 RX ADMIN — INSULIN LISPRO 2: 100 INJECTION, SOLUTION INTRAVENOUS; SUBCUTANEOUS at 05:37

## 2025-01-01 RX ADMIN — ATORVASTATIN CALCIUM 40 MILLIGRAM(S): 80 TABLET, FILM COATED ORAL at 21:53

## 2025-01-01 RX ADMIN — GABAPENTIN 400 MILLIGRAM(S): 400 CAPSULE ORAL at 22:59

## 2025-01-01 RX ADMIN — DEXTROMETHORPHAN HBR, GUAIFENESIN 200 MILLIGRAM(S): 200 LIQUID ORAL at 03:29

## 2025-01-01 RX ADMIN — HEPARIN SODIUM 13 UNIT(S)/HR: 1000 INJECTION INTRAVENOUS; SUBCUTANEOUS at 09:52

## 2025-01-01 RX ADMIN — HEPARIN SODIUM 13 UNIT(S)/HR: 1000 INJECTION INTRAVENOUS; SUBCUTANEOUS at 07:20

## 2025-01-01 RX ADMIN — Medication 40 MILLIGRAM(S): at 08:04

## 2025-01-01 RX ADMIN — Medication 500 MILLIGRAM(S): at 22:02

## 2025-01-01 RX ADMIN — APIXABAN 5 MILLIGRAM(S): 2.5 TABLET, FILM COATED ORAL at 05:40

## 2025-01-01 RX ADMIN — APIXABAN 5 MILLIGRAM(S): 2.5 TABLET, FILM COATED ORAL at 18:18

## 2025-01-01 RX ADMIN — APIXABAN 5 MILLIGRAM(S): 2.5 TABLET, FILM COATED ORAL at 05:05

## 2025-01-01 RX ADMIN — INSULIN LISPRO 4: 100 INJECTION, SOLUTION INTRAVENOUS; SUBCUTANEOUS at 17:12

## 2025-01-01 RX ADMIN — INSULIN LISPRO 4: 100 INJECTION, SOLUTION INTRAVENOUS; SUBCUTANEOUS at 05:43

## 2025-01-01 RX ADMIN — MEROPENEM 1000 MILLIGRAM(S): 1 INJECTION INTRAVENOUS at 15:09

## 2025-01-01 RX ADMIN — Medication 15 MILLILITER(S): at 05:40

## 2025-01-01 RX ADMIN — Medication 650 MILLIGRAM(S): at 14:27

## 2025-01-01 RX ADMIN — Medication 2 MILLIGRAM(S): at 15:51

## 2025-01-01 RX ADMIN — Medication 250 MILLIGRAM(S): at 13:11

## 2025-01-01 RX ADMIN — Medication 650 MILLIGRAM(S): at 10:36

## 2025-01-01 RX ADMIN — Medication 250 MILLIGRAM(S): at 14:32

## 2025-01-01 RX ADMIN — Medication 2 MILLIGRAM(S): at 21:52

## 2025-01-01 RX ADMIN — IPRATROPIUM BROMIDE AND ALBUTEROL SULFATE 3 MILLILITER(S): .5; 2.5 SOLUTION RESPIRATORY (INHALATION) at 21:12

## 2025-01-01 RX ADMIN — Medication 1 APPLICATION(S): at 05:35

## 2025-01-01 RX ADMIN — QUETIAPINE FUMARATE 50 MILLIGRAM(S): 25 TABLET ORAL at 21:49

## 2025-01-01 RX ADMIN — QUETIAPINE FUMARATE 25 MILLIGRAM(S): 25 TABLET ORAL at 21:54

## 2025-01-01 RX ADMIN — INSULIN GLARGINE-YFGN 14 UNIT(S): 100 INJECTION, SOLUTION SUBCUTANEOUS at 21:50

## 2025-01-01 RX ADMIN — INSULIN GLARGINE-YFGN 10 UNIT(S): 100 INJECTION, SOLUTION SUBCUTANEOUS at 08:23

## 2025-01-01 RX ADMIN — Medication 250 MILLIGRAM(S): at 11:42

## 2025-01-01 RX ADMIN — Medication 15 MILLILITER(S): at 14:32

## 2025-01-01 RX ADMIN — Medication 100 GRAM(S): at 06:02

## 2025-01-01 RX ADMIN — IPRATROPIUM BROMIDE AND ALBUTEROL SULFATE 3 MILLILITER(S): .5; 2.5 SOLUTION RESPIRATORY (INHALATION) at 20:32

## 2025-01-01 RX ADMIN — Medication 1 PACKET(S): at 18:24

## 2025-01-01 RX ADMIN — Medication 250 MILLIGRAM(S): at 06:16

## 2025-01-01 RX ADMIN — Medication 1 PACKET(S): at 05:08

## 2025-01-01 RX ADMIN — Medication 1 PACKET(S): at 17:45

## 2025-01-01 RX ADMIN — INSULIN LISPRO 2: 100 INJECTION, SOLUTION INTRAVENOUS; SUBCUTANEOUS at 12:05

## 2025-01-01 RX ADMIN — ATORVASTATIN CALCIUM 80 MILLIGRAM(S): 80 TABLET, FILM COATED ORAL at 21:54

## 2025-01-01 RX ADMIN — Medication 400 MILLIGRAM(S): at 08:31

## 2025-01-01 RX ADMIN — Medication 10 MILLIGRAM(S): at 17:57

## 2025-01-01 RX ADMIN — ATORVASTATIN CALCIUM 80 MILLIGRAM(S): 80 TABLET, FILM COATED ORAL at 21:56

## 2025-01-01 RX ADMIN — INSULIN GLARGINE-YFGN 10 UNIT(S): 100 INJECTION, SOLUTION SUBCUTANEOUS at 22:08

## 2025-01-01 RX ADMIN — INSULIN LISPRO 2: 100 INJECTION, SOLUTION INTRAVENOUS; SUBCUTANEOUS at 11:19

## 2025-01-01 RX ADMIN — Medication 250 MILLIGRAM(S): at 13:13

## 2025-01-01 RX ADMIN — Medication 650 MILLIGRAM(S): at 08:01

## 2025-01-01 RX ADMIN — INSULIN LISPRO 2: 100 INJECTION, SOLUTION INTRAVENOUS; SUBCUTANEOUS at 09:55

## 2025-01-01 RX ADMIN — Medication 100 MILLIGRAM(S): at 11:10

## 2025-01-01 RX ADMIN — Medication 15 MILLILITER(S): at 06:01

## 2025-01-01 RX ADMIN — Medication 500 MILLIGRAM(S): at 08:15

## 2025-01-01 RX ADMIN — Medication 1 PACKET(S): at 17:54

## 2025-01-01 RX ADMIN — INSULIN LISPRO 2: 100 INJECTION, SOLUTION INTRAVENOUS; SUBCUTANEOUS at 00:44

## 2025-01-01 RX ADMIN — SCOPOLAMINE 1 PATCH: 1 PATCH, EXTENDED RELEASE TRANSDERMAL at 19:18

## 2025-01-01 RX ADMIN — Medication 1000 MILLIGRAM(S): at 15:06

## 2025-01-01 RX ADMIN — Medication 40 MILLIGRAM(S): at 12:23

## 2025-01-01 RX ADMIN — Medication 250 MILLIGRAM(S): at 03:58

## 2025-01-01 RX ADMIN — APIXABAN 5 MILLIGRAM(S): 2.5 TABLET, FILM COATED ORAL at 05:41

## 2025-01-01 RX ADMIN — Medication 40 MILLIGRAM(S): at 17:54

## 2025-01-01 RX ADMIN — Medication 25 GRAM(S): at 06:37

## 2025-01-01 RX ADMIN — Medication 10 MILLIGRAM(S): at 08:28

## 2025-01-01 RX ADMIN — Medication 250 MILLIGRAM(S): at 21:11

## 2025-01-01 RX ADMIN — Medication 25 GRAM(S): at 05:28

## 2025-01-01 RX ADMIN — SCOPOLAMINE 1 PATCH: 1 PATCH, EXTENDED RELEASE TRANSDERMAL at 07:36

## 2025-01-01 RX ADMIN — Medication 25 GRAM(S): at 05:04

## 2025-01-01 RX ADMIN — IPRATROPIUM BROMIDE AND ALBUTEROL SULFATE 3 MILLILITER(S): .5; 2.5 SOLUTION RESPIRATORY (INHALATION) at 22:45

## 2025-01-01 RX ADMIN — Medication 100 MILLIGRAM(S): at 05:22

## 2025-01-01 RX ADMIN — IPRATROPIUM BROMIDE AND ALBUTEROL SULFATE 3 MILLILITER(S): .5; 2.5 SOLUTION RESPIRATORY (INHALATION) at 20:30

## 2025-01-01 RX ADMIN — INSULIN GLARGINE-YFGN 10 UNIT(S): 100 INJECTION, SOLUTION SUBCUTANEOUS at 08:50

## 2025-01-01 RX ADMIN — Medication 15 MILLILITER(S): at 11:15

## 2025-01-01 RX ADMIN — Medication 650 MILLIGRAM(S): at 23:36

## 2025-01-01 RX ADMIN — Medication 3 MILLIGRAM(S): at 21:54

## 2025-01-01 RX ADMIN — Medication 2 MILLIGRAM(S): at 14:13

## 2025-01-01 RX ADMIN — Medication 5 MILLIGRAM(S): at 22:59

## 2025-01-01 RX ADMIN — Medication 15 MILLILITER(S): at 16:12

## 2025-01-01 RX ADMIN — INSULIN LISPRO 2: 100 INJECTION, SOLUTION INTRAVENOUS; SUBCUTANEOUS at 23:32

## 2025-01-01 RX ADMIN — ATORVASTATIN CALCIUM 40 MILLIGRAM(S): 80 TABLET, FILM COATED ORAL at 21:59

## 2025-01-01 RX ADMIN — POLYETHYLENE GLYCOL 3350 17 GRAM(S): 17 POWDER, FOR SOLUTION ORAL at 12:08

## 2025-01-01 RX ADMIN — Medication 250 MILLIGRAM(S): at 21:20

## 2025-01-01 RX ADMIN — Medication 400 MILLIGRAM(S): at 11:56

## 2025-01-01 RX ADMIN — APIXABAN 5 MILLIGRAM(S): 2.5 TABLET, FILM COATED ORAL at 05:35

## 2025-01-01 RX ADMIN — MEROPENEM 1000 MILLIGRAM(S): 1 INJECTION INTRAVENOUS at 05:36

## 2025-01-01 RX ADMIN — Medication 1 PACKET(S): at 08:14

## 2025-01-01 RX ADMIN — Medication 15 MILLILITER(S): at 11:13

## 2025-01-01 RX ADMIN — INSULIN LISPRO 4: 100 INJECTION, SOLUTION INTRAVENOUS; SUBCUTANEOUS at 23:10

## 2025-01-01 RX ADMIN — Medication 650 MILLIGRAM(S): at 18:23

## 2025-01-01 RX ADMIN — Medication 1 APPLICATION(S): at 06:28

## 2025-01-01 RX ADMIN — Medication 100 MILLIGRAM(S): at 11:27

## 2025-01-01 RX ADMIN — Medication 1 PACKET(S): at 06:15

## 2025-01-01 RX ADMIN — ATORVASTATIN CALCIUM 40 MILLIGRAM(S): 80 TABLET, FILM COATED ORAL at 21:39

## 2025-01-01 RX ADMIN — Medication 650 MILLIGRAM(S): at 18:52

## 2025-01-01 RX ADMIN — Medication 15 MILLILITER(S): at 06:27

## 2025-01-01 RX ADMIN — Medication 1 APPLICATION(S): at 05:38

## 2025-01-01 RX ADMIN — Medication 40 MILLIGRAM(S): at 12:08

## 2025-01-01 RX ADMIN — Medication 15 MILLILITER(S): at 10:06

## 2025-01-01 RX ADMIN — APIXABAN 5 MILLIGRAM(S): 2.5 TABLET, FILM COATED ORAL at 05:19

## 2025-01-01 RX ADMIN — INSULIN LISPRO 4: 100 INJECTION, SOLUTION INTRAVENOUS; SUBCUTANEOUS at 10:41

## 2025-01-01 RX ADMIN — INSULIN GLARGINE-YFGN 10 UNIT(S): 100 INJECTION, SOLUTION SUBCUTANEOUS at 23:33

## 2025-01-01 RX ADMIN — Medication 250 MILLIGRAM(S): at 14:27

## 2025-01-01 RX ADMIN — POTASSIUM PHOSPHATE, MONOBASIC POTASSIUM PHOSPHATE, DIBASIC INJECTION, 83.33 MILLIMOLE(S): 236; 224 SOLUTION, CONCENTRATE INTRAVENOUS at 05:38

## 2025-01-01 RX ADMIN — Medication 15 MILLILITER(S): at 18:50

## 2025-01-01 RX ADMIN — Medication 1 APPLICATION(S): at 05:41

## 2025-01-01 RX ADMIN — QUETIAPINE FUMARATE 25 MILLIGRAM(S): 25 TABLET ORAL at 17:25

## 2025-01-01 RX ADMIN — INSULIN GLARGINE-YFGN 15 UNIT(S): 100 INJECTION, SOLUTION SUBCUTANEOUS at 08:59

## 2025-01-01 RX ADMIN — APIXABAN 5 MILLIGRAM(S): 2.5 TABLET, FILM COATED ORAL at 05:26

## 2025-01-01 RX ADMIN — Medication 15 MILLILITER(S): at 16:54

## 2025-01-01 RX ADMIN — Medication 3 MILLIGRAM(S): at 22:09

## 2025-01-01 RX ADMIN — QUETIAPINE FUMARATE 50 MILLIGRAM(S): 25 TABLET ORAL at 22:37

## 2025-01-01 RX ADMIN — Medication 5 MILLIGRAM(S): at 23:12

## 2025-01-01 RX ADMIN — Medication 25 GRAM(S): at 13:11

## 2025-01-31 ENCOUNTER — INPATIENT (INPATIENT)
Facility: HOSPITAL | Age: 55
LOS: 0 days | Discharge: LEFT BEFORE DISCHARGE | DRG: 176 | End: 2025-02-01
Attending: INTERNAL MEDICINE | Admitting: INTERNAL MEDICINE
Payer: COMMERCIAL

## 2025-01-31 VITALS
HEIGHT: 64 IN | HEART RATE: 115 BPM | SYSTOLIC BLOOD PRESSURE: 182 MMHG | WEIGHT: 149.91 LBS | TEMPERATURE: 98 F | RESPIRATION RATE: 16 BRPM | OXYGEN SATURATION: 98 % | DIASTOLIC BLOOD PRESSURE: 92 MMHG

## 2025-01-31 DIAGNOSIS — I73.9 PERIPHERAL VASCULAR DISEASE, UNSPECIFIED: ICD-10-CM

## 2025-01-31 DIAGNOSIS — I26.99 OTHER PULMONARY EMBOLISM WITHOUT ACUTE COR PULMONALE: ICD-10-CM

## 2025-01-31 DIAGNOSIS — E78.5 HYPERLIPIDEMIA, UNSPECIFIED: ICD-10-CM

## 2025-01-31 DIAGNOSIS — F17.200 NICOTINE DEPENDENCE, UNSPECIFIED, UNCOMPLICATED: ICD-10-CM

## 2025-01-31 DIAGNOSIS — E11.9 TYPE 2 DIABETES MELLITUS WITHOUT COMPLICATIONS: ICD-10-CM

## 2025-01-31 DIAGNOSIS — I80.219 PHLEBITIS AND THROMBOPHLEBITIS OF UNSPECIFIED ILIAC VEIN: ICD-10-CM

## 2025-01-31 DIAGNOSIS — I10 ESSENTIAL (PRIMARY) HYPERTENSION: ICD-10-CM

## 2025-01-31 DIAGNOSIS — G89.29 OTHER CHRONIC PAIN: ICD-10-CM

## 2025-01-31 DIAGNOSIS — R11.2 NAUSEA WITH VOMITING, UNSPECIFIED: ICD-10-CM

## 2025-01-31 DIAGNOSIS — Z29.9 ENCOUNTER FOR PROPHYLACTIC MEASURES, UNSPECIFIED: ICD-10-CM

## 2025-01-31 LAB
ALBUMIN SERPL ELPH-MCNC: 4.4 G/DL — SIGNIFICANT CHANGE UP (ref 3.3–5)
ALP SERPL-CCNC: 91 U/L — SIGNIFICANT CHANGE UP (ref 40–120)
ALT FLD-CCNC: 16 U/L — SIGNIFICANT CHANGE UP (ref 12–78)
ANION GAP SERPL CALC-SCNC: 17 MMOL/L — SIGNIFICANT CHANGE UP (ref 5–17)
APPEARANCE UR: CLEAR — SIGNIFICANT CHANGE UP
APTT BLD: 165.8 SEC — CRITICAL HIGH (ref 24.5–35.6)
APTT BLD: 27.6 SEC — SIGNIFICANT CHANGE UP (ref 24.5–35.6)
APTT BLD: 46.1 SEC — HIGH (ref 24.5–35.6)
APTT BLD: 47.4 SEC — HIGH (ref 24.5–35.6)
AST SERPL-CCNC: 13 U/L — LOW (ref 15–37)
B-OH-BUTYR SERPL-SCNC: 4.1 MMOL/L — HIGH
BASOPHILS # BLD AUTO: 0.04 K/UL — SIGNIFICANT CHANGE UP (ref 0–0.2)
BASOPHILS NFR BLD AUTO: 0.4 % — SIGNIFICANT CHANGE UP (ref 0–2)
BILIRUB SERPL-MCNC: 0.8 MG/DL — SIGNIFICANT CHANGE UP (ref 0.2–1.2)
BILIRUB UR-MCNC: NEGATIVE — SIGNIFICANT CHANGE UP
BUN SERPL-MCNC: 22 MG/DL — SIGNIFICANT CHANGE UP (ref 7–23)
CALCIUM SERPL-MCNC: 10.7 MG/DL — HIGH (ref 8.5–10.1)
CHLORIDE SERPL-SCNC: 90 MMOL/L — LOW (ref 96–108)
CO2 SERPL-SCNC: 22 MMOL/L — SIGNIFICANT CHANGE UP (ref 22–31)
COLOR SPEC: YELLOW — SIGNIFICANT CHANGE UP
CREAT SERPL-MCNC: 1.2 MG/DL — SIGNIFICANT CHANGE UP (ref 0.5–1.3)
D DIMER BLD IA.RAPID-MCNC: 1649 NG/ML DDU — HIGH
DIFF PNL FLD: ABNORMAL
EGFR: 71 ML/MIN/1.73M2 — SIGNIFICANT CHANGE UP
EOSINOPHIL # BLD AUTO: 0.07 K/UL — SIGNIFICANT CHANGE UP (ref 0–0.5)
EOSINOPHIL NFR BLD AUTO: 0.6 % — SIGNIFICANT CHANGE UP (ref 0–6)
GLUCOSE SERPL-MCNC: 340 MG/DL — HIGH (ref 70–99)
GLUCOSE UR QL: >=1000 MG/DL
HCT VFR BLD CALC: 36 % — LOW (ref 39–50)
HCT VFR BLD CALC: 36.8 % — LOW (ref 39–50)
HCT VFR BLD CALC: 43.9 % — SIGNIFICANT CHANGE UP (ref 39–50)
HGB BLD-MCNC: 12.5 G/DL — LOW (ref 13–17)
HGB BLD-MCNC: 13 G/DL — SIGNIFICANT CHANGE UP (ref 13–17)
HGB BLD-MCNC: 15.8 G/DL — SIGNIFICANT CHANGE UP (ref 13–17)
IMM GRANULOCYTES NFR BLD AUTO: 0.4 % — SIGNIFICANT CHANGE UP (ref 0–0.9)
INR BLD: 1.14 RATIO — SIGNIFICANT CHANGE UP (ref 0.85–1.16)
KETONES UR-MCNC: 80 MG/DL
LACTATE SERPL-SCNC: 1.2 MMOL/L — SIGNIFICANT CHANGE UP (ref 0.7–2)
LACTATE SERPL-SCNC: 3.3 MMOL/L — HIGH (ref 0.7–2)
LEUKOCYTE ESTERASE UR-ACNC: NEGATIVE — SIGNIFICANT CHANGE UP
LIDOCAIN IGE QN: 50 U/L — SIGNIFICANT CHANGE UP (ref 13–75)
LYMPHOCYTES # BLD AUTO: 2.3 K/UL — SIGNIFICANT CHANGE UP (ref 1–3.3)
LYMPHOCYTES # BLD AUTO: 20.3 % — SIGNIFICANT CHANGE UP (ref 13–44)
MCHC RBC-ENTMCNC: 28.7 PG — SIGNIFICANT CHANGE UP (ref 27–34)
MCHC RBC-ENTMCNC: 29.1 PG — SIGNIFICANT CHANGE UP (ref 27–34)
MCHC RBC-ENTMCNC: 29.4 PG — SIGNIFICANT CHANGE UP (ref 27–34)
MCHC RBC-ENTMCNC: 34.7 G/DL — SIGNIFICANT CHANGE UP (ref 32–36)
MCHC RBC-ENTMCNC: 35.3 G/DL — SIGNIFICANT CHANGE UP (ref 32–36)
MCHC RBC-ENTMCNC: 36 G/DL — SIGNIFICANT CHANGE UP (ref 32–36)
MCV RBC AUTO: 80.8 FL — SIGNIFICANT CHANGE UP (ref 80–100)
MCV RBC AUTO: 82.8 FL — SIGNIFICANT CHANGE UP (ref 80–100)
MCV RBC AUTO: 83.3 FL — SIGNIFICANT CHANGE UP (ref 80–100)
MONOCYTES # BLD AUTO: 1.04 K/UL — HIGH (ref 0–0.9)
MONOCYTES NFR BLD AUTO: 9.2 % — SIGNIFICANT CHANGE UP (ref 2–14)
NEUTROPHILS # BLD AUTO: 7.86 K/UL — HIGH (ref 1.8–7.4)
NEUTROPHILS NFR BLD AUTO: 69.1 % — SIGNIFICANT CHANGE UP (ref 43–77)
NITRITE UR-MCNC: NEGATIVE — SIGNIFICANT CHANGE UP
NRBC # BLD: 0 /100 WBCS — SIGNIFICANT CHANGE UP (ref 0–0)
NRBC BLD-RTO: 0 /100 WBCS — SIGNIFICANT CHANGE UP (ref 0–0)
PH UR: 5 — SIGNIFICANT CHANGE UP (ref 5–8)
PLATELET # BLD AUTO: 229 K/UL — SIGNIFICANT CHANGE UP (ref 150–400)
PLATELET # BLD AUTO: 243 K/UL — SIGNIFICANT CHANGE UP (ref 150–400)
PLATELET # BLD AUTO: 304 K/UL — SIGNIFICANT CHANGE UP (ref 150–400)
POTASSIUM SERPL-MCNC: 3.3 MMOL/L — LOW (ref 3.5–5.3)
POTASSIUM SERPL-SCNC: 3.3 MMOL/L — LOW (ref 3.5–5.3)
PROT SERPL-MCNC: 8.8 G/DL — HIGH (ref 6–8.3)
PROT UR-MCNC: 300 MG/DL
PROTHROM AB SERPL-ACNC: 13.3 SEC — SIGNIFICANT CHANGE UP (ref 9.9–13.4)
RAPID RVP RESULT: SIGNIFICANT CHANGE UP
RBC # BLD: 4.35 M/UL — SIGNIFICANT CHANGE UP (ref 4.2–5.8)
RBC # BLD: 4.42 M/UL — SIGNIFICANT CHANGE UP (ref 4.2–5.8)
RBC # BLD: 5.43 M/UL — SIGNIFICANT CHANGE UP (ref 4.2–5.8)
RBC # FLD: 13.1 % — SIGNIFICANT CHANGE UP (ref 10.3–14.5)
RBC # FLD: 13.1 % — SIGNIFICANT CHANGE UP (ref 10.3–14.5)
RBC # FLD: 13.2 % — SIGNIFICANT CHANGE UP (ref 10.3–14.5)
SARS-COV-2 RNA SPEC QL NAA+PROBE: SIGNIFICANT CHANGE UP
SODIUM SERPL-SCNC: 129 MMOL/L — LOW (ref 135–145)
SP GR SPEC: 1.04 — HIGH (ref 1–1.03)
TROPONIN I, HIGH SENSITIVITY RESULT: 8.1 NG/L — SIGNIFICANT CHANGE UP
UROBILINOGEN FLD QL: 0.2 MG/DL — SIGNIFICANT CHANGE UP (ref 0.2–1)
WBC # BLD: 11.35 K/UL — HIGH (ref 3.8–10.5)
WBC # BLD: 11.72 K/UL — HIGH (ref 3.8–10.5)
WBC # BLD: 9.52 K/UL — SIGNIFICANT CHANGE UP (ref 3.8–10.5)
WBC # FLD AUTO: 11.35 K/UL — HIGH (ref 3.8–10.5)
WBC # FLD AUTO: 11.72 K/UL — HIGH (ref 3.8–10.5)
WBC # FLD AUTO: 9.52 K/UL — SIGNIFICANT CHANGE UP (ref 3.8–10.5)

## 2025-01-31 PROCEDURE — 74177 CT ABD & PELVIS W/CONTRAST: CPT | Mod: 26

## 2025-01-31 PROCEDURE — 99222 1ST HOSP IP/OBS MODERATE 55: CPT

## 2025-01-31 PROCEDURE — 70450 CT HEAD/BRAIN W/O DYE: CPT | Mod: 26

## 2025-01-31 PROCEDURE — 93010 ELECTROCARDIOGRAM REPORT: CPT

## 2025-01-31 PROCEDURE — 71045 X-RAY EXAM CHEST 1 VIEW: CPT | Mod: 26

## 2025-01-31 PROCEDURE — 93970 EXTREMITY STUDY: CPT | Mod: 26

## 2025-01-31 PROCEDURE — 99285 EMERGENCY DEPT VISIT HI MDM: CPT

## 2025-01-31 PROCEDURE — 71275 CT ANGIOGRAPHY CHEST: CPT | Mod: 26

## 2025-01-31 PROCEDURE — 99223 1ST HOSP IP/OBS HIGH 75: CPT | Mod: GC

## 2025-01-31 RX ORDER — INSULIN LISPRO 100/ML
VIAL (ML) SUBCUTANEOUS
Refills: 0 | Status: DISCONTINUED | OUTPATIENT
Start: 2025-01-31 | End: 2025-02-01

## 2025-01-31 RX ORDER — HEPARIN SODIUM,PORCINE 10000/ML
1000 VIAL (ML) INJECTION
Qty: 25000 | Refills: 0 | Status: DISCONTINUED | OUTPATIENT
Start: 2025-01-31 | End: 2025-01-31

## 2025-01-31 RX ORDER — HEPARIN SODIUM,PORCINE 10000/ML
5500 VIAL (ML) INJECTION EVERY 6 HOURS
Refills: 0 | Status: DISCONTINUED | OUTPATIENT
Start: 2025-01-31 | End: 2025-01-31

## 2025-01-31 RX ORDER — APIXABAN 5 MG/1
10 TABLET, FILM COATED ORAL EVERY 12 HOURS
Refills: 0 | Status: DISCONTINUED | OUTPATIENT
Start: 2025-01-31 | End: 2025-01-31

## 2025-01-31 RX ORDER — BACTERIOSTATIC SODIUM CHLORIDE 0.9 %
1000 VIAL (ML) INJECTION
Refills: 0 | Status: DISCONTINUED | OUTPATIENT
Start: 2025-01-31 | End: 2025-01-31

## 2025-01-31 RX ORDER — HYDROMORPHONE HYDROCHLORIDE 4 MG/ML
0.5 INJECTION, SOLUTION INTRAMUSCULAR; INTRAVENOUS; SUBCUTANEOUS ONCE
Refills: 0 | Status: DISCONTINUED | OUTPATIENT
Start: 2025-01-31 | End: 2025-01-31

## 2025-01-31 RX ORDER — HEPARIN SODIUM,PORCINE 10000/ML
2500 VIAL (ML) INJECTION EVERY 6 HOURS
Refills: 0 | Status: DISCONTINUED | OUTPATIENT
Start: 2025-01-31 | End: 2025-01-31

## 2025-01-31 RX ORDER — FAMOTIDINE 10 MG/ML
20 INJECTION INTRAVENOUS DAILY
Refills: 0 | Status: DISCONTINUED | OUTPATIENT
Start: 2025-01-31 | End: 2025-02-01

## 2025-01-31 RX ORDER — BACTERIOSTATIC SODIUM CHLORIDE 0.9 %
1000 VIAL (ML) INJECTION
Refills: 0 | Status: DISCONTINUED | OUTPATIENT
Start: 2025-01-31 | End: 2025-02-01

## 2025-01-31 RX ORDER — DEXTROAMPHETAMINE SACCHARATE, AMPHETAMINE ASPARTATE, DEXTROAMPHETAMINE SULFATE AND AMPHETAMINE SULFATE 1.875; 1.875; 1.875; 1.875 MG/1; MG/1; MG/1; MG/1
20 TABLET ORAL
Refills: 0 | Status: DISCONTINUED | OUTPATIENT
Start: 2025-01-31 | End: 2025-01-31

## 2025-01-31 RX ORDER — BACTERIOSTATIC SODIUM CHLORIDE 0.9 %
2100 VIAL (ML) INJECTION ONCE
Refills: 0 | Status: COMPLETED | OUTPATIENT
Start: 2025-01-31 | End: 2025-01-31

## 2025-01-31 RX ORDER — ASPIRIN 81 MG/1
81 TABLET, COATED ORAL DAILY
Refills: 0 | Status: DISCONTINUED | OUTPATIENT
Start: 2025-01-31 | End: 2025-02-01

## 2025-01-31 RX ORDER — POTASSIUM CHLORIDE 750 MG/1
40 TABLET, EXTENDED RELEASE ORAL ONCE
Refills: 0 | Status: DISCONTINUED | OUTPATIENT
Start: 2025-01-31 | End: 2025-01-31

## 2025-01-31 RX ORDER — INSULIN LISPRO 100/ML
VIAL (ML) SUBCUTANEOUS AT BEDTIME
Refills: 0 | Status: DISCONTINUED | OUTPATIENT
Start: 2025-01-31 | End: 2025-02-01

## 2025-01-31 RX ORDER — MORPHINE SULFATE 60 MG/1
4 TABLET, FILM COATED, EXTENDED RELEASE ORAL ONCE
Refills: 0 | Status: DISCONTINUED | OUTPATIENT
Start: 2025-01-31 | End: 2025-01-31

## 2025-01-31 RX ORDER — HEPARIN SODIUM,PORCINE 10000/ML
5500 VIAL (ML) INJECTION EVERY 6 HOURS
Refills: 0 | Status: DISCONTINUED | OUTPATIENT
Start: 2025-01-31 | End: 2025-02-01

## 2025-01-31 RX ORDER — ONDANSETRON 4 MG/1
4 TABLET, ORALLY DISINTEGRATING ORAL EVERY 6 HOURS
Refills: 0 | Status: DISCONTINUED | OUTPATIENT
Start: 2025-01-31 | End: 2025-02-01

## 2025-01-31 RX ORDER — DM/PSEUDOEPHED/ACETAMINOPHEN 10-30-250
12.5 CAPSULE ORAL ONCE
Refills: 0 | Status: DISCONTINUED | OUTPATIENT
Start: 2025-01-31 | End: 2025-02-01

## 2025-01-31 RX ORDER — APIXABAN 5 MG/1
5 TABLET, FILM COATED ORAL EVERY 12 HOURS
Refills: 0 | Status: CANCELLED | OUTPATIENT
Start: 2025-02-07 | End: 2025-02-01

## 2025-01-31 RX ORDER — NORTRIPTYLINE HCL 50 MG
25 CAPSULE ORAL AT BEDTIME
Refills: 0 | Status: DISCONTINUED | OUTPATIENT
Start: 2025-01-31 | End: 2025-02-01

## 2025-01-31 RX ORDER — HYDROMORPHONE HYDROCHLORIDE 4 MG/ML
1 INJECTION, SOLUTION INTRAMUSCULAR; INTRAVENOUS; SUBCUTANEOUS EVERY 4 HOURS
Refills: 0 | Status: DISCONTINUED | OUTPATIENT
Start: 2025-01-31 | End: 2025-02-01

## 2025-01-31 RX ORDER — DM/PSEUDOEPHED/ACETAMINOPHEN 10-30-250
25 CAPSULE ORAL ONCE
Refills: 0 | Status: DISCONTINUED | OUTPATIENT
Start: 2025-01-31 | End: 2025-02-01

## 2025-01-31 RX ORDER — HEPARIN SODIUM,PORCINE 10000/ML
VIAL (ML) INJECTION
Qty: 25000 | Refills: 0 | Status: DISCONTINUED | OUTPATIENT
Start: 2025-01-31 | End: 2025-01-31

## 2025-01-31 RX ORDER — HEPARIN SODIUM,PORCINE 10000/ML
5500 VIAL (ML) INJECTION ONCE
Refills: 0 | Status: COMPLETED | OUTPATIENT
Start: 2025-01-31 | End: 2025-01-31

## 2025-01-31 RX ORDER — POTASSIUM CHLORIDE 750 MG/1
40 TABLET, EXTENDED RELEASE ORAL ONCE
Refills: 0 | Status: COMPLETED | OUTPATIENT
Start: 2025-01-31 | End: 2025-01-31

## 2025-01-31 RX ORDER — INSULIN LISPRO 100/ML
5 VIAL (ML) SUBCUTANEOUS ONCE
Refills: 0 | Status: COMPLETED | OUTPATIENT
Start: 2025-01-31 | End: 2025-01-31

## 2025-01-31 RX ORDER — HEPARIN SODIUM,PORCINE 10000/ML
1000 VIAL (ML) INJECTION
Qty: 25000 | Refills: 0 | Status: DISCONTINUED | OUTPATIENT
Start: 2025-01-31 | End: 2025-02-01

## 2025-01-31 RX ORDER — ONDANSETRON 4 MG/1
4 TABLET, ORALLY DISINTEGRATING ORAL ONCE
Refills: 0 | Status: COMPLETED | OUTPATIENT
Start: 2025-01-31 | End: 2025-01-31

## 2025-01-31 RX ORDER — GLUCAGON 3 MG/1
1 POWDER NASAL ONCE
Refills: 0 | Status: DISCONTINUED | OUTPATIENT
Start: 2025-01-31 | End: 2025-02-01

## 2025-01-31 RX ORDER — ATORVASTATIN CALCIUM 80 MG/1
80 TABLET, FILM COATED ORAL AT BEDTIME
Refills: 0 | Status: DISCONTINUED | OUTPATIENT
Start: 2025-01-31 | End: 2025-02-01

## 2025-01-31 RX ORDER — FAMOTIDINE 10 MG/ML
20 INJECTION INTRAVENOUS ONCE
Refills: 0 | Status: COMPLETED | OUTPATIENT
Start: 2025-01-31 | End: 2025-01-31

## 2025-01-31 RX ORDER — HYDROMORPHONE HYDROCHLORIDE 4 MG/ML
0.5 INJECTION, SOLUTION INTRAMUSCULAR; INTRAVENOUS; SUBCUTANEOUS EVERY 6 HOURS
Refills: 0 | Status: DISCONTINUED | OUTPATIENT
Start: 2025-01-31 | End: 2025-01-31

## 2025-01-31 RX ORDER — DM/PSEUDOEPHED/ACETAMINOPHEN 10-30-250
15 CAPSULE ORAL ONCE
Refills: 0 | Status: DISCONTINUED | OUTPATIENT
Start: 2025-01-31 | End: 2025-02-01

## 2025-01-31 RX ORDER — ACETAMINOPHEN 160 MG/5ML
650 SUSPENSION ORAL EVERY 6 HOURS
Refills: 0 | Status: DISCONTINUED | OUTPATIENT
Start: 2025-01-31 | End: 2025-02-01

## 2025-01-31 RX ORDER — HEPARIN SODIUM,PORCINE 10000/ML
2500 VIAL (ML) INJECTION EVERY 6 HOURS
Refills: 0 | Status: DISCONTINUED | OUTPATIENT
Start: 2025-01-31 | End: 2025-02-01

## 2025-01-31 RX ORDER — APIXABAN 5 MG/1
10 TABLET, FILM COATED ORAL EVERY 12 HOURS
Refills: 0 | Status: DISCONTINUED | OUTPATIENT
Start: 2025-01-31 | End: 2025-02-01

## 2025-01-31 RX ORDER — HYDROMORPHONE HYDROCHLORIDE 4 MG/ML
1 INJECTION, SOLUTION INTRAMUSCULAR; INTRAVENOUS; SUBCUTANEOUS ONCE
Refills: 0 | Status: DISCONTINUED | OUTPATIENT
Start: 2025-01-31 | End: 2025-01-31

## 2025-01-31 RX ORDER — SODIUM CHLORIDE 9 G/ML
1000 INJECTION, SOLUTION INTRAVENOUS
Refills: 0 | Status: DISCONTINUED | OUTPATIENT
Start: 2025-01-31 | End: 2025-02-01

## 2025-01-31 RX ORDER — HYDROMORPHONE HYDROCHLORIDE 4 MG/ML
1 INJECTION, SOLUTION INTRAMUSCULAR; INTRAVENOUS; SUBCUTANEOUS EVERY 6 HOURS
Refills: 0 | Status: DISCONTINUED | OUTPATIENT
Start: 2025-01-31 | End: 2025-01-31

## 2025-01-31 RX ORDER — HYDROMORPHONE HYDROCHLORIDE 4 MG/ML
0.5 INJECTION, SOLUTION INTRAMUSCULAR; INTRAVENOUS; SUBCUTANEOUS EVERY 4 HOURS
Refills: 0 | Status: DISCONTINUED | OUTPATIENT
Start: 2025-01-31 | End: 2025-02-01

## 2025-01-31 RX ORDER — ACETAMINOPHEN 160 MG/5ML
1000 SUSPENSION ORAL ONCE
Refills: 0 | Status: COMPLETED | OUTPATIENT
Start: 2025-01-31 | End: 2025-01-31

## 2025-01-31 RX ADMIN — MORPHINE SULFATE 4 MILLIGRAM(S): 60 TABLET, FILM COATED, EXTENDED RELEASE ORAL at 05:00

## 2025-01-31 RX ADMIN — Medication 1000 UNIT(S)/HR: at 17:31

## 2025-01-31 RX ADMIN — ACETAMINOPHEN 400 MILLIGRAM(S): 160 SUSPENSION ORAL at 04:12

## 2025-01-31 RX ADMIN — ONDANSETRON 4 MILLIGRAM(S): 4 TABLET, ORALLY DISINTEGRATING ORAL at 04:11

## 2025-01-31 RX ADMIN — Medication 1000 UNIT(S)/HR: at 11:39

## 2025-01-31 RX ADMIN — HYDROMORPHONE HYDROCHLORIDE 0.5 MILLIGRAM(S): 4 INJECTION, SOLUTION INTRAMUSCULAR; INTRAVENOUS; SUBCUTANEOUS at 11:04

## 2025-01-31 RX ADMIN — Medication 5500 UNIT(S): at 06:48

## 2025-01-31 RX ADMIN — HYDROMORPHONE HYDROCHLORIDE 1 MILLIGRAM(S): 4 INJECTION, SOLUTION INTRAMUSCULAR; INTRAVENOUS; SUBCUTANEOUS at 19:00

## 2025-01-31 RX ADMIN — Medication 1 MILLIGRAM(S): at 05:13

## 2025-01-31 RX ADMIN — HYDROMORPHONE HYDROCHLORIDE 1 MILLIGRAM(S): 4 INJECTION, SOLUTION INTRAMUSCULAR; INTRAVENOUS; SUBCUTANEOUS at 13:48

## 2025-01-31 RX ADMIN — HYDROMORPHONE HYDROCHLORIDE 1 MILLIGRAM(S): 4 INJECTION, SOLUTION INTRAMUSCULAR; INTRAVENOUS; SUBCUTANEOUS at 18:22

## 2025-01-31 RX ADMIN — Medication 1000 UNIT(S)/HR: at 17:41

## 2025-01-31 RX ADMIN — POTASSIUM CHLORIDE 40 MILLIEQUIVALENT(S): 750 TABLET, EXTENDED RELEASE ORAL at 05:14

## 2025-01-31 RX ADMIN — HYDROMORPHONE HYDROCHLORIDE 0.5 MILLIGRAM(S): 4 INJECTION, SOLUTION INTRAMUSCULAR; INTRAVENOUS; SUBCUTANEOUS at 10:46

## 2025-01-31 RX ADMIN — FAMOTIDINE 20 MILLIGRAM(S): 10 INJECTION INTRAVENOUS at 04:11

## 2025-01-31 RX ADMIN — MORPHINE SULFATE 4 MILLIGRAM(S): 60 TABLET, FILM COATED, EXTENDED RELEASE ORAL at 04:11

## 2025-01-31 RX ADMIN — HYDROMORPHONE HYDROCHLORIDE 1 MILLIGRAM(S): 4 INJECTION, SOLUTION INTRAMUSCULAR; INTRAVENOUS; SUBCUTANEOUS at 05:34

## 2025-01-31 RX ADMIN — HYDROMORPHONE HYDROCHLORIDE 0.5 MILLIGRAM(S): 4 INJECTION, SOLUTION INTRAMUSCULAR; INTRAVENOUS; SUBCUTANEOUS at 10:22

## 2025-01-31 RX ADMIN — Medication 2: at 11:08

## 2025-01-31 RX ADMIN — FAMOTIDINE 20 MILLIGRAM(S): 10 INJECTION INTRAVENOUS at 11:07

## 2025-01-31 RX ADMIN — ATORVASTATIN CALCIUM 80 MILLIGRAM(S): 80 TABLET, FILM COATED ORAL at 21:55

## 2025-01-31 RX ADMIN — Medication 5 UNIT(S): at 11:08

## 2025-01-31 RX ADMIN — Medication 75 MILLIGRAM(S): at 11:07

## 2025-01-31 RX ADMIN — HYDROMORPHONE HYDROCHLORIDE 1 MILLIGRAM(S): 4 INJECTION, SOLUTION INTRAMUSCULAR; INTRAVENOUS; SUBCUTANEOUS at 08:05

## 2025-01-31 RX ADMIN — HYDROMORPHONE HYDROCHLORIDE 1 MILLIGRAM(S): 4 INJECTION, SOLUTION INTRAMUSCULAR; INTRAVENOUS; SUBCUTANEOUS at 15:27

## 2025-01-31 RX ADMIN — HYDROMORPHONE HYDROCHLORIDE 1 MILLIGRAM(S): 4 INJECTION, SOLUTION INTRAMUSCULAR; INTRAVENOUS; SUBCUTANEOUS at 22:27

## 2025-01-31 RX ADMIN — Medication 1200 UNIT(S)/HR: at 07:40

## 2025-01-31 RX ADMIN — Medication 0 UNIT(S)/HR: at 10:28

## 2025-01-31 RX ADMIN — HYDROMORPHONE HYDROCHLORIDE 0.5 MILLIGRAM(S): 4 INJECTION, SOLUTION INTRAMUSCULAR; INTRAVENOUS; SUBCUTANEOUS at 09:35

## 2025-01-31 RX ADMIN — Medication 2100 MILLILITER(S): at 04:11

## 2025-01-31 RX ADMIN — Medication 1000 UNIT(S)/HR: at 13:09

## 2025-01-31 RX ADMIN — ACETAMINOPHEN 1000 MILLIGRAM(S): 160 SUSPENSION ORAL at 05:00

## 2025-01-31 RX ADMIN — HYDROMORPHONE HYDROCHLORIDE 1 MILLIGRAM(S): 4 INJECTION, SOLUTION INTRAMUSCULAR; INTRAVENOUS; SUBCUTANEOUS at 23:00

## 2025-01-31 RX ADMIN — ACETAMINOPHEN 1000 MILLIGRAM(S): 160 SUSPENSION ORAL at 08:05

## 2025-01-31 RX ADMIN — APIXABAN 10 MILLIGRAM(S): 5 TABLET, FILM COATED ORAL at 18:59

## 2025-01-31 RX ADMIN — Medication 1200 UNIT(S)/HR: at 06:48

## 2025-01-31 RX ADMIN — Medication 75 MILLILITER(S): at 09:36

## 2025-01-31 RX ADMIN — ASPIRIN 81 MILLIGRAM(S): 81 TABLET, COATED ORAL at 11:07

## 2025-01-31 NOTE — CONSULT NOTE ADULT - NS ATTEND AMEND GEN_ALL_CORE FT
55 yo Male s/p Right AKA 1/1/25 at OSH admitted with nausea, vomiting and diarrhea. The patient was found to have extensive iliofemoral DVT on the Right extending to IVC. Also he has PE however denies chest pain or dyspnea. No significant edema in the right leg. AKA is healing  well. Still has sutures in place. At this point there is no clear benefit for percutaneous intervention for DVT. We recommend therapeutic anticoagulation with heparin drip and transition to DOAC for discharge. Patient used to be on Eliquis before his AKA but was never resumed after. He reports extensive history of vascular interventions in the right leg including failed stents and bypass. He has no wounds in the left foot and a palpable PT pulse. Vascular surgery will sign off. Please reconsult as needed. Patient stated he will follow up with his vascular surgeon for suture removal fro Rt AKA

## 2025-01-31 NOTE — CHART NOTE - NSCHARTNOTEFT_GEN_A_CORE
was signed out patient for admission at 6: 35 am   for R DVT and b/l subsegmental PE  vascular consulted , discussed vascular team ( PA) who discussed case with the vascular surgeon , no current indication for thrombectomy   pt on RA , vitals stable   signed out to Dr. Sorensen , he accepted case after confirming that pt does not require currently thrombectomy was signed out patient for admission at 6: 35 am   for R DVT and b/l subsegmental PE  vascular consulted , discussed vascular team ( PA) who discussed case with the vascular surgeon , no current indication for thrombectomy   pt on RA , vitals stable   signed out to Dr. Alves , he accepted case after confirming that pt does not require currently thrombectomy  pt is seen at bedside , awake , alert and oriented x3 , no focal deficit   confirmed no prior h/o of seizures as per ED attending  Pt reportedly had a "shaking" episode in the ED, which was witnessed by RN but pt was awake and immediately responsive.  informed Dr. alves about CT finding of abnormal cortical hypodensity in the medial R parieto-occipital lobe ,no acute hemorrhage , mass effect of midline shift , recommend MRI

## 2025-01-31 NOTE — H&P ADULT - PROBLEM SELECTOR PLAN 1
Pt found on CTA to have acute PE, started on hep gtt  - will f/u 2d echo  - satting well on RA  - Vascular surgery team, and Heme-onc consulted, will f/u recs  - Patient having pain, was given rounds of dilaudid and morphine in ED, addiction medicine Dr. Randall consulted for further recs on pain management Pt found on CTA to have acute PE  - started on hep gtt, will bridge to DOAC  - will f/u 2d echo  - satting well on RA  - Vascular surgery team, and Heme-onc consulted, will f/u recs  - Patient having pain, was given rounds of dilaudid and morphine in ED, addiction medicine Dr. Randall consulted for further recs on pain management Pt found on CTA to have acute PE  - started on hep gtt, will bridge to DOAC tonight  - will f/u 2d echo  - satting well on RA  - Vascular surgery team, and Heme-onc consulted, will f/u recs  - Patient having pain, was given rounds of dilaudid and morphine in ED, addiction medicine Dr. Randall consulted for further recs on pain management  - Patient would like to be dc'ed tomorrow in am, if Hb stable, and consultants ok, may d/c on eliquis regiment, with outpt follow up with Dr. Castaneda Vascular surgeon at [facility], and Heme-Onc

## 2025-01-31 NOTE — CONSULT NOTE ADULT - SUBJECTIVE AND OBJECTIVE BOX
VASCULAR SURGERY PA CONSULT NOTE:    CHIEF COMPLAINT:  Patient is a 54y old  Male who presents with a chief complaint of SEIZURE    HPI:  HPI:  55 yo M with hx of poorly controlled DM, s/p R AKA (25 at [facility]) HTN, PVD, HLD, smoker presents c/o N/V/D that started yesterday. Unable to tolerate PO. States multiple episodes of N/V and 2-3 episodes of diarrhea. Denies fever, reports chills. States some mild epigastric discomfort which started after the vomiting. Denies CP, SOB, urinary symptoms. Pt's friend at the bedside reports pt had 4 seizures, which she describes as shaking episodes. Pt reportedly had a "shaking" episode in the ED, which was witnessed by RN but pt was awake and immediately responsive. No hx of seizures.    INTERVAL HPI:  54YOM with PMHx T2DM, PVD, HTN, HLD, current smoker, now s/p R AKA 25 at [facility] with Dr. Castaneda vascular surgeon (was d/c'd 25), prev hx of multiple angiograms and bypass of the RLE, patient p/w seizure like activity. Vascular surgery consulted for extensive DVT of the RLE, R infrarenal IVC & R femoral/iliac vein. Patient reports he was previously on Eliquis until 3 days prior to planned AKA, he was never restarted on Eliquis prior to d/c from [facility] and has not taken it at home. Of note, patient restarted smoking s/p AKA. Patient reports pain of the R hip and R shoulder after falling while at home. As well, patient reports shooting and sharp pains of the RLE and phantom pain of the R foot which have been present both prior and after AKA. Patient reports taking 6mg PO dilaudid at home for pain. Patient reports he was planned for suture removal yesterday with vascular surgeon. Patient denies increased swelling, warmth, redness, tenderness to the RLE.       PAST MEDICAL & SURGICAL HISTORY:  Diabetes      Neuropathy      PVD (peripheral vascular disease)    R AKA      REVIEW OF SYSTEMS:  CONSTITUTIONAL: No weakness, fevers or chills, no weight loss  EYES/ENT: No visual changes;  No vertigo or throat pain   NECK: No pain or stiffness  RESPIRATORY: No cough, wheezing, hemoptysis; No shortness of breath  CARDIOVASCULAR: No chest pain or palpitations  GASTROINTESTINAL: No abdominal or epigastric pain. No nausea, vomiting, or hematemesis; No diarrhea or constipation. No melena or hematochezia.  GENITOURINARY: No dysuria, frequency or hematuria  MSK: + R AKA  NEUROLOGICAL: No numbness or weakness  SKIN: No itching, burning, rashes, or lesions   All other review of systems is negative unless indicated above.    MEDICATIONS:  Home Medications:  atorvastatin 80 mg oral tablet: 1 tab(s) orally once a day (08 Mar 2024 03:46)  Eliquis 5 mg oral tablet: 1 tab(s) orally 2 times a day (08 Mar 2024 03:46)  metformin 1000 mg oral tablet: 1  orally 2 times a day (08 Mar 2024 03:46)    MEDICATIONS  (STANDING):  heparin  Infusion.  Unit(s)/Hr (12 mL/Hr) IV Continuous <Continuous>    MEDICATIONS  (PRN):  heparin   Injectable 5500 Unit(s) IV Push every 6 hours PRN For aPTT less than 40  heparin   Injectable 2500 Unit(s) IV Push every 6 hours PRN For aPTT between 40 - 57      ALLERGIES:  Lobster (Unknown)  Demerol HCl (Anaphylaxis)  Intolerances    SOCIAL HISTORY:  Smoking: Yes [x] smokes 4-5 cigarettes per day (quit for a few months last year, then restarted after AKA)      FAMILY HISTORY:  FH: type 2 diabetes (Father, Grandparent)        PHYSICAL EXAM:  Vital Signs Last 24 Hrs  T(C): 36.9 (2025 03:32), Max: 36.9 (2025 03:32)  T(F): 98.4 (2025 03:32), Max: 98.4 (2025 03:32)  HR: 80 (2025 08:02) (80 - 115)  BP: 114/76 (2025 08:02) (107/80 - 186/89)  BP(mean): 90 (2025 06:55) (90 - 107)  RR: 16 (2025 08:02) (16 - 18)  SpO2: 97% (2025 08:02) (96% - 100%)    Parameters below as of 2025 08:02  Patient On (Oxygen Delivery Method): room air        CONSTITUTIONAL: No apparent distress, lying comfortably in bed  HEAD:  Atraumatic, normocephalic  EYES: EOMI, PERRLA, conjunctiva and sclera clear  ENMT: Oral mucosa with moist membranes. Normal dentition; no pharyngeal injection or exudates  NECK: Supple, symmetric and without tracheal deviation   RESP: No respiratory distress, no use of accessory muscles  CV: RRR  GI: Soft, NT, ND, no rebound, no guarding  EXTREMITIES: R AKA with sutures in place, wound is c/d/i. R groin with dressing in place per patient from previous bypass dressing is c/d/i. No edema of erythema of the RLE, mild TTP R hip. Surgical scars from previous angiograms of the RLE. LLE with FROM, no edema, erythema, tenderness.  PSYCH: A&O x3; tearful  NEUROLOGY: Non-focal, motor & sensory grossly intact  SKIN: Warm & dry, no rashes or lesions; no subcutaneous nodules or induration palpable    LABS:                        15.8   11.35 )-----------( 304      ( 2025 03:48 )             43.9         129[L]  |  90[L]  |  22  ----------------------------<  340[H]  3.3[L]   |  22  |  1.20    Ca    10.7[H]      2025 03:48    TPro  8.8[H]  /  Alb  4.4  /  TBili  0.8  /  DBili  x   /  AST  13[L]  /  ALT  16  /  AlkPhos  91        Lactate, Blood: 1.2 mmol/L ( @ 06:46)  Lactate, Blood: 3.3 mmol/L ( @ 03:48)    Urinalysis Basic - ( 2025 03:48 )    Color: Yellow / Appearance: Clear / S.039 / pH: x  Gluc: 340 mg/dL / Ketone: 80 mg/dL  / Bili: Negative / Urobili: 0.2 mg/dL   Blood: x / Protein: 300 mg/dL / Nitrite: Negative   Leuk Esterase: Negative / RBC: 0-2 /HPF / WBC 0-2 /HPF   Sq Epi: x / Non Sq Epi: x / Bacteria: Occasional /HPF      LIVER FUNCTIONS - ( 2025 03:48 )  Alb: 4.4 g/dL / Pro: 8.8 g/dL / ALK PHOS: 91 U/L / ALT: 16 U/L / AST: 13 U/L / GGT: x             Urinalysis with Rflx Culture (collected 2025 03:48)        RADIOLOGY & ADDITIONAL STUDIES:  < from: US Duplex Venous Lower Ext Complete, Bilateral (25 @ 06:41) >  ACC: [id number] EXAM:  US DPLX LWR EXT VEINS COMPL BI   ORDERED BY:  BING COELLO     PROCEDURE DATE:  2025          INTERPRETATION:  CLINICAL INFORMATION: PTE/DVT seen on recent CT    COMPARISON: CT abdomen pelvis performed earlier same day.    TECHNIQUE: Duplex sonography of the BILATERAL LOWER extremity veins with   color and spectral Doppler, with and without compression.    FINDINGS:    RIGHT:  Status post right above-knee amputation.    Occlusive thrombus within the right common femoral, deep femoral and   superficial femoral veins.    LEFT:  Normal compressibility of the LEFT common femoral, femoral and popliteal   veins.  Doppler examination shows normal spontaneous and phasic flow.  No LEFT calf vein thrombosis is detected.    IMPRESSION:  Occlusive thrombus within the right common femoral, deep femoral and   superficial femoral veins.    --- End of Report ---            RAYMOND COWAN MD; Attending Radiologist  This document has been electronically signed. 2025  6:52AM    < end of copied text >    < from: CT Angio Chest PE Protocol w/ IV Cont (25 @ 05:13) >  ACC: [id number] EXAM:  CT ABDOMEN AND PELVIS IC   ORDERED BY:  BING COELLO     ACC: [id number] EXAM:  CT ANGIO CHEST PULM ART WAWIC   ORDERED BY:  BING COELLO     PROCEDURE DATE:  2025          INTERPRETATION:  CLINICAL INFORMATION: Seizure, nausea, vomiting, diffuse   pain    COMPARISON: CT abdomen pelvis performed 2023.    CONTRAST/COMPLICATIONS:  IV Contrast: Omnipaque 350  90 cc administered   10 cc discarded  Oral Contrast: NONE    PROCEDURE:  CT Angiography of the Chestwas performed followed by portal venous phase   imaging of the Abdomen and Pelvis.  Sagittal and coronal reformats were performed as well as 3D (MIP)   reconstructions.    FINDINGS:  CHEST:  LUNGS AND LARGE AIRWAYS: Patent central airways. No pulmonary nodules.  PLEURA: No pleural effusion.  VESSELS: Acute pulmonary emboli within the bilateral inferior   subsegmental pulmonary arteries (Series 303, image 283). Aorta   unremarkable.  HEART: Heart size is normal. No pericardial effusion. No evidence of   right heart strain.  MEDIASTINUM AND LESLY: No lymphadenopathy.  CHEST WALL AND LOWER NECK: Within normal limits.  BONES: Within normal limits.    ABDOMEN AND PELVIS:  LIVER: Hepatomegaly and hepatic steatosis.  BILE DUCTS: Normal caliber.  GALLBLADDER: Within normal limits.  SPLEEN: Within normal limits.  PANCREAS: Within normal limits.  ADRENALS: Within normal limits.  KIDNEYS/URETERS: Within normal limits.    BLADDER: Within normal limits.  REPRODUCTIVE ORGANS: Prostate within normal limits. Prominent   calcification of the vas deferens.    BOWEL: No bowel obstruction. Appendix is normal.  PERITONEUM/RETROPERITONEUM: Within normal limits.  VESSELS: Atherosclerotic changes. Acute DVT within the right femoral and   iliac veins extendinginto the infrarenal IVC. Partially visualized right   femoral artery stent.  LYMPH NODES: No lymphadenopathy.  ABDOMINAL WALL: Within normal limits.  BONES: Within normal limits.    IMPRESSION:  Acute pulmonary emboli within the bilateral inferior subsegmental   pulmonary arteries. No evidence of right heart strain.    Acute DVT within the right femoral and iliac veins extending into the   infrarenal IVC.    This report was discussed with Bing Coello MD at 2025 5:24 AM.    --- End of Report ---            RAYMOND COWAN MD; Attending Radiologist  This document has been electronically signed. 2025  5:26AM    < end of copied text >    
Date/Time Patient Seen:  		  Referring MD:   Data Reviewed	       Patient is a 54y old  Male who presents with a chief complaint of PE (31 Jan 2025 12:51)      Subjective/HPI   History of Present Illness:   53 y/o m w/ PMH of poorly controlled DM, s/p R AKA (1/1/25 at [facility]) HTN, PVD, HLD, smoker p/w episodes of nausea, vomiting, diarrhea starting yesterday.  Pt had some mild epigastric discomfort after the nausea and vomiting, and had about 2-3 episodes of diarrhea, that was nonbloody and not explosive or foul smelling.  No recent travel, sick contacts that patient is aware of, no recent antibiotic use, or changes in diet.  Pt is unable to tolerate PO.  Pt has not had fever or chills, chest pain, sob, dysurea, or frequency.  Friend of patient reports that patient did have some shaking episodes that they were concerned may have been seizures, but pt has no h/o seizures, and did not have any tongue biting, urinary or fecal incontinence.  RN also noticed an episode in ED, but it was not thought to be seizure like, and pt was responsive right away.  Patient had been on eliquis, was prescribed, but stopped taking, and is no longer complaint with med.    In the ED, d-dimer was elevated, and CTA revealed PE, pt was evauated by Vascular, and a heparin gtt was started.    PAST MEDICAL & SURGICAL HISTORY:  Diabetes    Traumatic brain injury    Migraine    Neuropathy    PVD (peripheral vascular disease)    No significant past surgical history       Review of Systems:  Review of Systems: CONSTITUTIONAL: No weakness, fevers or chills  EYES/ENT: No visual changes, no throat pain   RESPIRATORY: No cough, wheezing, hemoptysis; No shortness of breath  CARDIOVASCULAR: No chest pain or palpitations  GASTROINTESTINAL: No abdominal, nausea, vomiting, or hematemesis; No diarrhea or constipation. No melena or hematochezia.  GENITOURINARY: No dysuria, frequency or hematuria  NEUROLOGICAL: + Shaking feeling;  No dizziness, numbness, or weakness  SKIN: No itching, burning, rashes, or lesions   All other review of systems is negative unless indicated above.      Allergies and Intolerances:        Allergies:  	Demerol HCl: Drug, Anaphylaxis  	Lobster: Food, Unknown    Home Medications:   * Patient Currently Takes Medications as of 10-Mar-2024 03:52 documented in Structured Notes  · 	clopidogrel 75 mg oral tablet: 1 tab(s) orally once a day  · 	aspirin 81 mg oral delayed release tablet: 1 tab(s) orally once a day  · 	metformin 1000 mg oral tablet: 1  orally 2 times a day  · 	atorvastatin 80 mg oral tablet: 1 tab(s) orally once a day  · 	Eliquis 5 mg oral tablet: 1 tab(s) orally 2 times a day    Patient History:    Past Medical, Past Surgical, and Family History:  PAST MEDICAL HISTORY:  Diabetes     Neuropathy     PVD (peripheral vascular disease).     PAST SURGICAL HISTORY:  No significant past surgical history.     FAMILY HISTORY:  Father  Still living? Unknown  FH: type 2 diabetes, Age at diagnosis: Age Unknown    Grandparent  Still living? Unknown  FH: type 2 diabetes, Age at diagnosis: Age Unknown.     Social History:  · Substance use	No  · Social History (marital status, living situation, occupation, and sexual history)	no tob, etoh, illicits     Tobacco Screening:  · Core Measure Site	Yes  · Has the patient used tobacco in the past 30 days?	No    Risk Assessment:    Present on Admission:  Deep Venous Thrombosis	yes  DVT Confirmed	Thrombophlebitis of Iliac vein  Pulmonary Embolus	yes  Urinary Catheter	no  Central Venous Catheter/PICC Line	no  Surgical Site Incision	no  Pressure Ulcer(s)	no     Hepatitis C Test Questions:  · In accordance with [place] State Law, we offer every patient a Hepatitis C test. Would you like to be tested today?	Opt out        Medication list         MEDICATIONS  (STANDING):  aspirin  chewable 81 milliGRAM(s) Oral daily  atorvastatin 80 milliGRAM(s) Oral at bedtime  clopidogrel Tablet 75 milliGRAM(s) Oral daily  dextrose 5%. 1000 milliLiter(s) (50 mL/Hr) IV Continuous <Continuous>  dextrose 5%. 1000 milliLiter(s) (100 mL/Hr) IV Continuous <Continuous>  dextrose 50% Injectable 25 Gram(s) IV Push once  dextrose 50% Injectable 12.5 Gram(s) IV Push once  dextrose 50% Injectable 25 Gram(s) IV Push once  famotidine Injectable 20 milliGRAM(s) IV Push daily  glucagon  Injectable 1 milliGRAM(s) IntraMuscular once  heparin  Infusion. 1000 Unit(s)/Hr (10 mL/Hr) IV Continuous <Continuous>  HYDROmorphone  Injectable 1 milliGRAM(s) IV Push once  insulin lispro (ADMELOG) corrective regimen sliding scale   SubCutaneous three times a day before meals  insulin lispro (ADMELOG) corrective regimen sliding scale   SubCutaneous at bedtime  sodium chloride 0.9%. 1000 milliLiter(s) (75 mL/Hr) IV Continuous <Continuous>    MEDICATIONS  (PRN):  acetaminophen     Tablet .. 650 milliGRAM(s) Oral every 6 hours PRN Temp greater or equal to 38C (100.4F), Mild Pain (1 - 3)  dextrose Oral Gel 15 Gram(s) Oral once PRN Blood Glucose LESS THAN 70 milliGRAM(s)/deciliter  heparin   Injectable 5500 Unit(s) IV Push every 6 hours PRN For aPTT less than 40  heparin   Injectable 2500 Unit(s) IV Push every 6 hours PRN For aPTT between 40 - 57  ondansetron Injectable 4 milliGRAM(s) IV Push every 6 hours PRN Nausea and/or Vomiting         Vitals log        ICU Vital Signs Last 24 Hrs  T(C): 36.9 (31 Jan 2025 03:32), Max: 36.9 (31 Jan 2025 03:32)  T(F): 98.4 (31 Jan 2025 03:32), Max: 98.4 (31 Jan 2025 03:32)  HR: 80 (31 Jan 2025 08:02) (80 - 115)  BP: 114/76 (31 Jan 2025 08:02) (107/80 - 186/89)  BP(mean): 90 (31 Jan 2025 06:55) (90 - 107)  ABP: --  ABP(mean): --  RR: 16 (31 Jan 2025 08:02) (16 - 18)  SpO2: 97% (31 Jan 2025 08:02) (96% - 100%)    O2 Parameters below as of 31 Jan 2025 08:02  Patient On (Oxygen Delivery Method): room air                 Input and Output:  I&O's Detail      Lab Data                        13.0   11.72 )-----------( 243      ( 31 Jan 2025 12:48 )             36.8     01-31    129[L]  |  90[L]  |  22  ----------------------------<  340[H]  3.3[L]   |  22  |  1.20    Ca    10.7[H]      31 Jan 2025 03:48    TPro  8.8[H]  /  Alb  4.4  /  TBili  0.8  /  DBili  x   /  AST  13[L]  /  ALT  16  /  AlkPhos  91  01-31            Review of Systems	  right AKA  weakness  verbal  alert  on RA  all systems reviewed  pain reported      Objective     Physical Examination    heart s1s2  lung dec BS  head nc  head at  abd soft      Pertinent Lab findings & Imaging      Ty:  NO   Adequate UO     I&O's Detail           Discussed with:     Cultures:	        Radiology      ACC: [id number] EXAM:  CT ABDOMEN AND PELVIS IC   ORDERED BY:  BING RINCON     ACC: [id number] EXAM:  CT ANGIO CHEST PULM ART WAWIC   ORDERED BY:  BING RINCON     PROCEDURE DATE:  01/31/2025          INTERPRETATION:  CLINICAL INFORMATION: Seizure, nausea, vomiting, diffuse   pain    COMPARISON: CT abdomen pelvis performed 4/13/2023.    CONTRAST/COMPLICATIONS:  IV Contrast: Omnipaque 350  90 cc administered   10 cc discarded  Oral Contrast: NONE    PROCEDURE:  CT Angiography of the Chest was performed followed by portal venous phase   imaging of the Abdomen and Pelvis.  Sagittal and coronal reformats were performed as well as 3D (MIP)   reconstructions.    FINDINGS:  CHEST:  LUNGS AND LARGE AIRWAYS: Patent central airways. No pulmonary nodules.  PLEURA: No pleural effusion.  VESSELS: Acute pulmonary emboli within the bilateral inferior   subsegmental pulmonary arteries (Series 303, image 283). Aorta   unremarkable.  HEART: Heart size is normal. No pericardial effusion. No evidence of   right heart strain.  MEDIASTINUM AND LESLY: No lymphadenopathy.  CHEST WALL AND LOWER NECK: Within normal limits.  BONES: Within normal limits.    ABDOMEN AND PELVIS:  LIVER: Hepatomegaly and hepatic steatosis.  BILE DUCTS: Normal caliber.  GALLBLADDER: Within normal limits.  SPLEEN: Within normal limits.  PANCREAS: Within normal limits.  ADRENALS: Within normal limits.  KIDNEYS/URETERS: Within normal limits.    BLADDER: Within normal limits.  REPRODUCTIVE ORGANS: Prostate within normal limits. Prominent   calcification of the vas deferens.    BOWEL: No bowel obstruction. Appendix is normal.  PERITONEUM/RETROPERITONEUM: Within normal limits.  VESSELS: Atherosclerotic changes. Acute DVT within the right femoral and   iliac veins extending into the infrarenal IVC. Partially visualized right   femoral artery stent.  LYMPH NODES: No lymphadenopathy.  ABDOMINAL WALL: Within normal limits.  BONES: Within normal limits.    IMPRESSION:  Acute pulmonary emboli within the bilateral inferior subsegmental   pulmonary arteries. No evidence of right heart strain.    Acute DVT within the right femoral and iliac veins extending into the   infrarenal IVC.    This report was discussed with Bing Rincon MD at 1/31/2025 5:24 AM.    --- End of Report ---            RAYMOND COWAN MD; Attending Radiologist  This document has been electronically signed. Jan 31 2025  5:26AM        ACC: [id number] EXAM:  US DPLX LWR EXT VEINS COMPL BI   ORDERED BY:  BING RINCON     PROCEDURE DATE:  01/31/2025          INTERPRETATION:  CLINICAL INFORMATION: PTE/DVT seen on recent CT    COMPARISON: CT abdomen pelvis performed earlier same day.    TECHNIQUE: Duplex sonography of the BILATERAL LOWER extremity veins with   color and spectral Doppler, with and without compression.    FINDINGS:    RIGHT:  Status post right above-knee amputation.    Occlusive thrombus within the right common femoral, deep femoral and   superficial femoral veins.    LEFT:  Normal compressibility of the LEFT common femoral, femoral and popliteal   veins.  Doppler examination shows normal spontaneous and phasic flow.  No LEFT calf vein thrombosis is detected.    IMPRESSION:  Occlusive thrombus within the right common femoral, deep femoral and   superficial femoral veins.    --- End of Report ---            RAYMOND COWAN MD; Attending Radiologist  This document has been electronically signed. Jan 31 2025  6:52AM                  
Patient is a 54y old  Male who presents with a chief complaint of PE (31 Jan 2025 09:21)      HPI:  53 y/o m w/ PMH of poorly controlled DM, s/p R AKA (1/1/25 at [facility]) HTN, PVD, HLD, smoker p/w episodes of nausea, vomiting, diarrhea starting yesterday.  Pt had some mild epigastric discomfort after the nausea and vomiting, and had about 2-3 episodes of diarrhea, that was nonbloody and not explosive or foul smelling.  No recent travel, sick contacts that patient is aware of, no recent antibiotic use, or changes in diet.  Pt is unable to tolerate PO.  Pt has not had fever or chills, chest pain, sob, dysurea, or frequency.  Friend of patient reports that patient did have some shaking episodes that they were concerned may have been seizures, but pt has no h/o seizures, and did not have any tongue biting, urinary or fecal incontinence.  RN also noticed an episode in ED, but it was not thought to be seizure like, and pt was responsive right away.  Patient had been on eliquis, was prescribed, but stopped taking, and is no longer complaint with med.    In the ED, d-dimer was elevated, and CTA revealed PE, pt was evauated by Vascular, and a heparin gtt was started. (31 Jan 2025 09:21)       ROS:  Negative except for:    PAST MEDICAL & SURGICAL HISTORY:  Diabetes      Neuropathy      PVD (peripheral vascular disease)      No significant past surgical history          SOCIAL HISTORY:    FAMILY HISTORY:  FH: type 2 diabetes (Father, Grandparent)        MEDICATIONS  (STANDING):  aspirin  chewable 81 milliGRAM(s) Oral daily  atorvastatin 80 milliGRAM(s) Oral at bedtime  clopidogrel Tablet 75 milliGRAM(s) Oral daily  dextrose 5%. 1000 milliLiter(s) (50 mL/Hr) IV Continuous <Continuous>  dextrose 5%. 1000 milliLiter(s) (100 mL/Hr) IV Continuous <Continuous>  dextrose 50% Injectable 25 Gram(s) IV Push once  dextrose 50% Injectable 12.5 Gram(s) IV Push once  dextrose 50% Injectable 25 Gram(s) IV Push once  famotidine Injectable 20 milliGRAM(s) IV Push daily  glucagon  Injectable 1 milliGRAM(s) IntraMuscular once  heparin  Infusion. 1000 Unit(s)/Hr (10 mL/Hr) IV Continuous <Continuous>  HYDROmorphone  Injectable 1 milliGRAM(s) IV Push once  insulin lispro (ADMELOG) corrective regimen sliding scale   SubCutaneous three times a day before meals  insulin lispro (ADMELOG) corrective regimen sliding scale   SubCutaneous at bedtime  sodium chloride 0.9%. 1000 milliLiter(s) (75 mL/Hr) IV Continuous <Continuous>    MEDICATIONS  (PRN):  acetaminophen     Tablet .. 650 milliGRAM(s) Oral every 6 hours PRN Temp greater or equal to 38C (100.4F), Mild Pain (1 - 3)  dextrose Oral Gel 15 Gram(s) Oral once PRN Blood Glucose LESS THAN 70 milliGRAM(s)/deciliter  heparin   Injectable 5500 Unit(s) IV Push every 6 hours PRN For aPTT less than 40  heparin   Injectable 2500 Unit(s) IV Push every 6 hours PRN For aPTT between 40 - 57  ondansetron Injectable 4 milliGRAM(s) IV Push every 6 hours PRN Nausea and/or Vomiting      Allergies    Lobster (Unknown)  Demerol HCl (Anaphylaxis)    Intolerances        Vital Signs Last 24 Hrs  T(C): 36.9 (31 Jan 2025 03:32), Max: 36.9 (31 Jan 2025 03:32)  T(F): 98.4 (31 Jan 2025 03:32), Max: 98.4 (31 Jan 2025 03:32)  HR: 80 (31 Jan 2025 08:02) (80 - 115)  BP: 114/76 (31 Jan 2025 08:02) (107/80 - 186/89)  BP(mean): 90 (31 Jan 2025 06:55) (90 - 107)  RR: 16 (31 Jan 2025 08:02) (16 - 18)  SpO2: 97% (31 Jan 2025 08:02) (96% - 100%)    Parameters below as of 31 Jan 2025 08:02  Patient On (Oxygen Delivery Method): room air        PHYSICAL EXAM  General: adult in NAD  HEENT: clear oropharynx, anicteric sclera, pink conjunctivae  Neck: supple  CV: normal S1S2 with no murmur rubs or gallops  Lungs: clear to auscultation, no wheezes, no rhales  Abdomen: soft non-tender non-distended, no hepato/splenomegaly  Ext: no clubbing cyanosis or edema  Skin: no rashes and no petichiae  Neuro: alert and oriented X3 no focal deficits      LABS:    CBC Full  -  ( 31 Jan 2025 03:48 )  WBC Count : 11.35 K/uL  RBC Count : 5.43 M/uL  Hemoglobin : 15.8 g/dL  Hematocrit : 43.9 %  Platelet Count - Automated : 304 K/uL  Mean Cell Volume : 80.8 fl  Mean Cell Hemoglobin : 29.1 pg  Mean Cell Hemoglobin Concentration : 36.0 g/dL  Auto Neutrophil # : 7.86 K/uL  Auto Lymphocyte # : 2.30 K/uL  Auto Monocyte # : 1.04 K/uL  Auto Eosinophil # : 0.07 K/uL  Auto Basophil # : 0.04 K/uL  Auto Neutrophil % : 69.1 %  Auto Lymphocyte % : 20.3 %  Auto Monocyte % : 9.2 %  Auto Eosinophil % : 0.6 %  Auto Basophil % : 0.4 %    01-31    129[L]  |  90[L]  |  22  ----------------------------<  340[H]  3.3[L]   |  22  |  1.20    Ca    10.7[H]      31 Jan 2025 03:48    TPro  8.8[H]  /  Alb  4.4  /  TBili  0.8  /  DBili  x   /  AST  13[L]  /  ALT  16  /  AlkPhos  91  01-31    PT/INR - ( 31 Jan 2025 09:26 )   PT: 13.3 sec;   INR: 1.14 ratio         PTT - ( 31 Jan 2025 09:26 )  PTT:165.8 sec          BLOOD SMEAR INTERPRETATION:    RADIOLOGY & ADDITIONAL STUDIES:    < from: CT Angio Chest PE Protocol w/ IV Cont (01.31.25 @ 05:13) >  ACC: [id number] EXAM:  CT ABDOMEN AND PELVIS IC   ORDERED BY:  BING RINCON     ACC: [id number] EXAM:  CT ANGIO CHEST PULM ART WAWIC   ORDERED BY:  BING RINCON     PROCEDURE DATE:  01/31/2025          INTERPRETATION:  CLINICAL INFORMATION: Seizure, nausea, vomiting, diffuse   pain    COMPARISON: CT abdomen pelvis performed 4/13/2023.    CONTRAST/COMPLICATIONS:  IV Contrast: Omnipaque 350  90 cc administered   10 cc discarded  Oral Contrast: NONE    PROCEDURE:  CT Angiography of the Chestwas performed followed by portal venous phase   imaging of the Abdomen and Pelvis.  Sagittal and coronal reformats were performed as well as 3D (MIP)   reconstructions.    FINDINGS:  CHEST:  LUNGS AND LARGE AIRWAYS: Patent central airways. No pulmonary nodules.  PLEURA: No pleural effusion.  VESSELS: Acute pulmonary emboli within the bilateral inferior   subsegmental pulmonary arteries (Series 303, image 283). Aorta   unremarkable.  HEART: Heart size is normal. No pericardial effusion. No evidence of   right heart strain.  MEDIASTINUM AND LESLY: No lymphadenopathy.  CHEST WALL AND LOWER NECK: Within normal limits.  BONES: Within normal limits.    ABDOMEN AND PELVIS:  LIVER: Hepatomegaly and hepatic steatosis.  BILE DUCTS: Normal caliber.  GALLBLADDER: Within normal limits.  SPLEEN: Within normal limits.  PANCREAS: Within normal limits.  ADRENALS: Within normal limits.  KIDNEYS/URETERS: Within normal limits.    BLADDER: Within normal limits.  REPRODUCTIVE ORGANS: Prostate within normal limits. Prominent   calcification of the vas deferens.    BOWEL: No bowel obstruction. Appendix is normal.  PERITONEUM/RETROPERITONEUM: Within normal limits.  VESSELS: Atherosclerotic changes. Acute DVT within the right femoral and   iliac veins extendinginto the infrarenal IVC. Partially visualized right   femoral artery stent.  LYMPH NODES: No lymphadenopathy.  ABDOMINAL WALL: Within normal limits.  BONES: Within normal limits.    IMPRESSION:  Acute pulmonary emboli within the bilateral inferior subsegmental   pulmonary arteries. No evidence of right heart strain.    Acute DVT within the right femoral and iliac veins extending into the   infrarenal IVC.    This report was discussed with Bing Rincon MD at 1/31/2025 5:24 AM.    --- End of Report ---            RAYMOND COWAN MD; Attending Radiologist  This document has been electronically signed. Jan 31 2025  5:26AM    < end of copied text >  < from: US Duplex Venous Lower Ext Complete, Bilateral (01.31.25 @ 06:41) >  ACC: [id number] EXAM:  US DPLX LWR EXT VEINS COMPL BI   ORDERED BY:  BING RINCON     PROCEDURE DATE:  01/31/2025          INTERPRETATION:  CLINICAL INFORMATION: PTE/DVT seen on recent CT    COMPARISON: CT abdomen pelvis performed earlier same day.    TECHNIQUE: Duplex sonography of the BILATERAL LOWER extremity veins with   color and spectral Doppler, with and without compression.    FINDINGS:    RIGHT:  Status post right above-knee amputation.    Occlusive thrombus within the right common femoral, deep femoral and   superficial femoral veins.    LEFT:  Normal compressibility of the LEFT common femoral, femoral and popliteal   veins.  Doppler examination shows normal spontaneous and phasic flow.  No LEFT calf vein thrombosis is detected.    IMPRESSION:  Occlusive thrombus within the right common femoral, deep femoral and   superficial femoral veins.    --- End of Report ---            RAYMOND COWAN MD; Attending Radiologist  This document has been electronically signed. Jan 31 2025  6:52AM    < end of copied text >  
Physical Medicine and Rehabilitation Initial Evaluation    Patient is a 54y Male who is referred for neuropathic pain.    Medical studies/laboratory studies reviewed, includin-31-25 @ 03:48    129[L]  |  90[L]  |  22             --------------------------< 340[H]     3.3[L]  |  22  | 1.20    eGFR AA: --  eGFR N-AA: --    Calcium: 10.7[H]  Phosphorus: --  Magnesium: --    AST: 13[L]    ALT: 16  AlkPhos: 91  Protein: 8.8[H]  Albumin: 4.4  TBili: 0.8  D-Bili: --      The patient was seen and examined at bedside. Complains of neuropathic pain below the chest    ROS:  Constitutional: Denies fevers or chills  Neuro: Neuropathic pain    PAST MEDICAL & SURGICAL HISTORY:  Diabetes  Neuropathy  PVD (peripheral vascular disease)  No significant past surgical history    Medications: reviewed and revised  acetaminophen     Tablet .. 650 milliGRAM(s) Oral every 6 hours PRN  apixaban 10 milliGRAM(s) Oral every 12 hours  aspirin  chewable 81 milliGRAM(s) Oral daily  atorvastatin 80 milliGRAM(s) Oral at bedtime  dextrose 5%. 1000 milliLiter(s) IV Continuous <Continuous>  dextrose 5%. 1000 milliLiter(s) IV Continuous <Continuous>  dextrose 50% Injectable 25 Gram(s) IV Push once  dextrose 50% Injectable 12.5 Gram(s) IV Push once  dextrose 50% Injectable 25 Gram(s) IV Push once  dextrose Oral Gel 15 Gram(s) Oral once PRN  famotidine Injectable 20 milliGRAM(s) IV Push daily  glucagon  Injectable 1 milliGRAM(s) IntraMuscular once  heparin   Injectable 5500 Unit(s) IV Push every 6 hours PRN  heparin   Injectable 2500 Unit(s) IV Push every 6 hours PRN  heparin  Infusion. 1000 Unit(s)/Hr IV Continuous <Continuous>  HYDROmorphone  Injectable 0.5 milliGRAM(s) IV Push every 4 hours PRN  HYDROmorphone  Injectable 1 milliGRAM(s) IV Push every 4 hours PRN  insulin lispro (ADMELOG) corrective regimen sliding scale   SubCutaneous three times a day before meals  insulin lispro (ADMELOG) corrective regimen sliding scale   SubCutaneous at bedtime  nortriptyline 25 milliGRAM(s) Oral at bedtime  ondansetron Injectable 4 milliGRAM(s) IV Push every 6 hours PRN  sodium chloride 0.9%. 1000 milliLiter(s) IV Continuous <Continuous>      Physical Exam:   Vitals: T(C): 36.7 (25 @ 18:00), Max: 36.9 (25 @ 03:32)  HR: 75 (25 @ 18:00) (75 - 115)  BP: 136/79 (25 @ 18:00) (107/80 - 186/89)  RR: 16 (25 @ 18:00) (16 - 18)  SpO2: 96% (25 @ 18:00) (95% - 100%)    Constitutional: Gen: In no acute distress, cooperative with exam and questioning   Neuro: Impaired sensation peripheral UE and LORRIE R AKA present

## 2025-01-31 NOTE — ED PROVIDER NOTE - CLINICAL SUMMARY MEDICAL DECISION MAKING FREE TEXT BOX
53 yo M with N/V/D, abd pain, ?seizures. Will eval for DKA, intra-abd pathology, PNA, PE, intracranial pahtology, ACS. Will get labs, EKG, CXR, CTH, CTA chest, CT A/P. Will give IVFs, pain management, Zofran, pepcid. Will likely admit.

## 2025-01-31 NOTE — ED ADULT NURSE NOTE - CHIEF COMPLAINT QUOTE
Patient brought by ambulance had seizures 3 times at home complaining of nausea, vomiting, generalized body pain with  right AKA last 1/1/25; FS in

## 2025-01-31 NOTE — H&P ADULT - NSHPLABSRESULTS_GEN_ALL_CORE
15.8   11.35 )-----------( 304      ( 31 Jan 2025 03:48 )             43.9     01-31    129[L]  |  90[L]  |  22  ----------------------------<  340[H]  3.3[L]   |  22  |  1.20    Ca    10.7[H]      31 Jan 2025 03:48    TPro  8.8[H]  /  Alb  4.4  /  TBili  0.8  /  DBili  x   /  AST  13[L]  /  ALT  16  /  AlkPhos  91  01-31      ACC: [id number] EXAM:  CT ABDOMEN AND PELVIS IC   ORDERED BY:  VIV RINCON     ACC: [id number] EXAM:  CT ANGIO CHEST PULM ART WAWIC   ORDERED BY:  VIV RINCON     PROCEDURE DATE:  01/31/2025          INTERPRETATION:  CLINICAL INFORMATION: Seizure, nausea, vomiting, diffuse   pain    COMPARISON: CT abdomen pelvis performed 4/13/2023.    CONTRAST/COMPLICATIONS:  IV Contrast: Omnipaque 350  90 cc administered   10 cc discarded  Oral Contrast: NONE    PROCEDURE:  CT Angiography of the Chest was performed followed by portal venous phase   imaging of the Abdomen and Pelvis.  Sagittal and coronal reformats were performed as well as 3D (MIP)   reconstructions.    FINDINGS:  CHEST:  LUNGS AND LARGE AIRWAYS: Patent central airways. No pulmonary nodules.  PLEURA: No pleural effusion.  VESSELS: Acute pulmonary emboli within the bilateral inferior   subsegmental pulmonary arteries (Series 303, image 283). Aorta   unremarkable.  HEART: Heart size is normal. No pericardial effusion. No evidence of   right heart strain.  MEDIASTINUM AND LESLY: No lymphadenopathy.  CHEST WALL AND LOWER NECK: Within normal limits.  BONES: Within normal limits.    ABDOMEN AND PELVIS:  LIVER: Hepatomegaly and hepatic steatosis.  BILE DUCTS: Normal caliber.  GALLBLADDER: Within normal limits.  SPLEEN: Within normal limits.  PANCREAS: Within normal limits.  ADRENALS: Within normal limits.  KIDNEYS/URETERS: Within normal limits.    BLADDER: Within normal limits.  REPRODUCTIVE ORGANS: Prostate within normal limits. Prominent   calcification of the vas deferens.    BOWEL: No bowel obstruction. Appendix is normal.  PERITONEUM/RETROPERITONEUM: Within normal limits.  VESSELS: Atherosclerotic changes. Acute DVT within the right femoral and   iliac veins extending into the infrarenal IVC. Partially visualized right   femoral artery stent.  LYMPH NODES: No lymphadenopathy.  ABDOMINAL WALL: Within normal limits.  BONES: Within normal limits.    IMPRESSION:  Acute pulmonary emboli within the bilateral inferior subsegmental   pulmonary arteries. No evidence of right heart strain.    Acute DVT within the right femoral and iliac veins extending into the   infrarenal IVC.

## 2025-01-31 NOTE — H&P ADULT - NSHPREVIEWOFSYSTEMS_GEN_ALL_CORE
CONSTITUTIONAL: No weakness, fevers or chills  EYES/ENT: No visual changes, no throat pain   RESPIRATORY: No cough, wheezing, hemoptysis; No shortness of breath  CARDIOVASCULAR: No chest pain or palpitations  GASTROINTESTINAL: No abdominal, nausea, vomiting, or hematemesis; No diarrhea or constipation. No melena or hematochezia.  GENITOURINARY: No dysuria, frequency or hematuria  NEUROLOGICAL: + Shaking feeling;  No dizziness, numbness, or weakness  SKIN: No itching, burning, rashes, or lesions   All other review of systems is negative unless indicated above.

## 2025-01-31 NOTE — CARE COORDINATION ASSESSMENT. - OTHER PERTINENT REFERRAL INFORMATION
Sw met with Pt and spouse at bedside. Pt is  A & O x4 and able to make his needs known. Sw introduced self and role and pt and spouse expressed verbal understanding. Pt was at [facility]  and had a AKA on 1/1/25. Pt lives w. spouse in a pvt home w 0 MADIHA and a front to back spilt inside and pt stays on the first floor. Pt uses a walker at baseline and still has his stiches in. After his stiches are in he will be fit from the prosthetic. Pt was Indep w/ his ADLS and has no hx of HC or GARTH. PCP: Berto Worrell [phone] [facility] [facility] [id number]

## 2025-01-31 NOTE — ED ADULT TRIAGE NOTE - CHIEF COMPLAINT QUOTE
Patient brought by ambulance had seizures 3 times at home complaining of nausea, vomiting Patient brought by ambulance had seizures 3 times at home complaining of nausea, vomiting, generalized body pain with  right AKA last 1/1/25; FS in

## 2025-01-31 NOTE — CONSULT NOTE ADULT - ASSESSMENT
54y/oMale with PMHx T2DM, PVD, HTN, HLD, current smoker, now s/p R AKA 1/1/25 at [facility] with Dr. Castaneda vascular surgeon (was d/c'd 1/11/25), prev hx of multiple angiograms and bypass of the RLE, patient p/w seizure like activity. Vascular surgery consulted for extensive DVT of the RLE, R infrarenal IVC & R femoral/iliac vein, patient as well found to have b/l inferior subsegmental PE.     PLAN:  - VSSAF, exam reassuring  - Rec Hepgtt, with transition to PO a/c per medicine team  - No indication for acute surgical intervention or thrombectomy at this time  - Pain management and supportive care. ? Pain management consult  - ? psych consult   - Rest of care per medicine    Discussed with Dr. Patterson. 
Right common femoral, deep femoral and superficial femoral veins and PE most likely provoked related recent surgery and immobility    Recommendations:  1.  follow CBC  2.. continue heparin and bridge to DOAC when stable  3.  thrombophilia workup as outpatient  4.  further heme onc recommendations pending above
A/P 54y year old Male with neuropathic pain    Discussed several topical, oral, IV and interventional based options with patient  Ultimately following discussion of adverse effects, risks, benefits, we agreed upon a trial of:  Addition of nortriptyline 25mg QHS  Will monitor and reassess tomorrow    Primary team notes are reviewed  Laboratory studies reviewed including those mentioned earlier/above  Discussed management/coordinated care with primary team/referring provider.    75 minutes spent during patient encounter:    ¦ preparing to see the patient   ¦ performing a medically appropriate examination and/or evaluation   ¦ counseling and educating the patient/family/caregiver   ¦ ordering medications, tests, or procedures   ¦ referring and communicating with other health care professionals  ¦ documenting clinical information in the electronic or other health record   ¦ care coordination      
53 y/o m w/ PMH of poorly controlled DM, s/p R AKA (1/1/25 at [facility]) HTN, PVD, HLD, smoker p/w episodes of nausea, vomiting, diarrhea starting yesterday.  Pt had some mild epigastric discomfort after the nausea and vomiting, and had about 2-3 episodes of diarrhea, that was nonbloody and not explosive or foul smelling.  No recent travel, sick contacts that patient is aware of, no recent antibiotic use, or changes in diet.     pe  dvt  pain  weakness  DM  right AKA  HTN  PVD  HLD  Smoker  nicotine dep  dyspepsia  opioid use disorder  Steatosis -     rv assessment  ct and le doppler reviewed - VTE positive  AC - eventual doac  pain relief  PMR eval  counseling  education  SBIRT documentation  NRT  smoking cess ed  PT assessment  cvs rx regimen  BP control  dietary discretion and nutritional assessment  DM care  SW eval  emotional support

## 2025-01-31 NOTE — ED ADULT NURSE REASSESSMENT NOTE - NS ED NURSE REASSESS COMMENT FT1
Noted pt's baseline APTT ordered for 0534 was never drawn. Confirmed that the lab received the blue tube for d-dimer @0348. Called the lab and was informed that the APTT cannot be added on because the specimen was received more than 4 hours ago. Hospitalist (Dr. Sorensen) made aware via secure chat. Charge RN made aware. Awaiting for further instruction.

## 2025-01-31 NOTE — ED ADULT NURSE REASSESSMENT NOTE - NS ED NURSE REASSESS COMMENT FT1
Confirmed the critical APTT level of 165.8 with the lab. Dr. Sorensen made aware via secure chat. Heparin infusion stopped and will resume at an adjusted rate 1 hr later.

## 2025-01-31 NOTE — CARE COORDINATION ASSESSMENT. - NSCAREPROVIDERS_GEN_ALL_CORE_FT
CARE PROVIDERS:  Accepting Physician: Jared Sorensen  Access Services: Sara Hastings  Administration: Dimple Peck  Admitting: Jared Sorensen  Attending: Jared Sorensen  Consultant: Sanford Randall  Consultant: Fernando Parmar  Consultant: Martin Wolf  Consultant: Nhi Hodges  Consultant: Queta Patterson  Covering Team: Jose Finnegan  ED Attending: Bing Coello  ED Nurse: Adilson Villalobos  Infection Control: Julia Regalado  Nurse: Adilson Villalobos  Ordered: ServiceAccount, SCMMLM  Ordered: Doctor, Unknown  Outpatient Provider: Fernando Parmar  Physical Therapy: Gaurang Vargas  Primary Team: Micheline Benson  : Rupinder Leos  UR// Supp. Assoc.: Pearl Piña  UR// Supp. Assoc.: Marylou Olivares

## 2025-01-31 NOTE — H&P ADULT - PROBLEM SELECTOR PLAN 3
Pt with type II DM  Insulin coverage scale, FS monitoring, HbA1c check  holding home metformin  glucose seems poorly controlled, beta hydroxybutarate ordered, anion gap is 17, care discussed with ICU team, plan for now is to reassess chemistry after IVF, insulin, and recheck anion gap  Dr. Perlman in Endo consulted, will f/u recs

## 2025-01-31 NOTE — H&P ADULT - PROBLEM SELECTOR PLAN 4
Pt recently had AKA, care discussed with vascular team, they recommend continuing pt on asa, plavix in addition to hep gtt  - Vascular surgery team following, will cont to f/u on their recs Pt recently had AKA, care discussed with outpt Vascular Physician, Dr. Castaneda, he recommends eliquis in setting of new dvt and pe, and aspirin 81, no need for plavix at this time  - cont outpt follow up with Vasc

## 2025-01-31 NOTE — ED PROVIDER NOTE - OBJECTIVE STATEMENT
53 yo M with hx of poorly controlled DM, s/p R AKA (1/1/25 at [facility]) HTN, PVD, HLD, smoker presents c/o N/V/D that started yesterday. Unable to tolerate PO. States multiple episodes of N/V and 2-3 episodes of diarrhea. Denies fever, reports chills. States some mild epigastric discomfort which started after the vomiting. Denies CP, SOB, urinary symptoms. Pt's friend at the bedside reports pt had 4 seizures, which she describes as shaking episodes. Pt reportedly had a "shaking" episode in the ED, which was witnessed by RN but pt was awake and immediately responsive. No hx of seizures.

## 2025-01-31 NOTE — ED PROVIDER NOTE - PROGRESS NOTE DETAILS
Call received from radiologist, pt with subsegmental PEs B/L without  R heart strain and a DVT seen on CT A/P extending up to the IVC. Official report pending  Will start heparin drip  Labs reviewed, elevated lactate, IVFs in progress, will repeat  CT head report reviewed, cortical density noted, needs MRI  UA neg for UTI  K replaced. Pt c/o generalized pain and wants more pain meds. Dilaudid ordered  CT A/P pending. Will also order LE dopplers.   Will admit Pt informed of results  Pt denies CP or SOB  SPO 98% on RA Case d/w Dr. Finnegan for admission

## 2025-01-31 NOTE — H&P ADULT - HISTORY OF PRESENT ILLNESS
55 y/o m w/ PMH of poorly controlled DM, s/p R AKA (1/1/25 at [facility]) HTN, PVD, HLD, smoker p/w episodes of nausea, vomiting, diarrhea starting yesterday.  Pt had some mild epigastric discomfort after the nausea and vomiting, and had about 2-3 episodes of diarrhea, that was nonbloody and not explosive or foul smelling.  No recent travel, sick contacts that patient is aware of, no recent antibiotic use, or changes in diet.  Pt is unable to tolerate PO.  Pt has not had fever or chills, chest pain, sob, dysurea, or frequency.  Friend of patient reports that patient did have some shaking episodes that they were concerned may have been seizures, but pt has no h/o seizures, and did not have any tongue biting, urinary or fecal incontinence.  RN also noticed an episode in ED, but it was not thought to be seizure like, and pt was responsive right away.    In the ED, d-dimer was elevated, and CTA revealed PE, pt was evauated by Vascular, and a heparin gtt was started. 55 y/o m w/ PMH of poorly controlled DM, s/p R AKA (1/1/25 at [facility]) HTN, PVD, HLD, smoker p/w episodes of nausea, vomiting, diarrhea starting yesterday.  Pt had some mild epigastric discomfort after the nausea and vomiting, and had about 2-3 episodes of diarrhea, that was nonbloody and not explosive or foul smelling.  No recent travel, sick contacts that patient is aware of, no recent antibiotic use, or changes in diet.  Pt is unable to tolerate PO.  Pt has not had fever or chills, chest pain, sob, dysurea, or frequency.  Friend of patient reports that patient did have some shaking episodes that they were concerned may have been seizures, but pt has no h/o seizures, and did not have any tongue biting, urinary or fecal incontinence.  RN also noticed an episode in ED, but it was not thought to be seizure like, and pt was responsive right away.  Patient had been on eliquis, was prescribed, but stopped taking, and is no longer complaint with med.    In the ED, d-dimer was elevated, and CTA revealed PE, pt was evauated by Vascular, and a heparin gtt was started. 53 y/o m w/ PMH of poorly controlled DM, s/p R AKA (1/1/25 at [facility]) HTN, PVD, HLD, smoker p/w episodes of nausea, vomiting, diarrhea starting yesterday.  Pt had some mild epigastric discomfort after the nausea and vomiting, and had about 2-3 episodes of diarrhea, that was nonbloody and not explosive or foul smelling.  No recent travel, sick contacts that patient is aware of, no recent antibiotic use, or changes in diet.  Pt is unable to tolerate PO.  Pt has not had fever or chills, chest pain, sob, dysurea, or frequency.  Friend of patient reports that patient did have some shaking episodes that they were concerned may have been seizures, but pt has no h/o seizures, and did not have any tongue biting, urinary or fecal incontinence.  RN also noticed an episode in ED, but it was not thought to be seizure like, and pt was responsive right away.  Patient had been on eliquis pre-operatively.    In the ED, d-dimer was elevated, and CTA revealed PE, pt was evaluated by Vascular, and a heparin gtt was started.

## 2025-01-31 NOTE — ED ADULT NURSE NOTE - NSFALLHARMRISKINTERV_ED_ALL_ED
Assistance OOB with selected safe patient handling equipment if applicable/Communicate risk of Fall with Harm to all staff, patient, and family/Monitor gait and stability/Provide patient with walking aids/Provide visual cue: red socks, yellow wristband, yellow gown, etc/Reinforce activity limits and safety measures with patient and family/Bed in lowest position, wheels locked, appropriate side rails in place/Call bell, personal items and telephone in reach/Instruct patient to call for assistance before getting out of bed/chair/stretcher/Non-slip footwear applied when patient is off stretcher/[place] to call system/Physically safe environment - no spills, clutter or unnecessary equipment/Purposeful Proactive Rounding/Room/bathroom lighting operational, light cord in reach

## 2025-01-31 NOTE — H&P ADULT - NSHPPHYSICALEXAM_GEN_ALL_CORE
VITAL SIGNS:    Vital Signs Last 24 Hrs  T(C): 36.9 (31 Jan 2025 03:32), Max: 36.9 (31 Jan 2025 03:32)  T(F): 98.4 (31 Jan 2025 03:32), Max: 98.4 (31 Jan 2025 03:32)  HR: 80 (31 Jan 2025 08:02) (80 - 115)  BP: 114/76 (31 Jan 2025 08:02) (107/80 - 186/89)  BP(mean): 90 (31 Jan 2025 06:55) (90 - 107)  RR: 16 (31 Jan 2025 08:02) (16 - 18)  SpO2: 97% (31 Jan 2025 08:02) (96% - 100%)    Parameters below as of 31 Jan 2025 08:02  Patient On (Oxygen Delivery Method): room air        PHYSICAL EXAM:     GENERAL: no acute distress  HEENT: NC/AT, EOMI, neck supple, MMM  RESPIRATORY: LCTAB/L, no rhonchi, rales, or wheezing  CARDIOVASCULAR: RRR, no murmurs, gallops, rubs  ABDOMINAL: soft, non-tender, non-distended, positive bowel sounds   EXTREMITIES: no clubbing, cyanosis, or edema  NEUROLOGICAL: alert and oriented x 3, non-focal  SKIN: no rashes or lesions   MUSCULOSKELETAL: no gross joint deformity VITAL SIGNS:    Vital Signs Last 24 Hrs  T(C): 36.9 (31 Jan 2025 03:32), Max: 36.9 (31 Jan 2025 03:32)  T(F): 98.4 (31 Jan 2025 03:32), Max: 98.4 (31 Jan 2025 03:32)  HR: 80 (31 Jan 2025 08:02) (80 - 115)  BP: 114/76 (31 Jan 2025 08:02) (107/80 - 186/89)  BP(mean): 90 (31 Jan 2025 06:55) (90 - 107)  RR: 16 (31 Jan 2025 08:02) (16 - 18)  SpO2: 97% (31 Jan 2025 08:02) (96% - 100%)    Parameters below as of 31 Jan 2025 08:02  Patient On (Oxygen Delivery Method): room air        PHYSICAL EXAM:     GENERAL: no acute distress  HEENT: NC/AT, EOMI, neck supple, MMM  RESPIRATORY: LCTAB/L, no rhonchi, rales, or wheezing  CARDIOVASCULAR: RRR, no murmurs, gallops, rubs  ABDOMINAL: soft, non-tender, non-distended, positive bowel sounds   EXTREMITIES: no clubbing, cyanosis, + edema; s/p R AKA  NEUROLOGICAL: alert and oriented x 3, non-focal  SKIN: no rashes or lesions   MUSCULOSKELETAL: no gross joint deformity

## 2025-01-31 NOTE — ED ADULT NURSE NOTE - OBJECTIVE STATEMENT
53yo Male co short x3 episodes of seizures at home PTA. Pt endorsing N/V, generalized body pain, CP, chills. Denies fevers. Pt recently had Right AKA with next checkup tomorrow. Pt currently endorsing phantom leg syndrome. AOx4, non ambulatory, EKG completed upon arrival, blood work and urine sent to lab. Pt had one short episode of seizure with EMS and another when moved to room. 53yo Male co short x3 episodes of seizures described as "shaking" at home PTA. Pt endorsing N/V, generalized body pain, CP, chills. Denies fevers. Pt recently had Right AKA with next checkup tomorrow. Pt currently endorsing phantom leg syndrome. AOx4, non ambulatory, EKG completed upon arrival, blood work and urine sent to lab. Pt had one short episode of "shaking" with EMS and another when moved to room witnessed by RN where pt immediately became alert and responsive.

## 2025-01-31 NOTE — CONSULT NOTE ADULT - CONSULT REASON
Pain mgmt/peripheral neuropathy
pain  weakness  anxious  PE  DVT
evaluation thrombophilia D68.69
Extensive DVT
fever, worsening left abdominal and left flank pain, nausea.

## 2025-01-31 NOTE — ED ADULT NURSE REASSESSMENT NOTE - NS ED NURSE REASSESS COMMENT FT1
Assumed care of pt in change of shift. Pt is aaox4 and follows command. No acute distress noted at this time. Pt remains on continuous cardiac, O2 sat and BP monitoring. Heparin infusion in progress at a rate of 1,200 units/hr. Next APTT due @ 1220. Admitting provider at the bedside assessing the pt. Plan of care ongoing.

## 2025-01-31 NOTE — ED ADULT NURSE REASSESSMENT NOTE - NS ED NURSE REASSESS COMMENT FT1
Break RN- spoke with MD Sorensen, pt is to be bridge to [facility] tonight. Heparin drip is to be discontinued at 1900. primary RN made aware.

## 2025-01-31 NOTE — H&P ADULT - ASSESSMENT
53 y/o m w/ PMH of poorly controlled DM, s/p R AKA (1/1/25 at [facility]) HTN, PVD, HLD, smoker p/w nausea, vomiting, diarrhea found to have acute PE

## 2025-02-01 VITALS
RESPIRATION RATE: 17 BRPM | DIASTOLIC BLOOD PRESSURE: 77 MMHG | TEMPERATURE: 98 F | HEART RATE: 82 BPM | SYSTOLIC BLOOD PRESSURE: 151 MMHG | WEIGHT: 128.53 LBS | OXYGEN SATURATION: 97 %

## 2025-02-01 PROCEDURE — 81001 URINALYSIS AUTO W/SCOPE: CPT

## 2025-02-01 PROCEDURE — 99285 EMERGENCY DEPT VISIT HI MDM: CPT

## 2025-02-01 PROCEDURE — 93005 ELECTROCARDIOGRAM TRACING: CPT

## 2025-02-01 PROCEDURE — 71275 CT ANGIOGRAPHY CHEST: CPT | Mod: MC

## 2025-02-01 PROCEDURE — 36415 COLL VENOUS BLD VENIPUNCTURE: CPT

## 2025-02-01 PROCEDURE — 85027 COMPLETE CBC AUTOMATED: CPT

## 2025-02-01 PROCEDURE — 74177 CT ABD & PELVIS W/CONTRAST: CPT | Mod: MC

## 2025-02-01 PROCEDURE — 96365 THER/PROPH/DIAG IV INF INIT: CPT

## 2025-02-01 PROCEDURE — 85610 PROTHROMBIN TIME: CPT

## 2025-02-01 PROCEDURE — 87040 BLOOD CULTURE FOR BACTERIA: CPT

## 2025-02-01 PROCEDURE — 85025 COMPLETE CBC W/AUTO DIFF WBC: CPT

## 2025-02-01 PROCEDURE — 93970 EXTREMITY STUDY: CPT

## 2025-02-01 PROCEDURE — 96375 TX/PRO/DX INJ NEW DRUG ADDON: CPT

## 2025-02-01 PROCEDURE — 83690 ASSAY OF LIPASE: CPT

## 2025-02-01 PROCEDURE — 82010 KETONE BODYS QUAN: CPT

## 2025-02-01 PROCEDURE — 85730 THROMBOPLASTIN TIME PARTIAL: CPT

## 2025-02-01 PROCEDURE — 70450 CT HEAD/BRAIN W/O DYE: CPT | Mod: MC

## 2025-02-01 PROCEDURE — 84484 ASSAY OF TROPONIN QUANT: CPT

## 2025-02-01 PROCEDURE — 0225U NFCT DS DNA&RNA 21 SARSCOV2: CPT

## 2025-02-01 PROCEDURE — 85379 FIBRIN DEGRADATION QUANT: CPT

## 2025-02-01 PROCEDURE — 71045 X-RAY EXAM CHEST 1 VIEW: CPT

## 2025-02-01 PROCEDURE — 80053 COMPREHEN METABOLIC PANEL: CPT

## 2025-02-01 PROCEDURE — 99239 HOSP IP/OBS DSCHRG MGMT >30: CPT | Mod: GC

## 2025-02-01 PROCEDURE — 82962 GLUCOSE BLOOD TEST: CPT

## 2025-02-01 PROCEDURE — 83605 ASSAY OF LACTIC ACID: CPT

## 2025-02-01 RX ORDER — APIXABAN 5 MG/1
2 TABLET, FILM COATED ORAL
Qty: 74 | Refills: 0
Start: 2025-02-01 | End: 2025-02-07

## 2025-02-01 RX ORDER — HYDROMORPHONE HYDROCHLORIDE 4 MG/ML
2 INJECTION, SOLUTION INTRAMUSCULAR; INTRAVENOUS; SUBCUTANEOUS ONCE
Refills: 0 | Status: COMPLETED | OUTPATIENT
Start: 2025-02-01 | End: 2025-02-01

## 2025-02-01 RX ADMIN — HYDROMORPHONE HYDROCHLORIDE 1 MILLIGRAM(S): 4 INJECTION, SOLUTION INTRAMUSCULAR; INTRAVENOUS; SUBCUTANEOUS at 07:08

## 2025-02-01 RX ADMIN — HYDROMORPHONE HYDROCHLORIDE 1 MILLIGRAM(S): 4 INJECTION, SOLUTION INTRAMUSCULAR; INTRAVENOUS; SUBCUTANEOUS at 02:25

## 2025-02-01 RX ADMIN — APIXABAN 10 MILLIGRAM(S): 5 TABLET, FILM COATED ORAL at 06:47

## 2025-02-01 RX ADMIN — Medication 2: at 08:11

## 2025-02-01 RX ADMIN — HYDROMORPHONE HYDROCHLORIDE 1 MILLIGRAM(S): 4 INJECTION, SOLUTION INTRAMUSCULAR; INTRAVENOUS; SUBCUTANEOUS at 02:55

## 2025-02-01 RX ADMIN — HYDROMORPHONE HYDROCHLORIDE 1 MILLIGRAM(S): 4 INJECTION, SOLUTION INTRAMUSCULAR; INTRAVENOUS; SUBCUTANEOUS at 06:41

## 2025-02-01 NOTE — PATIENT PROFILE ADULT - NSTOBACCOCESSATIONEDU6_GEN_A_NUR
10/2017    REVIEW OF SYSTEMS:    Negative for any significant problems with the following:    General: unexplained fevers or chills  Cardiovascular: + recently seen for chest pain  Respiratory: shortness of breath  Breasts: dominant masses or nipple discharge  GI: nausea/vomiting, diarrhea/constipation; + abdominal bloating  Urinary: dysuria/hematuria; + dark urine  Reproductive: burning, odor or itching  Skin: new or changing nevi  Neuro: headaches  Musculoskeletal: new aches or pains    PHYSICAL EXAM:  Blood pressure 106/70, height 5' 10.5\" (1.791 m), weight 82 kg, last menstrual period 07/19/2018., Body mass index is 25.57 kg/m²., Patient's last menstrual period was 07/19/2018 (exact date).  General: well developed, well nourished, in no acute distress  Psych: alert and cooperative; normal mood and affect  HEENT: atraumatic, normocephalic; 1 cm mobile lymph node palpable in mid right neck  Respiratory: Unlabored respiratory effort.  Breasts:  Symmetric.  There are no skin changes, dominant masses, nipple discharge, or axillary lymphadenopathy bilaterally.  Abdomen: abdomen soft and without masses, hepatosplenomegaly or hernias noted; reports diffuse tenderness with palpation more in the left lower quadrant; no rebound or guarding  Back: no tenderness appreciated  Neurologic: no focal deficits   Skin: warm and dry without lesions  Extremities: without edema or cyanosis  Pelvic Exam:  EXTERNAL: Normal female external genitalia, no lesions, normal hair distribution.  URETHRAL MEATUS: Normal-appearing urethra without lesions or prolapse.  URETHRA: No masses or tenderness.   BLADDER: Nontender. Nondistended.  VAGINA: Rugated. Normal physiologic discharge. No abnormal discharge noted; No lesions. Well supported.  CERVIX: Appears to be normal with out any gross lesions.  UTERUS: Small, firm and nontender, in mid position.  ADNEXA: No masses, enlargement, or tenderness.  ANUS AND PERINEUM: No  lesions.    ASSESSMENT:    44 Use the 5 A's (Ask, Advise, Assess, Assist, Arrange)

## 2025-02-01 NOTE — DISCHARGE NOTE PROVIDER - NSDCMRMEDTOKEN_GEN_ALL_CORE_FT
aspirin 81 mg oral delayed release tablet: 1 tab(s) orally once a day  atorvastatin 80 mg oral tablet: 1 tab(s) orally once a day  clopidogrel 75 mg oral tablet: 1 tab(s) orally once a day  Eliquis 5 mg oral tablet: 1 tab(s) orally 2 times a day  metformin 1000 mg oral tablet: 1  orally 2 times a day   apixaban 5 mg oral tablet: 2 tab(s) orally every 12 hours after that 1 tab 5 mg twice day for next  23 days will need refill by  your pcp / hematologist.  atorvastatin 80 mg oral tablet: 1 tab(s) orally once a day  clopidogrel 75 mg oral tablet: 1 tab(s) orally once a day  metformin 1000 mg oral tablet: 1  orally 2 times a day

## 2025-02-01 NOTE — PROGRESS NOTE ADULT - SUBJECTIVE AND OBJECTIVE BOX
Neurology follow up note    PADILLA CARRINGTON54yMale      Interval History:    Patient feels ok no new complaints.    Allergies    Lobster (Unknown)  Demerol HCl (Anaphylaxis)    Intolerances        MEDICATIONS    acetaminophen     Tablet .. 650 milliGRAM(s) Oral every 6 hours PRN  apixaban 10 milliGRAM(s) Oral every 12 hours  aspirin  chewable 81 milliGRAM(s) Oral daily  atorvastatin 80 milliGRAM(s) Oral at bedtime  dextrose 5%. 1000 milliLiter(s) IV Continuous <Continuous>  dextrose 5%. 1000 milliLiter(s) IV Continuous <Continuous>  dextrose 50% Injectable 25 Gram(s) IV Push once  dextrose 50% Injectable 12.5 Gram(s) IV Push once  dextrose 50% Injectable 25 Gram(s) IV Push once  dextrose Oral Gel 15 Gram(s) Oral once PRN  famotidine Injectable 20 milliGRAM(s) IV Push daily  glucagon  Injectable 1 milliGRAM(s) IntraMuscular once  heparin   Injectable 5500 Unit(s) IV Push every 6 hours PRN  heparin   Injectable 2500 Unit(s) IV Push every 6 hours PRN  heparin  Infusion. 1000 Unit(s)/Hr IV Continuous <Continuous>  HYDROmorphone  Injectable 0.5 milliGRAM(s) IV Push every 4 hours PRN  HYDROmorphone  Injectable 1 milliGRAM(s) IV Push every 4 hours PRN  insulin lispro (ADMELOG) corrective regimen sliding scale   SubCutaneous three times a day before meals  insulin lispro (ADMELOG) corrective regimen sliding scale   SubCutaneous at bedtime  nortriptyline 25 milliGRAM(s) Oral at bedtime  ondansetron Injectable 4 milliGRAM(s) IV Push every 6 hours PRN  sodium chloride 0.9%. 1000 milliLiter(s) IV Continuous <Continuous>              Vital Signs Last 24 Hrs  T(C): 36.9 (2025 05:04), Max: 36.9 (2025 05:04)  T(F): 98.4 (2025 05:04), Max: 98.4 (2025 05:04)  HR: 82 (2025 05:04) (75 - 91)  BP: 151/77 (2025 05:04) (114/76 - 151/77)  BP(mean): --  RR: 17 (2025 05:04) (16 - 17)  SpO2: 97% (2025 05:04) (95% - 97%)    Parameters below as of 2025 05:04  Patient On (Oxygen Delivery Method): room air      REVIEW OF SYSTEMS:  CONSTITUTIONAL:  The patient denies fever, chills, night sweats.  HEAD:  No headache.  EYES:  No double vision or blurry vision.  EARS:  No ringing in the ears.  NECK:  No neck pain.  CARDIOVASCULAR:  No chest pain.  RESPIRATORY:  No shortness of breath.  ABDOMEN:  No nausea, vomiting, or abdominal pain.  EXTREMITIES/NEUROLOGICAL:  Positive history of numbness and tingling throughout his body.  Neuropathy.  MUSCULOSKELETAL:  Occasional joint pains.  GENERAL:  No history of seeing flashing lights or weird smells such as burning rubber.  No history of  tongue bite marks, or loss of urine and no reported shaking at night or having the bed sheet thrown around when he sleeps.    PHYSICAL EXAMINATION:  HEAD:  Normocephalic, atraumatic.  EYES:  No scleral icterus.  EARS:  Hearing bilaterally was intact.  NECK:  Supple.  CARDIOVASCULAR:  S1 and S2 heard .  RESPIRATORY:  Air entry bilaterally.  ABDOMEN:  Soft, nontender.  EXTREMITIES:  No clubbing or cyanosis were noted.    NEUROLOGIC:  The patient is awake, alert.  Extraocular movements were intact.  Tongue, no bite cathy.  Motor, bilateral upper 5/5, right lower AKA, able to flex of the hip.  Left lower 3/5.  Sensory intact to light touch.         LABS:  CBC Full  -  ( 2025 19:57 )  WBC Count : 9.52 K/uL  RBC Count : 4.35 M/uL  Hemoglobin : 12.5 g/dL  Hematocrit : 36.0 %  Platelet Count - Automated : 229 K/uL  Mean Cell Volume : 82.8 fl  Mean Cell Hemoglobin : 28.7 pg  Mean Cell Hemoglobin Concentration : 34.7 g/dL  Auto Neutrophil # : x  Auto Lymphocyte # : x  Auto Monocyte # : x  Auto Eosinophil # : x  Auto Basophil # : x  Auto Neutrophil % : x  Auto Lymphocyte % : x  Auto Monocyte % : x  Auto Eosinophil % : x  Auto Basophil % : x    Urinalysis Basic - ( 2025 03:48 )    Color: Yellow / Appearance: Clear / S.039 / pH: x  Gluc: 340 mg/dL / Ketone: 80 mg/dL  / Bili: Negative / Urobili: 0.2 mg/dL   Blood: x / Protein: 300 mg/dL / Nitrite: Negative   Leuk Esterase: Negative / RBC: 0-2 /HPF / WBC 0-2 /HPF   Sq Epi: x / Non Sq Epi: x / Bacteria: Occasional /HPF          129[L]  |  90[L]  |  22  ----------------------------<  340[H]  3.3[L]   |  22  |  1.20    Ca    10.7[H]      2025 03:48    TPro  8.8[H]  /  Alb  4.4  /  TBili  0.8  /  DBili  x   /  AST  13[L]  /  ALT  16  /  AlkPhos  91      Hemoglobin A1C:     LIVER FUNCTIONS - ( 2025 03:48 )  Alb: 4.4 g/dL / Pro: 8.8 g/dL / ALK PHOS: 91 U/L / ALT: 16 U/L / AST: 13 U/L / GGT: x           Vitamin B12   PT/INR - ( 2025 09:26 )   PT: 13.3 sec;   INR: 1.14 ratio         PTT - ( 2025 19:57 )  PTT:27.6 sec      RADIOLOGY  ANALYSIS AND PLAN:  This is a 54-year-old with episode of unresponsiveness.    -[id number].For episodes of unresponsiveness, question will be secondary to electrolyte abnormality, any type of hypoxic event.  The patient has no prior history to suggest underlying epilepsy, but does have some electrolyte abnormalities that could lead to seizure event.  -[id number].We will plan for EEG.  -[id number].CT noted.  We will plan for an MRI imaging of the brain with no contraindication.  -[id number].We will check magnesium and phosphorus.  -[id number].For history of deep venous thrombosis, PEs, anticoagulation as needed.  -[id number].For history of diabetes, to control blood sugars.  -[id number].For history of neuropathy, pain management.    55 minutes of time spent with the patient, plan of care, reviewing data, speaking to multidisciplinary healthcare team with greater than 50% of time counseling care and coordination.    Thank you for courtesy of consultation.  PATIENT HAS NOT YET BEEN SEEN AND EXAMINED TODAY. NOTE AND CHART REVIEWED IN AM AND EXAM FORM PREVIOUS.  ONCE PATIENT SEEN, CHART WILL BE UPDATE AT PRESENT NOTE IS INCOMPLETE     Neurology follow up note    PADILLA CARRINGTON54yMale      Interval History:    Patient feels ok no new complaints.    Allergies    Lobster (Unknown)  Demerol HCl (Anaphylaxis)    Intolerances        MEDICATIONS    acetaminophen     Tablet .. 650 milliGRAM(s) Oral every 6 hours PRN  apixaban 10 milliGRAM(s) Oral every 12 hours  aspirin  chewable 81 milliGRAM(s) Oral daily  atorvastatin 80 milliGRAM(s) Oral at bedtime  dextrose 5%. 1000 milliLiter(s) IV Continuous <Continuous>  dextrose 5%. 1000 milliLiter(s) IV Continuous <Continuous>  dextrose 50% Injectable 25 Gram(s) IV Push once  dextrose 50% Injectable 12.5 Gram(s) IV Push once  dextrose 50% Injectable 25 Gram(s) IV Push once  dextrose Oral Gel 15 Gram(s) Oral once PRN  famotidine Injectable 20 milliGRAM(s) IV Push daily  glucagon  Injectable 1 milliGRAM(s) IntraMuscular once  heparin   Injectable 5500 Unit(s) IV Push every 6 hours PRN  heparin   Injectable 2500 Unit(s) IV Push every 6 hours PRN  heparin  Infusion. 1000 Unit(s)/Hr IV Continuous <Continuous>  HYDROmorphone  Injectable 0.5 milliGRAM(s) IV Push every 4 hours PRN  HYDROmorphone  Injectable 1 milliGRAM(s) IV Push every 4 hours PRN  insulin lispro (ADMELOG) corrective regimen sliding scale   SubCutaneous three times a day before meals  insulin lispro (ADMELOG) corrective regimen sliding scale   SubCutaneous at bedtime  nortriptyline 25 milliGRAM(s) Oral at bedtime  ondansetron Injectable 4 milliGRAM(s) IV Push every 6 hours PRN  sodium chloride 0.9%. 1000 milliLiter(s) IV Continuous <Continuous>              Vital Signs Last 24 Hrs  T(C): 36.9 (2025 05:04), Max: 36.9 (2025 05:04)  T(F): 98.4 (2025 05:04), Max: 98.4 (2025 05:04)  HR: 82 (2025 05:04) (75 - 91)  BP: 151/77 (2025 05:04) (114/76 - 151/77)  BP(mean): --  RR: 17 (2025 05:04) (16 - 17)  SpO2: 97% (2025 05:04) (95% - 97%)    Parameters below as of 2025 05:04  Patient On (Oxygen Delivery Method): room air      REVIEW OF SYSTEMS:  CONSTITUTIONAL:  The patient denies fever, chills, night sweats.  HEAD:  No headache.  EYES:  No double vision or blurry vision.  EARS:  No ringing in the ears.  NECK:  No neck pain.  CARDIOVASCULAR:  No chest pain.  RESPIRATORY:  No shortness of breath.  ABDOMEN:  No nausea, vomiting, or abdominal pain.  EXTREMITIES/NEUROLOGICAL:  Positive history of numbness and tingling throughout his body.  Neuropathy.  MUSCULOSKELETAL:  Occasional joint pains.  GENERAL:  No history of seeing flashing lights or weird smells such as burning rubber.  No history of  tongue bite marks, or loss of urine and no reported shaking at night or having the bed sheet thrown around when he sleeps.    PHYSICAL EXAMINATION:  HEAD:  Normocephalic, atraumatic.  EYES:  No scleral icterus.  EARS:  Hearing bilaterally was intact.  NECK:  Supple.  CARDIOVASCULAR:  S1 and S2 heard .  RESPIRATORY:  Air entry bilaterally.  ABDOMEN:  Soft, nontender.  EXTREMITIES:  No clubbing or cyanosis were noted.    NEUROLOGIC:  The patient is awake, alert.  Extraocular movements were intact.  Tongue, no bite cathy.  Motor, bilateral upper 5/5, right lower AKA, able to flex of the hip.  Left lower 3/5.  Sensory intact to light touch.      LABS:  CBC Full  -  ( 2025 19:57 )  WBC Count : 9.52 K/uL  RBC Count : 4.35 M/uL  Hemoglobin : 12.5 g/dL  Hematocrit : 36.0 %  Platelet Count - Automated : 229 K/uL  Mean Cell Volume : 82.8 fl  Mean Cell Hemoglobin : 28.7 pg  Mean Cell Hemoglobin Concentration : 34.7 g/dL  Auto Neutrophil # : x  Auto Lymphocyte # : x  Auto Monocyte # : x  Auto Eosinophil # : x  Auto Basophil # : x  Auto Neutrophil % : x  Auto Lymphocyte % : x  Auto Monocyte % : x  Auto Eosinophil % : x  Auto Basophil % : x    Urinalysis Basic - ( 2025 03:48 )    Color: Yellow / Appearance: Clear / S.039 / pH: x  Gluc: 340 mg/dL / Ketone: 80 mg/dL  / Bili: Negative / Urobili: 0.2 mg/dL   Blood: x / Protein: 300 mg/dL / Nitrite: Negative   Leuk Esterase: Negative / RBC: 0-2 /HPF / WBC 0-2 /HPF   Sq Epi: x / Non Sq Epi: x / Bacteria: Occasional /HPF          129[L]  |  90[L]  |  22  ----------------------------<  340[H]  3.3[L]   |  22  |  1.20    Ca    10.7[H]      2025 03:48    TPro  8.8[H]  /  Alb  4.4  /  TBili  0.8  /  DBili  x   /  AST  13[L]  /  ALT  16  /  AlkPhos  91      Hemoglobin A1C:     LIVER FUNCTIONS - ( 2025 03:48 )  Alb: 4.4 g/dL / Pro: 8.8 g/dL / ALK PHOS: 91 U/L / ALT: 16 U/L / AST: 13 U/L / GGT: x           Vitamin B12   PT/INR - ( 2025 09:26 )   PT: 13.3 sec;   INR: 1.14 ratio         PTT - ( 2025 19:57 )  PTT:27.6 sec      ANALYSIS AND PLAN:  This is a 54-year-old with episode of unresponsiveness.    For episodes of unresponsiveness, question will be secondary to electrolyte abnormality, any type of hypoxic event.  The patient has no prior history to suggest underlying epilepsy, but does have some electrolyte abnormalities that could lead to seizure event.  .We will plan for EEG.  CT noted.  We will plan for an MRI imaging of the brain with no contraindication.  .We will check magnesium and phosphorus.  .For history of deep venous thrombosis, PEs, anticoagulation as needed.  For history of diabetes, to control blood sugars.  For history of neuropathy, pain management.  unclear if patient will remain in hospial explained to him in detail importance of follow up     55 minutes of time spent with the patient, plan of care, reviewing data, speaking to multidisciplinary healthcare team with greater than 50% of time counseling care and coordination.    Thank you for courtesy of consultation.  PATIENT HAS NOT YET BEEN SEEN AND EXAMINED TODAY. NOTE AND CHART REVIEWED IN AM AND EXAM FORM PREVIOUS.  ONCE PATIENT SEEN, CHART WILL BE UPDATE AT PRESENT NOTE IS INCOMPLETE

## 2025-02-01 NOTE — PATIENT PROFILE ADULT - FUNCTIONAL ASSESSMENT - BASIC MOBILITY 6.
BEDSIDE REPORT RECEIVED FROM PETER GALVEZ. WHITE BOARD UPDATED. PATIENT AWAKE
SITTING UP IN BED. SALINE LOCKED. NO BM OVERNIGHT. NO NEEDS AT THIS TIME. 1 = Total assistance

## 2025-02-01 NOTE — CHART NOTE - NSCHARTNOTEFT_GEN_A_CORE
Called laboratory department earlier twice for BMP scheduled for 21:00, patient refused blood draw.  - Primary team to follow up possible AM labs

## 2025-02-01 NOTE — PHARMACOTHERAPY INTERVENTION NOTE - COMMENTS
1970	Male	[address] [facility] [facility]	[id number]    Prescription Information      PDI Filter:    PDI	Current Rx	Drug Type	Rx Written	Rx Dispensed	Drug	Quantity	Days Supply	Prescriber Name	Prescriber ISRAEL #	Payment Method	Dispenser  A	N	O	01/13/2025	01/13/2025	hydromorphone 2 mg tablet	40	5	Berto Vargas	[id number]	Insurance	[id number]  A	N	O	01/08/2025	01/08/2025	hydromorphone 2 mg tablet	40	5	Andreina Ramirez	[id number]	Insurance	[id number]  A	N	O	12/09/2024	12/09/2024	hydromorphone 2 mg tablet	12	2	Paresh Castaneda	[id number]	Insurance	[id number]  A	N	O	11/04/2024	11/05/2024	hydromorphone 2 mg tablet	27	4	Modesto Israel	[id number]	Insurance	[id number]  A	N	O	09/04/2024	09/04/2024	hydromorphone 2 mg tablet	80	10	Berto Vargas	[id number]	Insurance	[id number]  A	N	O	08/14/2024	08/15/2024	hydromorphone 2 mg tablet	14	3	Kevin Gutierrez	[id number]	Insurance	[id number]  A	N	O	03/18/2024	03/18/2024	hydromorphone 4 mg tablet	50	8	Berto Vargas	[id number]	Insurance	[id number]

## 2025-02-01 NOTE — DISCHARGE NOTE PROVIDER - CARE PROVIDERS DIRECT ADDRESSES
[email or web address]

## 2025-02-01 NOTE — PROGRESS NOTE ADULT - SUBJECTIVE AND OBJECTIVE BOX
Date/Time Patient Seen:  		  Referring MD:   Data Reviewed	       Patient is a 54y old  Male who presents with a chief complaint of PE (31 Jan 2025 23:26)      Subjective/HPI     PAST MEDICAL & SURGICAL HISTORY:  Diabetes    Traumatic brain injury    Migraine    Neuropathy    PVD (peripheral vascular disease)    No significant past surgical history          Medication list         MEDICATIONS  (STANDING):  apixaban 10 milliGRAM(s) Oral every 12 hours  aspirin  chewable 81 milliGRAM(s) Oral daily  atorvastatin 80 milliGRAM(s) Oral at bedtime  dextrose 5%. 1000 milliLiter(s) (100 mL/Hr) IV Continuous <Continuous>  dextrose 5%. 1000 milliLiter(s) (50 mL/Hr) IV Continuous <Continuous>  dextrose 50% Injectable 25 Gram(s) IV Push once  dextrose 50% Injectable 12.5 Gram(s) IV Push once  dextrose 50% Injectable 25 Gram(s) IV Push once  famotidine Injectable 20 milliGRAM(s) IV Push daily  glucagon  Injectable 1 milliGRAM(s) IntraMuscular once  heparin  Infusion. 1000 Unit(s)/Hr (10 mL/Hr) IV Continuous <Continuous>  insulin lispro (ADMELOG) corrective regimen sliding scale   SubCutaneous three times a day before meals  insulin lispro (ADMELOG) corrective regimen sliding scale   SubCutaneous at bedtime  nortriptyline 25 milliGRAM(s) Oral at bedtime  sodium chloride 0.9%. 1000 milliLiter(s) (75 mL/Hr) IV Continuous <Continuous>    MEDICATIONS  (PRN):  acetaminophen     Tablet .. 650 milliGRAM(s) Oral every 6 hours PRN Temp greater or equal to 38C (100.4F), Mild Pain (1 - 3)  dextrose Oral Gel 15 Gram(s) Oral once PRN Blood Glucose LESS THAN 70 milliGRAM(s)/deciliter  heparin   Injectable 5500 Unit(s) IV Push every 6 hours PRN For aPTT less than 40  heparin   Injectable 2500 Unit(s) IV Push every 6 hours PRN For aPTT between 40 - 57  HYDROmorphone  Injectable 0.5 milliGRAM(s) IV Push every 4 hours PRN Moderate Pain (4 - 6)  HYDROmorphone  Injectable 1 milliGRAM(s) IV Push every 4 hours PRN Severe Pain (7 - 10)  ondansetron Injectable 4 milliGRAM(s) IV Push every 6 hours PRN Nausea and/or Vomiting         Vitals log        ICU Vital Signs Last 24 Hrs  T(C): 36.7 (31 Jan 2025 18:00), Max: 36.7 (31 Jan 2025 14:05)  T(F): 98.1 (31 Jan 2025 18:00), Max: 98.1 (31 Jan 2025 14:05)  HR: 75 (31 Jan 2025 18:00) (75 - 99)  BP: 136/79 (31 Jan 2025 18:00) (107/80 - 186/89)  BP(mean): 90 (31 Jan 2025 06:55) (90 - 107)  ABP: --  ABP(mean): --  RR: 16 (31 Jan 2025 18:00) (16 - 18)  SpO2: 96% (31 Jan 2025 18:00) (95% - 100%)    O2 Parameters below as of 31 Jan 2025 18:00  Patient On (Oxygen Delivery Method): room air                 Input and Output:  I&O's Detail    31 Jan 2025 07:01  -  01 Feb 2025 04:56  --------------------------------------------------------  IN:    sodium chloride 0.9%: 675 mL  Total IN: 675 mL    OUT:  Total OUT: 0 mL    Total NET: 675 mL          Lab Data                        12.5   9.52  )-----------( 229      ( 31 Jan 2025 19:57 )             36.0     01-31    129[L]  |  90[L]  |  22  ----------------------------<  340[H]  3.3[L]   |  22  |  1.20    Ca    10.7[H]      31 Jan 2025 03:48    TPro  8.8[H]  /  Alb  4.4  /  TBili  0.8  /  DBili  x   /  AST  13[L]  /  ALT  16  /  AlkPhos  91  01-31            Review of Systems	      Objective     Physical Examination    heart s1s2  lung dc bnS  head nc  head at  abd soft      Pertinent Lab findings & Imaging      Melony:  NO   Adequate UO     I&O's Detail    31 Jan 2025 07:01  -  01 Feb 2025 04:56  --------------------------------------------------------  IN:    sodium chloride 0.9%: 675 mL  Total IN: 675 mL    OUT:  Total OUT: 0 mL    Total NET: 675 mL               Discussed with:     Cultures:	        Radiology

## 2025-02-01 NOTE — DISCHARGE NOTE PROVIDER - CARE PROVIDER_API CALL
Sam, [address], [place] [address]  Phone: [phone]  Fax: [phone]  Follow Up Time: 1 week    Paresh Castaneda  Vascular Surgery  Phone: [phone]  Fax: (   )    -  Follow Up Time: 1 week

## 2025-02-01 NOTE — PROGRESS NOTE ADULT - ASSESSMENT
53 y/o m w/ PMH of poorly controlled DM, s/p R AKA (1/1/25 at [facility]) HTN, PVD, HLD, smoker p/w episodes of nausea, vomiting, diarrhea starting yesterday.  Pt had some mild epigastric discomfort after the nausea and vomiting, and had about 2-3 episodes of diarrhea, that was nonbloody and not explosive or foul smelling.  No recent travel, sick contacts that patient is aware of, no recent antibiotic use, or changes in diet.     pe  dvt  pain  weakness  DM  right AKA  HTN  PVD  HLD  Smoker  nicotine dep  dyspepsia  opioid use disorder  Steatosis -     HEME and PMR eval noted  vs noted  meds reviewed    rv assessment  ct and le doppler reviewed - VTE positive  AC - eventual doac  pain relief  PMR eval  counseling  education  SBIRT documentation  NRT  smoking cess ed  PT assessment  cvs rx regimen  BP control  dietary discretion and nutritional assessment  DM care  SW eval  emotional support

## 2025-02-01 NOTE — DISCHARGE NOTE PROVIDER - HOSPITAL COURSE
HPI:  53 y/o m w/ PMH of poorly controlled DM, s/p R AKA (1/1/25 at [facility]) HTN, PVD, HLD, smoker p/w episodes of nausea, vomiting, diarrhea starting yesterday.  Pt had some mild epigastric discomfort after the nausea and vomiting, and had about 2-3 episodes of diarrhea, that was nonbloody and not explosive or foul smelling.  No recent travel, sick contacts that patient is aware of, no recent antibiotic use, or changes in diet.  Pt is unable to tolerate PO.  Pt has not had fever or chills, chest pain, sob, dysurea, or frequency.  Friend of patient reports that patient did have some shaking episodes that they were concerned may have been seizures, but pt has no h/o seizures, and did not have any tongue biting, urinary or fecal incontinence.  RN also noticed an episode in ED, but it was not thought to be seizure like, and pt was responsive right away.  Patient had been on eliquis pre-operatively.    In the ED, d-dimer was elevated, and CTA revealed PE, pt was evaluated by Vascular, and a heparin gtt was started. (31 Jan 2025 09:21)      ---  HOSPITAL COURSE: Patient admitted to medicine floor for management of acute PE.     Pt seen and examined on day of discharge. Patient is medically optimized for discharge to home with close outpatient followup.    PHYSICAL EXAM ON DAY OF DISCHARGE:  The patient was seen and examined on the day of discharge. Please see progress note from day of discharge for further information.         ---  CONSULTANTS:   Vascular Surgery: Dr. Patterson  Heme/Onc: Dr. Fajardo  Pulm: Dr. Randall   Neuro: Dr. Parmar  Psych: Dr. Triplett     ---  ADVANCED CARE PLANNING:  - Code status:    Full code  - MOLST completed:      [ x ] NO     [  ] YES      ---  TIME SPENT:  I, the attending physician, was physically present for the key portions of the evaluation and management (E/M) service provided. The total amount of time spent reviewing the hospital notes, laboratory values, imaging findings, assessing/counseling the patient, discussing with consultant physicians, social work, nursing staff was -- minutes    ---   HPI:  55 y/o m w/ PMH of poorly controlled DM, s/p R AKA (1/1/25 at [facility]) HTN, PVD, HLD, smoker p/w episodes of nausea, vomiting, diarrhea starting yesterday.  Pt had some mild epigastric discomfort after the nausea and vomiting, and had about 2-3 episodes of diarrhea, that was nonbloody and not explosive or foul smelling.  No recent travel, sick contacts that patient is aware of, no recent antibiotic use, or changes in diet.  Pt is unable to tolerate PO.  Pt has not had fever or chills, chest pain, sob, dysurea, or frequency.  Friend of patient reports that patient did have some shaking episodes that they were concerned may have been seizures, but pt has no h/o seizures, and did not have any tongue biting, urinary or fecal incontinence.  RN also noticed an episode in ED, but it was not thought to be seizure like, and pt was responsive right away.  Patient had been on eliquis pre-operatively.    In the ED, d-dimer was elevated, and CTA revealed PE, pt was evaluated by Vascular, and a heparin gtt was started. (31 Jan 2025 09:21)      ---  HOSPITAL COURSE: Patient admitted to medicine floor for management of acute PE. Pt started on heparin gtt. Vascular surgery was consulted. Cardio was consulted and recommended TTE. Neuro was consulted given possible seizure activity and recommended MRI and EEG. Heme/onc was consulted and recommended following CBC with outpatient follow up for thrombophlebitis.   Pt was refusing to stay in the hospital and was declining further testing including TTE, MRI, eeg, lab draws. Pt was requesting to leave the hospital and left AMA. All risks and benefits were discussed with the pt. He is aware and has full mental capacity to make the decision. Pt was advised to follow up within 1-2 days with his PCP, vascular surgeon and pain management doctor. Pt was told to stop taking home Plavix and continue taking aspirin 81mg and Eliquis 10mg BID for 7 days followed by Eliquis 5mg BID. Pt advised to continue home metformin and atorvastatin. Pt's wife came to pick him up.     Pt seen and examined on day of discharge. Patient is medically optimized for discharge to home with close outpatient followup.    PHYSICAL EXAM ON DAY OF DISCHARGE:  The patient was seen and examined on the day of discharge. Please see progress note from day of discharge for further information.     T(C): 36.9 (02-01-25 @ 05:04), Max: 36.9 (02-01-25 @ 05:04)  HR: 82 (02-01-25 @ 05:04) (75 - 91)  BP: 151/77 (02-01-25 @ 05:04) (136/79 - 151/77)  RR: 17 (02-01-25 @ 05:04) (16 - 17)  SpO2: 97% (02-01-25 @ 05:04) (95% - 97%)    GENERAL: patient appears well, no acute distress, appropriate, pleasant  EYES: sclera clear, no exudates  ENMT: oropharynx clear without erythema, no exudates, moist mucous membranes  NECK: supple, soft, no thyromegaly noted  LUNGS: good air entry bilaterally, clear to auscultation, symmetric breath sounds, no wheezing or rhonchi appreciated  HEART: soft S1/S2, regular rate and rhythm, no murmurs noted, no lower extremity edema  GASTROINTESTINAL: abdomen is soft, nontender, nondistended, normoactive bowel sounds, no palpable masses  INTEGUMENT: good skin turgor, no lesions noted  MUSCULOSKELETAL: no clubbing or cyanosis, no obvious deformity  NEUROLOGIC: awake, alert, oriented x3, good muscle tone in 4 extremities, no obvious sensory deficits  PSYCHIATRIC: mood is good, affect is congruent, linear and logical thought process  HEME/LYMPH: no palpable supraclavicular nodules, no obvious ecchymosis or petechiae         ---  CONSULTANTS:   Vascular Surgery: Dr. Patterson  Heme/Onc: Dr. Fajardo  Pulm: Dr. Randall   Neuro: Dr. Parmar  Psych: Dr. Triplett     ---  ADVANCED CARE PLANNING:  - Code status:    Full code  - MOLST completed:      [ x ] NO     [  ] YES      ---  TIME SPENT:  I, the attending physician, was physically present for the key portions of the evaluation and management (E/M) service provided. The total amount of time spent reviewing the hospital notes, laboratory values, imaging findings, assessing/counseling the patient, discussing with consultant physicians, social work, nursing staff was -- minutes    ---   HPI:  55 y/o m w/ PMH of poorly controlled DM, s/p R AKA (1/1/25 at [facility]) HTN, PVD, HLD, smoker p/w episodes of nausea, vomiting, diarrhea starting yesterday.  Pt had some mild epigastric discomfort after the nausea and vomiting, and had about 2-3 episodes of diarrhea, that was nonbloody and not explosive or foul smelling.  No recent travel, sick contacts that patient is aware of, no recent antibiotic use, or changes in diet.  Pt is unable to tolerate PO.  Pt has not had fever or chills, chest pain, sob, dysurea, or frequency.  Friend of patient reports that patient did have some shaking episodes that they were concerned may have been seizures, but pt has no h/o seizures, and did not have any tongue biting, urinary or fecal incontinence.  RN also noticed an episode in ED, but it was not thought to be seizure like, and pt was responsive right away.  Patient had been on eliquis pre-operatively.    In the ED, d-dimer was elevated, and CTA revealed PE, pt was evaluated by Vascular, and a heparin gtt was started. (31 Jan 2025 09:21)      ---  HOSPITAL COURSE: Patient admitted to medicine floor for management of acute PE  AND RT DVT . Pt started on heparin gtt. Vascular surgery was consulted. Cardio was consulted and recommended TTE. Neuro was consulted given possible seizure activity and recommended MRI and EEG. Heme/onc was consulted and recommended following CBC with outpatient follow up for thrombophlebitis.   Pt was refusing to stay in the hospital and was declining further testing including TTE, MRI, eeg, lab draws. Pt was requesting to leave the hospital and left AMA. All risks and benefits were discussed with the pt. He is aware and has full mental capacity to make the decision. Pt was advised to follow up within 1-2 days with his PCP, vascular surgeon and pain management doctor. Pt was told to stop taking home Plavix and continue taking aspirin 81mg and Eliquis 10mg BID for 7 days followed by Eliquis 5mg BID. Pt advised to continue home metformin and atorvastatin. Pt's wife came to pick him up.     Pt seen and examined on day of discharge. Patient is medically optimized for discharge to home with close outpatient followup.    PHYSICAL EXAM ON DAY OF  AMA       T(C): 36.9 (02-01-25 @ 05:04), Max: 36.9 (02-01-25 @ 05:04)  HR: 82 (02-01-25 @ 05:04) (75 - 91)  BP: 151/77 (02-01-25 @ 05:04) (136/79 - 151/77)  RR: 17 (02-01-25 @ 05:04) (16 - 17)  SpO2: 97% (02-01-25 @ 05:04) (95% - 97%)    GENERAL: patient appears well,  EYES: sclera clear, no exudates  ENMT:  moist mucous membranes  NECK: supple, soft,  LUNGS:  breath sounds, no wheezing or rhonchi   HEART: soft S1/S2, regular rate and rhythm, no murmurs   no lower extremity edema  GASTROINTESTINAL: abdomen is soft, nontender, nondistended  , bowel sounds  MUSCULOSKELETAL: no clubbing or cyanosis, no obvious deformity  NEUROLOGIC: awake, alert, oriented x3, good muscle tone in 4 extremities, no obvious sensory deficits          ---  CONSULTANTS:   Vascular Surgery: Dr. Patterson  Heme/Onc: Dr. Fajardo  Pulm: Dr. Randall   Neuro: Dr. Parmar  Psych: Dr. Triplett     ---  ADVANCED CARE PLANNING:  - Code status:    Full code  - MOLST completed:      [ x ] NO     [  ] YES      ---  TIME SPENT:  I, the attending physician, was physically present for the key portions of the evaluation and management (E/M) service provided. The total amount of time spent reviewing the hospital notes, laboratory values, imaging findings, assessing/counseling the patient, discussing with consultant physicians, social work, nursing staff was --40 minutes    ---

## 2025-04-28 NOTE — DIETITIAN INITIAL EVALUATION ADULT - NSICDXPASTMEDICALHX_GEN_ALL_CORE_FT
Robert from [facility] faxed over a standard written order form to replace the pts o2. Robert wants to verify with nurse that form was received as he faxed it over twice on today (4/28/25) and last Thursday (4/24/25). Pls verify with Robert at [phone]   PAST MEDICAL HISTORY:  Diabetes     Migraine     Neuropathy     PVD (peripheral vascular disease)     Traumatic brain injury

## 2025-05-08 ENCOUNTER — RESULT REVIEW (OUTPATIENT)
Age: 55
End: 2025-05-08

## 2025-05-08 ENCOUNTER — TRANSCRIPTION ENCOUNTER (OUTPATIENT)
Age: 55
End: 2025-05-08

## 2025-05-15 ENCOUNTER — TRANSCRIPTION ENCOUNTER (OUTPATIENT)
Age: 55
End: 2025-05-15

## 2025-06-16 LAB
CULTURE RESULTS: SIGNIFICANT CHANGE UP
SPECIMEN SOURCE: SIGNIFICANT CHANGE UP

## (undated) DEVICE — Device

## (undated) DEVICE — PLV-SCD MACHINE: Type: DURABLE MEDICAL EQUIPMENT

## (undated) DEVICE — GLV 6.5 PROTEXIS (WHITE)

## (undated) DEVICE — DRSG TEGADERM 4 X 4.75"

## (undated) DEVICE — STOPCOCK HIGH PRESS 3 WAY

## (undated) DEVICE — PREP CHLORAPREP HI-LITE ORANGE 26ML

## (undated) DEVICE — INFLATOR ENCORE 26

## (undated) DEVICE — PACK MINOR NO DRAPE

## (undated) DEVICE — SYR LUER LOK 10CC

## (undated) DEVICE — DRSG CURITY GAUZE SPONGE 4 X 4" 12-PLY

## (undated) DEVICE — SUCTION CATH ARGYLE WHISTLE TIP 14FR STRAIGHT PACKED

## (undated) DEVICE — SYR LUER LOK 20CC

## (undated) DEVICE — VALVE CONTROL COPILOT BLEEDBACK

## (undated) DEVICE — BAG DECANTER IV STERILE

## (undated) DEVICE — DRAPE TOWEL BLUE 17" X 24"

## (undated) DEVICE — DRAPE SPLIT SHEET 77" X 108"

## (undated) DEVICE — FOLEY TRAY 16FR 5CC LTX UMETER CLOSED

## (undated) DEVICE — MEDICATION LABELS W MARKER

## (undated) DEVICE — STAPLER SKIN PROXIMATE

## (undated) DEVICE — VISITEC 4X4

## (undated) DEVICE — PACK CARD CATH CUSTOM

## (undated) DEVICE — FOLEY HOLDER STATLOCK 2 WAY ADULT

## (undated) DEVICE — TUBING HI PRESURE NONBRAID M/F 72"

## (undated) DEVICE — LAP PAD 18 X 18"

## (undated) DEVICE — WARMING BLANKET LOWER ADULT

## (undated) DEVICE — TAPE GLO-N-TELL RADIOPAQUE 20 STRIPS

## (undated) DEVICE — MARKING PEN W RULER

## (undated) DEVICE — DRAPE ULTRASOUND PROBE COVER W ULTRA GEL 18X120CM

## (undated) DEVICE — SPECIMEN CONTAINER 100ML

## (undated) DEVICE — SOL IRR POUR NS 0.9% 500ML

## (undated) DEVICE — TORQUE DEVICE FOR GUIDEWIRE 0.0100.038"

## (undated) DEVICE — SOL IRR POUR H2O 250ML

## (undated) DEVICE — SYR LUER LOK 3CC

## (undated) DEVICE — SYR ASEPTO

## (undated) DEVICE — DRSG TEGADERM 4X4.75"

## (undated) DEVICE — TUBING SUCTION 20FT

## (undated) DEVICE — BLADE SCALPEL SAFETY #11 WITH PLASTIC GREEN HANDLE

## (undated) DEVICE — DRAPE FEMORAL ANGIOGRAPHY W TROUGH

## (undated) DEVICE — GOWN TRIMAX LG

## (undated) DEVICE — ELCTR BOVIE PENCIL HANDPIECE

## (undated) DEVICE — ELCTR BOVIE PENCIL SMOKE EVACUATION

## (undated) DEVICE — DRAPE 3/4 SHEET W REINFORCEMENT 56X77"

## (undated) DEVICE — DRSG DERMABOND 0.7ML

## (undated) DEVICE — SUCTION YANKAUER NO CONTROL VENT

## (undated) DEVICE — WARMING BLANKET UPPER ADULT

## (undated) DEVICE — ONCORE 26 INSUFLATION DEVICE

## (undated) DEVICE — NDL COUNTER FOAM AND MAGNET 40-70

## (undated) DEVICE — POSITIONER FOAM EGG CRATE ULNAR 2PCS (PINK)

## (undated) DEVICE — STAPLER SKIN VISI-STAT 35 WIDE

## (undated) DEVICE — VENODYNE/SCD SLEEVE CALF MEDIUM